# Patient Record
Sex: FEMALE | Race: BLACK OR AFRICAN AMERICAN | NOT HISPANIC OR LATINO | Employment: UNEMPLOYED | URBAN - METROPOLITAN AREA
[De-identification: names, ages, dates, MRNs, and addresses within clinical notes are randomized per-mention and may not be internally consistent; named-entity substitution may affect disease eponyms.]

---

## 2017-01-05 ENCOUNTER — TELEPHONE (OUTPATIENT)
Dept: INTERNAL MEDICINE | Facility: CLINIC | Age: 32
End: 2017-01-05

## 2017-01-05 NOTE — TELEPHONE ENCOUNTER
Called patient and discussed labs and or test results. Patient expressed understanding and had the opportunity to ask questions. Any questions were answered. See meds, orders, follow up and instructions sections of encounter.    Specific issues include:  Low FE - see Rx (take w/ vit C)    FU w/ PCP in 3 months

## 2017-01-12 ENCOUNTER — HOSPITAL ENCOUNTER (OUTPATIENT)
Dept: RADIOLOGY | Facility: OTHER | Age: 32
Discharge: HOME | End: 2017-01-12
Attending: OBSTETRICS & GYNECOLOGY
Payer: MEDICAID

## 2017-01-12 ENCOUNTER — OFFICE VISIT (OUTPATIENT)
Dept: OBSTETRICS AND GYNECOLOGY | Facility: CLINIC | Age: 32
End: 2017-01-12
Attending: OBSTETRICS & GYNECOLOGY
Payer: MEDICAID

## 2017-01-12 VITALS
WEIGHT: 123.25 LBS | DIASTOLIC BLOOD PRESSURE: 68 MMHG | BODY MASS INDEX: 20.54 KG/M2 | HEIGHT: 65 IN | SYSTOLIC BLOOD PRESSURE: 108 MMHG

## 2017-01-12 DIAGNOSIS — N83.209 CYST OF OVARY, UNSPECIFIED LATERALITY: ICD-10-CM

## 2017-01-12 DIAGNOSIS — Z30.2 STERILIZATION: Primary | ICD-10-CM

## 2017-01-12 PROCEDURE — 99213 OFFICE O/P EST LOW 20 MIN: CPT | Mod: S$PBB,,, | Performed by: OBSTETRICS & GYNECOLOGY

## 2017-01-12 PROCEDURE — 99999 PR PBB SHADOW E&M-EST. PATIENT-LVL III: CPT | Mod: PBBFAC,,, | Performed by: OBSTETRICS & GYNECOLOGY

## 2017-01-12 NOTE — MR AVS SNAPSHOT
[facility] - OB/GYN [address]  [address]  [facility] [address]  Phone: [phone]  Fax: [phone]                  Clayton Duarte   2017 9:30 AM   Office Visit    Description:  Female : 1985   Provider:  Ira Santos MD   Department:  [facility] - OB/GYN [address]           Reason for Visit     Follow-up                To Do List           Future Appointments        Provider Department Dept Phone    2017 9:30 AM Ira Santos MD [facility] OB/GYN [address] [phone]    2017 10:30 AM [facility] USOP2 [facility] [phone]    2017 9:00 AM Lisa Aguayo MD [facility] Cardiology [phone]      Goals (5 Years of Data)     None      [facility]     [facility] Nurse Care [facility] -  Assistance  Registered nurses in the [facility] provide clinical advisement, health education, appointment booking, and other advisory services.  Call for this free service at [phone].             Medications           Message regarding Medications     Verify the changes and/or additions to your medication regime listed below are the same as discussed with your clinician today.  If any of these changes or additions are incorrect, please notify your healthcare provider.             Verify that the below list of medications is an accurate representation of the medications you are currently taking.  If none reported, the list may be blank. If incorrect, please contact your healthcare provider. Carry this list with you in case of emergency.           Current Medications     aspirin 81 MG Chew Take 81 mg by mouth once daily.    ferrous gluconate (FERGON) 325 MG Tab Take 1 tablet (325 mg total) by mouth 2 (two) times daily with meals.    fluticasone (FLONASE) 50 mcg/actuation nasal spray 2 sprays by Each Nare route once daily.    ibuprofen (ADVIL,MOTRIN) 600 MG tablet Take 1 tablet (600 mg total) by mouth every 6 (six) hours  "as needed for Pain.    naproxen sodium (ANAPROX) 550 MG tablet Take 1 tablet (550 mg total) by mouth 2 (two) times daily with meals.           Clinical Reference Information           Vital Signs - Last Recorded  Most recent update: 1/12/2017  9:24 AM by Maki Mendez MA    BP Ht Wt LMP BMI    108/68 5' 5" (1.651 m) 55.9 kg (123 lb 3.8 oz) 01/01/2017 20.51 kg/m2      Blood Pressure          Most Recent Value    BP  108/68      Allergies as of 1/12/2017     Bananas [Banana]    Peanut Butter Flavor      Immunizations Administered on Date of Encounter - 1/12/2017     None      "

## 2017-01-13 ENCOUNTER — TELEPHONE (OUTPATIENT)
Dept: OBSTETRICS AND GYNECOLOGY | Facility: CLINIC | Age: 32
End: 2017-01-13

## 2017-01-13 ENCOUNTER — PATIENT MESSAGE (OUTPATIENT)
Dept: CARDIOLOGY | Facility: CLINIC | Age: 32
End: 2017-01-13

## 2017-01-13 DIAGNOSIS — Z01.818 PREOPERATIVE CLEARANCE: Primary | ICD-10-CM

## 2017-01-13 NOTE — PROGRESS NOTES
Subjective:       Patient ID: Clayton Duarte is a 31 y.o. female.    Chief Complaint:  Follow-up (ER)      History of Present Illness  HPI  Patient presents today for follow up from ED. She went to ED with irregular bleeding and her IUD was removed. An ultrasound was done which showed an ovarian cyst. Patient does not report any pain or any irregular bleeding now. Up until this visit to ED she had not had any problems with her IUD. Patient desires BTL.     GYN & OB History  Patient's last menstrual period was 2017.   Date of Last Pap: 2016    OB History    Para Term  AB SAB TAB Ectopic Multiple Living   3 3 3       3      # Outcome Date GA Lbr Jose Alberto/2nd Weight Sex Delivery Anes PTL Lv   3 Term 13 39w2d 103:30 / 00:23 3.025 kg (6 lb 10.7 oz) F Vag-Spont EPI N Y      Birth Comments: 28    2 Term 09 39w0d 10:00 3.629 kg (8 lb) M Vag-Spont EPI N Y   1 Term 06 39w0d 09:00 3.033 kg (6 lb 11 oz) F Vag-Spont EPI N Y          Review of Systems  Review of Systems    ROS:  Constitutional: no weight loss, weight gain, fever, fatigue  Eyes:  No vision changes, glasses/contacts  ENT/Mouth: No ulcers, sinus problems, ears ringing, headache  Cardiovascular: No inability to lie flat, chest pain, exercise intolerance, swelling, heart palpitations  Respiratory: No wheezing, coughing blood, shortness of breath, or cough  Gastrointestinal: No diarrhea, bloody stool, nausea/vomiting, constipation, gas, hemorrhoids  Genitourinary: No blood in urine, painful urination, urgency of urination, frequency of urination, incomplete emptying, incontinence, abnormal bleeding, painful periods, heavy periods, vaginal discharge, vaginal odor, painful intercourse, sexual problems, bleeding after intercourse.  Musculoskeletal: No muscle weakness  Skin/Breast: No painful breasts, nipple discharge, masses, rash, ulcers  Neurological: No passing out, seizures, numbness, headache  Endocrine: No diabetes,  hypothyroid, hyperthyroid, hot flashes, hair loss, abnormal hair growth, ance  Psychiatric: No depression, crying  Hematologic: No bruises, bleeding, swollen lymph nodes, anemia.       Objective:    Physical Exam     General- well developed, well nourished  Vulva- no masses, no lesions  Vagina-  no masses, no lesions  Cervix- no Cervical motion tenderness, no lesions  Uterus- normal size, nontender  Adnexa- nontender, no masses    Assessment:   1. Ovarian cyst  2. Contraceptive counseling     3. Sterilization           Plan:      Patient will need medical clearance  Lap BTL scheduled  Follow up ultrasound

## 2017-01-13 NOTE — TELEPHONE ENCOUNTER
----- Message from Ira Santos MD sent at 1/13/2017  2:33 PM CST -----  Looks like she will need to see someone else to get cleared. She will need to call and get an appointment. Please let her know.  ----- Message -----     From: Lisa Aguayo MD     Sent: 1/13/2017   8:39 AM       To: Ira Santos MD, #    Tish,  She hasnt been seen in over 1.5 years. She will need an appointment with whoever is available.  Thanks,  Lisa  ----- Message -----     From: Ira Santos MD     Sent: 1/12/2017   6:05 PM       To: Lisa Aguayo MD    This patient's IUD was removed in the ED. She desires permanent sterilization with Laparoscopic BTL. Can you clear her for surgery? She is scheduled for January 25 at [facility]. This will be outpatient surgery and the procedure should take less than 1 hour.   Best,  Colleen Santos MD  GYN

## 2017-01-13 NOTE — TELEPHONE ENCOUNTER
Advised pt needs to be seen by someone sooner to be cleared for surgery. Pt states she will call the office to schedule

## 2017-01-19 ENCOUNTER — HOSPITAL ENCOUNTER (OUTPATIENT)
Dept: CARDIOLOGY | Facility: CLINIC | Age: 32
Discharge: HOME | End: 2017-01-19
Payer: MEDICAID

## 2017-01-19 ENCOUNTER — HOSPITAL ENCOUNTER (OUTPATIENT)
Dept: RADIOLOGY | Facility: OTHER | Age: 32
Discharge: HOME | End: 2017-01-19
Attending: OBSTETRICS & GYNECOLOGY
Payer: MEDICAID

## 2017-01-19 ENCOUNTER — OFFICE VISIT (OUTPATIENT)
Dept: OBSTETRICS AND GYNECOLOGY | Facility: CLINIC | Age: 32
End: 2017-01-19
Attending: OBSTETRICS & GYNECOLOGY
Payer: MEDICAID

## 2017-01-19 ENCOUNTER — TELEPHONE (OUTPATIENT)
Dept: OBSTETRICS AND GYNECOLOGY | Facility: CLINIC | Age: 32
End: 2017-01-19

## 2017-01-19 ENCOUNTER — OFFICE VISIT (OUTPATIENT)
Dept: CARDIOLOGY | Facility: CLINIC | Age: 32
End: 2017-01-19
Payer: MEDICAID

## 2017-01-19 ENCOUNTER — HOSPITAL ENCOUNTER (OUTPATIENT)
Dept: PREADMISSION TESTING | Facility: OTHER | Age: 32
Discharge: HOME | End: 2017-01-19
Attending: OBSTETRICS & GYNECOLOGY
Payer: MEDICAID

## 2017-01-19 VITALS
WEIGHT: 123 LBS | SYSTOLIC BLOOD PRESSURE: 98 MMHG | HEIGHT: 65 IN | BODY MASS INDEX: 20.49 KG/M2 | DIASTOLIC BLOOD PRESSURE: 64 MMHG

## 2017-01-19 VITALS
DIASTOLIC BLOOD PRESSURE: 65 MMHG | OXYGEN SATURATION: 100 % | BODY MASS INDEX: 20.49 KG/M2 | HEIGHT: 65 IN | SYSTOLIC BLOOD PRESSURE: 110 MMHG | TEMPERATURE: 98 F | WEIGHT: 123 LBS | HEART RATE: 83 BPM

## 2017-01-19 VITALS
SYSTOLIC BLOOD PRESSURE: 112 MMHG | DIASTOLIC BLOOD PRESSURE: 63 MMHG | BODY MASS INDEX: 20.64 KG/M2 | HEART RATE: 92 BPM | HEIGHT: 65 IN | WEIGHT: 123.88 LBS

## 2017-01-19 DIAGNOSIS — Z95.1 S/P CABG (CORONARY ARTERY BYPASS GRAFT): ICD-10-CM

## 2017-01-19 DIAGNOSIS — G89.18 POST-OP PAIN: Primary | ICD-10-CM

## 2017-01-19 DIAGNOSIS — G89.18 POST-OP PAIN: ICD-10-CM

## 2017-01-19 DIAGNOSIS — I25.42 CORONARY ARTERY DISSECTION: ICD-10-CM

## 2017-01-19 DIAGNOSIS — Z01.818 PREOPERATIVE CLEARANCE: ICD-10-CM

## 2017-01-19 DIAGNOSIS — Z01.818 PREOPERATIVE CLEARANCE: Primary | ICD-10-CM

## 2017-01-19 DIAGNOSIS — Z30.2 STERILIZATION: ICD-10-CM

## 2017-01-19 PROCEDURE — 93010 ELECTROCARDIOGRAM REPORT: CPT | Mod: S$PBB,,, | Performed by: INTERNAL MEDICINE

## 2017-01-19 PROCEDURE — 76830 TRANSVAGINAL US NON-OB: CPT | Mod: 26,,, | Performed by: RADIOLOGY

## 2017-01-19 PROCEDURE — 99999 PR PBB SHADOW E&M-EST. PATIENT-LVL II: CPT | Mod: PBBFAC,,, | Performed by: OBSTETRICS & GYNECOLOGY

## 2017-01-19 PROCEDURE — 99213 OFFICE O/P EST LOW 20 MIN: CPT | Mod: PBBFAC | Performed by: INTERNAL MEDICINE

## 2017-01-19 PROCEDURE — 76856 US EXAM PELVIC COMPLETE: CPT | Mod: 26,,, | Performed by: RADIOLOGY

## 2017-01-19 PROCEDURE — 99999 PR PBB SHADOW E&M-EST. PATIENT-LVL III: CPT | Mod: PBBFAC,,, | Performed by: INTERNAL MEDICINE

## 2017-01-19 PROCEDURE — 93005 ELECTROCARDIOGRAM TRACING: CPT | Mod: PBBFAC | Performed by: INTERNAL MEDICINE

## 2017-01-19 PROCEDURE — 99214 OFFICE O/P EST MOD 30 MIN: CPT | Mod: S$PBB,,, | Performed by: INTERNAL MEDICINE

## 2017-01-19 PROCEDURE — 99213 OFFICE O/P EST LOW 20 MIN: CPT | Mod: S$PBB,,, | Performed by: OBSTETRICS & GYNECOLOGY

## 2017-01-19 RX ORDER — FAMOTIDINE 20 MG/1
20 TABLET, FILM COATED ORAL
Status: CANCELLED | OUTPATIENT
Start: 2017-01-19 | End: 2017-01-19

## 2017-01-19 RX ORDER — NAPROXEN SODIUM 550 MG/1
TABLET ORAL
Qty: 20 TABLET | Refills: 0
Start: 2017-01-19 | End: 2017-02-24

## 2017-01-19 RX ORDER — SODIUM CHLORIDE, SODIUM LACTATE, POTASSIUM CHLORIDE, CALCIUM CHLORIDE 600; 310; 30; 20 MG/100ML; MG/100ML; MG/100ML; MG/100ML
INJECTION, SOLUTION INTRAVENOUS CONTINUOUS
Status: CANCELLED | OUTPATIENT
Start: 2017-01-19

## 2017-01-19 RX ORDER — PREGABALIN 75 MG/1
150 CAPSULE ORAL ONCE
Status: CANCELLED | OUTPATIENT
Start: 2017-01-19 | End: 2017-01-19

## 2017-01-19 RX ORDER — NAPROXEN SODIUM 550 MG/1
TABLET ORAL
Qty: 20 TABLET | Refills: 0 | Status: SHIPPED | OUTPATIENT
Start: 2017-01-19 | End: 2017-01-19

## 2017-01-19 RX ORDER — MIDAZOLAM HYDROCHLORIDE 5 MG/ML
3 INJECTION INTRAMUSCULAR; INTRAVENOUS ONCE AS NEEDED
Status: CANCELLED | OUTPATIENT
Start: 2017-01-19 | End: 2017-01-19

## 2017-01-19 RX ORDER — ONDANSETRON 4 MG/1
8 TABLET, ORALLY DISINTEGRATING ORAL EVERY 8 HOURS PRN
Status: CANCELLED | OUTPATIENT
Start: 2017-01-19

## 2017-01-19 RX ORDER — LIDOCAINE HYDROCHLORIDE 10 MG/ML
1 INJECTION, SOLUTION EPIDURAL; INFILTRATION; INTRACAUDAL; PERINEURAL ONCE
Status: CANCELLED | OUTPATIENT
Start: 2017-01-19 | End: 2017-01-19

## 2017-01-19 RX ORDER — IBUPROFEN 200 MG
800 TABLET ORAL EVERY 8 HOURS
Status: CANCELLED | OUTPATIENT
Start: 2017-01-20

## 2017-01-19 RX ORDER — KETOROLAC TROMETHAMINE 30 MG/ML
30 INJECTION, SOLUTION INTRAMUSCULAR; INTRAVENOUS EVERY 6 HOURS
Status: CANCELLED | OUTPATIENT
Start: 2017-01-19 | End: 2017-01-20

## 2017-01-19 NOTE — TELEPHONE ENCOUNTER
----- Message from Sharon Negrete sent at 1/19/2017 10:26 AM CST -----  Contact: [facility]  _x  1st Request  _  2nd Request  _  3rd Request        Who: [facility]    Why:states they need clarification on the dosage of Rx naproxen sodium (ANAPROX DS) 550 MG tablet for the patient    What Number to Call Back: [phone]    When to Expect a call back: (Before the end of the day)   -- if call after 3:00 call back will be tomorrow.

## 2017-01-19 NOTE — DISCHARGE INSTRUCTIONS
PRE-ADMIT TESTING -  [phone]    [address]  [facility]        OUTPATIENT SURGERY UNIT - [phone]    Your surgery has been scheduled at [facility]. We are pleased to have the opportunity to serve you. For Further Information please call [phone].    On the day of surgery please report to the Information Desk on the 1st floor.    CONTACT YOUR PHYSICIAN'S OFFICE THE DAY PRIOR TO YOUR SURGERY TO OBTAIN YOUR ARRIVAL TIME.     The evening before surgery do not eat anything after 9 p.m. ( this includes hard candy, chewing gum and mints).  You may have GATORADE, POWERADE AND WATER FROM 9 p.m. until leaving home to come to the hospital.   DO NOT DRINK ANY LIQUIDS ON THE WAY TO THE HOSPITAL.     SPECIAL MEDICATION INSTRUCTIONS: TAKE medications checked off by the Anesthesiologist on your Medication List.    Angiogram Patients: Take medications as instructed by your physician, including aspirin.     Surgery Patients:    If you take ASPIRIN - Your PHYSICIAN/SURGEON will need to inform you IF/OR when you need to stop taking aspirin prior to your surgery.     Do Not take any medications containing IBUPROFEN.  Do Not Wear any make-up or dark nail polish   (especially eye make-up) to surgery. If you come to surgery with makeup on you will be required to remove the makeup or nail polish.    Do not shave your surgical area at least 5 days prior to your surgery. The surgical prep will be performed at the hospital according to Infection Control regulations.    Leave all valuables at home.   Do Not wear any jewelry or watches, including any metal in body piercings.  Contact Lens must be removed before surgery. Either do not wear the contact lens or bring a case and solution for storage.  Please bring a container for eyeglasses or dentures as required.  Bring any paperwork your physician has provided, such as consent forms,  history and physicals, doctor's orders, etc.   Bring comfortable  clothes that are loose fitting to wear upon discharge. Take into consideration the type of surgery being performed.  Maintain your diet as advised per your physician the day prior to surgery.      Adequate rest the night before surgery is advised.   Park in the Parking lot behind the hospital or in the [facility] across the street from the parking lot. Parking is complimentary.  If you will be discharged the same day as your procedure, please arrange for a responsible adult to drive you home or to accompany you if traveling by taxi.   YOU WILL NOT BE PERMITTED TO DRIVE OR TO LEAVE THE HOSPITAL ALONE AFTER SURGERY.   It is strongly recommended that you arrange for someone to remain with you for the first 24 hrs following your surgery.       Thank you for your cooperation.  The Staff of [facility].        Bathing Instructions                                                                 Please shower the evening before and morning of your procedure with    ANTIBACTERIAL SOAP. ( DIAL, etc )  Concentrate on the surgical area   for at least 3 minutes and rinse completely. Dry off as usual.                            No lotions or creams.

## 2017-01-19 NOTE — PROGRESS NOTES
"Subjective:       Patient ID: Clayton Duarte is a 31 y.o. female.    Chief Complaint: Pre-op Exam (tubaligation)    HPI   Ms. Duarte is a 31 year old female with a past medical history of spontaneous LAD/LCx dissection s/p 2xCABG (LIMA to LAD and SVG to OM1) who presents for preop clearance. The patient states that she is to undergo a tubal ligation on February 10th. She states that she has chronic SOB, with occasional chest pressure and palpitations since her surgery. She stated that she is unable to climb a flight of stairs or walk a block without being significantly out of breath. She denies any orthopnea, LE edema or PND. She had a treadmill stress echo in 2014 that did not identify any stress related wall motion abnormalities, but she also did not reach target heart rate. After further questioning, it does seem that the patient is not very active and does not have a regular exercise regimen. She was not referred to cardiac rehab after her surgery.    Review of Systems   Constitutional: Negative for activity change, chills, fatigue and fever.   HENT: Negative.    Respiratory: Positive for shortness of breath. Negative for cough.    Cardiovascular: Positive for chest pain and palpitations. Negative for leg swelling.   Gastrointestinal: Negative for abdominal distention, diarrhea, nausea and vomiting.   Genitourinary: Negative for difficulty urinating, dysuria and urgency.   Musculoskeletal: Negative for arthralgias and myalgias.   Skin: Negative for color change and rash.   Neurological: Negative for dizziness, weakness, light-headedness and numbness.       Objective:     Vitals:    01/19/17 1350   BP: 112/63   BP Location: Left arm   Patient Position: Sitting   BP Method: Automatic   Pulse: 92   Weight: 56.2 kg (123 lb 14.4 oz)   Height: 5' 5" (1.651 m)       Physical Exam   Constitutional: Vital signs are normal. She appears well-developed and well-nourished. She is active and cooperative.  Non-toxic " appearance. No distress.   HENT:   Head: Normocephalic and atraumatic.   Mouth/Throat: Uvula is midline, oropharynx is clear and moist and mucous membranes are normal.   Eyes: Conjunctivae and lids are normal. Pupils are equal, round, and reactive to light.   Neck: Normal carotid pulses and no JVD present. Carotid bruit is not present.   Cardiovascular: Normal rate, regular rhythm, S1 normal, S2 normal, normal heart sounds and normal pulses.  Exam reveals no gallop.    No murmur heard.  Pulmonary/Chest: Effort normal and breath sounds normal. No accessory muscle usage. No respiratory distress. She has no decreased breath sounds. She has no wheezes. She has no rhonchi. She has no rales.   Abdominal: Soft. Bowel sounds are normal. She exhibits no distension and no mass. There is no tenderness.   Neurological: She is alert.   Skin: Skin is warm, dry and intact.       Assessment:       1. Preoperative clearance    2. Coronary artery dissection    3. Post-op pain    4. S/P CABG (coronary artery bypass graft)        Plan:     1.) Preoperative clearance  --patient does have dyspnea on exertion and occasional anginal chest pains  --will order PET stress to determine cardiac perfusion and determine if there are any defects as her last stress test was negative, but she failed to reach target heart rate    2.) Coronary Artery Dissection  --s/p CABG  --patient is very deconditioned, so will refer to cardiac rehab    3.) Post-op pain  --during the intake, her medications were reconciled and the naproxen was discontinued accidentally.  --I replaced the prescription for naproxen within the computer (I did not send it electronically to any pharmacy) under this diagnosis and called the pharmacy to ensure that the prescription was still present for the patient to .    Discussed with Dr. Debbi Crum MD  Cardiology PGY4    ADDENDUM: PET Scan will not be covered by the patient's insurance. Will order SPECT stress  instead.

## 2017-01-19 NOTE — MR AVS SNAPSHOT
[facility] OB/GYN [address]  [address]  [place] [address]  Phone: [phone]  Fax: [phone]                  Clayton Duarte   2017 8:45 AM   Office Visit    Description:  Female : 1985   Provider:  Ira Santos MD   Department:  [facility] OB/GYN [address]           Reason for Visit     Pre-op Exam                To Do List           Future Appointments        Provider Department Dept Phone    2017 9:30 AM PRE-ADMIT, [facility] [phone]    2017 12:00 PM [facility] USOP2 [facility] [phone]    2017 2:00 PM EKG, APPT [facility] [phone]    2017 3:00 PM lAexandra Crum MD [facility] Cardiology [phone]    2017 9:00 AM Lisa Aguayo MD [facility] Cardiology [phone]      Your Future Surgeries/Procedures     2017   Surgery with Ira Santos MD   [facility] ([facility])    [address]  [facility] [place] [facility] [address] [phone]              Goals (5 Years of Data)     None      [facility]     [facility] Nurse Care Line -  Assistance  Registered nurses in the [facility] provide clinical advisement, health education, appointment booking, and other advisory services.  Call for this free service at [phone].             Medications           Message regarding Medications     Verify the changes and/or additions to your medication regime listed below are the same as discussed with your clinician today.  If any of these changes or additions are incorrect, please notify your healthcare provider.             Verify that the below list of medications is an accurate representation of the medications you are currently taking.  If none reported, the list may be blank. If incorrect, please contact your healthcare provider. Carry this list with you in case of emergency.           Current Medications      "aspirin 81 MG Chew Take 81 mg by mouth once daily.    ferrous gluconate (FERGON) 325 MG Tab Take 1 tablet (325 mg total) by mouth 2 (two) times daily with meals.    fluticasone (FLONASE) 50 mcg/actuation nasal spray 2 sprays by Each Nare route once daily.    ibuprofen (ADVIL,MOTRIN) 600 MG tablet Take 1 tablet (600 mg total) by mouth every 6 (six) hours as needed for Pain.    naproxen sodium (ANAPROX) 550 MG tablet Take 1 tablet (550 mg total) by mouth 2 (two) times daily with meals.           Clinical Reference Information           Vital Signs - Last Recorded  Most recent update: 1/19/2017  8:58 AM by Maki Mendez MA    BP Ht Wt LMP BMI    98/64 5' 5" (1.651 m) 55.8 kg (123 lb 0.3 oz) 01/01/2017 20.47 kg/m2      Blood Pressure          Most Recent Value    BP  98/64      Allergies as of 1/19/2017     Bananas [Banana]    Peanut Butter Flavor      Immunizations Administered on Date of Encounter - 1/19/2017     None      "

## 2017-01-19 NOTE — PROGRESS NOTES
"          History & Physical            OBGYN    C.C Pre-op Exam      HPI : Clayton Duarte is a 31 y.o. female  for evaluation and discussion of treatment options for Pre-op Exam  Patient desires permanent sterilization. She signed papers at last visit on 2017.  .     Past Medical History   Diagnosis Date    Abnormal Pap smear      LGSIL , ASCUS 2009    Abnormal Pap smear of cervix     Anemia     Coronary artery dissection 2013    History of Abnormal Pap smear     Postpartum depression     Spinal headache complicating labor and delivery, postpartum condition 2013     Past Surgical History   Procedure Laterality Date    [place] tooth extraction      Coronary artery bypass graft       tanner to lad, svg OM1    Cardiac surgery       Family History   Problem Relation Age of Onset    Cancer Neg Hx     Heart attack Neg Hx     Heart disease Neg Hx     Breast cancer Neg Hx     Ovarian cancer Neg Hx     Colon cancer Neg Hx      Social History   Substance Use Topics    Smoking status: Never Smoker    Smokeless tobacco: Never Used    Alcohol use No     OB History    Para Term  AB SAB TAB Ectopic Multiple Living   3 3 3       3      # Outcome Date GA Lbr Jose Alberto/2nd Weight Sex Delivery Anes PTL Lv   3 Term 13 39w2d 103:30 / 00:23 3.025 kg (6 lb 10.7 oz) F Vag-Spont EPI N Y      Birth Comments: 28    2 Term 09 39w0d 10:00 3.629 kg (8 lb) M Vag-Spont EPI N Y   1 Term 06 39w0d 09:00 3.033 kg (6 lb 11 oz) F Vag-Spont EPI N Y          Visit Vitals    BP 98/64    Ht 5' 5" (1.651 m)    Wt 55.8 kg (123 lb 0.3 oz)    LMP 2017    BMI 20.47 kg/m2       ROS:    GENERAL: Denies weight gain or weight loss. Feeling well overall.   SKIN: Denies rash or lesions.   HEAD: Denies head injury or headache.   NODES: Denies enlarged lymph nodes.   CHEST: Denies chest pain or shortness of breath.   CARDIOVASCULAR: Denies palpitations or left sided chest pain. "   ABDOMEN: No abdominal pain, constipation, diarrhea, nausea, vomiting or rectal bleeding.   URINARY: No frequency, dysuria, hematuria, or burning on urination.  REPRODUCTIVE: See HPI.   BREASTS: The patient performs breast self-examination and denies pain, lumps, or nipple discharge.   HEMATOLOGIC: No easy bruisability or excessive bleeding with the exception of menstrual cycles.  MUSCULOSKELETAL: Denies joint pain or swelling.   NEUROLOGIC: Denies syncope or weakness.   PSYCHIATRIC: Denies depression, anxiety or mood swings.    PHYSICAL EXAM:    APPEARANCE: Well nourished, well developed, in no acute distress.  AFFECT: WNL, alert and oriented x 3  SKIN: No acne or hirsutism  NECK: Neck symmetric without masses or thyromegaly  NODES: No inguinal, cervical, axillary, or femoral lymph node enlargement  CHEST: Good respiratory effect  ABDOMEN: Soft.  No tenderness or masses.  No hepatosplenomegaly.  No hernias.  PELVIC: Normal external genitalia without lesions.  Normal hair distribution.  Adequate perineal body, normal urethral meatus.  Vagina moist and well rugated without lesions or discharge.  Cervix pink, without lesions, discharge or tenderness.  No significant cystocele or rectocele.  Bimanual exam shows uterus to be normal size, regular, mobile and nontender.  Adnexa without masses or tenderness.    EXTREMITIES: No edema.    ASSESSMENT & PLAN:    1. Sterilization  2. Post op pain      I have discussed the risks, benefits, indications, and alternatives of the procedure in detail.  The patient verbalizes her understanding.  All questions answered.  Consents signed.  The patient agrees to proceed to proceed as planned.

## 2017-01-19 NOTE — IP AVS SNAPSHOT
[facility] [place] [facility] ([place])  [address] [place], [place] [address]  Phone: [phone]           Patient Discharge Instructions    Our goal is to set you up for success. This packet includes information on your condition, medications, and your home care. It will help you to care for yourself so you don't get sicker.     Please ask your nurse if you have any questions.        There are many details to remember when preparing for your surgery. Here is what you will need to do, please ask your nurse if there are more specific instructions and if you have any questions:    1. 24 hours before procedure Do not smoke or drink alcoholic beverages 24 hours prior to your procedure    2. Eating before procedure Do not eat or drink anything 8 hours before your procedure - this includes gum, mints, and candy.     3. Day of procedure Please remove all jewelry for the procedure. If you wear contact lenses, dentures, hearing aids or glasses, bring a container to put them in during your surgery and give to a family member for safekeeping.  If your doctor has scheduled you for an overnight stay, bring a small overnight bag with any personal items that you need.    4. After procedure Make arrangements in advance for transportation home by a responsible adult. It is not safe to drive a vehicle during the 24 hours following surgery.     PLEASE NOTE: You may be contacted the day before your surgery to confirm your surgery date and arrival time. The Surgery schedule has many variables which may affect the time of your surgery case. Family members should be available if your surgery time changes.                [facility]  Unless otherwise directed by your provider, please contact [facility], our nurse care line that is available for 24/7 assistance.     [phone] (toll-[phone])    Registered nurses in the [facility] provide clinical advisement, health education, appointment booking, and other  advisory services.                    ** Verify the list of medication(s) below is accurate and up to date. Carry this with you in case of emergency. If your medications have changed, please notify your healthcare provider.             Medication List      TAKE these medications        Additional Info                      ibuprofen 600 MG tablet   Commonly known as:  ADVIL,MOTRIN   Quantity:  60 tablet   Refills:  0   Dose:  600 mg    Instructions:  Take 1 tablet (600 mg total) by mouth every 6 (six) hours as needed for Pain.     Begin Date    AM    Noon    PM    Bedtime       * naproxen sodium 550 MG tablet   Commonly known as:  ANAPROX   Quantity:  14 tablet   Refills:  0   Dose:  550 mg    Instructions:  Take 1 tablet (550 mg total) by mouth 2 (two) times daily with meals.     Begin Date    AM    Noon    PM    Bedtime       * naproxen sodium 550 MG tablet   Commonly known as:  ANAPROX DS   Quantity:  20 tablet   Refills:  0    Instructions:  DO NOT TAKE OTHER ANTI-INFLAMMATORY AGENTS SUCH AS ASPIRIN, IBUPROPHEN, PAMPRIN, ETC. AT THE SAME TIME     Begin Date    AM    Noon    PM    Bedtime       * Notice:  This list has 2 medication(s) that are the same as other medications prescribed for you. Read the directions carefully, and ask your doctor or other care provider to review them with you.               Please bring to all follow up appointments:    1. A copy of your discharge instructions.  2. All medicines you are currently taking in their original bottles.  3. Identification and insurance card.    Please arrive 15 minutes ahead of scheduled appointment time.    Please call 24 hours in advance if you must reschedule your appointment and/or time.        Your Scheduled Appointments     Jan 19, 2017 12:00 PM CST   Us Non Ob with BAP USOP2   [facility]    [address]  [facility] [address]   [phone]            Jan 19, 2017  2:00 PM CST   EKG with EKG, APPT   [facility]    [address]  [facility] [address]   [phone]            Jan 19, 2017  3:00 PM CST   Established Patient Visit with MD Reginaldo Bess - Cardiology ([facility] )    [address]  [facility] [address]   [phone]            Feb 20, 2017  9:30 AM CST   Post OP with Ira Santos MD   [facility] - OB/GYN [address] ([facility])    [address]  [facility] [address]   [phone]            Feb 24, 2017  9:00 AM CST   Consult with MD Reginaldo Walsh - Cardiology ([facility] )    [address]  [place] [address] [phone]              Your Future Surgeries/Procedures     Jan 25, 2017   Surgery with Ira Santos MD   [facility] ([facility])    [address] [facility] [id number] [phone]                  Discharge Instructions       PRE-ADMIT TESTING -  [phone]    [address] [place] [address]        OUTPATIENT SURGERY UNIT - [phone]    Your surgery has been scheduled at [facility]. We are pleased to have the opportunity to serve you. For Further Information please call [phone].    On the day of surgery please report to the Information Desk on the 1st floor.    CONTACT YOUR PHYSICIAN'S OFFICE THE DAY PRIOR TO YOUR SURGERY TO OBTAIN YOUR ARRIVAL TIME.     The evening before surgery do not eat anything after 9 p.m. ( this includes hard candy, chewing gum and mints).  You may have GATORADE, POWERADE AND WATER FROM 9 p.m. until leaving home to come to the hospital.   DO NOT DRINK ANY LIQUIDS ON THE WAY TO THE HOSPITAL.     SPECIAL MEDICATION INSTRUCTIONS: TAKE medications checked off by the Anesthesiologist on your Medication List.    Angiogram Patients: Take medications as instructed by your physician, including aspirin.     Surgery Patients:    If you take ASPIRIN - Your PHYSICIAN/SURGEON will need to inform you IF/OR  when you need to stop taking aspirin prior to your surgery.     Do Not take any medications containing IBUPROFEN.  Do Not Wear any make-up or dark nail polish   (especially eye make-up) to surgery. If you come to surgery with makeup on you will be required to remove the makeup or nail polish.    Do not shave your surgical area at least 5 days prior to your surgery. The surgical prep will be performed at the hospital according to Infection Control regulations.    Leave all valuables at home.   Do Not wear any jewelry or watches, including any metal in body piercings.  Contact Lens must be removed before surgery. Either do not wear the contact lens or bring a case and solution for storage.  Please bring a container for eyeglasses or dentures as required.  Bring any paperwork your physician has provided, such as consent forms,  history and physicals, doctor's orders, etc.   Bring comfortable clothes that are loose fitting to wear upon discharge. Take into consideration the type of surgery being performed.  Maintain your diet as advised per your physician the day prior to surgery.      Adequate rest the night before surgery is advised.   Park in the Parking lot behind the hospital or in the [employer] [facility] across the street from the parking lot. Parking is complimentary.  If you will be discharged the same day as your procedure, please arrange for a responsible adult to drive you home or to accompany you if traveling by taxi.   YOU WILL NOT BE PERMITTED TO DRIVE OR TO LEAVE THE HOSPITAL ALONE AFTER SURGERY.   It is strongly recommended that you arrange for someone to remain with you for the first 24 hrs following your surgery.       Thank you for your cooperation.  The Staff of [facility].        Bathing Instructions                                                                 Please shower the evening before and morning of your procedure with    ANTIBACTERIAL SOAP. ( DIAL, etc )   "Concentrate on the surgical area   for at least 3 minutes and rinse completely. Dry off as usual.                            No lotions or creams.                        Admission Information     Date & Time Provider Department CSN    1/19/2017  9:30 AM Ira Santos MD [facility] [id number]      Care Providers     Provider Role Specialty Primary office phone    Ira Santos MD Attending Provider Obstetrics [phone]      Your Vitals Were     BP Pulse Temp Height Weight Last Period    110/65 83 98.3 °F (36.8 °C) (Oral) 5' 5" (1.651 m) 55.8 kg (123 lb) 01/01/2017    SpO2 BMI             100% 20.47 kg/m2         Recent Lab Values     No lab values to display.      Allergies as of 1/19/2017        Reactions    Bananas [Banana] Itching    Peanut Butter Flavor       Advance Directives     An advance directive is a document which, in the event you are no longer able to make decisions for yourself, tells your healthcare team what kind of treatment you do or do not want to receive, or who you would like to make those decisions for you.  If you do not currently have an advance directive, [facility] encourages you to create one.  For more information call:  [phone]), [phone] ([phone]),  or log on to [email or web address].        Language Assistance Services     ATTENTION: Language assistance services are available, free of charge. Please call [phone].      ATENCIÓN: Si habla español, tiene a santos disposición servicios gratuitos de asistencia lingüística. Llame al [phone].     CHÚ Ý: N?u b?n nói Ti?ng Vi?t, có các d?ch v? h? tr? ngôn ng? mi?n phí dành cho b?n. G?i s? [phone].         [facility] complies with applicable Federal civil rights laws and does not discriminate on the basis of race, color, national origin, age, disability, or sex.        "

## 2017-01-19 NOTE — MR AVS SNAPSHOT
[facility] - Cardiology  [address]  [facility] [address]  Phone: [phone]                  Clayton Duarte   2017 3:00 PM   Office Visit    Description:  Female : 1985   Provider:  Alexandra Crum MD   Department:  [facility] - Cardiology           Reason for Visit     Pre-op Exam           Diagnoses this Visit        Comments    Coronary artery dissection    -  Primary            To Do List           Future Appointments        Provider Department Dept Phone    2017 9:30 AM Ira Santos MD [facility] - OB/GYN [address] [phone]    2017 9:00 AM Lisa Aguayo MD [facility] Cardiology [phone]      Your Future Surgeries/Procedures     Feb 10, 2017   Surgery with Ira Santos MD   [facility] ([facility])    [address]  [facility] [address] [phone]              Goals (5 Years of Data)     None      [facility]     [facility] -  Assistance  Registered nurses in the [facility] provide clinical advisement, health education, appointment booking, and other advisory services.  Call for this free service at [phone].             Medications           Message regarding Medications     Verify the changes and/or additions to your medication regime listed below are the same as discussed with your clinician today.  If any of these changes or additions are incorrect, please notify your healthcare provider.        STOP taking these medications     ibuprofen (ADVIL,MOTRIN) 600 MG tablet Take 1 tablet (600 mg total) by mouth every 6 (six) hours as needed for Pain.    naproxen sodium (ANAPROX DS) 550 MG tablet DO NOT TAKE OTHER ANTI-INFLAMMATORY AGENTS SUCH AS ASPIRIN, IBUPROPHEN, PAMPRIN, ETC. AT THE SAME TIME    naproxen sodium (ANAPROX) 550 MG tablet Take 1 tablet (550 mg total) by mouth 2 (two) times daily with meals.           Verify that the below list of medications is an  "accurate representation of the medications you are currently taking.  If none reported, the list may be blank. If incorrect, please contact your healthcare provider. Carry this list with you in case of emergency.           Current Medications            Clinical Reference Information           Vital Signs - Last Recorded  Most recent update: 1/19/2017  1:53 PM by Bijal Barksdale MA    BP Pulse Ht Wt LMP BMI    112/63 (BP Location: Left arm, Patient Position: Sitting, BP Method: Automatic) 92 5' 5" (1.651 m) 56.2 kg (123 lb 14.4 oz) 01/01/2017 20.62 kg/m2      Blood Pressure          Most Recent Value    Right Arm BP - Sitting  112/59    Left Arm BP - Sitting  112/63    BP  112/63      Allergies as of 1/19/2017     Bananas [Banana]    Peanut Butter Flavor      Immunizations Administered on Date of Encounter - 1/19/2017     None      Orders Placed During Today's Visit     Future Labs/Procedures Expected by Expires    Cardiac PET Scan Stress  As directed 1/19/2018    Cardiac Rehab Phase II  As directed 1/19/2018      "

## 2017-01-20 ENCOUNTER — DOCUMENTATION ONLY (OUTPATIENT)
Dept: CARDIAC REHAB | Facility: CLINIC | Age: 32
End: 2017-01-20

## 2017-01-20 DIAGNOSIS — I25.42 CORONARY ARTERY DISSECTION: Primary | ICD-10-CM

## 2017-01-20 NOTE — PROGRESS NOTES
Patient was ordered to Phase II cardiac rehab 1-19-17.  Unfortunately the patient did not have an insurance coverable diagnosis and Medicaid does not cover the program.  EPIC order removed.

## 2017-01-24 ENCOUNTER — TELEPHONE (OUTPATIENT)
Dept: CARDIOLOGY | Facility: CLINIC | Age: 32
End: 2017-01-24

## 2017-01-24 NOTE — TELEPHONE ENCOUNTER
Left msg for pt to call our office back to reschedule SPECT appt to a different date due to department changes on schedule.

## 2017-02-08 DIAGNOSIS — R07.9 CHEST PAIN, UNSPECIFIED TYPE: Primary | ICD-10-CM

## 2017-02-08 DIAGNOSIS — Z01.810 PREOPERATIVE CARDIOVASCULAR EXAMINATION: ICD-10-CM

## 2017-02-08 NOTE — TELEPHONE ENCOUNTER
This pt is having surgery on Friday 2/10 and wanted to make sure she is cleared for surgery? Thanks

## 2017-02-08 NOTE — PROGRESS NOTES
"Received notice that patient's SPECT stress was denied. Called the insurance company who stated that since there were no "new" symptoms, they would not approve the MPI. Due to the fact that she has chest pains and has a surgery planned, we need to determine whether she has any evidence of ischemia. Will order an exercise stress echo and will attempt to plan it for tomorrow to prevent any delays.    Discussed with Dr. Debbi Crum MD  Cardiology PGY4  "

## 2017-02-09 ENCOUNTER — PATIENT MESSAGE (OUTPATIENT)
Dept: OBSTETRICS AND GYNECOLOGY | Facility: CLINIC | Age: 32
End: 2017-02-09

## 2017-02-09 RX ORDER — MEDROXYPROGESTERONE ACETATE 150 MG/ML
150 INJECTION, SUSPENSION INTRAMUSCULAR ONCE
Qty: 1 ML | Refills: 0 | Status: SHIPPED | OUTPATIENT
Start: 2017-02-09 | End: 2017-02-24

## 2017-02-10 ENCOUNTER — PATIENT MESSAGE (OUTPATIENT)
Dept: CARDIOLOGY | Facility: CLINIC | Age: 32
End: 2017-02-10

## 2017-02-10 ENCOUNTER — TELEPHONE (OUTPATIENT)
Dept: OBSTETRICS AND GYNECOLOGY | Facility: CLINIC | Age: 32
End: 2017-02-10

## 2017-02-10 DIAGNOSIS — N92.6 IRREGULAR MENSES: Primary | ICD-10-CM

## 2017-02-10 NOTE — TELEPHONE ENCOUNTER
Pt calling c/o 11 days late on cycle took UPT but it was negative. Pt would like to get blood work. Scheduled ot for beta

## 2017-02-14 ENCOUNTER — PATIENT MESSAGE (OUTPATIENT)
Dept: CARDIOLOGY | Facility: CLINIC | Age: 32
End: 2017-02-14

## 2017-02-15 ENCOUNTER — HOSPITAL ENCOUNTER (OUTPATIENT)
Dept: CARDIOLOGY | Facility: CLINIC | Age: 32
Discharge: HOME | End: 2017-02-15
Payer: MEDICAID

## 2017-02-15 ENCOUNTER — HOSPITAL ENCOUNTER (OUTPATIENT)
Facility: HOSPITAL | Age: 32
Discharge: HOME | End: 2017-02-16
Attending: EMERGENCY MEDICINE | Admitting: HOSPITALIST
Payer: MEDICAID

## 2017-02-15 DIAGNOSIS — R07.9 CHEST PAIN, UNSPECIFIED TYPE: ICD-10-CM

## 2017-02-15 DIAGNOSIS — R07.9 CHEST PAIN ON EXERTION: ICD-10-CM

## 2017-02-15 DIAGNOSIS — R07.9 CHEST PAIN, UNSPECIFIED TYPE: Primary | ICD-10-CM

## 2017-02-15 LAB
ALBUMIN SERPL BCP-MCNC: 4.3 G/DL
ALP SERPL-CCNC: 49 U/L
ALT SERPL W/O P-5'-P-CCNC: 14 U/L
ANION GAP SERPL CALC-SCNC: 8 MMOL/L
AST SERPL-CCNC: 22 U/L
B-HCG UR QL: NEGATIVE
BASOPHILS # BLD AUTO: 0.02 K/UL
BASOPHILS NFR BLD: 0.3 %
BILIRUB SERPL-MCNC: 0.1 MG/DL
BNP SERPL-MCNC: 34 PG/ML
BUN SERPL-MCNC: 11 MG/DL
CALCIUM SERPL-MCNC: 10.3 MG/DL
CHLORIDE SERPL-SCNC: 107 MMOL/L
CO2 SERPL-SCNC: 23 MMOL/L
CREAT SERPL-MCNC: 0.8 MG/DL
CTP QC/QA: YES
DIASTOLIC DYSFUNCTION: NO
DIFFERENTIAL METHOD: ABNORMAL
EOSINOPHIL # BLD AUTO: 0.1 K/UL
EOSINOPHIL NFR BLD: 1.8 %
ERYTHROCYTE [DISTWIDTH] IN BLOOD BY AUTOMATED COUNT: 18.1 %
EST. GFR  (AFRICAN AMERICAN): >60 ML/MIN/1.73 M^2
EST. GFR  (NON AFRICAN AMERICAN): >60 ML/MIN/1.73 M^2
ESTIMATED PA SYSTOLIC PRESSURE: 22.18
GLUCOSE SERPL-MCNC: 116 MG/DL
HCT VFR BLD AUTO: 32.2 %
HGB BLD-MCNC: 9.3 G/DL
INR PPP: 1
LYMPHOCYTES # BLD AUTO: 2.6 K/UL
LYMPHOCYTES NFR BLD: 37.2 %
MCH RBC QN AUTO: 21 PG
MCHC RBC AUTO-ENTMCNC: 28.9 %
MCV RBC AUTO: 73 FL
MITRAL VALVE MOBILITY: NORMAL
MITRAL VALVE REGURGITATION: NORMAL
MONOCYTES # BLD AUTO: 0.4 K/UL
MONOCYTES NFR BLD: 5.9 %
NEUTROPHILS # BLD AUTO: 3.9 K/UL
NEUTROPHILS NFR BLD: 54.7 %
PLATELET # BLD AUTO: 318 K/UL
PMV BLD AUTO: 9.8 FL
POTASSIUM SERPL-SCNC: 3.8 MMOL/L
PROT SERPL-MCNC: 8.1 G/DL
PROTHROMBIN TIME: 10.4 SEC
RBC # BLD AUTO: 4.42 M/UL
RETIRED EF AND QEF - SEE NOTES: 45 (ref 55–65)
SODIUM SERPL-SCNC: 138 MMOL/L
TRICUSPID VALVE REGURGITATION: NORMAL
TROPONIN I SERPL DL<=0.01 NG/ML-MCNC: <0.006 NG/ML
TROPONIN I SERPL DL<=0.01 NG/ML-MCNC: <0.006 NG/ML
WBC # BLD AUTO: 7.09 K/UL

## 2017-02-15 PROCEDURE — 93325 DOPPLER ECHO COLOR FLOW MAPG: CPT | Mod: 26,S$PBB,, | Performed by: INTERNAL MEDICINE

## 2017-02-15 PROCEDURE — 96361 HYDRATE IV INFUSION ADD-ON: CPT

## 2017-02-15 PROCEDURE — 99285 EMERGENCY DEPT VISIT HI MDM: CPT | Mod: ,,, | Performed by: EMERGENCY MEDICINE

## 2017-02-15 PROCEDURE — 96375 TX/PRO/DX INJ NEW DRUG ADDON: CPT

## 2017-02-15 PROCEDURE — 99285 EMERGENCY DEPT VISIT HI MDM: CPT | Mod: 25

## 2017-02-15 PROCEDURE — 85610 PROTHROMBIN TIME: CPT

## 2017-02-15 PROCEDURE — 81025 URINE PREGNANCY TEST: CPT | Performed by: EMERGENCY MEDICINE

## 2017-02-15 PROCEDURE — 84484 ASSAY OF TROPONIN QUANT: CPT | Mod: 91

## 2017-02-15 PROCEDURE — 25000003 PHARM REV CODE 250: Performed by: INTERNAL MEDICINE

## 2017-02-15 PROCEDURE — 96374 THER/PROPH/DIAG INJ IV PUSH: CPT

## 2017-02-15 PROCEDURE — 93351 STRESS TTE COMPLETE: CPT | Mod: 26,S$PBB,, | Performed by: INTERNAL MEDICINE

## 2017-02-15 PROCEDURE — 83880 ASSAY OF NATRIURETIC PEPTIDE: CPT

## 2017-02-15 PROCEDURE — 25500020 PHARM REV CODE 255: Performed by: EMERGENCY MEDICINE

## 2017-02-15 PROCEDURE — 85025 COMPLETE CBC W/AUTO DIFF WBC: CPT

## 2017-02-15 PROCEDURE — G0378 HOSPITAL OBSERVATION PER HR: HCPCS

## 2017-02-15 PROCEDURE — 80053 COMPREHEN METABOLIC PANEL: CPT

## 2017-02-15 PROCEDURE — 93320 DOPPLER ECHO COMPLETE: CPT | Mod: 26,S$PBB,, | Performed by: INTERNAL MEDICINE

## 2017-02-15 PROCEDURE — 25000003 PHARM REV CODE 250: Performed by: EMERGENCY MEDICINE

## 2017-02-15 RX ORDER — NITROGLYCERIN 0.4 MG/1
0.4 TABLET SUBLINGUAL
Status: DISCONTINUED | OUTPATIENT
Start: 2017-02-15 | End: 2017-02-16

## 2017-02-15 RX ORDER — ASPIRIN 325 MG
325 TABLET ORAL
Status: COMPLETED | OUTPATIENT
Start: 2017-02-15 | End: 2017-02-15

## 2017-02-15 RX ORDER — ACETAMINOPHEN 325 MG/1
650 TABLET ORAL
Status: DISCONTINUED | OUTPATIENT
Start: 2017-02-15 | End: 2017-02-16

## 2017-02-15 RX ORDER — NITROGLYCERIN 0.4 MG/1
0.4 TABLET SUBLINGUAL EVERY 5 MIN PRN
Status: DISCONTINUED | OUTPATIENT
Start: 2017-02-15 | End: 2017-02-16

## 2017-02-15 RX ORDER — METOPROLOL TARTRATE 25 MG/1
25 TABLET, FILM COATED ORAL ONCE
Status: COMPLETED | OUTPATIENT
Start: 2017-02-15 | End: 2017-02-15

## 2017-02-15 RX ORDER — METOPROLOL TARTRATE 1 MG/ML
5 INJECTION, SOLUTION INTRAVENOUS
Status: COMPLETED | OUTPATIENT
Start: 2017-02-15 | End: 2017-02-15

## 2017-02-15 RX ADMIN — METOPROLOL TARTRATE 25 MG: 25 TABLET ORAL at 10:02

## 2017-02-15 RX ADMIN — NITROGLYCERIN 0.4 MG: 0.4 TABLET SUBLINGUAL at 09:02

## 2017-02-15 RX ADMIN — METOPROLOL TARTRATE 5 MG: 5 INJECTION, SOLUTION INTRAVENOUS at 09:02

## 2017-02-15 RX ADMIN — ASPIRIN 325 MG ORAL TABLET 325 MG: 325 PILL ORAL at 07:02

## 2017-02-15 RX ADMIN — IOHEXOL 75 ML: 350 INJECTION, SOLUTION INTRAVENOUS at 09:02

## 2017-02-16 VITALS
HEART RATE: 79 BPM | BODY MASS INDEX: 21.62 KG/M2 | RESPIRATION RATE: 20 BRPM | OXYGEN SATURATION: 95 % | WEIGHT: 122 LBS | SYSTOLIC BLOOD PRESSURE: 121 MMHG | TEMPERATURE: 98 F | DIASTOLIC BLOOD PRESSURE: 68 MMHG | HEIGHT: 63 IN

## 2017-02-16 LAB
ANION GAP SERPL CALC-SCNC: 5 MMOL/L
BASOPHILS # BLD AUTO: 0.02 K/UL
BASOPHILS NFR BLD: 0.3 %
BUN SERPL-MCNC: 10 MG/DL
CALCIUM SERPL-MCNC: 9.9 MG/DL
CHLORIDE SERPL-SCNC: 111 MMOL/L
CHOLEST/HDLC SERPL: 2.6 {RATIO}
CO2 SERPL-SCNC: 23 MMOL/L
CREAT SERPL-MCNC: 0.7 MG/DL
DIFFERENTIAL METHOD: ABNORMAL
EOSINOPHIL # BLD AUTO: 0.1 K/UL
EOSINOPHIL NFR BLD: 2 %
ERYTHROCYTE [DISTWIDTH] IN BLOOD BY AUTOMATED COUNT: 17.9 %
EST. GFR  (AFRICAN AMERICAN): >60 ML/MIN/1.73 M^2
EST. GFR  (NON AFRICAN AMERICAN): >60 ML/MIN/1.73 M^2
GLUCOSE SERPL-MCNC: 82 MG/DL
HCT VFR BLD AUTO: 28.5 %
HDL/CHOLESTEROL RATIO: 38.6 %
HDLC SERPL-MCNC: 140 MG/DL
HDLC SERPL-MCNC: 54 MG/DL
HGB BLD-MCNC: 8.4 G/DL
IRON SERPL-MCNC: 18 UG/DL
LDLC SERPL CALC-MCNC: 66.4 MG/DL
LYMPHOCYTES # BLD AUTO: 2.7 K/UL
LYMPHOCYTES NFR BLD: 45.3 %
MCH RBC QN AUTO: 21 PG
MCHC RBC AUTO-ENTMCNC: 29.5 %
MCV RBC AUTO: 71 FL
MONOCYTES # BLD AUTO: 0.6 K/UL
MONOCYTES NFR BLD: 10.1 %
NEUTROPHILS # BLD AUTO: 2.5 K/UL
NEUTROPHILS NFR BLD: 42.1 %
NONHDLC SERPL-MCNC: 86 MG/DL
PLATELET # BLD AUTO: 312 K/UL
PMV BLD AUTO: 9.9 FL
POTASSIUM SERPL-SCNC: 3.9 MMOL/L
RBC # BLD AUTO: 4 M/UL
SATURATED IRON: 4 %
SODIUM SERPL-SCNC: 139 MMOL/L
TOTAL IRON BINDING CAPACITY: 457 UG/DL
TRANSFERRIN SERPL-MCNC: 309 MG/DL
TRIGL SERPL-MCNC: 98 MG/DL
TROPONIN I SERPL DL<=0.01 NG/ML-MCNC: 0.01 NG/ML
WBC # BLD AUTO: 6.01 K/UL

## 2017-02-16 PROCEDURE — 25000003 PHARM REV CODE 250: Performed by: PHYSICIAN ASSISTANT

## 2017-02-16 PROCEDURE — 93005 ELECTROCARDIOGRAM TRACING: CPT

## 2017-02-16 PROCEDURE — 80061 LIPID PANEL: CPT

## 2017-02-16 PROCEDURE — G0378 HOSPITAL OBSERVATION PER HR: HCPCS

## 2017-02-16 PROCEDURE — 63600175 PHARM REV CODE 636 W HCPCS: Performed by: INTERNAL MEDICINE

## 2017-02-16 PROCEDURE — 85025 COMPLETE CBC W/AUTO DIFF WBC: CPT

## 2017-02-16 PROCEDURE — 99225 PR SUBSEQUENT OBSERVATION CARE,LEVEL II: CPT | Mod: ,,, | Performed by: PHYSICIAN ASSISTANT

## 2017-02-16 PROCEDURE — 25000003 PHARM REV CODE 250: Performed by: EMERGENCY MEDICINE

## 2017-02-16 PROCEDURE — 83540 ASSAY OF IRON: CPT

## 2017-02-16 PROCEDURE — 93010 ELECTROCARDIOGRAM REPORT: CPT | Mod: ,,, | Performed by: INTERNAL MEDICINE

## 2017-02-16 PROCEDURE — 99219 PR INITIAL OBSERVATION CARE,LEVL II: CPT | Mod: ,,, | Performed by: INTERNAL MEDICINE

## 2017-02-16 PROCEDURE — 84484 ASSAY OF TROPONIN QUANT: CPT

## 2017-02-16 PROCEDURE — 80048 BASIC METABOLIC PNL TOTAL CA: CPT

## 2017-02-16 PROCEDURE — 63600175 PHARM REV CODE 636 W HCPCS: Performed by: EMERGENCY MEDICINE

## 2017-02-16 RX ORDER — KETOROLAC TROMETHAMINE 30 MG/ML
30 INJECTION, SOLUTION INTRAMUSCULAR; INTRAVENOUS EVERY 6 HOURS PRN
Status: DISCONTINUED | OUTPATIENT
Start: 2017-02-16 | End: 2017-02-16

## 2017-02-16 RX ORDER — FERROUS SULFATE 325(65) MG
325 TABLET, DELAYED RELEASE (ENTERIC COATED) ORAL DAILY
Status: DISCONTINUED | OUTPATIENT
Start: 2017-02-16 | End: 2017-02-16 | Stop reason: HOSPADM

## 2017-02-16 RX ORDER — ONDANSETRON 4 MG/1
4 TABLET, ORALLY DISINTEGRATING ORAL EVERY 8 HOURS PRN
Status: DISCONTINUED | OUTPATIENT
Start: 2017-02-16 | End: 2017-02-16 | Stop reason: HOSPADM

## 2017-02-16 RX ORDER — ACETAMINOPHEN 325 MG/1
650 TABLET ORAL EVERY 6 HOURS PRN
Status: DISCONTINUED | OUTPATIENT
Start: 2017-02-16 | End: 2017-02-16 | Stop reason: HOSPADM

## 2017-02-16 RX ORDER — FERROUS SULFATE 325(65) MG
325 TABLET, DELAYED RELEASE (ENTERIC COATED) ORAL DAILY
Refills: 0 | COMMUNITY
Start: 2017-02-16 | End: 2017-02-24 | Stop reason: SDUPTHER

## 2017-02-16 RX ORDER — MORPHINE SULFATE 2 MG/ML
2 INJECTION, SOLUTION INTRAMUSCULAR; INTRAVENOUS ONCE
Status: COMPLETED | OUTPATIENT
Start: 2017-02-16 | End: 2017-02-16

## 2017-02-16 RX ORDER — SODIUM CHLORIDE 9 MG/ML
INJECTION, SOLUTION INTRAVENOUS CONTINUOUS
Status: DISCONTINUED | OUTPATIENT
Start: 2017-02-16 | End: 2017-02-16

## 2017-02-16 RX ORDER — AMOXICILLIN 250 MG
1 CAPSULE ORAL 2 TIMES DAILY
Status: DISCONTINUED | OUTPATIENT
Start: 2017-02-16 | End: 2017-02-16

## 2017-02-16 RX ORDER — PANTOPRAZOLE SODIUM 40 MG/1
40 TABLET, DELAYED RELEASE ORAL DAILY
Status: DISCONTINUED | OUTPATIENT
Start: 2017-02-16 | End: 2017-02-16

## 2017-02-16 RX ADMIN — KETOROLAC TROMETHAMINE 30 MG: 30 INJECTION, SOLUTION INTRAMUSCULAR at 04:02

## 2017-02-16 RX ADMIN — SODIUM CHLORIDE: 0.9 INJECTION, SOLUTION INTRAVENOUS at 03:02

## 2017-02-16 RX ADMIN — MORPHINE SULFATE 2 MG: 2 INJECTION, SOLUTION INTRAMUSCULAR; INTRAVENOUS at 07:02

## 2017-02-16 RX ADMIN — FERROUS SULFATE TAB EC 325 MG (65 MG FE EQUIVALENT) 325 MG: 325 (65 FE) TABLET DELAYED RESPONSE at 01:02

## 2017-02-16 RX ADMIN — ONDANSETRON 4 MG: 4 TABLET, ORALLY DISINTEGRATING ORAL at 04:02

## 2017-02-16 NOTE — PROGRESS NOTES
Pt in recovery from stress test. Chest pain started when pt got off the treadmill. Dr zambrano in room received vorb for 1 spray sl ntg. B/p 119/81 ; nsr 83  1710 pt remains with 4/10 chest pains; vorb for 1 spray sl ntg; b/p 129/65; nsr 86  1719 4/10 chest pain remains; 1 spray sl ntg given b/p 124/76 nsr 90.  Per dr zambrano since chest pain is unresolved and stress was inconclusive and pt has hx of cabg . Pt should go to the er.  20 g sl started in left forearm. Tolerated well. Pt brought to er via wheelchair by myself and rajinder mills rn.  Pt vss, remains with 4/10 chest pain otherwise nad. Report given to zainab charge nurse.

## 2017-02-16 NOTE — CONSULTS
Emergency Room  Cardiology Consults      SUBJECTIVE:     Chief Complaint/Reason for Consult: Chest Pain    History of Present Illness:  Patient is a 31 y.o. female with hx of SCAD post partum with extension from LM to LAD/LCX requiring CABGx2(LIMA-LAD, SVG-OM1) sent from stress lab for chest pain during stress test. Pt with chronic chest pain since CABG. Describes pain as L sided can occur with exertion and associated with SOB, relieved with 15 min of rest. Can also occur at rest. Rates it very mild and continues with daily activities. Had ANUSHKA only reaching 73% predicted HR with baseline septal hypokinesis and no EKG changes in 5/2014. She has had same symptoms and underwent another ANUSHKA today as pre op prior to tubal ligation. Reached 93% predicted HR and developed 6/10 chest pain much worse in intensity but same character as before. Did not respond to 3 SL nitro. Still with baseline WMA that failed to augment. Had TWI in inf/lat leads. Sent to ED. BP equal in both arms. Trop neg. Still having pain      (Not in a hospital admission)    Review of patient's allergies indicates:   Allergen Reactions    Bananas [banana] Itching    Peanut butter flavor         Past Medical History   Diagnosis Date    Abnormal Pap smear      LGSIL 2008, ASCUS 2009    Abnormal Pap smear of cervix     Anemia     Coronary artery dissection 9/19/2013    History of Abnormal Pap smear     Postpartum depression     Spinal headache complicating labor and delivery, postpartum condition 9/6/2013     Past Surgical History   Procedure Laterality Date    [place] tooth extraction      Coronary artery bypass graft  9/13     tanner to lad, svg OM1    Cardiac surgery       Family History   Problem Relation Age of Onset    Cancer Neg Hx     Heart attack Neg Hx     Heart disease Neg Hx     Breast cancer Neg Hx     Ovarian cancer Neg Hx     Colon cancer Neg Hx      Social History   Substance Use Topics    Smoking status: Never Smoker     Smokeless tobacco: Never Used    Alcohol use No       Review of Systems:  Constitutional: no fever or chills  Eyes: no visual changes  ENT: no nasal congestion or sore throat  Respiratory: no cough or shortness of breath  Cardiovascular: +chest pain, no palpitations  Gastrointestinal: no nausea or vomiting, no abdominal pain or change in bowel habits  Genitourinary: no hematuria or dysuria  Integument/Breast: no rash or pruritis  Hematologic/Lymphatic: no easy bruising or lymphadenopathy  Musculoskeletal: no arthralgias or myalgias  Neurological: no seizures or tremors  Endocrine: no heat or cold intolerance    OBJECTIVE:     Vital Signs (Most Recent)  Temp: 98.8 °F (37.1 °C) (02/15/17 1807)  Pulse: 78 (02/15/17 2019)  Resp: 19 (02/15/17 2019)  BP: 113/66 (02/15/17 2019)  SpO2: 100 % (02/15/17 2019)    Physical Exam:  General appearance: WDWN in NAD  Neck: no JVD  Lungs:  CTAB  Heart: rrr, no m/r/g  Abdomen: SNTND, BS+  Extremities: no c/c/e  Pulses: 2+ and symmetric  Neurologic: AOx3.         EKG:  NSR, septal infarct, RAD    ASSESSMENT/PLAN:     Chest Pain  - Concerned for Angina vs MSK pain  - Given hx of SCAD and pain now worse after stress test will admit to Cardiology Comanagement for observation and to trend troponin  - Given recurrence rate in this pt population and need for pre op screening will get Cardiac CTA  - Pt given ASA. Will given metoprolol for Cardiac CTA to Keep HR<70  - Will not treat as ACS at this time unless CTA concerning or troponins trend positive.    Discussed with Dr. Aguayo

## 2017-02-16 NOTE — ED PROVIDER NOTES
"Encounter Date: 2/15/2017    SCRIBE #1 NOTE: I, Cheyanne Rodriguez, am scribing for, and in the presence of, Dr. Guerrero.       History     Chief Complaint   Patient presents with    Chest Pain     Review of patient's allergies indicates:   Allergen Reactions    Bananas [banana] Itching    Peanut butter flavor      HPI Comments: Time seen by provider: 7:12 PM    This is a 31 y.o. female with a PMHx of coronary artery dissection and CABG who presents with complaint of chest pain. She describes that during a stress test she began having adf 6/10 chest pain. She states that since her heart surgery she has baseline chest pain most notably on exertion. She says the pain she was having today was different than her "normal" had concerns including Chest Pain but she still feels like it was a manageable pain for her. The patient states she still has the pain now and has not changed since receiving nitroglycerin. She states she now has a headache from the nitro. She did have some nausea and shortness of breath earlier that has since resolved. She denies diaphoresis or radiating pain. She describes that her pain is a pressure like central chest pain.     The history is provided by the patient.     Past Medical History   Diagnosis Date    Abnormal Pap smear      LGSIL 2008, ASCUS 2009    Abnormal Pap smear of cervix     Anemia     Coronary artery dissection 9/19/2013    History of Abnormal Pap smear     Postpartum depression     Spinal headache complicating labor and delivery, postpartum condition 9/6/2013     Past Medical History Pertinent Negatives   Diagnosis Date Noted    AAA (abdominal aortic aneurysm) 4/29/2014    Anticoagulant long-term use 9/1/2013    Asthma 9/1/2013    Asthma 4/29/2014    Atrial fibrillation 4/29/2014    Atrial flutter 4/29/2014    Blood dyscrasia 9/1/2013    Breast disorder 9/1/2013    Carotid artery occlusion 4/29/2014    CHF (congestive heart failure) 4/29/2014    Chronic kidney " disease 9/1/2013    Clotting disorder 4/29/2014    Diabetes mellitus 2/14/2013    Diabetes mellitus 4/29/2014    Difficult intubation 9/1/2013    General anesthetics causing adverse effect in therapeutic use 9/1/2013    Heart block 4/29/2014    Heart murmur 4/29/2014    Herpes simplex without mention of complication 9/1/2013    HIV infection 9/1/2013    Hyperlipidemia 4/29/2014    Hypotension, iatrogenic 9/1/2013    Infertility 9/1/2013    Liver disease 9/1/2013    Malignant hyperthermia 9/1/2013    Mental disorder 9/1/2013    PONV (postoperative nausea and vomiting) 9/1/2013    Respiratory distress 9/1/2013    Rh incompatibility 9/1/2013    Seizures 9/1/2013    Sickle cell anemia 9/1/2013    Sleep apnea 4/29/2014    Stenosis and insufficiency of lacrimal passages 4/29/2014    Stroke 4/29/2014    Syncope and collapse 4/29/2014    Systemic lupus erythematosus 9/1/2013    Thyroid disease 9/1/2013    Trauma 9/1/2013    Valvular regurgitation 4/29/2014    Varicosities 9/1/2013     Past Surgical History   Procedure Laterality Date    [place] tooth extraction      Coronary artery bypass graft  9/13     tanner to lad, svg OM1    Cardiac surgery       Family History   Problem Relation Age of Onset    Cancer Neg Hx     Heart attack Neg Hx     Heart disease Neg Hx     Breast cancer Neg Hx     Ovarian cancer Neg Hx     Colon cancer Neg Hx      Social History   Substance Use Topics    Smoking status: Never Smoker    Smokeless tobacco: Never Used    Alcohol use No     Review of Systems   Constitutional: Negative for chills, diaphoresis and fever.   HENT: Negative for drooling and nosebleeds.    Eyes: Negative for visual disturbance.   Respiratory: Positive for shortness of breath. Negative for cough.    Cardiovascular: Positive for chest pain.   Gastrointestinal: Positive for nausea. Negative for abdominal distention, abdominal pain, diarrhea and vomiting.   Genitourinary: Negative for  difficulty urinating, dysuria and hematuria.   Musculoskeletal: Negative for neck pain and neck stiffness.   Skin: Negative for rash.   Neurological: Positive for headaches. Negative for seizures, syncope and speech difficulty.       Physical Exam   Initial Vitals   BP Pulse Resp Temp SpO2   02/15/17 1807 02/15/17 1807 02/15/17 1807 02/15/17 1807 02/15/17 1807   122/60 70 18 98.8 °F (37.1 °C) 95 %     Physical Exam    Nursing note and vitals reviewed.  Constitutional: She appears well-developed and well-nourished. She is not diaphoretic. No distress.   HENT:   Head: Normocephalic and atraumatic.   Mouth/Throat: Oropharynx is clear and moist.   Eyes: Conjunctivae and EOM are normal. Pupils are equal, round, and reactive to light.   Neck: Normal range of motion. Neck supple.   Cardiovascular: Normal rate, regular rhythm, normal heart sounds and intact distal pulses.   Pulmonary/Chest: Breath sounds normal. No respiratory distress. She has no wheezes. She has no rhonchi. She has no rales.   Abdominal: Soft. Bowel sounds are normal. She exhibits no distension. There is no tenderness. There is no rebound and no guarding.   Musculoskeletal: Normal range of motion.   Neurological: She is alert and oriented to person, place, and time. She has normal strength.   Skin: Skin is warm and dry. No erythema.   Psychiatric: She has a normal mood and affect. Her behavior is normal. Judgment and thought content normal.         ED Course   Procedures  Labs Reviewed   CBC W/ AUTO DIFFERENTIAL - Abnormal; Notable for the following:        Result Value    Hemoglobin 9.3 (*)     Hematocrit 32.2 (*)     MCV 73 (*)     MCH 21.0 (*)     MCHC 28.9 (*)     RDW 18.1 (*)     All other components within normal limits    Narrative:     PLEASE REVIEW ORDER START TIME BEFORE MARKING SPECIMEN  COLLECTED.   COMPREHENSIVE METABOLIC PANEL - Abnormal; Notable for the following:     Glucose 116 (*)     Alkaline Phosphatase 49 (*)     All other components  within normal limits    Narrative:     PLEASE REVIEW ORDER START TIME BEFORE MARKING SPECIMEN  COLLECTED.   PROTIME-INR    Narrative:     PLEASE REVIEW ORDER START TIME BEFORE MARKING SPECIMEN  COLLECTED.   TROPONIN I    Narrative:     PLEASE REVIEW ORDER START TIME BEFORE MARKING SPECIMEN  COLLECTED.   B-TYPE NATRIURETIC PEPTIDE    Narrative:     PLEASE REVIEW ORDER START TIME BEFORE MARKING SPECIMEN  COLLECTED.             Medical Decision Making:   History:   Old Medical Records: I decided to obtain old medical records.  Clinical Tests:   Lab Tests: Reviewed and Ordered  Radiological Study: Reviewed and Ordered  Medical Tests: Reviewed and Ordered  Other:   I have discussed this case with another health care provider.            Scribe Attestation:   Scribe #1: I performed the above scribed service and the documentation accurately describes the services I performed. I attest to the accuracy of the note.    Attending Attestation:           Physician Attestation for Scribe:  Physician Attestation Statement for Scribe #1: I, Dr. Guerrero, reviewed documentation, as scribed by Cheyanne Rodriguez in my presence, and it is both accurate and complete.         Attending ED Notes:   This is an emergent evaluation of a 31 y.o. female who presents with chest pain, h/o coronary artery dissection s/p repair.    Initial Differential Dx: recurrent dissection of coronaries, ACS, GERD, unlikely pleurisy, MSK     Plan: cardiac workup incl troponin, ekg, card monitoring, card consultation  __________________________________    I discussed my initial impression and plan with the patient, who agreed with the plan.    IV access established. Labs collected and sent for analysis.     EKG was independently interpreted by me as above.  __________________________________    Labs and imaging studies were reviewed and interpreted by me. No significant abnormalities noted.     Overall impression/plan: chest pain, unspecified    8:40 PM Case d/w  cardiologist again, who states that patient should be admitted to [facility] for further care, including but not limited to CTA coronaries.     patient informed of results, overall impression and further plan of care. They expressed understanding and agreed.    Dr. Estevez ([facility] medicine) contacted and case discussed. Pt to be admitted to Dr. Lopez.          ED Course   Value Comment By Time   Troponin I: <0.006 (Reviewed) Jose Francisco Guerrero MD 02/15 2011   WBC: 7.09 (Reviewed) Jose Francisco Guerrero MD 02/15 2011   Hemoglobin: (!) 9.3 (Reviewed) Jose Francisco Guerrero MD 02/15 2011   Hematocrit: (!) 32.2 (Reviewed) Jose Francisco Guerrero MD 02/15 2011   BNP: 34 (Reviewed) Jose Francisco Guerrero MD 02/15 2011     Clinical Impression:   The encounter diagnosis was Chest pain, unspecified type.    Disposition:   Disposition: Placed in Observation  Condition: Stable       Jose Francisco Guerrero MD  02/15/17 2053

## 2017-02-16 NOTE — H&P
[facility]  Cardiology  History and Physical     Patient Name: Clayton Duarte  MRN: [id number]  Admission Date: 2/15/2017  Code Status: Full Code   Attending Provider: Jose Francisco Guerrero MD   Primary Care Physician: Lesley Vanessa MD  Principal Problem:Chest pain on exertion    Patient information was obtained from patient and ER records.     Subjective:     Chief Complaint:  Chest pain     HPI:  30 yo F w chronic microcytic anemia and hx of post-partum coronary artery disection from left main to LAD and cirucmflex s/p CABG x2 (2013) w LIMA to LAD and SVG to OM1 presents w atypical chest pain while taking stress test.  Pt states that since her CABG, she has had permanent intermittent daily CP that is very different from her current pain.      She describes her permanent intermittent daily CP as usually in the L side w numbness, w and w/o exertion, off-and-on, at worst 6-7/10, comes in 15 min bouts, mostly sharp pains, nothing makes it worse or better.  She also sometimes has R chest pain when lying on L side producing R chest pain and tingling that goes down R arm.  This pain improves when the patient sits up.      Today during her stress-echo, she began to have CP, dizziness, and SOB and asked to stop the test.  She describes her current chest pain as being in her L side and L axilla,  w no radiation.  She states that it came on around 6-7 min while walking vigorously on incline.  She described the pain as both pressure-like and sharp.  4/10.  Nothing makes it better or worse.  She continues to have CP now in the ED but dizziness and SOB have resolved.    Denies any other medical problems except chronic anemia.  No hx of DM2.  No family hx of DM2, CAD, or cancer.   Never smoker.      During echo-stress test, she only reached 73% of her predicted HR w baseline septal hypokinesis.  No EKG changes compared to 5/2014.  She was given nitrox3 without relief.  She got 5 mg of metoprolol and 1xnitro for  CTA, still without relief.            Past Medical History   Diagnosis Date    Abnormal Pap smear      LGSIL 2008, ASCUS 2009    Abnormal Pap smear of cervix     Anemia     Coronary artery dissection 9/19/2013    History of Abnormal Pap smear     Postpartum depression     Spinal headache complicating labor and delivery, postpartum condition 9/6/2013       Past Surgical History   Procedure Laterality Date    [place] tooth extraction      Coronary artery bypass graft  9/13     tanner to lad, svg OM1    Cardiac surgery         Review of patient's allergies indicates:   Allergen Reactions    Bananas [banana] Itching    Peanut butter flavor        No current facility-administered medications on file prior to encounter.      Current Outpatient Prescriptions on File Prior to Encounter   Medication Sig    medroxyPROGESTERone (DEPO-PROVERA) 150 mg/mL Syrg Inject 1 mL (150 mg total) into the muscle once.    naproxen sodium (ANAPROX DS) 550 MG tablet DO NOT TAKE OTHER ANTI-INFLAMMATORY AGENTS SUCH AS ASPIRIN, IBUPROPHEN, PAMPRIN, ETC. AT THE SAME TIME     Family History     None        Social History Main Topics    Smoking status: Never Smoker    Smokeless tobacco: Never Used    Alcohol use No    Drug use: No    Sexual activity: Yes     Partners: Male     Birth control/ protection: None     Review of Systems   Constitution: Negative for weakness, malaise/fatigue and weight gain.   HENT: Negative for congestion.    Cardiovascular: Positive for chest pain and dyspnea on exertion. Negative for claudication, irregular heartbeat, leg swelling, near-syncope, orthopnea, palpitations, paroxysmal nocturnal dyspnea and syncope.   Respiratory: Negative for shortness of breath and sleep disturbances due to breathing.    Hematologic/Lymphatic: Negative for bleeding problem. Does not bruise/bleed easily.   Skin: Negative for flushing.   Musculoskeletal: Negative for back pain.   Gastrointestinal: Negative for bloating,  abdominal pain, heartburn, nausea and vomiting.   Neurological: Negative for dizziness and light-headedness.   Psychiatric/Behavioral: The patient is nervous/anxious.      Objective:     Vital Signs (Most Recent):  Temp: 98.8 °F (37.1 °C) (02/15/17 1807)  Pulse: 81 (02/15/17 2302)  Resp: 19 (02/15/17 2019)  BP: 114/72 (02/15/17 2302)  SpO2: 100 % (02/15/17 2302) Vital Signs (24h Range):  Temp:  [98.8 °F (37.1 °C)] 98.8 °F (37.1 °C)  Pulse:  [70-81] 81  Resp:  [18-21] 19  SpO2:  [95 %-100 %] 100 %  BP: (103-122)/(60-72) 114/72     Weight: 55.3 kg (122 lb)  Body mass index is 21.61 kg/(m^2).    SpO2: 100 %  O2 Device (Oxygen Therapy): room air    No intake or output data in the 24 hours ending 02/16/17 0119    Lines/Drains/Airways     Peripheral Intravenous Line                 Peripheral IV - Single Lumen 02/15/17 1909 Left Forearm less than 1 day         Peripheral IV - Single Lumen 02/15/17 1909 Right Antecubital less than 1 day                Physical Exam   Constitutional: She is oriented to person, place, and time. She appears well-developed.   HENT:   Head: Normocephalic and atraumatic.   Neck: Normal range of motion. Neck supple. No JVD present. No thyromegaly present.   Cardiovascular: Normal rate, regular rhythm, S1 normal, S2 normal, normal heart sounds, intact distal pulses and normal pulses.  Exam reveals no S3, no S4, no distant heart sounds, no friction rub, no midsystolic click and no opening snap.    No murmur heard.  Pulses:       Carotid pulses are 2+ on the right side, and 2+ on the left side.       Radial pulses are 2+ on the right side, and 2+ on the left side.        Posterior tibial pulses are 2+ on the right side, and 2+ on the left side.   Midline sternotomy scar is present, well-healed   Pulmonary/Chest: Effort normal and breath sounds normal. No stridor. No respiratory distress. She has no wheezes. She has no rales. She exhibits no tenderness.   Abdominal: Soft. Bowel sounds are normal. She  exhibits no distension and no mass. There is no tenderness. There is no rebound and no guarding.   Musculoskeletal: She exhibits no edema, tenderness or deformity.   Lymphadenopathy:     She has no cervical adenopathy.   Neurological: She is alert and oriented to person, place, and time.   Skin: Skin is warm and dry. No rash noted. No erythema. No pallor.   Psychiatric: She has a normal mood and affect. Her behavior is normal.       Significant Labs:   Troponin   Recent Labs  Lab 02/15/17  1924 02/15/17  2258   TROPONINI <0.006 <0.006       Significant Imaging:   Awaiting cardiology read of CTA.    Assessment and Plan:     * Chest pain on exertion  Pt presented with ongoing pain initiated by stress-echo s/p post-partum coronary artery stenosis and tx with CABGx2.  There is concern in such patients given the high reoccurrence rate of coronary artery dissection and MI.    Her current chronic chest pain is atypical and seems to be possibly related to neuropathic pain from her surgery.  Her current acute chest pain on exertion was concerning and an ischemic cause could not be ruled out.      -continue to trend troponin x3  -monitor VS closely and consider beta blocker if needed  -f/u CTA for concern of coronary artery dissection reoccurrence  -check lipid panel with AM labs  -b/l BP checks       Chronic anemia  Pt has stable H/H relative to past hospital visits.  Likely 2/2 to iron-deficiency anemia in a menstruating woman.  -iron panel in AM  -consider initiating PO iron supplement depending on iron panel and follow as outpatient  -continue to monitor CBC in morning labs      VTE Risk Mitigation         Ordered     Medium Risk of VTE  Once      02/16/17 0304     Place sequential compression device  Until discontinued      02/16/17 0304     Place KRIS hose  Until discontinued      02/16/17 0304          Alix Bennett MD  Cardiology   [facility]

## 2017-02-16 NOTE — SIGNIFICANT EVENT
Obtained prelimenary read of cardiac CTA from Dr. Arias. Pt's native coronaries appear to be well healed with no evidence of dissection or obstructive lesions. Here SVG and LIMA are occluded. Pt's troponin's are neg and her CP improved with toradol all suggesting non cardiac cause of her symptoms.

## 2017-02-16 NOTE — PLAN OF CARE
02/16/17 1001   Discharge Assessment   Assessment Type Discharge Planning Assessment   Confirmed/corrected address and phone number on facesheet? Yes   Assessment information obtained from? Patient;Medical Record   Expected Length of Stay (days) 2   Communicated expected length of stay with patient/caregiver yes   Prior to hospitilization cognitive status: Alert/Oriented   Prior to hospitalization functional status: Independent   Current cognitive status: Alert/Oriented   Current Functional Status: Independent   Arrived From home or self-care   Lives With spouse;child(meaghan), dependent   Able to Return to Prior Arrangements yes   Is patient able to care for self after discharge? Yes   How many people do you have in your home that can help with your care after discharge? 1   Who are your caregiver(s) and their phone number(s)? , Td  [phone]   Patient's perception of discharge disposition home or selfcare   Readmission Within The Last 30 Days no previous admission in last 30 days   Patient currently being followed by outpatient case management? No   Patient currently receives home health services? No   Does the patient currently use HME? No   Patient currently receives private duty nursing? No   Patient currently receives any other outside agency services? No   Equipment Currently Used at Home none   Do you have any problems affording any of your prescribed medications? No   Is the patient taking medications as prescribed? yes   Do you have any financial concerns preventing you from receiving the healthcare you need? No   Does the patient have transportation to healthcare appointments? Yes   Transportation Available car   On Dialysis? No   Does the patient receive services at the Coumadin Clinic? No   Are there any open cases? No   Discharge Plan A Home with family   Patient/Family In Agreement With Plan yes   Placed in Obs for CP on exertion. Lives with spouse and 3 children. Independent in her ADLs.  Plan is to DC home. No DC needs identified.

## 2017-02-16 NOTE — ED NOTES
Pt resting comfortably and independently repositioned in stretcher with bed locked in lowest position for safety. NAD noted at this time. Respirations even and unlabored and visible chest rise noted.  Patient offered bathroom assistance and denies need at this time. Pt instructed to call if assistance is needed. Pt on continuous cardiac, BP, and O2 monitoring. Call light within reach. Family at bedside. No needs at this time. Will continue to monitor.

## 2017-02-16 NOTE — ED TRIAGE NOTES
"Pt presents to ED c/o constant substernal chest pain. Pt stated that the pain is constant and "something I feel everyday so I am use to it." Pt stated that she went for a stress test today and the  Wanted her to come here for further evaulation. Pt had CABG in 2013.     LOC: Patient name and date of birth verified.  The patient is awake, alert and aware of environment with an appropriate affect, the patient is oriented x 3 and speaking appropriately.  Pt in NAD.    APPEARANCE: Patient resting comfortably and in no acute distress, patient is clean and well groomed, patient's clothing is properly fastened.  SKIN: The skin is warm and dry, color consistent with ethnicity, patient has normal skin turgor and moist mucus membranes, skin intact, no breakdown or brusing noted.  MUSCULOSKELETAL: Patient moving all extremities well, no obvious swelling or deformities noted.  RESPIRATORY: Airway is open and patent, respirations are spontaneous, patient has a normal effort and rate, no accessory muscle use noted.  ABDOMEN: Soft and non tender to palpation, no distention noted. Bowel sounds present in all four quadrants.  NEUROLOGIC: Eyes open spontaneously, behavior appropriate to situation, follows commands, facial expression symmetrical, bilateral hand grasp equal and even, purposeful motor response noted, normal sensation in all extremities when touched with a finger.    "

## 2017-02-16 NOTE — ASSESSMENT & PLAN NOTE
Pt presented with ongoing pain initiated by stress-echo s/p post-partum coronary artery stenosis and tx with CABGx2.  There is concern in such patients given the high reoccurrence rate of coronary artery dissection and MI.    Her current chronic chest pain is atypical and seems to be possibly related to neuropathic pain from her surgery.  Her current acute chest pain on exertion was concerning and an ischemic cause could not be ruled out.      -continue to trend troponin x3  -monitor VS closely and consider beta blocker if needed  -f/u CTA for concern of coronary artery dissection reoccurrence  -check lipid panel with AM labs

## 2017-02-16 NOTE — SUBJECTIVE & OBJECTIVE
Past Medical History   Diagnosis Date    Abnormal Pap smear      LGSIL 2008, ASCUS 2009    Abnormal Pap smear of cervix     Anemia     Coronary artery dissection 9/19/2013    History of Abnormal Pap smear     Postpartum depression     Spinal headache complicating labor and delivery, postpartum condition 9/6/2013       Past Surgical History   Procedure Laterality Date    Lerona tooth extraction      Coronary artery bypass graft  9/13     tanner to lad, svg OM1    Cardiac surgery         Review of patient's allergies indicates:   Allergen Reactions    Bananas [banana] Itching    Peanut butter flavor        No current facility-administered medications on file prior to encounter.      Current Outpatient Prescriptions on File Prior to Encounter   Medication Sig    medroxyPROGESTERone (DEPO-PROVERA) 150 mg/mL Syrg Inject 1 mL (150 mg total) into the muscle once.    naproxen sodium (ANAPROX DS) 550 MG tablet DO NOT TAKE OTHER ANTI-INFLAMMATORY AGENTS SUCH AS ASPIRIN, IBUPROPHEN, PAMPRIN, ETC. AT THE SAME TIME     Family History     None        Social History Main Topics    Smoking status: Never Smoker    Smokeless tobacco: Never Used    Alcohol use No    Drug use: No    Sexual activity: Yes     Partners: Male     Birth control/ protection: None     Review of Systems   Constitution: Negative for weakness, malaise/fatigue and weight gain.   HENT: Negative for congestion.    Cardiovascular: Positive for chest pain and dyspnea on exertion. Negative for claudication, irregular heartbeat, leg swelling, near-syncope, orthopnea, palpitations, paroxysmal nocturnal dyspnea and syncope.   Respiratory: Negative for shortness of breath and sleep disturbances due to breathing.    Hematologic/Lymphatic: Negative for bleeding problem. Does not bruise/bleed easily.   Skin: Negative for flushing.   Musculoskeletal: Negative for back pain.   Gastrointestinal: Negative for bloating, abdominal pain, heartburn, nausea and  vomiting.   Neurological: Negative for dizziness and light-headedness.   Psychiatric/Behavioral: The patient is nervous/anxious.      Objective:     Vital Signs (Most Recent):  Temp: 98.8 °F (37.1 °C) (02/15/17 1807)  Pulse: 81 (02/15/17 2302)  Resp: 19 (02/15/17 2019)  BP: 114/72 (02/15/17 2302)  SpO2: 100 % (02/15/17 2302) Vital Signs (24h Range):  Temp:  [98.8 °F (37.1 °C)] 98.8 °F (37.1 °C)  Pulse:  [70-81] 81  Resp:  [18-21] 19  SpO2:  [95 %-100 %] 100 %  BP: (103-122)/(60-72) 114/72     Weight: 55.3 kg (122 lb)  Body mass index is 21.61 kg/(m^2).    SpO2: 100 %  O2 Device (Oxygen Therapy): room air    No intake or output data in the 24 hours ending 02/16/17 0119    Lines/Drains/Airways     Peripheral Intravenous Line                 Peripheral IV - Single Lumen 02/15/17 1909 Left Forearm less than 1 day         Peripheral IV - Single Lumen 02/15/17 1909 Right Antecubital less than 1 day                Physical Exam   Constitutional: She is oriented to person, place, and time. She appears well-developed.   HENT:   Head: Normocephalic and atraumatic.   Neck: Normal range of motion. Neck supple. No JVD present. No thyromegaly present.   Cardiovascular: Normal rate, regular rhythm, S1 normal, S2 normal, normal heart sounds, intact distal pulses and normal pulses.  Exam reveals no S3, no S4, no distant heart sounds, no friction rub, no midsystolic click and no opening snap.    No murmur heard.  Pulses:       Carotid pulses are 2+ on the right side, and 2+ on the left side.       Radial pulses are 2+ on the right side, and 2+ on the left side.        Posterior tibial pulses are 2+ on the right side, and 2+ on the left side.   Midline sternotomy scar is present, well-healed   Pulmonary/Chest: Effort normal and breath sounds normal. No stridor. No respiratory distress. She has no wheezes. She has no rales. She exhibits no tenderness.   Abdominal: Soft. Bowel sounds are normal. She exhibits no distension and no mass.  There is no tenderness. There is no rebound and no guarding.   Musculoskeletal: She exhibits no edema, tenderness or deformity.   Lymphadenopathy:     She has no cervical adenopathy.   Neurological: She is alert and oriented to person, place, and time.   Skin: Skin is warm and dry. No rash noted. No erythema. No pallor.   Psychiatric: She has a normal mood and affect. Her behavior is normal.       Significant Labs:   Troponin   Recent Labs  Lab 02/15/17  1924 02/15/17  2258   TROPONINI <0.006 <0.006       Significant Imaging:   Awaiting cardiology read of CTA.

## 2017-02-16 NOTE — PLAN OF CARE
Date: 02/16/2017      Patient Name: Clayton Duarte  YOB: 1985  [address] [address]          Hospital Medicine Dept.  [facility] [address] [place] [address] [phone] after hours  [phone] fax Clayton Duarte has been hospitalized at the [facility] since 2/15/2017.  Please excuse the patient from duties.  Patient may return on 2/20/2017.  No restrictions.     Please contact me if you have any questions.                  __________________________  Kiara Bacon PA-C  02/16/2017

## 2017-02-16 NOTE — ASSESSMENT & PLAN NOTE
Pt has stable H/H relative to past hospital visits.  Likely 2/2 to iron-deficiency anemia in a menstruating woman.  -iron panel in AM  -consider initiating PO iron supplement and follow as outpatient  -continue to monitor CBC in morning labs

## 2017-02-17 NOTE — ASSESSMENT & PLAN NOTE
- Patient with both acute, ongoing chest pain inititated by stress echo and chronic chest pain related to CABG in 2013.  - Troponin negative x 3 and VSS  - CTA chest with no e/o coronary dissection and reviewed with cardiology  - Take ibuprofen TID with meals to see if chest pain improves

## 2017-02-17 NOTE — ASSESSMENT & PLAN NOTE
- chronic anemia and iron studies low  - Start on iron supplement  - Follow up PCP for ongoing management

## 2017-02-17 NOTE — DISCHARGE SUMMARY
[facility] Hospital Medicine  Discharge Summary      Patient Name: Clayton Duarte  MRN: [id number]  Admission Date: 2/15/2017  Hospital Length of Stay: 0 days  Discharge Date: 2/16/2017  Attending Physician: Dr. Lopez  Discharging Provider: Kiara Bacon PA-C  Primary Care Provider: Lesley Vanessa MD  [facility] Medicine Team: [facility] HOSP MED  Kiara Bacon PA-C    HPI:   30 yo F w chronic microcytic anemia and hx of post-partum coronary artery disection from left main to LAD and cirucmflex s/p CABG x2 (2013) w LIMA to LAD and SVG to OM1 presents w atypical chest pain while taking stress test. Pt states that since her CABG, she has had permanent intermittent daily CP that is very different from her current pain.      She describes her permanent intermittent daily CP as usually in the L side w numbness, w and w/o exertion, off-and-on, at worst 6-7/10, comes in 15 min bouts, mostly sharp pains, nothing makes it worse or better. She also sometimes has R chest pain when lying on L side producing R chest pain and tingling that goes down R arm. This pain improves when the patient sits up.      Today during her stress-echo, she began to have CP, dizziness, and SOB and asked to stop the test. She describes her current chest pain as being in her L side and L axilla, w no radiation. She states that it came on around 6-7 min while walking vigorously on incline. She described the pain as both pressure-like and sharp. 4/10. Nothing makes it better or worse. She continues to have CP now in the ED but dizziness and SOB have resolved.     Denies any other medical problems except chronic anemia. No hx of DM2.  No family hx of DM2, CAD, or cancer.   Never smoker.      During echo-stress test, she only reached 73% of her predicted HR w baseline septal hypokinesis. No EKG changes compared to 5/2014. She was given nitrox3 without relief. She got 5 mg of metoprolol and 1xnitro for CTA, still without  relief.       * No surgery found *      Indwelling Lines/Drains at time of discharge:   Lines/Drains/Airways          No matching active lines, drains, or airways        Hospital Course:   Placed in observation overnight for atypical chest pain that was initiated on stress echo prior to admission. Troponin negative x 3. Cardiology consulted in ED. CTA chest completed with no e/o coronary artery dissection. Vitals signs remained stable. Patient discharged home in stable condition.      Consults:     Significant Diagnostic Studies: Labs:   CMP   Recent Labs  Lab 02/15/17  1924 02/16/17  0345    139   K 3.8 3.9    111*   CO2 23 23   * 82   BUN 11 10   CREATININE 0.8 0.7   CALCIUM 10.3 9.9   PROT 8.1  --    ALBUMIN 4.3  --    BILITOT 0.1  --    ALKPHOS 49*  --    AST 22  --    ALT 14  --    ANIONGAP 8 5*   ESTGFRAFRICA >60.0 >60.0   EGFRNONAA >60.0 >60.0   , CBC   Recent Labs  Lab 02/15/17  1924 02/16/17  0345   WBC 7.09 6.01   HGB 9.3* 8.4*   HCT 32.2* 28.5*    312    and All labs within the past 24 hours have been reviewed  Cardiac Graphics:   CTA chest 2/15/17  The patient underwent cardiac CTA. Sublingual nitroglycerin and IV Lopressor administered per protocol. The patient tolerated procedure well.     Prior to administration of IV contrast dye. Cardiac calcium scoring was performed. Cardiac calcium score is zero.     The pulmonary artery is of normal caliber. The SVC is normal. Four pulmonary veins enter normally to left atrium. The interatrial septum is intact. Patient is status post median sternotomy. Ascending aorta is without evidence of aneurysm or dissection.     Left main arises normally from the left coronary sinus. The left main is normal. The LAD is a transapical artery. The LAD is normal.   The circumflex artery is normal. This is a left dominant circulation.  The right coronary artery arises normally from a right corner circulation. This is a nondominant RCA. The right  coronary is normal.   The FAVIO appears to have been harvested for bypass. It is occluded. There is a stump evident in the anterior portion of the aorta. This most likely was a vein graft to the circumflex territory.     1)Summary no evidence of proximal epicardial stenosis.   2) occluded FAVIO and saphenous vein graft.    Pending Diagnostic Studies:     None        Final Active Diagnoses:    Diagnosis Date Noted POA    PRINCIPAL PROBLEM:  Chest pain on exertion [R07.9] 02/15/2017 Yes    Chronic anemia [D64.9] 09/03/2013 Yes     Chronic      Problems Resolved During this Admission:    Diagnosis Date Noted Date Resolved POA      * Chest pain on exertion  - Patient with both acute, ongoing chest pain inititated by stress echo and chronic chest pain related to CABG in 2013.  - Troponin negative x 3 and VSS  - CTA chest with no e/o coronary dissection and reviewed with cardiology  - Take ibuprofen TID with meals to see if chest pain improves     Chronic anemia  - chronic anemia and iron studies low  - Start on iron supplement  - Follow up PCP for ongoing management      Discharged Condition: good    Disposition: Home or Self Care    Follow Up:  Follow-up Information     Follow up with Lisa Aguayo MD On 2/24/2017.    Specialty:  Cardiology    Why:  09:00 appointment    Contact information:    [address] [facility] [id number] [phone]          Patient Instructions:     Diet general     Activity as tolerated     Call MD for:  temperature >100.4     Call MD for:  persistent nausea and vomiting or diarrhea     Call MD for:  severe uncontrolled pain     Call MD for:  severe persistent headache     Call MD for:  persistent dizziness, light-headedness, or visual disturbances       Medications:  Reconciled Home Medications:   Discharge Medication List as of 2/16/2017  5:17 PM      START taking these medications    Details   ferrous sulfate 325 (65 FE) MG EC tablet Take 1 tablet (325 mg total) by mouth once  daily., Starting 2/16/2017, Until Discontinued, OTC         CONTINUE these medications which have NOT CHANGED    Details   medroxyPROGESTERone (DEPO-PROVERA) 150 mg/mL Syrg Inject 1 mL (150 mg total) into the muscle once., Starting 2/9/2017, Normal      naproxen sodium (ANAPROX DS) 550 MG tablet DO NOT TAKE OTHER ANTI-INFLAMMATORY AGENTS SUCH AS ASPIRIN, IBUPROPHEN, PAMPRIN, ETC. AT THE SAME TIME, No Print           Time spent on the discharge of patient: 33 minutes    Kiara Bacon PA-C  Department of Hospital Medicine  [facility]

## 2017-02-24 ENCOUNTER — OFFICE VISIT (OUTPATIENT)
Dept: CARDIOLOGY | Facility: CLINIC | Age: 32
End: 2017-02-24
Payer: MEDICAID

## 2017-02-24 VITALS
BODY MASS INDEX: 20.2 KG/M2 | DIASTOLIC BLOOD PRESSURE: 68 MMHG | SYSTOLIC BLOOD PRESSURE: 108 MMHG | WEIGHT: 121.25 LBS | HEART RATE: 92 BPM | HEIGHT: 65 IN

## 2017-02-24 DIAGNOSIS — Z01.810 PREOP CARDIOVASCULAR EXAM: ICD-10-CM

## 2017-02-24 DIAGNOSIS — D50.9 IRON DEFICIENCY ANEMIA, UNSPECIFIED IRON DEFICIENCY ANEMIA TYPE: ICD-10-CM

## 2017-02-24 DIAGNOSIS — I25.42 CORONARY ARTERY DISSECTION: ICD-10-CM

## 2017-02-24 DIAGNOSIS — R07.89 CHEST PAIN, NON-CARDIAC: Primary | ICD-10-CM

## 2017-02-24 PROCEDURE — 99213 OFFICE O/P EST LOW 20 MIN: CPT | Mod: PBBFAC | Performed by: INTERNAL MEDICINE

## 2017-02-24 PROCEDURE — 99999 PR PBB SHADOW E&M-EST. PATIENT-LVL III: CPT | Mod: PBBFAC,,, | Performed by: INTERNAL MEDICINE

## 2017-02-24 PROCEDURE — 99213 OFFICE O/P EST LOW 20 MIN: CPT | Mod: S$PBB,,, | Performed by: INTERNAL MEDICINE

## 2017-02-24 RX ORDER — FERROUS SULFATE 325(65) MG
325 TABLET, DELAYED RELEASE (ENTERIC COATED) ORAL 3 TIMES DAILY
Refills: 0 | COMMUNITY
Start: 2017-02-24 | End: 2017-02-24

## 2017-02-24 RX ORDER — FERROUS SULFATE 325(65) MG
325 TABLET, DELAYED RELEASE (ENTERIC COATED) ORAL
COMMUNITY
End: 2017-05-29 | Stop reason: SDUPTHER

## 2017-02-24 NOTE — MR AVS SNAPSHOT
[facility] - Cardiology  [address]  [facility] [address]  Phone: [phone]                  Clayton Duarte   2017 9:00 AM   Office Visit    Description:  Female : 1985   Provider:  Lisa Aguayo MD   Department:  [facility]           Reason for Visit     Coronary artery dissection           Diagnoses this Visit        Comments    Chest pain on exertion    -  Primary     Coronary artery dissection                To Do List           Goals (5 Years of Data)     None      Follow-Up and Disposition     Return in about 1 year (around 2018), or if symptoms worsen or fail to improve.      [facility]     [facility] -  Assistance  Registered nurses in the [facility] provide clinical advisement, health education, appointment booking, and other advisory services.  Call for this free service at [phone].             Medications           Message regarding Medications     Verify the changes and/or additions to your medication regime listed below are the same as discussed with your clinician today.  If any of these changes or additions are incorrect, please notify your healthcare provider.        STOP taking these medications     medroxyPROGESTERone (DEPO-PROVERA) 150 mg/mL Syrg Inject 1 mL (150 mg total) into the muscle once.    naproxen sodium (ANAPROX DS) 550 MG tablet DO NOT TAKE OTHER ANTI-INFLAMMATORY AGENTS SUCH AS ASPIRIN, IBUPROPHEN, PAMPRIN, ETC. AT THE SAME TIME           Verify that the below list of medications is an accurate representation of the medications you are currently taking.  If none reported, the list may be blank. If incorrect, please contact your healthcare provider. Carry this list with you in case of emergency.           Current Medications     ferrous sulfate 325 (65 FE) MG EC tablet Take 1 tablet (325 mg total) by mouth once daily.           Clinical Reference Information           Your Vitals  Were     BP                   108/68 (BP Location: Left arm, Patient Position: Sitting, BP Method: Automatic)           Blood Pressure          Most Recent Value    Right Arm BP - Sitting  115/64    Left Arm BP - Sitting  108/68    BP  108/68      Allergies as of 2/24/2017     Bananas [Banana]    Peanut Butter Flavor      Immunizations Administered on Date of Encounter - 2/24/2017     None      Instructions    Take iron with vitamin c rich foods and avoid taking it within an hour of dairy products or antacids such as tums.        Language Assistance Services     ATTENTION: Language assistance services are available, free of charge. Please call [phone].      ATENCIÓN: Si habla español, tiene a santos disposición servicios gratuitos de asistencia lingüística. Llame al [phone].     KELLY Ý: N?u b?n nói Ti?ng Vi?t, có các d?ch v? h? tr? ngôn ng? mi?n phí dành cho b?n. G?i s? [phone].         [facility] complies with applicable Federal civil rights laws and does not discriminate on the basis of race, color, national origin, age, disability, or sex.

## 2017-02-24 NOTE — PATIENT INSTRUCTIONS
Take iron with vitamin c rich foods and avoid taking it within an hour of dairy products or antacids such as tums.   Return to clinic in one year.  Pain management consult entered.

## 2017-02-24 NOTE — LETTER
February 24, 2017      Madhav Rascon MD  [address]  [place] [address]           [facility] - Cardiology  [address]  [place] [address]  Phone: [phone]          Patient: Clayton Duarte   MR Number: [id number]   YOB: 1985   Date of Visit: 2/24/2017       Dear Dr. Madhav Rascon:    Thank you for referring Clayton Duarte to me for evaluation. Attached you will find relevant portions of my assessment and plan of care.    If you have questions, please do not hesitate to call me. I look forward to following Clayton Duarte along with you.    Sincerely,    Brook Hanna NP    Enclosure  CC:  No Recipients    If you would like to receive this communication electronically, please contact [email or web address] or [phone] to request more information on [email or web address] access.    For providers and/or their staff who would like to refer a patient to [facility], please contact us through our one-stop-shop provider referral line, [facility], at [phone].    If you feel you have received this communication in error or would no longer like to receive these types of communications, please e-mail [email or web address]

## 2017-02-24 NOTE — PROGRESS NOTES
Ms. Duarte is a patient of Dr. Lisa Aguayo and was last seen in [facility] 1/19/2017.    Subjective:   Patient ID:  Clayton Duarte is a 31 y.o. female who presents for follow-up of Coronary artery dissection    Patient is a 31 y.o. female with hx of SCAD post partum with extension from LM to LAD/LCX requiring CABGx2 (LIMA-LAD, SVG-OM1) in 2013 sent from stress lab for chest pain during stress test. Pt with chronic chest pain since CABG. Describes pain as LSB middle of the sternum that is sharp shooting pressure pain and is constant in nature.  Reports PND at times. Chest pain is not relieved with rest. Rates pain at 4.5/10 and continues with daily activities. Exertion increases pain. States that pain has progressively increased over the last year. Had ANUSHKA only reaching 73% predicted HR with baseline septal hypokinesis and no EKG changes in 5/2014. She has had same symptoms and underwent another ANUSHKA 2/15/17 as pre op prior to tubal ligation. Reached 93% predicted HR and developed 6/10 chest pain much worse in intensity but same character as before. Did not respond to 3 SL nitro. Still with baseline wall motion abnormality that failed to augment. Had TWI in inf/lat leads. Sent to ED. BP equal in both arms. Trop neg x3 at that time. CTA negative.     Echo CONCLUSIONS 2/15/17:    1 - Mildly depressed left ventricular systolic function (EF 45-50%).     2 - Normal left ventricular diastolic function.     3 - Normal right ventricular systolic function .     4 - The estimated PA systolic pressure is 22 mmHg.     5 - Trivial mitral regurgitation.     6 - Trivial tricuspid regurgitation.     CTA 2/15/17  1)Summary no evidence of proximal epicardial stenosis.   2) occluded FAVIO and saphenous vein graft.  3) Calcium score 0    Past Medical History:   Diagnosis Date    Abnormal Pap smear     LGSIL 2008, ASCUS 2009    Abnormal Pap smear of cervix     Anemia     Coronary artery dissection 9/19/2013    History of  Abnormal Pap smear     Postpartum depression     Spinal headache complicating labor and delivery, postpartum condition 9/6/2013     Past Surgical History:   Procedure Laterality Date    CARDIAC SURGERY      CORONARY ARTERY BYPASS GRAFT  9/13    tanner to lad, svg OM1    WISDOM TOOTH EXTRACTION       Family History   Problem Relation Age of Onset    Cancer Neg Hx     Heart attack Neg Hx     Heart disease Neg Hx     Breast cancer Neg Hx     Ovarian cancer Neg Hx     Colon cancer Neg Hx      Social History     Social History    Marital status:      Spouse name: N/A    Number of children: 3    Years of education: College     Occupational History    MA at [employer]     Social History Main Topics    Smoking status: Never Smoker    Smokeless tobacco: Never Used    Alcohol use No    Drug use: No    Sexual activity: Yes     Partners: Male     Birth control/ protection: None     Other Topics Concern    Not on file     Social History Narrative       Review of Systems   Constitution: Negative for decreased appetite, diaphoresis, weakness, malaise/fatigue, weight gain and weight loss.   HENT: Negative for headaches.    Eyes: Negative for visual disturbance.   Cardiovascular: Positive for chest pain. Negative for claudication, dyspnea on exertion, irregular heartbeat, leg swelling, near-syncope, orthopnea, palpitations, paroxysmal nocturnal dyspnea and syncope.        Denies chest pressure   Respiratory: Positive for shortness of breath. Negative for cough, hemoptysis, sleep disturbances due to breathing and snoring.    Musculoskeletal: Negative for myalgias.   Gastrointestinal: Negative for bloating, abdominal pain, anorexia, change in bowel habit, constipation, diarrhea, nausea and vomiting.   Neurological: Negative for difficulty with concentration, disturbances in coordination, excessive daytime sleepiness, dizziness, light-headedness, loss of balance and numbness.  "  Psychiatric/Behavioral: The patient does not have insomnia.      Allergies and current medications updated and reviewed:  Review of patient's allergies indicates:   Allergen Reactions    Bananas [banana] Itching    Peanut butter flavor      Current Outpatient Prescriptions   Medication Sig    ferrous sulfate 325 (65 FE) MG EC tablet Take 325 mg by mouth 3 (three) times daily with meals.     No current facility-administered medications for this visit.        Objective:     Right Arm BP - Sittin/64 (17 08)  Left Arm BP - Sittin/68 (17)    /68 (BP Location: Left arm, Patient Position: Sitting, BP Method: Automatic)  Pulse 92  Ht 5' 5" (1.651 m)  Wt 55 kg (121 lb 4.1 oz)  BMI 20.18 kg/m2    Physical Exam   Constitutional: She is oriented to person, place, and time. She appears well-developed and well-nourished. No distress.   HENT:   Head: Normocephalic and atraumatic.   Eyes: Conjunctivae and EOM are normal.   Neck: Normal range of motion. Neck supple. Normal carotid pulses, no hepatojugular reflux and no JVD present. Carotid bruit is not present.   Cardiovascular: Normal rate, regular rhythm, S1 normal, S2 normal and intact distal pulses.  PMI is not displaced.  Exam reveals no gallop and no friction rub.    No murmur heard.  Pulses:       Carotid pulses are 2+ on the right side, and 2+ on the left side.       Radial pulses are 2+ on the right side, and 2+ on the left side.        Femoral pulses are 2+ on the right side, and 2+ on the left side.       Dorsalis pedis pulses are 2+ on the right side, and 2+ on the left side.        Posterior tibial pulses are 1+ on the right side, and 1+ on the left side.   Pulmonary/Chest: Effort normal and breath sounds normal. No respiratory distress. She has no decreased breath sounds. She has no wheezes. She has no rhonchi. She has no rales. She exhibits no tenderness.       Abdominal: Soft. Bowel sounds are normal. She exhibits no " distension, no fluid wave, no abdominal bruit, no ascites and no pulsatile midline mass. There is no hepatosplenomegaly. There is no tenderness. There is no guarding.   Musculoskeletal: She exhibits no edema.   Neurological: She is alert and oriented to person, place, and time. She has normal strength and normal reflexes. She displays a negative Romberg sign. Gait normal.   Skin: Skin is warm, dry and intact. No rash noted. She is not diaphoretic. Nails show no clubbing.   Psychiatric: She has a normal mood and affect. Her speech is normal and behavior is normal. Thought content normal.   Nursing note and vitals reviewed.      Chemistry        Component Value Date/Time     02/16/2017 0345    K 3.9 02/16/2017 0345     (H) 02/16/2017 0345    CO2 23 02/16/2017 0345    BUN 10 02/16/2017 0345    CREATININE 0.7 02/16/2017 0345    GLU 82 02/16/2017 0345        Component Value Date/Time    CALCIUM 9.9 02/16/2017 0345    ALKPHOS 49 (L) 02/15/2017 1924    AST 22 02/15/2017 1924    ALT 14 02/15/2017 1924    BILITOT 0.1 02/15/2017 1924          Recent Labs  Lab 12/09/15  1101  02/15/17  1924 02/16/17 0345   WHITE BLOOD CELL COUNT 6.33  < > 7.09 6.01   HEMOGLOBIN 8.7 L  < > 9.3 L 8.4 L   POC HEMATOCRIT  --   < >  --   --    HEMATOCRIT 30.0 L  < > 32.2 L 28.5 L   MCV 75 L  < > 73 L 71 L   PLATELETS 422 H  < > 318 312   BNP  --   --  34  --    TSH 0.174 L  --   --   --    CHOLESTEROL  --   --   --  140   HDL  --   --   --  54   LDL CHOLESTEROL  --   --   --  66.4   TRIGLYCERIDES  --   --   --  98   HDL/CHOLESTEROL RATIO  --   --   --  38.6   < > = values in this interval not displayed.      Recent Labs  Lab 02/15/17  1924   INR 1.0      Test(s) Reviewed  I have reviewed the following in detail:  [x] Stress test   [] Angiography   [x] Echocardiogram   [x] Labs   [x] Other:  CTA       Assessment/Plan:     Chest pain, non-cardiac  Comments:  Recent stress echo and CTA negative. Pain is not cardiac. Refer to PCP and pain  management   Orders:  -     Ambulatory consult to Pain Clinic    Coronary artery dissection  Comments:  CTA done 2/17/17 did not reveal any vessels of concern. Stress echo revealed good blood flow through coronary arteries and closure of bypass grafts. Follow PRN    Iron deficiency anemia, unspecified iron deficiency anemia type  Comments:  Continue TID iron supplement. Take iron 1 hour before or after dairy. Take iron with vitamin C rich foods. Follow with PCP    Preop cardiovascular exam  Comments:  No cardiac reason to prohibit tubal ligation surgery.     Other orders  -     Discontinue: ferrous sulfate 325 (65 FE) MG EC tablet; Take 1 tablet (325 mg total) by mouth 3 (three) times daily.; Refill: 0      Return in about 1 year (around 2/24/2018), or if symptoms worsen or fail to improve.    Brook Hanna NP-C

## 2017-05-23 ENCOUNTER — OFFICE VISIT (OUTPATIENT)
Dept: INTERNAL MEDICINE | Facility: CLINIC | Age: 32
End: 2017-05-23
Payer: MEDICAID

## 2017-05-23 VITALS
SYSTOLIC BLOOD PRESSURE: 110 MMHG | DIASTOLIC BLOOD PRESSURE: 58 MMHG | HEART RATE: 89 BPM | BODY MASS INDEX: 19.99 KG/M2 | HEIGHT: 65 IN | OXYGEN SATURATION: 99 % | WEIGHT: 120 LBS

## 2017-05-23 DIAGNOSIS — Z11.3 SCREENING FOR STD (SEXUALLY TRANSMITTED DISEASE): ICD-10-CM

## 2017-05-23 DIAGNOSIS — D50.9 IRON DEFICIENCY ANEMIA, UNSPECIFIED IRON DEFICIENCY ANEMIA TYPE: ICD-10-CM

## 2017-05-23 DIAGNOSIS — Z00.00 ANNUAL PHYSICAL EXAM: Primary | ICD-10-CM

## 2017-05-23 DIAGNOSIS — N39.41 URGENCY INCONTINENCE: ICD-10-CM

## 2017-05-23 DIAGNOSIS — K42.9 UMBILICAL HERNIA WITHOUT OBSTRUCTION AND WITHOUT GANGRENE: ICD-10-CM

## 2017-05-23 PROCEDURE — 99999 PR PBB SHADOW E&M-EST. PATIENT-LVL III: CPT | Mod: PBBFAC,,, | Performed by: FAMILY MEDICINE

## 2017-05-23 PROCEDURE — 99213 OFFICE O/P EST LOW 20 MIN: CPT | Mod: PBBFAC | Performed by: FAMILY MEDICINE

## 2017-05-23 PROCEDURE — 99395 PREV VISIT EST AGE 18-39: CPT | Mod: S$PBB,,, | Performed by: FAMILY MEDICINE

## 2017-05-23 NOTE — PROGRESS NOTES
"Subjective:       Patient ID: Clayton Duarte is a 31 y.o. female.    Chief Complaint: Annual Exam    HPI   30 y/o female with Iron def anemia, post partum SCAD s/p CABG is here for annual exam  Denies f/n/v/d/constipation/cp/sob, Endorses urinary urgency, no dysuria/hematuria  On iron bid  Eye exam utd  Dental exam utd  Not doing dedicated exercise    Review of Systems   Constitutional: Negative for activity change, appetite change, fatigue and fever.   Respiratory: Negative for cough and shortness of breath.    Cardiovascular: Negative for chest pain, palpitations and leg swelling.   Gastrointestinal: Negative for constipation, diarrhea, nausea and vomiting.   Genitourinary: Positive for difficulty urinating. Negative for dysuria.   Skin: Negative for rash.   Neurological: Negative for dizziness and light-headedness.   Psychiatric/Behavioral: Negative for sleep disturbance.       Objective:      BP (!) 110/58   Pulse 89   Ht 5' 5" (1.651 m)   Wt 54.4 kg (120 lb)   LMP 05/15/2017 (Approximate)   SpO2 99%   BMI 19.97 kg/m²   Physical Exam   Constitutional: She appears well-developed and well-nourished.   HENT:   Head: Normocephalic and atraumatic.   Mouth/Throat: No oropharyngeal exudate.   Neck: Normal range of motion. Neck supple. No thyromegaly present.   Cardiovascular: Normal rate, regular rhythm and normal heart sounds.    Pulmonary/Chest: Effort normal and breath sounds normal. No respiratory distress.   Abdominal: Soft. Bowel sounds are normal. She exhibits no distension. There is no tenderness.   Musculoskeletal: She exhibits no edema.   Lymphadenopathy:     She has no cervical adenopathy.   Neurological: She is alert.   Skin: Skin is warm and dry.   Psychiatric: She has a normal mood and affect.   Nursing note and vitals reviewed.      Assessment:       1. Annual physical exam    2. Iron deficiency anemia, unspecified iron deficiency anemia type    3. Screening for STD (sexually transmitted " disease)    4. Umbilical hernia without obstruction and without gangrene    5. Urgency incontinence        Plan:   Clayton was seen today for annual exam.    Diagnoses and all orders for this visit:    Annual physical exam    Iron deficiency anemia, unspecified iron deficiency anemia type  -     CBC auto differential; Future  -     Ferritin; Future  -     Iron and TIBC; Future  -     Reticulocytes; Future    Screening for STD (sexually transmitted disease)  -     C. trachomatis/N. gonorrhoeae by AMP DNA Urine; Future  -     Hepatitis panel, acute; Future  -     HIV-1 and HIV-2 antibodies; Future  -     RPR; Future    Umbilical hernia without obstruction and without gangrene    Urgency incontinence  -     Urinalysis  -     Urine culture       May due iv iron if no improvement

## 2017-05-23 NOTE — PATIENT INSTRUCTIONS
Kegel Exercises  Kegel exercises dont need special clothing or equipment. Theyre easy to learn and simple to do. And if you do them right, no one can tell youre doing them, so they can be done almost anywhere. Your healthcare provider, nurse, or physical therapist can answer any questions you have and help you get started.    A weak pelvic floor   The pelvic floor muscles may weaken due to aging, pregnancy and vaginal childbirth, injury, surgery, chronic cough, or lack of exercise. If the pelvic floor is weak, your bladder and other pelvic organs may sag out of place. The urethra may also open too easily and allow urine to leak out. Kegel exercises can help you strengthen your pelvic floor muscles. Then they can better support the pelvic organs and control urine flow.  How Kegel exercises are done  Try each of the Kegel exercises described below. When youre doing them, try not to move your leg, buttock, or stomach muscles.  · Contract as if you were stopping your urine stream. But do it when youre not urinating.  · Tighten your rectum as if trying not to pass gas. Contract your anus, but dont move your buttocks.  · You may place a finger or 2 in the vagina and squeeze your finger with your vagina to learn which muscles to tighten.  Try to hold each Kegel for a slow count to 5. You probably wont be able to hold them for that long at first. But keep practicing. It will get easier as your pelvic floor gets stronger. Eventually, special weights that you place in your vagina may be recommended to help make your Kegels even more effective. Visit your healthcare provider if you have difficulties doing Kegel exercises.  Helpful hints  Here are some tips to follow:  · Do your Kegels as often as you can. The more you do them, the faster youll feel the results.  · Pick an activity you do often as a reminder. For instance, do your Kegels every time you sit down.  · Tighten your pelvic floor before you sneeze, get up  from a chair, cough, laugh, or lift. This protects your pelvic floor from injury and can help prevent urine leakage.   Date Last Reviewed: 8/5/2015  © 0804-4510 [employer]. [address] [place] [address], [place] [address]. All rights reserved. This information is not intended as a substitute for professional medical care. Always follow your healthcare professional's instructions.

## 2017-05-25 ENCOUNTER — LAB VISIT (OUTPATIENT)
Dept: LAB | Facility: HOSPITAL | Age: 32
End: 2017-05-25
Attending: FAMILY MEDICINE
Payer: MEDICAID

## 2017-05-25 DIAGNOSIS — Z11.3 SCREENING FOR STD (SEXUALLY TRANSMITTED DISEASE): ICD-10-CM

## 2017-05-25 DIAGNOSIS — D50.9 IRON DEFICIENCY ANEMIA, UNSPECIFIED IRON DEFICIENCY ANEMIA TYPE: ICD-10-CM

## 2017-05-25 LAB
BASOPHILS # BLD AUTO: 0.01 K/UL
BASOPHILS NFR BLD: 0.2 %
DIFFERENTIAL METHOD: ABNORMAL
EOSINOPHIL # BLD AUTO: 0.1 K/UL
EOSINOPHIL NFR BLD: 2 %
ERYTHROCYTE [DISTWIDTH] IN BLOOD BY AUTOMATED COUNT: 17.5 %
FERRITIN SERPL-MCNC: 2 NG/ML
HCT VFR BLD AUTO: 33.2 %
HGB BLD-MCNC: 9.5 G/DL
IRON SERPL-MCNC: 17 UG/DL
LYMPHOCYTES # BLD AUTO: 1.6 K/UL
LYMPHOCYTES NFR BLD: 28.9 %
MCH RBC QN AUTO: 21.2 PG
MCHC RBC AUTO-ENTMCNC: 28.6 %
MCV RBC AUTO: 74 FL
MONOCYTES # BLD AUTO: 0.4 K/UL
MONOCYTES NFR BLD: 6.5 %
NEUTROPHILS # BLD AUTO: 3.5 K/UL
NEUTROPHILS NFR BLD: 62.2 %
PLATELET # BLD AUTO: 347 K/UL
PMV BLD AUTO: 10.5 FL
RBC # BLD AUTO: 4.49 M/UL
RETICS/RBC NFR AUTO: 1 %
SATURATED IRON: 3 %
TOTAL IRON BINDING CAPACITY: 549 UG/DL
TRANSFERRIN SERPL-MCNC: 371 MG/DL
WBC # BLD AUTO: 5.58 K/UL

## 2017-05-25 PROCEDURE — 36415 COLL VENOUS BLD VENIPUNCTURE: CPT

## 2017-05-25 PROCEDURE — 86703 HIV-1/HIV-2 1 RESULT ANTBDY: CPT

## 2017-05-25 PROCEDURE — 82728 ASSAY OF FERRITIN: CPT

## 2017-05-25 PROCEDURE — 80074 ACUTE HEPATITIS PANEL: CPT

## 2017-05-25 PROCEDURE — 85045 AUTOMATED RETICULOCYTE COUNT: CPT

## 2017-05-25 PROCEDURE — 83540 ASSAY OF IRON: CPT

## 2017-05-25 PROCEDURE — 85025 COMPLETE CBC W/AUTO DIFF WBC: CPT

## 2017-05-25 PROCEDURE — 86592 SYPHILIS TEST NON-TREP QUAL: CPT

## 2017-05-26 LAB
HAV IGM SERPL QL IA: NEGATIVE
HBV CORE IGM SERPL QL IA: NEGATIVE
HBV SURFACE AG SERPL QL IA: NEGATIVE
HCV AB SERPL QL IA: NEGATIVE
HIV 1+2 AB+HIV1 P24 AG SERPL QL IA: NEGATIVE
RPR SER QL: NORMAL

## 2017-05-29 ENCOUNTER — TELEPHONE (OUTPATIENT)
Dept: INTERNAL MEDICINE | Facility: CLINIC | Age: 32
End: 2017-05-29

## 2017-05-29 DIAGNOSIS — D50.9 IRON DEFICIENCY ANEMIA, UNSPECIFIED IRON DEFICIENCY ANEMIA TYPE: Primary | ICD-10-CM

## 2017-05-29 RX ORDER — FERROUS SULFATE 325(65) MG
325 TABLET, DELAYED RELEASE (ENTERIC COATED) ORAL
Qty: 270 TABLET | Refills: 3 | Status: SHIPPED | OUTPATIENT
Start: 2017-05-29 | End: 2017-09-21

## 2017-08-05 ENCOUNTER — HOSPITAL ENCOUNTER (EMERGENCY)
Facility: HOSPITAL | Age: 32
Discharge: HOME | End: 2017-08-05
Attending: EMERGENCY MEDICINE | Admitting: EMERGENCY MEDICINE
Payer: MEDICAID

## 2017-08-05 VITALS
OXYGEN SATURATION: 99 % | SYSTOLIC BLOOD PRESSURE: 118 MMHG | RESPIRATION RATE: 16 BRPM | TEMPERATURE: 99 F | HEIGHT: 65 IN | HEART RATE: 98 BPM | WEIGHT: 132 LBS | BODY MASS INDEX: 21.99 KG/M2 | DIASTOLIC BLOOD PRESSURE: 61 MMHG

## 2017-08-05 DIAGNOSIS — N39.0 URINARY TRACT INFECTION WITHOUT HEMATURIA, SITE UNSPECIFIED: Primary | ICD-10-CM

## 2017-08-05 LAB
B-HCG UR QL: NEGATIVE
BACTERIA #/AREA URNS AUTO: ABNORMAL /HPF
BILIRUB UR QL STRIP: NEGATIVE
CLARITY UR REFRACT.AUTO: ABNORMAL
COLOR UR AUTO: YELLOW
CTP QC/QA: YES
GLUCOSE UR QL STRIP: NEGATIVE
HGB UR QL STRIP: ABNORMAL
HYALINE CASTS UR QL AUTO: 0 /LPF
KETONES UR QL STRIP: NEGATIVE
LEUKOCYTE ESTERASE UR QL STRIP: ABNORMAL
MICROSCOPIC COMMENT: ABNORMAL
NITRITE UR QL STRIP: NEGATIVE
NON-SQ EPI CELLS #/AREA URNS AUTO: 2 /HPF
PH UR STRIP: 6 [PH] (ref 5–8)
PROT UR QL STRIP: ABNORMAL
RBC #/AREA URNS AUTO: 15 /HPF (ref 0–4)
SP GR UR STRIP: 1.01 (ref 1–1.03)
SQUAMOUS #/AREA URNS AUTO: 2 /HPF
URN SPEC COLLECT METH UR: ABNORMAL
UROBILINOGEN UR STRIP-ACNC: NEGATIVE EU/DL
WBC #/AREA URNS AUTO: >100 /HPF (ref 0–5)

## 2017-08-05 PROCEDURE — 99283 EMERGENCY DEPT VISIT LOW MDM: CPT

## 2017-08-05 PROCEDURE — 25000003 PHARM REV CODE 250: Performed by: PHYSICIAN ASSISTANT

## 2017-08-05 PROCEDURE — 99283 EMERGENCY DEPT VISIT LOW MDM: CPT | Mod: ,,, | Performed by: PHYSICIAN ASSISTANT

## 2017-08-05 PROCEDURE — 81025 URINE PREGNANCY TEST: CPT | Performed by: PHYSICIAN ASSISTANT

## 2017-08-05 PROCEDURE — 87077 CULTURE AEROBIC IDENTIFY: CPT

## 2017-08-05 PROCEDURE — 87186 SC STD MICRODIL/AGAR DIL: CPT

## 2017-08-05 PROCEDURE — 81001 URINALYSIS AUTO W/SCOPE: CPT

## 2017-08-05 PROCEDURE — 87088 URINE BACTERIA CULTURE: CPT

## 2017-08-05 PROCEDURE — 87086 URINE CULTURE/COLONY COUNT: CPT

## 2017-08-05 RX ORDER — NITROFURANTOIN 25; 75 MG/1; MG/1
100 CAPSULE ORAL
Status: COMPLETED | OUTPATIENT
Start: 2017-08-05 | End: 2017-08-05

## 2017-08-05 RX ORDER — NITROFURANTOIN 25; 75 MG/1; MG/1
100 CAPSULE ORAL 2 TIMES DAILY
Qty: 14 CAPSULE | Refills: 0 | Status: SHIPPED | OUTPATIENT
Start: 2017-08-05 | End: 2017-08-12

## 2017-08-05 RX ADMIN — NITROFURANTOIN (MONOHYDRATE/MACROCRYSTALS) 100 MG: 75; 25 CAPSULE ORAL at 05:08

## 2017-08-05 NOTE — ED NOTES
Pt identifiers Clayton Duarte were checked and are correct  LOC: The patient is awake, alert, aware of environment with an appropriate affect. Oriented x3, speaking appropriately  APPEARANCE: Pt resting comfortably, in no acute distress, pt is clean and well groomed, clothing properly fastened  SKIN: Skin warm, dry and intact, normal skin turgor, moist mucus membranes  RESPIRATORY: Airway is open and patent, respirations are spontaneous, even and unlabored, normal effort and rate  CARDIAC: Normal rate and rhythm, no peripheral edema noted,  bilateral radial pulses 2+  ABDOMEN: Soft, nontender, nondistended. Bowel sounds present to all four quad of abd on auscultation  NEUROLOGIC:  facial expression is symmetrical, patient moving all extremities spontaneously, normal sensation in all extremities when touched with a finger.  Follows all commands appropriately  MUSCULOSKELETAL: No obvious deformities.

## 2017-08-05 NOTE — ED PROVIDER NOTES
Encounter Date: 8/5/2017       History     Chief Complaint   Patient presents with    Abdominal Cramping     31-year-old -American female with past medical history of coronary artery dissection presents to the ED complaining of dysuria and suprapubic abdominal pain for the past 2 weeks.  She has the abdominal pain only after urinating.  She reports associated frequency, urgency, and nocturia. She was in [place] on vacation and just got back in town. She denies any abdominal trauma. She denies f/c, chest pain, SOB, nausea, VB, vaginal discharge, headache, flank pain. She denies tobacco, alcohol, or drug use.      The history is provided by the patient.     Review of patient's allergies indicates:   Allergen Reactions    Bananas [banana] Itching    Peanut butter flavor      Past Medical History:   Diagnosis Date    Abnormal Pap smear     LGSIL 2008, ASCUS 2009    Abnormal Pap smear of cervix     Anemia     Coronary artery dissection 9/19/2013    History of Abnormal Pap smear     Postpartum depression     Spinal headache complicating labor and delivery, postpartum condition 9/6/2013     Past Surgical History:   Procedure Laterality Date    CARDIAC SURGERY      CORONARY ARTERY BYPASS GRAFT  9/13    tanner to lad, svg OM1    WISDOM TOOTH EXTRACTION       Family History   Problem Relation Age of Onset    Hypertension Mother     Cancer Neg Hx     Heart attack Neg Hx     Heart disease Neg Hx     Breast cancer Neg Hx     Ovarian cancer Neg Hx     Colon cancer Neg Hx      Social History   Substance Use Topics    Smoking status: Never Smoker    Smokeless tobacco: Never Used    Alcohol use No     Review of Systems   Constitutional: Negative for chills and fever.   HENT: Negative for congestion, rhinorrhea and sore throat.    Eyes: Negative for photophobia and visual disturbance.   Respiratory: Negative for shortness of breath.    Cardiovascular: Negative for chest pain.   Gastrointestinal:  Positive for abdominal pain and nausea. Negative for constipation, diarrhea and vomiting.   Genitourinary: Positive for dysuria, frequency and urgency. Negative for hematuria.        +nocturia   Musculoskeletal: Negative for back pain, neck pain and neck stiffness.   Skin: Negative for rash and wound.   Neurological: Negative for dizziness, syncope, weakness, light-headedness, numbness and headaches.   Psychiatric/Behavioral: Negative for confusion.       Physical Exam     Initial Vitals [08/05/17 1522]   BP Pulse Resp Temp SpO2   118/61 98 16 98.7 °F (37.1 °C) 99 %      MAP       80         Physical Exam    Nursing note and vitals reviewed.  Constitutional: She appears well-developed and well-nourished. She is not diaphoretic. No distress.   HENT:   Head: Normocephalic and atraumatic.   Neck: Normal range of motion. Neck supple.   Cardiovascular: Normal rate, regular rhythm and normal heart sounds. Exam reveals no gallop and no friction rub.    No murmur heard.  Pulmonary/Chest: Breath sounds normal. She has no wheezes. She has no rhonchi. She has no rales.   Abdominal: Soft. Bowel sounds are normal. There is tenderness (minimal) in the right lower quadrant and suprapubic area. There is no rigidity, no rebound, no guarding, no CVA tenderness, no tenderness at McBurney's point and negative Duarte's sign.   Musculoskeletal: Normal range of motion.   Neurological: She is alert and oriented to person, place, and time.   Skin: Skin is warm and dry. No rash noted. No erythema.   Psychiatric: She has a normal mood and affect.         ED Course   Procedures  Labs Reviewed   URINALYSIS, REFLEX TO URINE CULTURE - Abnormal; Notable for the following:        Result Value    Appearance, UA Cloudy (*)     Protein, UA 1+ (*)     Occult Blood UA 1+ (*)     Leukocytes, UA 3+ (*)     All other components within normal limits    Narrative:     Preferred Collection Type->Urine, Clean Catch   URINALYSIS MICROSCOPIC - Abnormal; Notable  for the following:     RBC, UA 15 (*)     WBC, UA >100 (*)     Non-Squam Epith 2 (*)     All other components within normal limits    Narrative:     Preferred Collection Type->Urine, Clean Catch   CULTURE, URINE   POCT URINE PREGNANCY             Medical Decision Making:   History:   Old Medical Records: I decided to obtain old medical records.  Clinical Tests:   Lab Tests: Ordered and Reviewed       APC / Resident Notes:   31-year-old -American female with past medical history of coronary artery dissection presents to the ED complaining of dysuria and suprapubic abdominal pain for the past 2 weeks.  VSS. RRR. Lungs CTA. Abdomen soft and minimally tender in the epigastric region and RLQ. No CVA tenderness. DDx includes but is not limited to UTI, pregnancy, ovarian cyst, menstrual cramps. Will get UA.    UPT negative.    UA shows signs of infection. 3+ leukocytes, >100 WBC. Urine culture added.    I do not feel that she needs any further labs or imaging at this time. Stable for discharge.    She was discharged with a prescription for macrobid (first dose given in the ED).  She will follow up with her PCP.  All of the patient's questions were answered.  I reviewed the patient's chart and labs and discussed the case with my supervising physician.            Attending Attestation:     Physician Attestation Statement for NP/PA:   I discussed this assessment and plan of this patient with the NP/PA, but I did not personally examine the patient. The face to face encounter was performed by the NP/PA.    Other NP/PA Attestation Additions:      Medical Decision Making: Gerald- [id number]  30 yo female complaining of lower abodminal cramping. UA significant for UTI. Will treat with macrobid. Pt is stable for DC.                    ED Course     Clinical Impression:   The encounter diagnosis was Urinary tract infection without hematuria, site unspecified.    Disposition:   Disposition: Discharged  Condition:  Stable                        Selena Muro PA-C  08/05/17 1922       Karey Castellon MD  08/06/17 1104

## 2017-08-08 LAB — BACTERIA UR CULT: NORMAL

## 2017-09-12 ENCOUNTER — PATIENT MESSAGE (OUTPATIENT)
Dept: OBSTETRICS AND GYNECOLOGY | Facility: CLINIC | Age: 32
End: 2017-09-12

## 2017-09-21 ENCOUNTER — OFFICE VISIT (OUTPATIENT)
Dept: OBSTETRICS AND GYNECOLOGY | Facility: CLINIC | Age: 32
End: 2017-09-21
Payer: MEDICAID

## 2017-09-21 VITALS
SYSTOLIC BLOOD PRESSURE: 118 MMHG | HEIGHT: 65 IN | WEIGHT: 119.06 LBS | DIASTOLIC BLOOD PRESSURE: 72 MMHG | BODY MASS INDEX: 19.83 KG/M2

## 2017-09-21 DIAGNOSIS — Z01.419 WELL WOMAN EXAM: Primary | ICD-10-CM

## 2017-09-21 DIAGNOSIS — N89.8 VAGINAL DISCHARGE: ICD-10-CM

## 2017-09-21 DIAGNOSIS — Z30.09 GENERAL COUNSELING AND ADVICE FOR CONTRACEPTIVE MANAGEMENT: ICD-10-CM

## 2017-09-21 LAB
CANDIDA RRNA VAG QL PROBE: NEGATIVE
G VAGINALIS RRNA GENITAL QL PROBE: POSITIVE
T VAGINALIS RRNA GENITAL QL PROBE: NEGATIVE

## 2017-09-21 PROCEDURE — 99395 PREV VISIT EST AGE 18-39: CPT | Mod: S$PBB,,, | Performed by: NURSE PRACTITIONER

## 2017-09-21 PROCEDURE — 99999 PR PBB SHADOW E&M-EST. PATIENT-LVL II: CPT | Mod: PBBFAC,,, | Performed by: NURSE PRACTITIONER

## 2017-09-21 PROCEDURE — 87480 CANDIDA DNA DIR PROBE: CPT

## 2017-09-21 PROCEDURE — 99212 OFFICE O/P EST SF 10 MIN: CPT | Mod: PBBFAC | Performed by: NURSE PRACTITIONER

## 2017-09-21 PROCEDURE — 87660 TRICHOMONAS VAGIN DIR PROBE: CPT

## 2017-09-21 NOTE — PROGRESS NOTES
HISTORY OF PRESENT ILLNESS:    Clayton Daurte is a 32 y.o. female, , Patient's last menstrual period was 2017.,  presents for a routine exam and contraceptive counseling.  -Has had a Mirena IUD in the past with good results and is thinking about getting another one.  -Has noticed increased vaginal discharge w/o odor or itching.    Past Medical History:   Diagnosis Date    Abnormal Pap smear of cervix ,     colposcopy    Anemia     Coronary artery dissection 2013    Postpartum depression     Spinal headache complicating labor and delivery, postpartum condition 2013       Past Surgical History:   Procedure Laterality Date    CARDIAC SURGERY      CORONARY ARTERY BYPASS GRAFT      tanner to lad, svg OM1    WISDOM TOOTH EXTRACTION         MEDICATIONS AND ALLERGIES:  No current outpatient prescriptions on file.    Review of patient's allergies indicates:   Allergen Reactions    Bananas [banana] Itching    Peanut butter flavor        Family History   Problem Relation Age of Onset    Hypertension Mother     Breast cancer Neg Hx     Ovarian cancer Neg Hx     Colon cancer Neg Hx        Social History     Social History    Marital status: Single     Spouse name: N/A    Number of children: 3    Years of education: College     Occupational History    MA at [employer]     Social History Main Topics    Smoking status: Never Smoker    Smokeless tobacco: Never Used    Alcohol use No    Drug use: No    Sexual activity: Yes     Partners: Male     Birth control/ protection: None     Other Topics Concern    Not on file     Social History Narrative    No narrative on file       OB HISTORY: Number of vaginal deliveries:3    COMPREHENSIVE GYN HISTORY:  PAP History: Reports abnormal Paps: , . Treatment: Colposcopy. LAST PAP 16 NORMAL.  Infection History: Denies STDs. Denies PID.  Benign History: Denies uterine fibroids. Denies ovarian cysts.  "Denies endometriosis. Denies other conditions.  Cancer History: Denies cervical cancer. Denies uterine cancer or hyperplasia. Denies ovarian cancer. Denies vulvar cancer or pre-cancer. Denies vaginal cancer or pre-cancer. Denies breast cancer. Denies colon cancer.  Sexual Activity History: Reports currently being sexually active  Menstrual History: Monthly. Mod then light flow.   Dysmenorrhea History: Reports mild dysmenorrhea.   Contraception: Condoms.    ROS:  GENERAL: No weight changes. No swelling. No fatigue. No fever.  CARDIOVASCULAR: No chest pain. No shortness of breath. No leg cramps.   NEUROLOGICAL: No headaches. No vision changes.  BREASTS: No pain. No lumps. No discharge.  ABDOMEN: No pain. No nausea. No vomiting. No diarrhea. No constipation.  REPRODUCTIVE: No abnormal bleeding.   VULVA: No pain. No lesions. No itching.  VAGINA: No relaxation. No itching. No odor. DISCHARFE.. No lesions.  URINARY: No incontinence. No nocturia. No frequency. No dysuria.    /72   Ht 5' 5" (1.651 m)   Wt 54 kg (119 lb 0.8 oz)   LMP 09/05/2017   BMI 19.81 kg/m²     PE:  APPEARANCE: Well nourished, well developed, in no acute distress.  AFFECT: WNL, alert and oriented x 3.  SKIN: No acne or hirsutism.  NECK: Neck symmetric, without masses or thyromegaly.  NODES: No inguinal, cervical, axillary or femoral lymph node enlargement.  CHEST: Good respiratory effort.   ABDOMEN: Soft. No tenderness or masses.  BREASTS: Symmetrical, no skin changes, visible lesions, palpable masses or nipple discharge bilaterally.  PELVIC: External female genitalia without lesions.  Female hair distribution. Adequate perineal body, Normal urethral meatus. Vagina moist and well rugated without lesions. MUCOID D/C present  No significant cystocele or rectocele present. Cervix pink without lesions, discharge or tenderness. Uterus is 8 week size, regular, mobile and nontender. Adnexa without masses or tenderness.  EXTREMITIES: No " edema    DIAGNOSIS:  1. Well woman exam with routine exam    2. General counseling and advice for contraceptive management    3. Vaginal discharge        PLAN: Pre-authorization for Mirena IUD sent    LABS AND TESTS ORDERED:  None  Declined STD tests    MEDICATIONS PRESCRIBED:  Non    COUNSELING:  The patient was counseled today on:  -all contraceptive options and she chose Mirena IUD;  -Mirena IUD risk/benefits, insertion procedure;  -A.C.S. Pap and pelvic exam guidelines (pap every 3 years);  -to follow up with her PCP for other health maintenance.    FOLLOW-UP with me for an IUD insertion and with either me or Dr Santos annually.

## 2017-09-22 DIAGNOSIS — B96.89 BV (BACTERIAL VAGINOSIS): Primary | ICD-10-CM

## 2017-09-22 DIAGNOSIS — N76.0 BV (BACTERIAL VAGINOSIS): Primary | ICD-10-CM

## 2017-09-22 RX ORDER — METRONIDAZOLE 500 MG/1
500 TABLET ORAL 2 TIMES DAILY
Qty: 14 TABLET | Refills: 1 | Status: SHIPPED | OUTPATIENT
Start: 2017-09-22 | End: 2017-09-29

## 2017-10-08 ENCOUNTER — PATIENT MESSAGE (OUTPATIENT)
Dept: OBSTETRICS AND GYNECOLOGY | Facility: CLINIC | Age: 32
End: 2017-10-08

## 2017-10-09 ENCOUNTER — TELEPHONE (OUTPATIENT)
Dept: OBSTETRICS AND GYNECOLOGY | Facility: CLINIC | Age: 32
End: 2017-10-09

## 2017-10-12 ENCOUNTER — PROCEDURE VISIT (OUTPATIENT)
Dept: OBSTETRICS AND GYNECOLOGY | Facility: CLINIC | Age: 32
End: 2017-10-12
Payer: MEDICAID

## 2017-10-12 VITALS — DIASTOLIC BLOOD PRESSURE: 66 MMHG | HEIGHT: 65 IN | SYSTOLIC BLOOD PRESSURE: 102 MMHG

## 2017-10-12 DIAGNOSIS — Z30.430 ENCOUNTER FOR IUD INSERTION: Primary | ICD-10-CM

## 2017-10-12 LAB
B-HCG UR QL: NEGATIVE
CTP QC/QA: YES

## 2017-10-12 PROCEDURE — 58300 INSERT INTRAUTERINE DEVICE: CPT | Mod: S$PBB,,, | Performed by: NURSE PRACTITIONER

## 2017-10-12 PROCEDURE — 81025 URINE PREGNANCY TEST: CPT | Mod: PBBFAC | Performed by: NURSE PRACTITIONER

## 2017-10-12 PROCEDURE — 58300 INSERT INTRAUTERINE DEVICE: CPT | Mod: PBBFAC | Performed by: NURSE PRACTITIONER

## 2017-10-12 RX ADMIN — LEVONORGESTREL 1 EACH: 52 INTRAUTERINE DEVICE INTRAUTERINE at 05:10

## 2017-10-12 NOTE — PROCEDURES
INSERTION OF INTRAUTERINE DEVICE  Date/Time: 10/12/2017 4:00 PM  Performed by: TRACEY AZUL  Authorized by: TRACEY AZUL   Preparation: Patient was prepped and draped in the usual sterile fashion.  Local anesthesia used: no    Anesthesia:  Local anesthesia used: no    Sedation:  Patient sedated: no  Patient tolerance: Patient tolerated the procedure well with no immediate complications      IUD PLACEMENT:       Clayton Duarte is a 32 y.o. female  Patient's last menstrual period was 10/08/2017.  presents for an IUD placement.    PRE-IUD PLACEMENT COUNSELING:  All contraceptive options were reviewed and the patient chooses an IUD.  The patient's history was reviewed and there are no contraindications to an IUD. The procedure and minimal risks of pain, bleeding, perforation and infection at the insertion and spontaneous expulsion within the first two weeks was discussed. The benefits of amenorrhea and no systemic side effects were explained. All questions were answered and the patient agrees to proceed. Consent was signed (scanned into computer).    PROCEDURE:  UPT negative  A time out was performed.    PELVIC: External female genitalia without lesions.  Female hair distribution. Adequate perineal body, Normal urethral meatus. Vagina moist and well rugated without lesions or discharge. Cervix is pink without lesions, discharge or tenderness. Uterus is 6 week size,     position, regular, mobile and nontender. Adnexa without masses or tenderness.    PROCEDURE:  A single tooth tenaculum was placed on the anterior cervical lip.  The uterus was sounded to 6 cm using sterile technique.  A Mirena IUD was loaded and placed high in uterine fundus without difficulty using sterile technique.  The string was cut to 2cm length from exo cervix.  The tenaculum and speculum were removed.   The patient tolerated the procedure well.    ASSESSMENT:  1. Contraception management / IUD insertion.     POST IUD  PLACEMENT COUNSELING:  Manage post IUD placement pain with NSAIDs, Tylenol or Rx per [employer].  IUD danger signs and how to check the strings.  Removal in 5 years for Mirena IUD and in 10 years for Copper IUD.    FOLLOW-UP: With me in two weeks for a string check.

## 2017-10-19 ENCOUNTER — OFFICE VISIT (OUTPATIENT)
Dept: OBSTETRICS AND GYNECOLOGY | Facility: CLINIC | Age: 32
End: 2017-10-19
Payer: MEDICAID

## 2017-10-19 VITALS
BODY MASS INDEX: 19.83 KG/M2 | DIASTOLIC BLOOD PRESSURE: 72 MMHG | WEIGHT: 119 LBS | SYSTOLIC BLOOD PRESSURE: 118 MMHG | HEIGHT: 65 IN

## 2017-10-19 DIAGNOSIS — Z30.431 INTRAUTERINE DEVICE SURVEILLANCE: Primary | ICD-10-CM

## 2017-10-19 PROCEDURE — 99024 POSTOP FOLLOW-UP VISIT: CPT | Mod: ,,, | Performed by: NURSE PRACTITIONER

## 2017-10-19 PROCEDURE — 99212 OFFICE O/P EST SF 10 MIN: CPT | Mod: PBBFAC | Performed by: NURSE PRACTITIONER

## 2017-10-19 PROCEDURE — 99999 PR PBB SHADOW E&M-EST. PATIENT-LVL II: CPT | Mod: PBBFAC,,, | Performed by: NURSE PRACTITIONER

## 2017-10-19 NOTE — PROGRESS NOTES
IUD CHECK:    Clayton Duarte is a 32 y.o. female  presents for IUD check following insertion on 10-12-17 () . She is not having any problems with bleeding, cramping, fever or discharge. She is able to feel the strings.    EXAM:  External female genitalia is without lesions.  Vagina is without lesions or discharge.  Cervix is pink without lesions, discharge or tenderness; IUD strings are visible.  Uterus is nontender and strings are palpable.  Adnexa are nontender and without masses.    ASSESSMENT:  1. IUD check / IUD in situ.    COUNSELING:  IUD danger signs and how to check the strings reviewed.    FOLLOW-UP for annual gyn exam or prn.

## 2017-10-26 ENCOUNTER — PATIENT MESSAGE (OUTPATIENT)
Dept: OBSTETRICS AND GYNECOLOGY | Facility: CLINIC | Age: 32
End: 2017-10-26

## 2017-11-08 ENCOUNTER — PATIENT MESSAGE (OUTPATIENT)
Dept: OBSTETRICS AND GYNECOLOGY | Facility: CLINIC | Age: 32
End: 2017-11-08

## 2017-12-04 ENCOUNTER — HOSPITAL ENCOUNTER (EMERGENCY)
Facility: HOSPITAL | Age: 32
Discharge: HOME | End: 2017-12-04
Attending: EMERGENCY MEDICINE
Payer: MEDICAID

## 2017-12-04 VITALS
DIASTOLIC BLOOD PRESSURE: 62 MMHG | SYSTOLIC BLOOD PRESSURE: 100 MMHG | RESPIRATION RATE: 16 BRPM | WEIGHT: 129 LBS | TEMPERATURE: 99 F | BODY MASS INDEX: 21.49 KG/M2 | OXYGEN SATURATION: 100 % | HEART RATE: 95 BPM | HEIGHT: 65 IN

## 2017-12-04 DIAGNOSIS — R51.9 HEADACHE: ICD-10-CM

## 2017-12-04 DIAGNOSIS — E86.0 DEHYDRATION: ICD-10-CM

## 2017-12-04 DIAGNOSIS — R07.9 CHEST PAIN: ICD-10-CM

## 2017-12-04 DIAGNOSIS — R11.2 NAUSEA AND VOMITING, INTRACTABILITY OF VOMITING NOT SPECIFIED, UNSPECIFIED VOMITING TYPE: Primary | ICD-10-CM

## 2017-12-04 DIAGNOSIS — R06.00 DYSPNEA, UNSPECIFIED TYPE: ICD-10-CM

## 2017-12-04 LAB
ALBUMIN SERPL BCP-MCNC: 3.9 G/DL
ALP SERPL-CCNC: 53 U/L
ALT SERPL W/O P-5'-P-CCNC: 13 U/L
ANION GAP SERPL CALC-SCNC: 10 MMOL/L
AST SERPL-CCNC: 32 U/L
B-HCG UR QL: NEGATIVE
BACTERIA #/AREA URNS AUTO: ABNORMAL /HPF
BASOPHILS # BLD AUTO: 0.02 K/UL
BASOPHILS NFR BLD: 0.1 %
BILIRUB SERPL-MCNC: 0.6 MG/DL
BILIRUB UR QL STRIP: NEGATIVE
BNP SERPL-MCNC: 32 PG/ML
BUN SERPL-MCNC: 10 MG/DL
CALCIUM SERPL-MCNC: 10.4 MG/DL
CHLORIDE SERPL-SCNC: 107 MMOL/L
CLARITY UR REFRACT.AUTO: ABNORMAL
CO2 SERPL-SCNC: 20 MMOL/L
COLOR UR AUTO: YELLOW
CREAT SERPL-MCNC: 0.7 MG/DL
CTP QC/QA: YES
DIFFERENTIAL METHOD: ABNORMAL
EOSINOPHIL # BLD AUTO: 0 K/UL
EOSINOPHIL NFR BLD: 0.1 %
ERYTHROCYTE [DISTWIDTH] IN BLOOD BY AUTOMATED COUNT: 17 %
EST. GFR  (AFRICAN AMERICAN): >60 ML/MIN/1.73 M^2
EST. GFR  (NON AFRICAN AMERICAN): >60 ML/MIN/1.73 M^2
FLUAV AG SPEC QL IA: NEGATIVE
FLUBV AG SPEC QL IA: NEGATIVE
GLUCOSE SERPL-MCNC: 87 MG/DL
GLUCOSE UR QL STRIP: NEGATIVE
HCT VFR BLD AUTO: 32.6 %
HGB BLD-MCNC: 9.6 G/DL
HGB UR QL STRIP: ABNORMAL
IMM GRANULOCYTES # BLD AUTO: 0.07 K/UL
IMM GRANULOCYTES NFR BLD AUTO: 0.4 %
KETONES UR QL STRIP: ABNORMAL
LEUKOCYTE ESTERASE UR QL STRIP: ABNORMAL
LIPASE SERPL-CCNC: 20 U/L
LYMPHOCYTES # BLD AUTO: 1.1 K/UL
LYMPHOCYTES NFR BLD: 6.4 %
MCH RBC QN AUTO: 21.8 PG
MCHC RBC AUTO-ENTMCNC: 29.4 G/DL
MCV RBC AUTO: 74 FL
MICROSCOPIC COMMENT: ABNORMAL
MONOCYTES # BLD AUTO: 0.8 K/UL
MONOCYTES NFR BLD: 4.9 %
NEUTROPHILS # BLD AUTO: 14.7 K/UL
NEUTROPHILS NFR BLD: 88.1 %
NITRITE UR QL STRIP: NEGATIVE
NRBC BLD-RTO: 0 /100 WBC
PH UR STRIP: 6 [PH] (ref 5–8)
PLATELET # BLD AUTO: 236 K/UL
PMV BLD AUTO: 10.2 FL
POTASSIUM SERPL-SCNC: 5 MMOL/L
PROT SERPL-MCNC: 8.5 G/DL
PROT UR QL STRIP: NEGATIVE
RBC # BLD AUTO: 4.4 M/UL
RBC #/AREA URNS AUTO: 2 /HPF (ref 0–4)
SODIUM SERPL-SCNC: 137 MMOL/L
SP GR UR STRIP: 1.02 (ref 1–1.03)
SPECIMEN SOURCE: NORMAL
SQUAMOUS #/AREA URNS AUTO: 12 /HPF
TROPONIN I SERPL DL<=0.01 NG/ML-MCNC: <0.006 NG/ML
URN SPEC COLLECT METH UR: ABNORMAL
UROBILINOGEN UR STRIP-ACNC: NEGATIVE EU/DL
WBC # BLD AUTO: 16.67 K/UL
WBC #/AREA URNS AUTO: 8 /HPF (ref 0–5)

## 2017-12-04 PROCEDURE — 81025 URINE PREGNANCY TEST: CPT | Performed by: EMERGENCY MEDICINE

## 2017-12-04 PROCEDURE — 25000003 PHARM REV CODE 250: Performed by: EMERGENCY MEDICINE

## 2017-12-04 PROCEDURE — 83880 ASSAY OF NATRIURETIC PEPTIDE: CPT

## 2017-12-04 PROCEDURE — 84484 ASSAY OF TROPONIN QUANT: CPT

## 2017-12-04 PROCEDURE — 96375 TX/PRO/DX INJ NEW DRUG ADDON: CPT

## 2017-12-04 PROCEDURE — 63600175 PHARM REV CODE 636 W HCPCS: Performed by: EMERGENCY MEDICINE

## 2017-12-04 PROCEDURE — 25500020 PHARM REV CODE 255: Performed by: EMERGENCY MEDICINE

## 2017-12-04 PROCEDURE — 96374 THER/PROPH/DIAG INJ IV PUSH: CPT

## 2017-12-04 PROCEDURE — 99284 EMERGENCY DEPT VISIT MOD MDM: CPT | Mod: 25

## 2017-12-04 PROCEDURE — 99284 EMERGENCY DEPT VISIT MOD MDM: CPT | Mod: ,,, | Performed by: EMERGENCY MEDICINE

## 2017-12-04 PROCEDURE — 80053 COMPREHEN METABOLIC PANEL: CPT

## 2017-12-04 PROCEDURE — 83690 ASSAY OF LIPASE: CPT

## 2017-12-04 PROCEDURE — 87400 INFLUENZA A/B EACH AG IA: CPT | Mod: 59

## 2017-12-04 PROCEDURE — 81001 URINALYSIS AUTO W/SCOPE: CPT

## 2017-12-04 PROCEDURE — 96361 HYDRATE IV INFUSION ADD-ON: CPT

## 2017-12-04 PROCEDURE — 85025 COMPLETE CBC W/AUTO DIFF WBC: CPT

## 2017-12-04 RX ORDER — HYDROCODONE BITARTRATE AND ACETAMINOPHEN 5; 325 MG/1; MG/1
1 TABLET ORAL EVERY 4 HOURS PRN
Qty: 18 TABLET | Refills: 0 | Status: SHIPPED | OUTPATIENT
Start: 2017-12-04 | End: 2018-05-28

## 2017-12-04 RX ORDER — KETOROLAC TROMETHAMINE 30 MG/ML
30 INJECTION, SOLUTION INTRAMUSCULAR; INTRAVENOUS
Status: COMPLETED | OUTPATIENT
Start: 2017-12-04 | End: 2017-12-04

## 2017-12-04 RX ORDER — PROCHLORPERAZINE EDISYLATE 5 MG/ML
10 INJECTION INTRAMUSCULAR; INTRAVENOUS
Status: COMPLETED | OUTPATIENT
Start: 2017-12-04 | End: 2017-12-04

## 2017-12-04 RX ORDER — HYDROMORPHONE HYDROCHLORIDE 2 MG/ML
1 INJECTION, SOLUTION INTRAMUSCULAR; INTRAVENOUS; SUBCUTANEOUS
Status: COMPLETED | OUTPATIENT
Start: 2017-12-04 | End: 2017-12-04

## 2017-12-04 RX ORDER — ONDANSETRON 4 MG/1
4 TABLET, FILM COATED ORAL EVERY 8 HOURS PRN
Qty: 12 TABLET | Refills: 0 | Status: SHIPPED | OUTPATIENT
Start: 2017-12-04 | End: 2017-12-11

## 2017-12-04 RX ADMIN — PROCHLORPERAZINE EDISYLATE 10 MG: 5 INJECTION INTRAMUSCULAR; INTRAVENOUS at 12:12

## 2017-12-04 RX ADMIN — IOHEXOL 75 ML: 350 INJECTION, SOLUTION INTRAVENOUS at 01:12

## 2017-12-04 RX ADMIN — SODIUM CHLORIDE 1000 ML: 0.9 INJECTION, SOLUTION INTRAVENOUS at 12:12

## 2017-12-04 RX ADMIN — KETOROLAC TROMETHAMINE 30 MG: 30 INJECTION, SOLUTION INTRAMUSCULAR at 12:12

## 2017-12-04 RX ADMIN — HYDROMORPHONE HYDROCHLORIDE 1 MG: 2 INJECTION INTRAMUSCULAR; INTRAVENOUS; SUBCUTANEOUS at 03:12

## 2017-12-04 NOTE — ED PROVIDER NOTES
Chief complaint:  Headache (onset two weeks ago w/ n/v and facial pain)      HPI:  Clayton Duarte is a 32 y.o. female presenting with acute onset of multiple symptoms that have been going on for last 2 weeks.  First she is complaining of right-sided chest pain and associated shortness of breath that she describes as moderate and aching but sometimes sharp.  No cough or congestion.  No leg swelling or calf tenderness.  Additionally, she is complaining of a headache which has also been present for 2 weeks.  She states it is mostly frontal in nature that radiates into her face.  No sinus pressure or congestion.  No drainage.  No fevers or chills.  She is also describing some right-sided numbness to her arm and upper leg but not her lower leg, but it does include her right foot.  No weakness.      ROS: As per HPI and below:  Constitutional:  No fevers, no chills  Eyes: no visual changes  Cardiac: right sided chest pain  Respiratory: shortness of breath  Abdominal: no abdominal pain,  nausea,  vomiting  Genitourinary: No dysuria, no frequency  Skin: no rash  Heme: no bleeding  Musculoskeletal: no joint pain  Neuro:  Right-sided focal numbness, no focal weakness, HA  Pyschological: no depression      Review of patient's allergies indicates:   Allergen Reactions    Bananas [banana] Itching    Peanut butter flavor        No current facility-administered medications on file prior to encounter.      No current outpatient prescriptions on file prior to encounter.       PMH:  As per HPI and below:  Past Medical History:   Diagnosis Date    Abnormal Pap smear of cervix 2008, 2009    colposcopy    Anemia     Coronary artery dissection 9/19/2013    Coronary artery dissection     Postpartum depression     Spinal headache complicating labor and delivery, postpartum condition 9/6/2013     Past Surgical History:   Procedure Laterality Date    CARDIAC SURGERY      CORONARY ARTERY BYPASS GRAFT  9/13    tanner to lad, svg  OM1    WISDOM TOOTH EXTRACTION         Social History     Social History    Marital status: Single     Spouse name: N/A    Number of children: 3    Years of education: College     Occupational History    MA at [employer]     Social History Main Topics    Smoking status: Never Smoker    Smokeless tobacco: Never Used    Alcohol use No    Drug use: No    Sexual activity: Yes     Partners: Male     Birth control/ protection: None     Other Topics Concern    None     Social History Narrative    None       Family History   Problem Relation Age of Onset    Hypertension Mother     Breast cancer Neg Hx     Ovarian cancer Neg Hx     Colon cancer Neg Hx        Physical Exam:    Vitals:    12/04/17 1520   BP: 100/62   Pulse: 95   Resp:    Temp: 98.5 °F (36.9 °C)     Constitutional: Well-nourished, well-developed, in no acute distress, not cachectic  Eyes: PERRLA, EOMI, normal conjunctiva, normal sclera  ENT: Moist Mucous membranes  Respiratory: Clear to auscultation bilaterally, no wheezes, no crackles, no rhonchi  Cardiovascular: Regular rate and rhythm, no murmurs, no rubs, no gallops  Abdominal: Soft, nontender, nondistended, no guarding, no rebound  Musculoskeletal: Normal range of motion, no obvious deformity, normal capillary refill, head atraumatic, neck supple, no meningismus  Skin: no rash, no ecchymosis, no errythema, no discharge  Neurologic: Cranial nerves II through XII intact, no motor deficits,  subjective right-sided numbness no cerebellar deficits  Psychological: Alert, oriented x3, normal affect, normal mood    Orders Placed This Encounter   Procedures    X-Ray Chest PA And Lateral    CT Head Without Contrast    CTA Chest Non-Coronary - PE Study    CBC auto differential    Comprehensive metabolic panel    Lipase    Troponin I    Urinalysis, Reflex to Urine Culture Urine, Clean Catch    Influenza antigen Nasopharyngeal Swab    Brain natriuretic peptide     Urinalysis Microscopic    POCT urine pregnancy    Insert Saline lock IV       Medications   hydromorphone (PF) injection 1 mg (not administered)   sodium chloride 0.9% bolus 1,000 mL (0 mLs Intravenous Stopped 12/4/17 1417)   prochlorperazine injection Soln 10 mg (10 mg Intravenous Given 12/4/17 1257)   ketorolac injection 30 mg (30 mg Intravenous Given 12/4/17 1257)   omnipaque 350 iohexol 75 mL (75 mLs Intravenous Given 12/4/17 1340)         Labs Reviewed   CBC W/ AUTO DIFFERENTIAL - Abnormal; Notable for the following:        Result Value    WBC 16.67 (*)     Hemoglobin 9.6 (*)     Hematocrit 32.6 (*)     MCV 74 (*)     MCH 21.8 (*)     MCHC 29.4 (*)     RDW 17.0 (*)     Gran # 14.7 (*)     Immature Grans (Abs) 0.07 (*)     Gran% 88.1 (*)     Lymph% 6.4 (*)     All other components within normal limits   COMPREHENSIVE METABOLIC PANEL - Abnormal; Notable for the following:     CO2 20 (*)     Total Protein 8.5 (*)     Alkaline Phosphatase 53 (*)     All other components within normal limits   URINALYSIS, REFLEX TO URINE CULTURE - Abnormal; Notable for the following:     Appearance, UA Hazy (*)     Ketones, UA 3+ (*)     Occult Blood UA 1+ (*)     Leukocytes, UA Trace (*)     All other components within normal limits    Narrative:     Preferred Collection Type->Urine, Clean Catch   URINALYSIS MICROSCOPIC - Abnormal; Notable for the following:     WBC, UA 8 (*)     All other components within normal limits    Narrative:     Preferred Collection Type->Urine, Clean Catch   LIPASE   TROPONIN I   INFLUENZA A AND B ANTIGEN   B-TYPE NATRIURETIC PEPTIDE   POCT URINE PREGNANCY                   ASSESSMENT  1. Nausea and vomiting, intractability of vomiting not specified, unspecified vomiting type    2. Chest pain    3. Headache    4. Dehydration    5. Dyspnea, unspecified type          Disposition:  Discharge    New Prescriptions    HYDROCODONE-ACETAMINOPHEN 5-325MG (NORCO) 5-325 MG PER TABLET    Take 1 tablet by mouth  every 4 (four) hours as needed for Pain.    ONDANSETRON (ZOFRAN) 4 MG TABLET    Take 1 tablet (4 mg total) by mouth every 8 (eight) hours as needed.     Modified Medications    No medications on file     Discontinued Medications    No medications on file     MDM  Number of Diagnoses or Management Options  Chest pain:   Dehydration:   Dyspnea, unspecified type:   Headache:   Nausea and vomiting, intractability of vomiting not specified, unspecified vomiting type:   Diagnosis management comments: Differential diagnosis includes migraine, sinusitis, Intracranial hemorrhage, ACS, pneumonia, PE    Patient presents with a constellation of symptoms that have been going on for 2 weeks including headache, nausea vomiting, chest pain, shortness of breath.  She is accompanied history with a previous history of coronary artery dissection.  We'll perform cardiac and infectious workup and reevaluate.  We'll also rule out PE.    Patient's laboratories are unremarkable except for some chronic anemia as well as an elevated white count of unclear etiology.  Urinalysis has slight contamination but no evidence of sea infection.  Chest x-ray as well as CT scan of chest and head are unremarkable.  Patient feels better with pain medication and fluids.  We'll give slightly more pain medication for headache and discharge home with primary care follow-up.  At this moment, I have no clear etiology for the extent of her symptoms, but I do not feel there is an acute emergent etiology.  I will not start antibiotics at this time since I am unclear what I would be treating.  His may be a viral syndrome of some sort.  We'll discharge with nausea medication and pain medication for her headache.       Amount and/or Complexity of Data Reviewed  Clinical lab tests: ordered and reviewed  Tests in the radiology section of CPT®: ordered  Tests in the medicine section of CPT®: ordered  Decide to obtain previous medical records or to obtain history from  someone other than the patient: yes  Independent visualization of images, tracings, or specimens: yes (EKG: nsr at 97, t wave inversion anteriorly, q waves anteriorly, no st elevation or depression    CXR: nad)           Charles Pulido III, MD  12/04/17 1142

## 2017-12-04 NOTE — ED NOTES
"Patient identifiers verified and correct for Ms Duarte  C/C: Headache, CP, SOB, n/v/d  APPEARANCE: awake and alert in NAD.  SKIN: warm, dry and intact. No breakdown or bruising.  MUSCULOSKELETAL: Patient moving all extremities spontaneously, no obvious swelling or deformities noted. Ambulates independently.  RESPIRATORY: Positive  shortness of breath.Respirations unlabored. No cough, no fever.   CARDIAC: Positive  CP but states PTA  2+ distal pulses; no peripheral edema  ABDOMEN: S/ND/NT, Positive nausea, vomiting, diarrhea.  : voids spontaneously, denies difficulty  Neurologic: AAO x 4; follows commands equal strength in all extremities; Positive  Numbness/tingling on "entire" right side. Positive dizziness and weakness when standing    "

## 2017-12-04 NOTE — ED TRIAGE NOTES
"Patient states luigi temporal  headache x 2 weeks, states right facial "numbness" Also concerned nausea/vomiting onset today, states achy with chills. Upon triage , states positive CP and SOB.   "

## 2018-05-28 ENCOUNTER — LAB VISIT (OUTPATIENT)
Dept: LAB | Facility: HOSPITAL | Age: 33
End: 2018-05-28
Attending: FAMILY MEDICINE
Payer: MEDICAID

## 2018-05-28 ENCOUNTER — OFFICE VISIT (OUTPATIENT)
Dept: INTERNAL MEDICINE | Facility: CLINIC | Age: 33
End: 2018-05-28
Payer: MEDICAID

## 2018-05-28 VITALS
DIASTOLIC BLOOD PRESSURE: 70 MMHG | BODY MASS INDEX: 22.77 KG/M2 | HEIGHT: 65 IN | WEIGHT: 136.69 LBS | HEART RATE: 81 BPM | SYSTOLIC BLOOD PRESSURE: 112 MMHG | OXYGEN SATURATION: 99 %

## 2018-05-28 DIAGNOSIS — K42.9 UMBILICAL HERNIA WITHOUT OBSTRUCTION AND WITHOUT GANGRENE: ICD-10-CM

## 2018-05-28 DIAGNOSIS — Z00.00 ANNUAL PHYSICAL EXAM: ICD-10-CM

## 2018-05-28 DIAGNOSIS — Z13.220 ENCOUNTER FOR LIPID SCREENING FOR CARDIOVASCULAR DISEASE: ICD-10-CM

## 2018-05-28 DIAGNOSIS — R07.9 CHEST PAIN, UNSPECIFIED TYPE: ICD-10-CM

## 2018-05-28 DIAGNOSIS — R10.84 GENERALIZED ABDOMINAL PAIN: ICD-10-CM

## 2018-05-28 DIAGNOSIS — Z11.3 SCREENING EXAMINATION FOR STD (SEXUALLY TRANSMITTED DISEASE): ICD-10-CM

## 2018-05-28 DIAGNOSIS — Z00.00 ANNUAL PHYSICAL EXAM: Primary | ICD-10-CM

## 2018-05-28 DIAGNOSIS — D64.9 CHRONIC ANEMIA: Chronic | ICD-10-CM

## 2018-05-28 DIAGNOSIS — Z13.6 ENCOUNTER FOR LIPID SCREENING FOR CARDIOVASCULAR DISEASE: ICD-10-CM

## 2018-05-28 DIAGNOSIS — R10.30 LOWER ABDOMINAL PAIN: ICD-10-CM

## 2018-05-28 DIAGNOSIS — D50.9 IRON DEFICIENCY ANEMIA, UNSPECIFIED IRON DEFICIENCY ANEMIA TYPE: ICD-10-CM

## 2018-05-28 LAB
BILIRUB UR QL STRIP: NEGATIVE
CLARITY UR REFRACT.AUTO: ABNORMAL
COLOR UR AUTO: YELLOW
GLUCOSE UR QL STRIP: NEGATIVE
HGB UR QL STRIP: ABNORMAL
KETONES UR QL STRIP: NEGATIVE
LEUKOCYTE ESTERASE UR QL STRIP: NEGATIVE
MICROSCOPIC COMMENT: ABNORMAL
NITRITE UR QL STRIP: NEGATIVE
PH UR STRIP: 5 [PH] (ref 5–8)
PROT UR QL STRIP: NEGATIVE
RBC #/AREA URNS AUTO: 10 /HPF (ref 0–4)
SP GR UR STRIP: 1.03 (ref 1–1.03)
SQUAMOUS #/AREA URNS AUTO: 4 /HPF
URN SPEC COLLECT METH UR: ABNORMAL
UROBILINOGEN UR STRIP-ACNC: 4 EU/DL

## 2018-05-28 PROCEDURE — 99395 PREV VISIT EST AGE 18-39: CPT | Mod: S$PBB,,, | Performed by: FAMILY MEDICINE

## 2018-05-28 PROCEDURE — 81001 URINALYSIS AUTO W/SCOPE: CPT

## 2018-05-28 PROCEDURE — 99999 PR PBB SHADOW E&M-EST. PATIENT-LVL III: CPT | Mod: PBBFAC,,, | Performed by: FAMILY MEDICINE

## 2018-05-28 PROCEDURE — 87491 CHLMYD TRACH DNA AMP PROBE: CPT

## 2018-05-28 PROCEDURE — 99213 OFFICE O/P EST LOW 20 MIN: CPT | Mod: PBBFAC,PN | Performed by: FAMILY MEDICINE

## 2018-05-28 NOTE — PROGRESS NOTES
"Subjective:       Patient ID: Clayton Duarte is a 32 y.o. female.    Chief Complaint: Annual Exam    HPI 31 y/o female with Iron def anemia, post partum SCAD s/p CABG 9/2013 is here for annual exam.  She has been having lower abdominal crampy sharp pain, occurs about 1-2 times a week, lasts 5 min and resolves, not associated with cycle, not associated with meal time, denies f/n/v/d/constipation, gets sternal chest pain almost daily since her surgery, 1-2 times lasts a few min and resolves, occur more with activity, when this happens she feels a little sob, not taking any nsaids, does feel heartburn or indigestion, denies urinary sx.  She is active and does ene and cardio 4 days a week.  Sleeping ok.  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   Respiratory: Negative for cough and shortness of breath.    Cardiovascular: Positive for chest pain. Negative for palpitations and leg swelling.   Gastrointestinal: Positive for abdominal pain. Negative for constipation, diarrhea, nausea and vomiting.   Genitourinary: Negative for difficulty urinating and dysuria.   Skin: Negative for rash.   Neurological: Negative for dizziness and light-headedness.   Psychiatric/Behavioral: Negative for sleep disturbance.       Objective:      /70 (BP Location: Left arm, Patient Position: Sitting, BP Method: Medium (Manual))   Pulse 81   Ht 5' 5" (1.651 m)   Wt 62 kg (136 lb 11 oz)   LMP 05/26/2018 (Exact Date)   SpO2 99%   BMI 22.75 kg/m²   Physical Exam   Constitutional: She appears well-developed and well-nourished.   HENT:   Head: Normocephalic and atraumatic.   Mouth/Throat: No oropharyngeal exudate.   Neck: Normal range of motion. Neck supple. No thyromegaly present. "   Cardiovascular: Normal rate, regular rhythm and normal heart sounds.    Pulmonary/Chest: Effort normal and breath sounds normal. No respiratory distress.   Abdominal: Soft. Bowel sounds are normal. She exhibits no distension and no mass. There is no tenderness. There is no rebound. No hernia.   Musculoskeletal: She exhibits no edema.   Cannot reproduce chest pain   Lymphadenopathy:     She has no cervical adenopathy.   Neurological: She is alert.   Skin: Skin is warm and dry.   Psychiatric: She has a normal mood and affect.   Nursing note and vitals reviewed.      Assessment:       1. Annual physical exam    2. Chronic anemia    3. Iron deficiency anemia, unspecified iron deficiency anemia type    4. Screening examination for STD (sexually transmitted disease)    5. Encounter for lipid screening for cardiovascular disease    6. Umbilical hernia without obstruction and without gangrene    7. Generalized abdominal pain    8. Lower abdominal pain    9. Chest pain, unspecified type        Plan:   Clayton was seen today for annual exam.    Diagnoses and all orders for this visit:    Annual physical exam  -     Iron and TIBC; Future  -     Ferritin; Future  -     Folate; Future  -     Vitamin B12; Future  -     Reticulocytes; Future  -     Comprehensive metabolic panel; Future  -     Lipid panel; Future  -     TSH; Future  -     Hemoglobin A1c; Future  -     C. trachomatis/N. gonorrhoeae by AMP DNA Urine; Future  -     Hepatitis panel, acute; Future  -     HIV-1 and HIV-2 antibodies; Future  -     RPR; Future  -     Urinalysis; Future    Chronic anemia    Iron deficiency anemia, unspecified iron deficiency anemia type  -     Iron and TIBC; Future  -     Ferritin; Future  -     Folate; Future  -     Vitamin B12; Future  -     Reticulocytes; Future    Screening examination for STD (sexually transmitted disease)  -     C. trachomatis/N. gonorrhoeae by AMP DNA Urine; Future  -     Hepatitis panel, acute; Future  -      HIV-1 and HIV-2 antibodies; Future  -     RPR; Future    Encounter for lipid screening for cardiovascular disease  -     Lipid panel; Future    Umbilical hernia without obstruction and without gangrene    Generalized abdominal pain  -     CT Abdomen Pelvis  Without Contrast; Future    Lower abdominal pain  -     CT Abdomen Pelvis  Without Contrast; Future    Chest pain, unspecified type

## 2018-05-29 ENCOUNTER — TELEPHONE (OUTPATIENT)
Dept: INTERNAL MEDICINE | Facility: CLINIC | Age: 33
End: 2018-05-29

## 2018-05-29 LAB
C TRACH DNA SPEC QL NAA+PROBE: NOT DETECTED
N GONORRHOEA DNA SPEC QL NAA+PROBE: NOT DETECTED

## 2018-05-29 NOTE — TELEPHONE ENCOUNTER
Please let her know that I heard back from Dr. Aguayo and that she would like to see her in clinic prior to doing any testing please assist with appointment

## 2018-05-30 ENCOUNTER — TELEPHONE (OUTPATIENT)
Dept: INTERNAL MEDICINE | Facility: CLINIC | Age: 33
End: 2018-05-30

## 2018-05-30 ENCOUNTER — PATIENT MESSAGE (OUTPATIENT)
Dept: INTERNAL MEDICINE | Facility: CLINIC | Age: 33
End: 2018-05-30

## 2018-05-30 NOTE — TELEPHONE ENCOUNTER
----- Message from Alix Wagner sent at 5/30/2018  2:52 PM CDT -----  Contact: Pt    Name of Who is Calling: HALIMA JOE [[id number]]      What is the request in detail: Patient states she is returning a call patient prefers to be contacted on the patient portal......Please contact to further discuss and advise       Can the clinic reply by [facility]: Yes      [facility]: [phone]

## 2018-08-07 ENCOUNTER — PATIENT MESSAGE (OUTPATIENT)
Dept: OBSTETRICS AND GYNECOLOGY | Facility: CLINIC | Age: 33
End: 2018-08-07

## 2018-08-08 ENCOUNTER — PATIENT MESSAGE (OUTPATIENT)
Dept: OBSTETRICS AND GYNECOLOGY | Facility: CLINIC | Age: 33
End: 2018-08-08

## 2018-08-13 ENCOUNTER — OFFICE VISIT (OUTPATIENT)
Dept: OBSTETRICS AND GYNECOLOGY | Facility: CLINIC | Age: 33
End: 2018-08-13
Payer: MEDICAID

## 2018-08-13 VITALS
HEIGHT: 65 IN | SYSTOLIC BLOOD PRESSURE: 100 MMHG | DIASTOLIC BLOOD PRESSURE: 76 MMHG | WEIGHT: 144.75 LBS | BODY MASS INDEX: 24.12 KG/M2

## 2018-08-13 DIAGNOSIS — Z30.9 ENCOUNTER FOR CONTRACEPTIVE MANAGEMENT, UNSPECIFIED TYPE: ICD-10-CM

## 2018-08-13 DIAGNOSIS — T83.32XA MALPOSITIONED INTRAUTERINE DEVICE (IUD), INITIAL ENCOUNTER: Primary | ICD-10-CM

## 2018-08-13 PROCEDURE — 99999 PR PBB SHADOW E&M-EST. PATIENT-LVL III: CPT | Mod: PBBFAC,,, | Performed by: NURSE PRACTITIONER

## 2018-08-13 PROCEDURE — 99213 OFFICE O/P EST LOW 20 MIN: CPT | Mod: PBBFAC | Performed by: NURSE PRACTITIONER

## 2018-08-13 PROCEDURE — 99213 OFFICE O/P EST LOW 20 MIN: CPT | Mod: S$PBB,,, | Performed by: NURSE PRACTITIONER

## 2018-08-13 RX ORDER — MEDROXYPROGESTERONE ACETATE 150 MG/ML
150 INJECTION, SUSPENSION INTRAMUSCULAR
Qty: 1 SYRINGE | Refills: 3 | Status: SHIPPED | OUTPATIENT
Start: 2018-08-13 | End: 2019-07-30 | Stop reason: SDUPTHER

## 2018-08-13 NOTE — PROGRESS NOTES
CC: IUD fell out    HPI: Pt is a 32 y.o.  female who presents c/o that her IUD spontaneously came out onto the floor while she was on her cycle.  [facility]  Reports since IUD was inserted she has had heavy monthly cycles.  Reports her previous IUD she had no cycle. Pt desires a replacement IUD.  UPT is negative.       ROS:  GENERAL: Feeling well overall. Denies fever or chills.   SKIN: Denies rash or lesions.   HEAD: Denies head injury or headache.   NODES: Denies enlarged lymph nodes.   CHEST: Denies chest pain or shortness of breath.   CARDIOVASCULAR: Denies palpitations or left sided chest pain.   ABDOMEN: No abdominal pain, constipation, diarrhea, nausea, vomiting or rectal bleeding.   URINARY: No dysuria, hematuria, or burning on urination.  REPRODUCTIVE: See HPI.   BREASTS: Denies pain, lumps, or nipple discharge.   HEMATOLOGIC: No easy bruisability or excessive bleeding.   MUSCULOSKELETAL: Denies joint pain or swelling.   NEUROLOGIC: Denies syncope or weakness.   PSYCHIATRIC: Denies depression, anxiety or mood swings.    PE:   APPEARANCE: Well nourished, well developed, Black or  female in no acute distress.  VULVA: No lesions. Normal external female genitalia.  URETHRAL MEATUS: Normal size and location, no lesions, no prolapse.  URETHRA: No masses, tenderness, or prolapse.  VAGINA: Moist. No lesions or lacerations noted. No abnormal discharge present. No odor present.   CERVIX: No lesions or discharge. No cervical motion tenderness.   UTERUS: Normal size, regular shape, mobile, non-tender.  ADNEXA: No tenderness. No fullness or masses palpated in the adnexal regions.   ANUS PERINEUM: Normal.      Diagnosis:  1. Malpositioned intrauterine device (IUD), initial encounter    2. Encounter for contraceptive management, unspecified type        Plan:   UPT is negative  Depo Provera for contraception for now until IUD is replaced  Mirena IUD benefits investigation -  to be inserted by Dr. Madison    Orders Placed This Encounter    POCT Urine Pregnancy    medroxyPROGESTERone (DEPO-PROVERA) 150 mg/mL Syrg         Follow-up PRN no resolution of symptoms.    Marita Rock, KASSIE-C

## 2018-08-14 ENCOUNTER — PATIENT MESSAGE (OUTPATIENT)
Dept: OBSTETRICS AND GYNECOLOGY | Facility: CLINIC | Age: 33
End: 2018-08-14

## 2018-08-29 ENCOUNTER — TELEPHONE (OUTPATIENT)
Dept: OBSTETRICS AND GYNECOLOGY | Facility: CLINIC | Age: 33
End: 2018-08-29

## 2018-08-29 ENCOUNTER — PATIENT MESSAGE (OUTPATIENT)
Dept: OBSTETRICS AND GYNECOLOGY | Facility: CLINIC | Age: 33
End: 2018-08-29

## 2018-09-06 ENCOUNTER — TELEPHONE (OUTPATIENT)
Dept: OBSTETRICS AND GYNECOLOGY | Facility: CLINIC | Age: 33
End: 2018-09-06

## 2018-09-06 NOTE — TELEPHONE ENCOUNTER
lvm to [facility] pt for IUD, sherman covered 100% through buy and bill. $0.00 co pay

## 2018-09-10 ENCOUNTER — PATIENT MESSAGE (OUTPATIENT)
Dept: OBSTETRICS AND GYNECOLOGY | Facility: CLINIC | Age: 33
End: 2018-09-10

## 2018-09-24 ENCOUNTER — OFFICE VISIT (OUTPATIENT)
Dept: URGENT CARE | Facility: CLINIC | Age: 33
End: 2018-09-24
Payer: MEDICAID

## 2018-09-24 ENCOUNTER — PATIENT MESSAGE (OUTPATIENT)
Dept: OBSTETRICS AND GYNECOLOGY | Facility: CLINIC | Age: 33
End: 2018-09-24

## 2018-09-24 VITALS
WEIGHT: 144 LBS | BODY MASS INDEX: 23.99 KG/M2 | HEIGHT: 65 IN | SYSTOLIC BLOOD PRESSURE: 113 MMHG | OXYGEN SATURATION: 99 % | DIASTOLIC BLOOD PRESSURE: 71 MMHG | TEMPERATURE: 99 F | RESPIRATION RATE: 16 BRPM | HEART RATE: 88 BPM

## 2018-09-24 DIAGNOSIS — H66.002 ACUTE SUPPURATIVE OTITIS MEDIA OF LEFT EAR WITHOUT SPONTANEOUS RUPTURE OF TYMPANIC MEMBRANE, RECURRENCE NOT SPECIFIED: Primary | ICD-10-CM

## 2018-09-24 PROCEDURE — 99214 OFFICE O/P EST MOD 30 MIN: CPT | Mod: S$GLB,,, | Performed by: NURSE PRACTITIONER

## 2018-09-24 RX ORDER — AMOXICILLIN 875 MG/1
875 TABLET, FILM COATED ORAL 2 TIMES DAILY
Qty: 20 TABLET | Refills: 0 | Status: SHIPPED | OUTPATIENT
Start: 2018-09-24 | End: 2018-10-04

## 2018-09-24 RX ORDER — IBUPROFEN 600 MG/1
600 TABLET ORAL EVERY 6 HOURS PRN
Qty: 60 TABLET | Refills: 2 | Status: SHIPPED | OUTPATIENT
Start: 2018-09-24 | End: 2018-10-28

## 2018-09-24 NOTE — LETTER
September 24, 2018      [facility] [address], [address] [address]  Phone: [phone]  Fax: [phone]       Patient: Clayton Duarte   YOB: 1985  Date of Visit: 09/24/2018    To Whom It May Concern:    Damian Duarte  was at [facility] on 09/24/2018. She may return to work/school on 09/26/2018 with no restrictions. If you have any questions or concerns, or if I can be of further assistance, please do not hesitate to contact me.    Sincerely,        Krupa Quintero, NP

## 2018-09-24 NOTE — PROGRESS NOTES
"Subjective:       Patient ID: Clayton Duarte is a 33 y.o. female.    Vitals:  height is 5' 5" (1.651 m) and weight is 65.3 kg (144 lb). Her temperature is 98.8 °F (37.1 °C). Her blood pressure is 113/71 and her pulse is 88. Her respiration is 16 and oxygen saturation is 99%.     Chief Complaint: Sinus Problem    Pt c/o symptoms that have been accumulating over the past 3 weeks, starting with sore throat, followed by, ear pain, fatigue.     Reports a negative strep swab 1 wk ago      Sinus Problem   This is a new problem. The current episode started 1 to 4 weeks ago. The problem has been waxing and waning since onset. There has been no fever. Her pain is at a severity of 8/10. Associated symptoms include ear pain (left/severe) and a sore throat. Pertinent negatives include no chills, congestion, coughing, headaches, hoarse voice or shortness of breath.     Review of Systems   Constitution: Positive for malaise/fatigue. Negative for chills.   HENT: Positive for ear pain (left/severe) and sore throat. Negative for congestion and hoarse voice.    Eyes: Negative for discharge and redness.   Cardiovascular: Negative for chest pain, dyspnea on exertion and leg swelling.   Respiratory: Negative for cough, shortness of breath, sputum production and wheezing.    Musculoskeletal: Negative for myalgias.   Gastrointestinal: Positive for nausea. Negative for abdominal pain and vomiting.   Neurological: Negative for headaches.       Objective:      Physical Exam   Constitutional: She is oriented to person, place, and time. She appears well-developed and well-nourished. She is cooperative.  Non-toxic appearance. She does not appear ill. No distress.   HENT:   Head: Normocephalic and atraumatic.   Right Ear: Hearing, tympanic membrane, external ear and ear canal normal.   Left Ear: Hearing, external ear and ear canal normal. Tympanic membrane is erythematous and bulging.   Nose: Nose normal. No mucosal edema, rhinorrhea or " nasal deformity. No epistaxis. Right sinus exhibits no maxillary sinus tenderness and no frontal sinus tenderness. Left sinus exhibits no maxillary sinus tenderness and no frontal sinus tenderness.   Mouth/Throat: Uvula is midline, oropharynx is clear and moist and mucous membranes are normal. No trismus in the jaw. Normal dentition. No uvula swelling. No posterior oropharyngeal erythema.   Eyes: Conjunctivae and lids are normal. No scleral icterus.   Sclera clear bilat   Neck: Trachea normal, full passive range of motion without pain and phonation normal. Neck supple.   Cardiovascular: Normal rate, regular rhythm, normal heart sounds, intact distal pulses and normal pulses.   Pulmonary/Chest: Effort normal and breath sounds normal. No respiratory distress.   Abdominal: Soft. Normal appearance and bowel sounds are normal. She exhibits no distension. There is no tenderness.   Musculoskeletal: Normal range of motion. She exhibits no edema or deformity.   Neurological: She is alert and oriented to person, place, and time. She exhibits normal muscle tone. Coordination normal.   Skin: Skin is warm, dry and intact. She is not diaphoretic. No pallor.   Psychiatric: She has a normal mood and affect. Her speech is normal and behavior is normal. Judgment and thought content normal. Cognition and memory are normal.   Nursing note and vitals reviewed.      Assessment:       1. Acute suppurative otitis media of left ear without spontaneous rupture of tympanic membrane, recurrence not specified        Plan:         Acute suppurative otitis media of left ear without spontaneous rupture of tympanic membrane, recurrence not specified  -     amoxicillin (AMOXIL) 875 MG tablet; Take 1 tablet (875 mg total) by mouth 2 (two) times daily. for 10 days  Dispense: 20 tablet; Refill: 0  -     ibuprofen (ADVIL,MOTRIN) 600 MG tablet; Take 1 tablet (600 mg total) by mouth every 6 (six) hours as needed for Pain.  Dispense: 60 tablet; Refill:  2            Patient Instructions   TAKE FULL COURSE OF ANTIBIOTICS.   EAT WHEN TAKING MOTRIN  You must understand that you've received an Urgent Care treatment only and that you may be released before all your medical problems are known or treated. You, the patient, will arrange for follow up care as instructed.  If your condition worsens we recommend that you receive another evaluation at the emergency room immediately or contact your primary medical clinics after hours call service to discuss your concerns.  Please return here or go to the Emergency Department for any concerns or worsening of condition.      Middle Ear Infection (Adult)  You have an infection of the middle ear, the space behind the eardrum. This is also called acute otitis media (AOM). Sometimes it is caused by the common cold. This is because congestion can block the internal passage (eustachian tube) that drains fluid from the middle ear. When the middle ear fills with fluid, bacteria can grow there and cause an infection. Oral antibiotics are used to treat this illness, not ear drops. Symptoms usually start to improve within 1 to 2 days of treatment.    Home care  The following are general care guidelines:  · Finish all of the antibiotic medicine given, even though you may feel better after the first few days.  · You may use over-the-counter medicine, such as acetaminophen or ibuprofen, to control pain and fever, unless something else was prescribed. If you have chronic liver or kidney disease or have ever had a stomach ulcer or gastrointestinal bleeding, talk with your healthcare provider before using these medicines. Do not give aspirin to anyone under 18 years of age who has a fever. It may cause severe illness or death.  Follow-up care  Follow up with your healthcare provider, or as advised, in 2 weeks if all symptoms have not gotten better, or if hearing doesn't go back to normal within 1 month.  When to seek medical advice  Call your  healthcare provider right away if any of these occur:  · Ear pain gets worse or does not improve after 3 days of treatment  · Unusual drowsiness or confusion  · Neck pain, stiff neck, or headache  · Fluid or blood draining from the ear canal  · Fever of 100.4°F (38°C) or as advised   · Seizure  Date Last Reviewed: 6/1/2016  © 2436-1605 [employer]. [address] [place] [address], [place] [address]. All rights reserved. This information is not intended as a substitute for professional medical care. Always follow your healthcare professional's instructions.

## 2018-09-24 NOTE — PATIENT INSTRUCTIONS
TAKE FULL COURSE OF ANTIBIOTICS.   EAT WHEN TAKING MOTRIN  You must understand that you've received an Urgent Care treatment only and that you may be released before all your medical problems are known or treated. You, the patient, will arrange for follow up care as instructed.  If your condition worsens we recommend that you receive another evaluation at the emergency room immediately or contact your primary medical clinics after hours call service to discuss your concerns.  Please return here or go to the Emergency Department for any concerns or worsening of condition.      Middle Ear Infection (Adult)  You have an infection of the middle ear, the space behind the eardrum. This is also called acute otitis media (AOM). Sometimes it is caused by the common cold. This is because congestion can block the internal passage (eustachian tube) that drains fluid from the middle ear. When the middle ear fills with fluid, bacteria can grow there and cause an infection. Oral antibiotics are used to treat this illness, not ear drops. Symptoms usually start to improve within 1 to 2 days of treatment.    Home care  The following are general care guidelines:  · Finish all of the antibiotic medicine given, even though you may feel better after the first few days.  · You may use over-the-counter medicine, such as acetaminophen or ibuprofen, to control pain and fever, unless something else was prescribed. If you have chronic liver or kidney disease or have ever had a stomach ulcer or gastrointestinal bleeding, talk with your healthcare provider before using these medicines. Do not give aspirin to anyone under 18 years of age who has a fever. It may cause severe illness or death.  Follow-up care  Follow up with your healthcare provider, or as advised, in 2 weeks if all symptoms have not gotten better, or if hearing doesn't go back to normal within 1 month.  When to seek medical advice  Call your healthcare provider right away if any  of these occur:  · Ear pain gets worse or does not improve after 3 days of treatment  · Unusual drowsiness or confusion  · Neck pain, stiff neck, or headache  · Fluid or blood draining from the ear canal  · Fever of 100.4°F (38°C) or as advised   · Seizure  Date Last Reviewed: 6/1/2016  © 1875-8516 [employer]. [address] [place] [address]. All rights reserved. This information is not intended as a substitute for professional medical care. Always follow your healthcare professional's instructions.

## 2018-10-15 ENCOUNTER — PATIENT MESSAGE (OUTPATIENT)
Dept: INTERNAL MEDICINE | Facility: CLINIC | Age: 33
End: 2018-10-15

## 2018-10-22 ENCOUNTER — PATIENT MESSAGE (OUTPATIENT)
Dept: OBSTETRICS AND GYNECOLOGY | Facility: CLINIC | Age: 33
End: 2018-10-22

## 2018-10-28 ENCOUNTER — OFFICE VISIT (OUTPATIENT)
Dept: URGENT CARE | Facility: CLINIC | Age: 33
End: 2018-10-28
Payer: MEDICAID

## 2018-10-28 VITALS
BODY MASS INDEX: 23.99 KG/M2 | DIASTOLIC BLOOD PRESSURE: 73 MMHG | SYSTOLIC BLOOD PRESSURE: 118 MMHG | TEMPERATURE: 99 F | HEART RATE: 89 BPM | HEIGHT: 65 IN | RESPIRATION RATE: 16 BRPM | OXYGEN SATURATION: 96 % | WEIGHT: 144 LBS

## 2018-10-28 DIAGNOSIS — S99.922A INJURY OF TOE ON LEFT FOOT, INITIAL ENCOUNTER: Primary | ICD-10-CM

## 2018-10-28 PROCEDURE — 73660 X-RAY EXAM OF TOE(S): CPT | Mod: TA,S$GLB,, | Performed by: RADIOLOGY

## 2018-10-28 PROCEDURE — 99214 OFFICE O/P EST MOD 30 MIN: CPT | Mod: S$GLB,,, | Performed by: NURSE PRACTITIONER

## 2018-10-28 RX ORDER — NAPROXEN 500 MG/1
500 TABLET ORAL 2 TIMES DAILY WITH MEALS
Qty: 20 TABLET | Refills: 0 | Status: SHIPPED | OUTPATIENT
Start: 2018-10-28 | End: 2018-11-07

## 2018-10-28 NOTE — PATIENT INSTRUCTIONS
BUDDY TAPE AS NEEDED. TAKE ANTIINFLAMMATORIES AS NEEDED FOR PAIN.     You must understand that you've received an Urgent Care treatment only and that you may be released before all your medical problems are known or treated. You, the patient, will arrange for follow up care as instructed.  If your condition worsens we recommend that you receive another evaluation at the emergency room immediately or contact your primary medical clinics after hours call service to discuss your concerns.  Please return here or go to the Emergency Department for any concerns or worsening of condition.      Self-Care for Strains and Sprains  Most minor strains and sprains can be treated with self-care. Recovering from a strain or sprain may take 6 to 8 weeks. Your self-care goal is to reduce pain and immobilize the injury to speed healing.     A sprain injures ligaments (tissue that connects bones to bones).        A strain injures muscles or tendons (tissue that connects muscles to bones).   Support the injured area  Wrapping the injured area provides support for short, necessary activities. Be careful not to wrap the area too tightly. This could cut off the blood supply.  · Support a wrist, elbow, or shoulder with a sling.  · Wrap an ankle or knee with an elastic bandage.  · Tape a finger or toe to the one next to it.  Use cold and heat  Cold reduces swelling. Both cold and heat reduce pain. Heat should not be used in the initial treatment of the injury. When using cold or heat, always place a towel between the pack and your skin.  · Apply ice or a cold pack 10 to 15 minutes every hour youre awake for the first 2 days.  · After the swelling goes down, use cold or heat to control pain. Dont use heat late in the day, since it can cause swelling when youre not active.  Rest and elevate  Rest and elevation help your injury heal faster.  · Raise the injured area above your heart level.  · Keep the injured area from moving.  · Limit the  use of the joint or limb.  Use medicine  · Aspirin reduces pain and swelling. (Note: Dont give aspirin to a child 18 or younger unless prescribed by the doctor.)  · Aspirin substitutes, such as ibuprofen, can reduce pain. Some substitutes reduce swelling, too. Ask your pharmacist which substitutes you can use.  Call your doctor if:  · The injured joint wont move, or bones make a grating sound when they move.  · You cant put weight on the injured area, even after 24 hours.  · The injured body part is cold, blue, or numb.  · The joint or limb appears bent or crooked.  · Pain increases or doesnt improve in 4 days.  · When pressing along the injured area, you notice a spot that is especially painful.   Date Last Reviewed: 9/29/2015  © 8416-0346 [employer]. [address] [place] [address], [place] [address]. All rights reserved. This information is not intended as a substitute for professional medical care. Always follow your healthcare professional's instructions.

## 2018-10-28 NOTE — PROGRESS NOTES
"Subjective:       Patient ID: Clayton Duarte is a 33 y.o. female.    Vitals:  height is 5' 5" (1.651 m) and weight is 65.3 kg (144 lb). Her temperature is 98.6 °F (37 °C). Her blood pressure is 118/73 and her pulse is 89. Her respiration is 16 and oxygen saturation is 96%.     Chief Complaint: Toe Pain    Pt c/o first digit toe pain due to bumping her tow on her bed   Onset five days ago.        Toe Pain    The incident occurred 5 to 7 days ago. The incident occurred at home. The injury mechanism was a direct blow. The pain is present in the left toes. The pain is at a severity of 10/10. The pain is severe. The pain has been worsening since onset. She reports no foreign bodies present. The symptoms are aggravated by movement. She has tried nothing for the symptoms.     Review of Systems   Constitution: Negative for chills and fever.   HENT: Negative for sore throat.    Eyes: Negative for blurred vision.   Cardiovascular: Negative for chest pain.   Respiratory: Negative for shortness of breath.    Skin: Negative for rash.   Musculoskeletal: Negative for back pain and joint pain.   Gastrointestinal: Negative for abdominal pain, diarrhea, nausea and vomiting.   Neurological: Negative for headaches.   Psychiatric/Behavioral: The patient is not nervous/anxious.    All other systems reviewed and are negative.      Objective:      Physical Exam   Constitutional: She is oriented to person, place, and time. She appears well-developed and well-nourished. She is cooperative.  Non-toxic appearance. She does not appear ill. No distress.   HENT:   Head: Normocephalic and atraumatic. Head is without abrasion, without contusion and without laceration.   Right Ear: Hearing, tympanic membrane, external ear and ear canal normal. No hemotympanum.   Left Ear: Hearing, tympanic membrane, external ear and ear canal normal. No hemotympanum.   Nose: Nose normal. No mucosal edema, rhinorrhea or nasal deformity. No epistaxis. Right " sinus exhibits no maxillary sinus tenderness and no frontal sinus tenderness. Left sinus exhibits no maxillary sinus tenderness and no frontal sinus tenderness.   Mouth/Throat: Uvula is midline, oropharynx is clear and moist and mucous membranes are normal. No trismus in the jaw. Normal dentition. No uvula swelling. No posterior oropharyngeal erythema.   Eyes: Conjunctivae, EOM and lids are normal. Pupils are equal, round, and reactive to light. Right eye exhibits no discharge. Left eye exhibits no discharge. No scleral icterus.   Sclera clear bilat   Neck: Trachea normal, normal range of motion, full passive range of motion without pain and phonation normal. Neck supple. No spinous process tenderness and no muscular tenderness present. No neck rigidity. No tracheal deviation present.   Cardiovascular: Normal rate, regular rhythm, normal heart sounds, intact distal pulses and normal pulses.   Pulmonary/Chest: Effort normal and breath sounds normal. No respiratory distress.   Abdominal: Soft. Normal appearance and bowel sounds are normal. She exhibits no distension, no pulsatile midline mass and no mass. There is no tenderness.   Musculoskeletal: She exhibits no edema or deformity.        Left foot: There is decreased range of motion, tenderness and swelling. There is no bony tenderness, normal capillary refill, no crepitus, no deformity and no laceration.        Feet:    Neurological: She is alert and oriented to person, place, and time. She has normal strength. No cranial nerve deficit or sensory deficit. She exhibits normal muscle tone. She displays no seizure activity. Coordination normal. GCS eye subscore is 4. GCS verbal subscore is 5. GCS motor subscore is 6.   Skin: Skin is warm, dry and intact. Capillary refill takes less than 2 seconds. No abrasion, no bruising, no burn, no ecchymosis and no laceration noted. She is not diaphoretic. No pallor.   Psychiatric: She has a normal mood and affect. Her speech is  normal and behavior is normal. Judgment and thought content normal. Cognition and memory are normal.   Nursing note and vitals reviewed.   X-ray:No fracture or dislocation.  Assessment:       1. Injury of toe on left foot, initial encounter        Plan:         Injury of toe on left foot, initial encounter  -     X-Ray Toe 2 View; Future; Expected date: 10/28/2018  -     naproxen (NAPROSYN) 500 MG tablet; Take 1 tablet (500 mg total) by mouth 2 (two) times daily with meals. for 10 days  Dispense: 20 tablet; Refill: 0            Patient Instructions     BUDDY TAPE AS NEEDED. TAKE ANTIINFLAMMATORIES AS NEEDED FOR PAIN.     You must understand that you've received an Urgent Care treatment only and that you may be released before all your medical problems are known or treated. You, the patient, will arrange for follow up care as instructed.  If your condition worsens we recommend that you receive another evaluation at the emergency room immediately or contact your primary medical clinics after hours call service to discuss your concerns.  Please return here or go to the Emergency Department for any concerns or worsening of condition.      Self-Care for Strains and Sprains  Most minor strains and sprains can be treated with self-care. Recovering from a strain or sprain may take 6 to 8 weeks. Your self-care goal is to reduce pain and immobilize the injury to speed healing.     A sprain injures ligaments (tissue that connects bones to bones).        A strain injures muscles or tendons (tissue that connects muscles to bones).   Support the injured area  Wrapping the injured area provides support for short, necessary activities. Be careful not to wrap the area too tightly. This could cut off the blood supply.  · Support a wrist, elbow, or shoulder with a sling.  · Wrap an ankle or knee with an elastic bandage.  · Tape a finger or toe to the one next to it.  Use cold and heat  Cold reduces swelling. Both cold and heat reduce  pain. Heat should not be used in the initial treatment of the injury. When using cold or heat, always place a towel between the pack and your skin.  · Apply ice or a cold pack 10 to 15 minutes every hour youre awake for the first 2 days.  · After the swelling goes down, use cold or heat to control pain. Dont use heat late in the day, since it can cause swelling when youre not active.  Rest and elevate  Rest and elevation help your injury heal faster.  · Raise the injured area above your heart level.  · Keep the injured area from moving.  · Limit the use of the joint or limb.  Use medicine  · Aspirin reduces pain and swelling. (Note: Dont give aspirin to a child 18 or younger unless prescribed by the doctor.)  · Aspirin substitutes, such as ibuprofen, can reduce pain. Some substitutes reduce swelling, too. Ask your pharmacist which substitutes you can use.  Call your doctor if:  · The injured joint wont move, or bones make a grating sound when they move.  · You cant put weight on the injured area, even after 24 hours.  · The injured body part is cold, blue, or numb.  · The joint or limb appears bent or crooked.  · Pain increases or doesnt improve in 4 days.  · When pressing along the injured area, you notice a spot that is especially painful.   Date Last Reviewed: 9/29/2015  © 2938-1572 [employer]. [address] [place] [address], [place] [address]. All rights reserved. This information is not intended as a substitute for professional medical care. Always follow your healthcare professional's instructions.

## 2018-11-06 ENCOUNTER — PATIENT MESSAGE (OUTPATIENT)
Dept: OBSTETRICS AND GYNECOLOGY | Facility: CLINIC | Age: 33
End: 2018-11-06

## 2018-12-03 ENCOUNTER — PATIENT MESSAGE (OUTPATIENT)
Dept: CARDIOLOGY | Facility: CLINIC | Age: 33
End: 2018-12-03

## 2018-12-17 ENCOUNTER — PATIENT MESSAGE (OUTPATIENT)
Dept: CARDIOLOGY | Facility: CLINIC | Age: 33
End: 2018-12-17

## 2019-01-14 ENCOUNTER — PATIENT MESSAGE (OUTPATIENT)
Dept: OBSTETRICS AND GYNECOLOGY | Facility: CLINIC | Age: 34
End: 2019-01-14

## 2019-02-25 ENCOUNTER — PATIENT MESSAGE (OUTPATIENT)
Dept: INTERNAL MEDICINE | Facility: CLINIC | Age: 34
End: 2019-02-25

## 2019-02-26 RX ORDER — MEDROXYPROGESTERONE ACETATE 150 MG/ML
INJECTION, SUSPENSION INTRAMUSCULAR
Refills: 1 | COMMUNITY
Start: 2019-02-12 | End: 2019-12-24

## 2019-04-26 ENCOUNTER — PATIENT MESSAGE (OUTPATIENT)
Dept: OBSTETRICS AND GYNECOLOGY | Facility: CLINIC | Age: 34
End: 2019-04-26

## 2019-04-29 ENCOUNTER — TELEPHONE (OUTPATIENT)
Dept: INTERNAL MEDICINE | Facility: CLINIC | Age: 34
End: 2019-04-29

## 2019-05-04 ENCOUNTER — LAB VISIT (OUTPATIENT)
Dept: LAB | Facility: HOSPITAL | Age: 34
End: 2019-05-04
Attending: FAMILY MEDICINE
Payer: MEDICAID

## 2019-05-04 DIAGNOSIS — D50.9 IRON DEFICIENCY ANEMIA, UNSPECIFIED IRON DEFICIENCY ANEMIA TYPE: ICD-10-CM

## 2019-05-04 DIAGNOSIS — Z11.3 SCREENING EXAMINATION FOR STD (SEXUALLY TRANSMITTED DISEASE): ICD-10-CM

## 2019-05-04 DIAGNOSIS — Z13.6 ENCOUNTER FOR LIPID SCREENING FOR CARDIOVASCULAR DISEASE: ICD-10-CM

## 2019-05-04 DIAGNOSIS — Z00.00 ANNUAL PHYSICAL EXAM: ICD-10-CM

## 2019-05-04 DIAGNOSIS — Z13.220 ENCOUNTER FOR LIPID SCREENING FOR CARDIOVASCULAR DISEASE: ICD-10-CM

## 2019-05-04 LAB
ALBUMIN SERPL BCP-MCNC: 3.9 G/DL (ref 3.5–5.2)
ALP SERPL-CCNC: 53 U/L (ref 55–135)
ALT SERPL W/O P-5'-P-CCNC: 25 U/L (ref 10–44)
ANION GAP SERPL CALC-SCNC: 6 MMOL/L (ref 8–16)
AST SERPL-CCNC: 23 U/L (ref 10–40)
BILIRUB SERPL-MCNC: 0.5 MG/DL (ref 0.1–1)
BUN SERPL-MCNC: 8 MG/DL (ref 6–20)
CALCIUM SERPL-MCNC: 10.7 MG/DL (ref 8.7–10.5)
CHLORIDE SERPL-SCNC: 112 MMOL/L (ref 95–110)
CHOLEST SERPL-MCNC: 161 MG/DL (ref 120–199)
CHOLEST/HDLC SERPL: 3.3 {RATIO} (ref 2–5)
CO2 SERPL-SCNC: 23 MMOL/L (ref 23–29)
CREAT SERPL-MCNC: 0.9 MG/DL (ref 0.5–1.4)
EST. GFR  (AFRICAN AMERICAN): >60 ML/MIN/1.73 M^2
EST. GFR  (NON AFRICAN AMERICAN): >60 ML/MIN/1.73 M^2
ESTIMATED AVG GLUCOSE: 108 MG/DL (ref 68–131)
FERRITIN SERPL-MCNC: 13 NG/ML (ref 20–300)
FOLATE SERPL-MCNC: 11.8 NG/ML (ref 4–24)
GLUCOSE SERPL-MCNC: 86 MG/DL (ref 70–110)
HBA1C MFR BLD HPLC: 5.4 % (ref 4–5.6)
HDLC SERPL-MCNC: 49 MG/DL (ref 40–75)
HDLC SERPL: 30.4 % (ref 20–50)
IRON SERPL-MCNC: 73 UG/DL (ref 30–160)
LDLC SERPL CALC-MCNC: 96.8 MG/DL (ref 63–159)
NONHDLC SERPL-MCNC: 112 MG/DL
POTASSIUM SERPL-SCNC: 4.1 MMOL/L (ref 3.5–5.1)
PROT SERPL-MCNC: 7.1 G/DL (ref 6–8.4)
RETICS/RBC NFR AUTO: 1.9 % (ref 0.5–2.5)
SATURATED IRON: 16 % (ref 20–50)
SODIUM SERPL-SCNC: 141 MMOL/L (ref 136–145)
T4 FREE SERPL-MCNC: 1.11 NG/DL (ref 0.71–1.51)
TOTAL IRON BINDING CAPACITY: 443 UG/DL (ref 250–450)
TRANSFERRIN SERPL-MCNC: 299 MG/DL (ref 200–375)
TRIGL SERPL-MCNC: 76 MG/DL (ref 30–150)
TSH SERPL DL<=0.005 MIU/L-ACNC: 0.27 UIU/ML (ref 0.4–4)
VIT B12 SERPL-MCNC: 558 PG/ML (ref 210–950)

## 2019-05-04 PROCEDURE — 83036 HEMOGLOBIN GLYCOSYLATED A1C: CPT

## 2019-05-04 PROCEDURE — 86703 HIV-1/HIV-2 1 RESULT ANTBDY: CPT

## 2019-05-04 PROCEDURE — 84443 ASSAY THYROID STIM HORMONE: CPT

## 2019-05-04 PROCEDURE — 80053 COMPREHEN METABOLIC PANEL: CPT

## 2019-05-04 PROCEDURE — 85045 AUTOMATED RETICULOCYTE COUNT: CPT

## 2019-05-04 PROCEDURE — 82728 ASSAY OF FERRITIN: CPT

## 2019-05-04 PROCEDURE — 36415 COLL VENOUS BLD VENIPUNCTURE: CPT

## 2019-05-04 PROCEDURE — 83540 ASSAY OF IRON: CPT

## 2019-05-04 PROCEDURE — 80061 LIPID PANEL: CPT

## 2019-05-04 PROCEDURE — 80074 ACUTE HEPATITIS PANEL: CPT

## 2019-05-04 PROCEDURE — 84439 ASSAY OF FREE THYROXINE: CPT

## 2019-05-04 PROCEDURE — 82746 ASSAY OF FOLIC ACID SERUM: CPT

## 2019-05-04 PROCEDURE — 82607 VITAMIN B-12: CPT

## 2019-05-04 PROCEDURE — 86592 SYPHILIS TEST NON-TREP QUAL: CPT

## 2019-05-06 ENCOUNTER — PATIENT MESSAGE (OUTPATIENT)
Dept: INTERNAL MEDICINE | Facility: CLINIC | Age: 34
End: 2019-05-06

## 2019-05-06 ENCOUNTER — OFFICE VISIT (OUTPATIENT)
Dept: INTERNAL MEDICINE | Facility: CLINIC | Age: 34
End: 2019-05-06
Payer: MEDICAID

## 2019-05-06 VITALS
WEIGHT: 159.31 LBS | DIASTOLIC BLOOD PRESSURE: 66 MMHG | OXYGEN SATURATION: 98 % | HEIGHT: 65 IN | SYSTOLIC BLOOD PRESSURE: 118 MMHG | HEART RATE: 111 BPM | BODY MASS INDEX: 26.54 KG/M2

## 2019-05-06 DIAGNOSIS — I25.42 CORONARY ARTERY DISSECTION: ICD-10-CM

## 2019-05-06 DIAGNOSIS — Z30.9 ENCOUNTER FOR CONTRACEPTIVE MANAGEMENT, UNSPECIFIED TYPE: ICD-10-CM

## 2019-05-06 DIAGNOSIS — D50.9 IRON DEFICIENCY ANEMIA, UNSPECIFIED IRON DEFICIENCY ANEMIA TYPE: ICD-10-CM

## 2019-05-06 DIAGNOSIS — Z00.00 ANNUAL PHYSICAL EXAM: Primary | ICD-10-CM

## 2019-05-06 DIAGNOSIS — R51.9 NEW ONSET HEADACHE: ICD-10-CM

## 2019-05-06 DIAGNOSIS — R79.89 LOW SERUM THYROID STIMULATING HORMONE (TSH): ICD-10-CM

## 2019-05-06 DIAGNOSIS — N83.201 CYST OF RIGHT OVARY: ICD-10-CM

## 2019-05-06 DIAGNOSIS — R10.819 SUPRAPUBIC TENDERNESS: ICD-10-CM

## 2019-05-06 PROCEDURE — 99395 PREV VISIT EST AGE 18-39: CPT | Mod: S$PBB,,, | Performed by: FAMILY MEDICINE

## 2019-05-06 PROCEDURE — 99999 PR PBB SHADOW E&M-EST. PATIENT-LVL V: CPT | Mod: PBBFAC,,, | Performed by: FAMILY MEDICINE

## 2019-05-06 PROCEDURE — 99999 PR PBB SHADOW E&M-EST. PATIENT-LVL V: ICD-10-PCS | Mod: PBBFAC,,, | Performed by: FAMILY MEDICINE

## 2019-05-06 PROCEDURE — 99215 OFFICE O/P EST HI 40 MIN: CPT | Mod: PBBFAC,PN | Performed by: FAMILY MEDICINE

## 2019-05-06 PROCEDURE — 99395 PR PREVENTIVE VISIT,EST,18-39: ICD-10-PCS | Mod: S$PBB,,, | Performed by: FAMILY MEDICINE

## 2019-05-06 RX ORDER — FERROUS GLUCONATE 324(38)MG
324 TABLET ORAL 2 TIMES DAILY
Qty: 180 TABLET | Refills: 1 | Status: SHIPPED | OUTPATIENT
Start: 2019-05-06 | End: 2019-07-26

## 2019-05-06 RX ORDER — MEDROXYPROGESTERONE ACETATE 150 MG/ML
150 INJECTION, SUSPENSION INTRAMUSCULAR ONCE
Status: COMPLETED | OUTPATIENT
Start: 2019-05-06 | End: 2019-05-06

## 2019-05-06 RX ADMIN — MEDROXYPROGESTERONE ACETATE 150 MG: 150 INJECTION, SUSPENSION INTRAMUSCULAR at 02:05

## 2019-05-06 NOTE — PROGRESS NOTES
After obtaining consent, and per orders of Dr. Madison, injection of depo Lot [id number] 2/28/23 given in the [facility] by DESIREE DRAKE. Patient tolerated well and band aid applied. Patient instructed to remain in clinic for 15 minutes afterwards, and to report any adverse reaction to me immediately.

## 2019-05-06 NOTE — PROGRESS NOTES
"Subjective:       Patient ID: Clayton Duarte is a 33 y.o. female.    Chief Complaint: Annual Exam    HPI  34 y/o female with Iron def anemia, post partum SCAD s/p CABG 9/2013 is here for annual exam.      Headaches on and off for the past 6 months. Pain is temporal, its sharp and throbbing, she has blurry vision when it happens, she has associated light and noise sensitivity, last 3 minutes and resolves.  This occurs 3-4 times a day on most day.  She is eating healthy and regularly, she is staying hydrated, she is not having caffeine regularly at most 2 days a week, she is sleeping well but wakes up tired. She denies snoring or stopping breathing. Recent eye exam normal. She denies f/n/v, she had an episode of heartburn 3 months ago, none since, her stools are a little looser over the past 2 months, she denies constipation/abdominal pain/cp/sob/urianry sx.  S Weight up 25 pounds. She is not doing any dedicated exercise.    Iron def anemia: Gummie iron  Eye exam utd  Dental exam utd  GYN: Depo now, wants to get IUD has to set an appt with Dr. Madison    Review of Systems   Constitutional: Positive for fatigue. Negative for activity change, appetite change and fever.   Eyes: Positive for visual disturbance.   Respiratory: Negative for cough and shortness of breath.    Cardiovascular: Negative for chest pain, palpitations and leg swelling.   Gastrointestinal: Positive for diarrhea. Negative for abdominal distention, abdominal pain, blood in stool, constipation, nausea and vomiting.   Genitourinary: Negative for difficulty urinating and dysuria.   Skin: Negative for rash.   Neurological: Positive for headaches. Negative for dizziness and light-headedness.   Psychiatric/Behavioral: Negative for sleep disturbance.       Objective:      /66 (BP Location: Left arm, Patient Position: Sitting, BP Method: X-Large (Manual))   Pulse (!) 111   Ht 5' 5" (1.651 m)   Wt 72.3 kg (159 lb 4.5 oz)   LMP 01/07/2019   SpO2 " 98%   BMI 26.51 kg/m²   Physical Exam   Constitutional: She appears well-developed and well-nourished.   HENT:   Head: Normocephalic and atraumatic.   Mouth/Throat: No oropharyngeal exudate.   Neck: Normal range of motion. Neck supple. No thyromegaly present.   Cardiovascular: Normal rate, regular rhythm and normal heart sounds.   Pulmonary/Chest: Effort normal and breath sounds normal. No respiratory distress.   Abdominal: Soft. Bowel sounds are normal. She exhibits no distension. There is tenderness (R lower quadrant tenderness).   Musculoskeletal: She exhibits no edema.   Lymphadenopathy:     She has no cervical adenopathy.   Neurological: She is alert.   Skin: Skin is warm and dry.   Psychiatric: She has a normal mood and affect.   Nursing note and vitals reviewed.      Assessment:       1. Annual physical exam    2. Encounter for contraceptive management, unspecified type    3. Low serum thyroid stimulating hormone (TSH)    4. Iron deficiency anemia, unspecified iron deficiency anemia type    5. Coronary artery dissection    6. Suprapubic tenderness    7. Cyst of right ovary    8. New onset headache        Plan:   Clayton was seen today for annual exam.    Diagnoses and all orders for this visit:    Annual physical exam    Encounter for contraceptive management, unspecified type  -     medroxyPROGESTERone (DEPO-PROVERA) injection 150 mg    Low serum thyroid stimulating hormone (TSH)  -     US Soft Tissue Head Neck Thyroid; Future  -     Ambulatory referral to Endocrinology  -     Ambulatory referral to Cardiology    Iron deficiency anemia, unspecified iron deficiency anemia type  -     ferrous gluconate (FERGON) 324 MG tablet; Take 1 tablet (324 mg total) by mouth 2 (two) times daily.  -     CBC auto differential; Future  -     Ferritin; Future  -     Iron and TIBC; Future  -     Ambulatory referral to Cardiology    Coronary artery dissection  -     Ambulatory referral to Cardiology    Suprapubic  tenderness  -     US Transvaginal Non OB; Future    Cyst of right ovary  -     US Transvaginal Non OB; Future    New onset headache  -     Ambulatory referral to Neurology

## 2019-06-18 ENCOUNTER — PATIENT MESSAGE (OUTPATIENT)
Dept: OBSTETRICS AND GYNECOLOGY | Facility: CLINIC | Age: 34
End: 2019-06-18

## 2019-06-28 ENCOUNTER — OFFICE VISIT (OUTPATIENT)
Dept: OBSTETRICS AND GYNECOLOGY | Facility: CLINIC | Age: 34
End: 2019-06-28
Payer: MEDICAID

## 2019-06-28 VITALS
WEIGHT: 160.5 LBS | BODY MASS INDEX: 26.74 KG/M2 | HEIGHT: 65 IN | DIASTOLIC BLOOD PRESSURE: 84 MMHG | SYSTOLIC BLOOD PRESSURE: 126 MMHG

## 2019-06-28 DIAGNOSIS — Z01.411 ENCOUNTER FOR WELL WOMAN EXAM WITH ABNORMAL FINDINGS: ICD-10-CM

## 2019-06-28 DIAGNOSIS — Z30.9 ENCOUNTER FOR CONTRACEPTIVE MANAGEMENT, UNSPECIFIED TYPE: ICD-10-CM

## 2019-06-28 DIAGNOSIS — N89.8 VAGINAL DISCHARGE: ICD-10-CM

## 2019-06-28 DIAGNOSIS — Z11.3 SCREEN FOR STD (SEXUALLY TRANSMITTED DISEASE): ICD-10-CM

## 2019-06-28 DIAGNOSIS — Z01.419 WELL WOMAN EXAM WITH ROUTINE GYNECOLOGICAL EXAM: Primary | ICD-10-CM

## 2019-06-28 PROCEDURE — 87624 HPV HI-RISK TYP POOLED RSLT: CPT

## 2019-06-28 PROCEDURE — 87510 GARDNER VAG DNA DIR PROBE: CPT

## 2019-06-28 PROCEDURE — 88141 CYTOPATH C/V INTERPRET: CPT | Mod: ,,, | Performed by: PATHOLOGY

## 2019-06-28 PROCEDURE — 87491 CHLMYD TRACH DNA AMP PROBE: CPT

## 2019-06-28 PROCEDURE — 87480 CANDIDA DNA DIR PROBE: CPT

## 2019-06-28 PROCEDURE — 99212 OFFICE O/P EST SF 10 MIN: CPT | Mod: PBBFAC | Performed by: OBSTETRICS & GYNECOLOGY

## 2019-06-28 PROCEDURE — 99999 PR PBB SHADOW E&M-EST. PATIENT-LVL II: ICD-10-PCS | Mod: PBBFAC,,, | Performed by: OBSTETRICS & GYNECOLOGY

## 2019-06-28 PROCEDURE — 99395 PR PREVENTIVE VISIT,EST,18-39: ICD-10-PCS | Mod: S$PBB,,, | Performed by: OBSTETRICS & GYNECOLOGY

## 2019-06-28 PROCEDURE — 99395 PREV VISIT EST AGE 18-39: CPT | Mod: S$PBB,,, | Performed by: OBSTETRICS & GYNECOLOGY

## 2019-06-28 PROCEDURE — 88142 CYTOPATH C/V THIN LAYER: CPT | Performed by: PATHOLOGY

## 2019-06-28 PROCEDURE — 99999 PR PBB SHADOW E&M-EST. PATIENT-LVL II: CPT | Mod: PBBFAC,,, | Performed by: OBSTETRICS & GYNECOLOGY

## 2019-06-28 PROCEDURE — 88141 LIQUID-BASED PAP SMEAR, SCREENING: ICD-10-PCS | Mod: ,,, | Performed by: PATHOLOGY

## 2019-06-28 NOTE — PROGRESS NOTES
Subjective:       Patient ID: Clayton Duarte is a 33 y.o. female.    Chief Complaint:  Annual Exam    History of Present Illness:  HPI  SUBJECTIVE:   33 y.o. female  here for annual.     She describes her periods as irregular on Depo Provera.   denies break through bleeding.   denies vaginal itching or irritation.  complains of vaginal discharge. Recently changed     She is sexually active.  She uses Depo-Provera (19) for contraception. She desires Mirena IUD. She had Mirena last year, but had expulsion during her cycle.    History of abnormal pap: No  Last Pap: 2016 - NILM  Last MMG: N/A  Last Colonoscopy: N/A    FH: Denies family history of breast, GYN or colon cancer.    GYN & OB History  No LMP recorded. (Menstrual status: Birth Control).   Date of Last Pap: 2016    OB History    Para Term  AB Living   3 3 3     3   SAB TAB Ectopic Multiple Live Births           3      # Outcome Date GA Lbr Jose Alberto/2nd Weight Sex Delivery Anes PTL Lv   3 Term 13 39w2d 103:30 / 00:23 3.025 kg (6 lb 10.7 oz) F Vag-Spont EPI N RENEE      Birth Comments: 28    2 Term 09 39w0d 10:00 3.629 kg (8 lb) M Vag-Spont EPI N RENEE   1 Term 06 39w0d 09:00 3.033 kg (6 lb 11 oz) F Vag-Spont EPI N RENEE       Review of Systems  Review of Systems   Constitutional: Negative for chills, diaphoresis, fatigue and fever.   Respiratory: Negative for cough and shortness of breath.    Cardiovascular: Negative for chest pain and palpitations.   Gastrointestinal: Negative for abdominal pain, constipation, diarrhea, nausea and vomiting.   Genitourinary: Negative for dysmenorrhea, dysuria, frequency, menorrhagia, menstrual problem, pelvic pain, vaginal bleeding, vaginal discharge and vaginal pain.   Musculoskeletal: Negative for back pain and myalgias.   Integumentary:  Negative for rash, acne, breast mass, nipple discharge and breast skin changes.   Neurological: Negative for headaches.    Psychiatric/Behavioral: Negative for depression. The patient is not nervous/anxious.    Breast: Negative for mass, mastodynia, nipple discharge and skin changes          Objective:    Physical Exam:   Constitutional: She is oriented to person, place, and time. She appears well-developed and well-nourished. No distress.    HENT:   Head: Normocephalic and atraumatic.    Eyes: EOM are normal.    Neck: Normal range of motion. No thyromegaly present.     Pulmonary/Chest: Effort normal. She exhibits no mass and no tenderness. Right breast exhibits no inverted nipple, no mass, no nipple discharge, no skin change, no tenderness and no swelling. Left breast exhibits no inverted nipple, no mass, no nipple discharge, no skin change, no tenderness and no swelling. Breasts are symmetrical.        Abdominal: Soft. She exhibits no distension and no mass. There is no tenderness. There is no rebound and no guarding. No hernia.     Genitourinary:   Genitourinary Comments: Normal external female genitalia; vagina rugated, normal; cervix normal, no masses; uterus small mobile nontender; no adnexal masses palpated; rectal deferred             Musculoskeletal: Normal range of motion and moves all extremeties.       Neurological: She is alert and oriented to person, place, and time.    Skin: Skin is warm. No rash noted.    Psychiatric: She has a normal mood and affect. Her behavior is normal. Judgment and thought content normal.          Assessment:        1. Well woman exam with routine gynecological exam    2. Encounter for contraceptive management, unspecified type    3. Vaginal discharge    4. Encounter for well woman exam with abnormal findings    5. Screen for STD (sexually transmitted disease)                Plan:      Clayton was seen today for annual exam.    Diagnoses and all orders for this visit:    Well woman exam with routine gynecological exam  -     Liquid-based pap smear, screening  -     HPV High Risk Genotypes,  PCR  - Pap guidelines discussed. Pap/HPV collected.  - MMG age 40.    - Contraception - Contraception options discussed including OCPs, Depo Provera, vaginal ring, hormone patch, Nexplanon, IUD. Desires Mirena IUD.  - STD screening - collected    Encounter for contraceptive management, unspecified type  -     Device Authorization Order  -     levonorgestrel 20 mcg/24 hours (5 yrs) 52 mg IUD 1 Intra Uterine Device    Vaginal discharge  -     Vaginosis Screen by DNA Probe    Orders Placed This Encounter   Procedures    Vaginosis Screen by DNA Probe    HPV High Risk Genotypes, PCR    C. trachomatis/N. gonorrhoeae by AMP DNA    Device Authorization Order       Follow up in about 2 weeks (around 7/12/2019) for IUD placement.

## 2019-06-30 LAB
BACTERIAL VAGINOSIS DNA: POSITIVE
C TRACH DNA SPEC QL NAA+PROBE: NOT DETECTED
CANDIDA GLABRATA DNA: NEGATIVE
CANDIDA KRUSEI DNA: NEGATIVE
CANDIDA RRNA VAG QL PROBE: NEGATIVE
N GONORRHOEA DNA SPEC QL NAA+PROBE: NOT DETECTED
T VAGINALIS RRNA GENITAL QL PROBE: NEGATIVE

## 2019-07-01 ENCOUNTER — PATIENT MESSAGE (OUTPATIENT)
Dept: OBSTETRICS AND GYNECOLOGY | Facility: CLINIC | Age: 34
End: 2019-07-01

## 2019-07-01 DIAGNOSIS — N76.0 BV (BACTERIAL VAGINOSIS): Primary | ICD-10-CM

## 2019-07-01 DIAGNOSIS — B96.89 BV (BACTERIAL VAGINOSIS): Primary | ICD-10-CM

## 2019-07-01 RX ORDER — METRONIDAZOLE 500 MG/1
500 TABLET ORAL EVERY 12 HOURS
Qty: 14 TABLET | Refills: 0 | Status: SHIPPED | OUTPATIENT
Start: 2019-07-01 | End: 2019-07-08

## 2019-07-03 LAB
HPV HR 12 DNA CVX QL NAA+PROBE: NEGATIVE
HPV16 AG SPEC QL: NEGATIVE
HPV18 DNA SPEC QL NAA+PROBE: POSITIVE

## 2019-07-08 ENCOUNTER — PATIENT MESSAGE (OUTPATIENT)
Dept: OBSTETRICS AND GYNECOLOGY | Facility: CLINIC | Age: 34
End: 2019-07-08

## 2019-07-11 ENCOUNTER — PATIENT MESSAGE (OUTPATIENT)
Dept: OBSTETRICS AND GYNECOLOGY | Facility: CLINIC | Age: 34
End: 2019-07-11

## 2019-07-26 ENCOUNTER — TELEPHONE (OUTPATIENT)
Dept: PHARMACY | Facility: CLINIC | Age: 34
End: 2019-07-26

## 2019-07-26 ENCOUNTER — PROCEDURE VISIT (OUTPATIENT)
Dept: OBSTETRICS AND GYNECOLOGY | Facility: CLINIC | Age: 34
End: 2019-07-26
Payer: MEDICAID

## 2019-07-26 VITALS
BODY MASS INDEX: 27.66 KG/M2 | WEIGHT: 166 LBS | SYSTOLIC BLOOD PRESSURE: 122 MMHG | DIASTOLIC BLOOD PRESSURE: 80 MMHG | HEIGHT: 65 IN

## 2019-07-26 DIAGNOSIS — Z30.09 ENCOUNTER FOR OTHER GENERAL COUNSELING OR ADVICE ON CONTRACEPTION: ICD-10-CM

## 2019-07-26 DIAGNOSIS — Z87.42 HX OF ABNORMAL CERVICAL PAP SMEAR: Primary | ICD-10-CM

## 2019-07-26 DIAGNOSIS — Z30.9 ENCOUNTER FOR CONTRACEPTIVE MANAGEMENT, UNSPECIFIED TYPE: ICD-10-CM

## 2019-07-26 LAB
B-HCG UR QL: NEGATIVE
CTP QC/QA: YES

## 2019-07-26 PROCEDURE — 57454 COLPOSCOPY: ICD-10-PCS | Mod: S$PBB,,, | Performed by: OBSTETRICS & GYNECOLOGY

## 2019-07-26 PROCEDURE — 81025 URINE PREGNANCY TEST: CPT | Mod: PBBFAC | Performed by: OBSTETRICS & GYNECOLOGY

## 2019-07-26 PROCEDURE — 99212 PR OFFICE/OUTPT VISIT, EST, LEVL II, 10-19 MIN: ICD-10-PCS | Mod: 25,S$PBB,, | Performed by: OBSTETRICS & GYNECOLOGY

## 2019-07-26 PROCEDURE — 99212 OFFICE O/P EST SF 10 MIN: CPT | Mod: 25,S$PBB,, | Performed by: OBSTETRICS & GYNECOLOGY

## 2019-07-26 PROCEDURE — 57454 BX/CURETT OF CERVIX W/SCOPE: CPT | Mod: PBBFAC | Performed by: OBSTETRICS & GYNECOLOGY

## 2019-07-26 PROCEDURE — 88305 TISSUE SPECIMEN TO PATHOLOGY, OBSTETRICS/GYNECOLOGY: ICD-10-PCS | Mod: 26,,, | Performed by: PATHOLOGY

## 2019-07-26 PROCEDURE — 88305 TISSUE EXAM BY PATHOLOGIST: CPT | Mod: 26,,, | Performed by: PATHOLOGY

## 2019-07-26 PROCEDURE — 88305 TISSUE EXAM BY PATHOLOGIST: CPT | Performed by: PATHOLOGY

## 2019-07-26 NOTE — PROCEDURES
Colposcopy  Date/Time: 7/26/2019 11:08 AM  Performed by: Mitzy Madison MD  Authorized by: Mitzy Madison MD     Consent Done?:  Yes (Written)  Assistants?: Yes    List of assistants:  Perla SCHUSTER was present for the entire procedure.    Colposcopy Site:  Cervix  Position:  Supine  Acrowhite Lesion? Yes (12:00)    Atypical Vessels? Yes (6:00)    Transformation Zone Adequate?: Yes    Biopsy?: Yes         Location:  Cervix ((6 00 and 12 00))  ECC Performed?: Yes    LEEP Performed?: No    Estimated blood loss (cc):  2   Patient tolerated the procedure well with no immediate complications.   Post-operative instructions were provided for the patient.   Patient was discharged and will follow up if any complications occur

## 2019-07-26 NOTE — PROGRESS NOTES
Subjective:       Patient ID: Clayton Duarte is a 33 y.o. female.    Chief Complaint:  Colposcopy    History of Present Illness:  HPI  32 y/o  presents for colposcopy for ASCUS pap/HPV 18. She has not had gardasil vaccine. She is also interested in BTL in the future. She knows she will need to discuss with her Cardiologist whether or not she could be cleared for general anesthesia. Planning for Mirena IUD in the meantime.    GYN & OB History  No LMP recorded. (Menstrual status: Birth Control).   Date of Last Pap: 7/10/2019    OB History    Para Term  AB Living   3 3 3     3   SAB TAB Ectopic Multiple Live Births           3      # Outcome Date GA Lbr Jose Alberto/2nd Weight Sex Delivery Anes PTL Lv   3 Term 13 39w2d 103:30 / 00:23 3.025 kg (6 lb 10.7 oz) F Vag-Spont EPI N RENEE      Birth Comments: 28    2 Term 09 39w0d 10:00 3.629 kg (8 lb) M Vag-Spont EPI N RENEE   1 Term 06 39w0d 09:00 3.033 kg (6 lb 11 oz) F Vag-Spont EPI N RENEE       Review of Systems  Review of Systems   Constitutional: Negative for chills, diaphoresis, fatigue and fever.   Respiratory: Negative for cough and shortness of breath.    Cardiovascular: Negative for chest pain and palpitations.   Gastrointestinal: Negative for abdominal pain, constipation, diarrhea, nausea and vomiting.   Genitourinary: Negative for dysmenorrhea, dyspareunia, menorrhagia, menstrual problem, pelvic pain, vaginal bleeding, vaginal discharge and vaginal pain.   Neurological: Negative for headaches.   Psychiatric/Behavioral: Negative for depression. The patient is not nervous/anxious.            Objective:    Physical Exam:   Constitutional: She is oriented to person, place, and time. She appears well-developed and well-nourished.    HENT:   Head: Normocephalic and atraumatic.    Eyes: EOM are normal.    Neck: Normal range of motion.     Pulmonary/Chest: Effort normal.        Abdominal: Soft.     Genitourinary:   Genitourinary  Comments: See procedure note           Musculoskeletal: Normal range of motion and moves all extremeties.       Neurological: She is alert and oriented to person, place, and time.    Skin: Skin is warm. No rash noted.    Psychiatric: She has a normal mood and affect. Her behavior is normal. Judgment and thought content normal.          Assessment:        1. Hx of abnormal cervical Pap smear    2. Encounter for contraceptive management, unspecified type    3. Encounter for other general counseling or advice on contraception             Plan:      Clayton was seen today for colposcopy.    Diagnoses and all orders for this visit:    Hx of abnormal cervical Pap smear  -     POCT Urine Pregnancy  -     Tissue Specimen To Pathology, Obstetrics/Gynecology  - See colposcopy procedure note.   - Discussed HPV vaccination - will request approval and schedule for initial vaccine.    Encounter for contraceptive management, unspecified type  -     levonorgestrel (MIRENA) 20 mcg/24 hours (5 yrs) 52 mg IUD; 1 Intra Uterine Device by Intrauterine route once. for 1 dose  - Referral placed. F/U once approved.    Encounter for other general counseling or advice on contraception  - Patient desires permanent sterilization. In office sterilization (essure) no longer recommended. She will discuss with her Cardiologist regarding general anesthesia. Would be short 20-30 min laparoscopic procedure, bilateral salpingectomy. Discussed ovarian cancer risk reducing benefit of salpingectomy and patient agrees.     Other orders  -     (In Office Administered) HPV Vaccine (9-Valent) (3 Dose) (IM)        Orders Placed This Encounter   Procedures    (In Office Administered) HPV Vaccine (9-Valent) (3 Dose) (IM)    POCT Urine Pregnancy       Follow up in about 2 weeks (around 8/9/2019) for IUD placement.

## 2019-07-29 ENCOUNTER — PATIENT MESSAGE (OUTPATIENT)
Dept: OBSTETRICS AND GYNECOLOGY | Facility: CLINIC | Age: 34
End: 2019-07-29

## 2019-07-30 DIAGNOSIS — Z30.9 ENCOUNTER FOR CONTRACEPTIVE MANAGEMENT, UNSPECIFIED TYPE: ICD-10-CM

## 2019-07-31 RX ORDER — MEDROXYPROGESTERONE ACETATE 150 MG/ML
INJECTION, SUSPENSION INTRAMUSCULAR
Qty: 1 ML | Refills: 0 | Status: SHIPPED | OUTPATIENT
Start: 2019-07-31 | End: 2019-12-24

## 2019-08-05 ENCOUNTER — PROCEDURE VISIT (OUTPATIENT)
Dept: OBSTETRICS AND GYNECOLOGY | Facility: CLINIC | Age: 34
End: 2019-08-05
Payer: MEDICAID

## 2019-08-05 VITALS
BODY MASS INDEX: 27.82 KG/M2 | WEIGHT: 167 LBS | SYSTOLIC BLOOD PRESSURE: 129 MMHG | HEIGHT: 65 IN | DIASTOLIC BLOOD PRESSURE: 85 MMHG

## 2019-08-05 DIAGNOSIS — Z30.9 ENCOUNTER FOR CONTRACEPTIVE MANAGEMENT, UNSPECIFIED TYPE: Primary | ICD-10-CM

## 2019-08-05 LAB
B-HCG UR QL: NEGATIVE
CTP QC/QA: YES

## 2019-08-05 PROCEDURE — 81025 URINE PREGNANCY TEST: CPT | Mod: S$GLB,,, | Performed by: OBSTETRICS & GYNECOLOGY

## 2019-08-05 PROCEDURE — 58300 INSERT INTRAUTERINE DEVICE: CPT | Mod: S$GLB,,, | Performed by: OBSTETRICS & GYNECOLOGY

## 2019-08-05 PROCEDURE — 58300 INSERTION OF IUD: ICD-10-PCS | Mod: S$GLB,,, | Performed by: OBSTETRICS & GYNECOLOGY

## 2019-08-05 PROCEDURE — 81025 POCT URINE PREGNANCY: ICD-10-PCS | Mod: S$GLB,,, | Performed by: OBSTETRICS & GYNECOLOGY

## 2019-08-05 NOTE — PROCEDURES
Insertion of IUD  Date/Time: 8/5/2019 11:55 AM  Performed by: Mitzy Madison MD  Authorized by: Mitzy Madison MD     Consent:     Consent obtained:  Written    Consent given by:  Patient    Procedure risks and benefits discussed: yes      Patient questions answered: yes      Patient agrees, verbalizes understanding, and wants to proceed: yes      Instructions and paperwork completed: yes    Procedure:     Pelvic exam performed: yes      Negative GC/chlamydia test: no      Negative urine pregnancy test: yes      Negative serum pregnancy test: no      Cervix cleaned and prepped: yes      Speculum placed in vagina: yes      Tenaculum applied to cervix: no      Uterus sounded: yes      Uterus sound depth (cm):  8.5    IUD inserted with no complications: yes      Strings trimmed: yes    Post-procedure:     Patient tolerated procedure well: yes      Patient will follow up after next period: yes

## 2019-08-06 ENCOUNTER — PATIENT MESSAGE (OUTPATIENT)
Dept: OBSTETRICS AND GYNECOLOGY | Facility: CLINIC | Age: 34
End: 2019-08-06

## 2019-12-19 ENCOUNTER — TELEPHONE (OUTPATIENT)
Dept: INTERNAL MEDICINE | Facility: CLINIC | Age: 34
End: 2019-12-19

## 2019-12-23 ENCOUNTER — PATIENT MESSAGE (OUTPATIENT)
Dept: INTERNAL MEDICINE | Facility: CLINIC | Age: 34
End: 2019-12-23

## 2019-12-23 NOTE — TELEPHONE ENCOUNTER
Pt states that she used bath and body work scented Jel and starting feeling lots of pressure during urination after use. Pt thinks she might have a UTI. Pt denies any other symptoms of UTI. Schedule pt for 12/24/19 with Dr. Vanessa to discuss.

## 2019-12-24 ENCOUNTER — PATIENT MESSAGE (OUTPATIENT)
Dept: INTERNAL MEDICINE | Facility: CLINIC | Age: 34
End: 2019-12-24

## 2019-12-24 PROBLEM — R79.89 LOW TSH LEVEL: Status: ACTIVE | Noted: 2019-12-24

## 2019-12-26 ENCOUNTER — TELEPHONE (OUTPATIENT)
Dept: INTERNAL MEDICINE | Facility: CLINIC | Age: 34
End: 2019-12-26

## 2019-12-26 DIAGNOSIS — N30.00 ACUTE CYSTITIS WITHOUT HEMATURIA: Primary | ICD-10-CM

## 2019-12-27 RX ORDER — SULFAMETHOXAZOLE AND TRIMETHOPRIM 800; 160 MG/1; MG/1
1 TABLET ORAL 2 TIMES DAILY
Qty: 10 TABLET | Refills: 0 | Status: SHIPPED | OUTPATIENT
Start: 2019-12-27 | End: 2020-01-01

## 2019-12-27 NOTE — TELEPHONE ENCOUNTER
Spoke with pt and informed her of her urine sample results. Pt stated she was still having some burning when urinating still. Pt uses [facility] on [address] and [address].

## 2019-12-27 NOTE — TELEPHONE ENCOUNTER
Spoke with pt and informed her that Dr Ash sent in a new antibiotic to her pharmacy and to call the office next week if she is still having symptoms and follow-up. Pt verbalized understanding.

## 2019-12-27 NOTE — TELEPHONE ENCOUNTER
Rx for new antibiotic has been sent to her pharmacy.  If she still has symptoms next week, please recommend to call the office to be re-evaluated.  Thanks.

## 2019-12-27 NOTE — TELEPHONE ENCOUNTER
Please let the patient know her urine sample is growing out an unusual bacterium which may not be sensitive to the antibiotic that was chosen for her urine infection.  If she has any additional urinary symptoms at this time, we should plan on changing this antibiotic.  Thank you.

## 2020-02-05 ENCOUNTER — PATIENT MESSAGE (OUTPATIENT)
Dept: OBSTETRICS AND GYNECOLOGY | Facility: CLINIC | Age: 35
End: 2020-02-05

## 2020-03-04 ENCOUNTER — OFFICE VISIT (OUTPATIENT)
Dept: URGENT CARE | Facility: CLINIC | Age: 35
End: 2020-03-04
Payer: MEDICAID

## 2020-03-04 VITALS
BODY MASS INDEX: 28.66 KG/M2 | HEIGHT: 65 IN | WEIGHT: 172 LBS | DIASTOLIC BLOOD PRESSURE: 82 MMHG | HEART RATE: 119 BPM | TEMPERATURE: 102 F | RESPIRATION RATE: 16 BRPM | SYSTOLIC BLOOD PRESSURE: 121 MMHG | OXYGEN SATURATION: 95 %

## 2020-03-04 DIAGNOSIS — J03.90 EXUDATIVE TONSILLITIS: ICD-10-CM

## 2020-03-04 DIAGNOSIS — R68.89 FLU-LIKE SYMPTOMS: ICD-10-CM

## 2020-03-04 DIAGNOSIS — R50.9 FEVER, UNSPECIFIED FEVER CAUSE: Primary | ICD-10-CM

## 2020-03-04 LAB
CTP QC/QA: YES
FLUAV AG NPH QL: NEGATIVE
FLUBV AG NPH QL: NEGATIVE
HETEROPH AB SER QL: NEGATIVE
MOLECULAR STREP A: NEGATIVE

## 2020-03-04 PROCEDURE — 99214 OFFICE O/P EST MOD 30 MIN: CPT | Mod: 25,S$GLB,, | Performed by: PHYSICIAN ASSISTANT

## 2020-03-04 PROCEDURE — 87804 INFLUENZA ASSAY W/OPTIC: CPT | Mod: QW,S$GLB,, | Performed by: PHYSICIAN ASSISTANT

## 2020-03-04 PROCEDURE — 99214 PR OFFICE/OUTPT VISIT, EST, LEVL IV, 30-39 MIN: ICD-10-PCS | Mod: 25,S$GLB,, | Performed by: PHYSICIAN ASSISTANT

## 2020-03-04 PROCEDURE — 86308 POCT INFECTIOUS MONONUCLEOSIS: ICD-10-PCS | Mod: QW,S$GLB,, | Performed by: PHYSICIAN ASSISTANT

## 2020-03-04 PROCEDURE — 87804 POCT INFLUENZA A/B: ICD-10-PCS | Mod: 59,QW,S$GLB, | Performed by: PHYSICIAN ASSISTANT

## 2020-03-04 PROCEDURE — 86308 HETEROPHILE ANTIBODY SCREEN: CPT | Mod: QW,S$GLB,, | Performed by: PHYSICIAN ASSISTANT

## 2020-03-04 PROCEDURE — 87651 STREP A DNA AMP PROBE: CPT | Mod: QW,S$GLB,, | Performed by: PHYSICIAN ASSISTANT

## 2020-03-04 PROCEDURE — 87651 POCT STREP A MOLECULAR: ICD-10-PCS | Mod: QW,S$GLB,, | Performed by: PHYSICIAN ASSISTANT

## 2020-03-04 RX ORDER — OSELTAMIVIR PHOSPHATE 75 MG/1
75 CAPSULE ORAL 2 TIMES DAILY
Qty: 10 CAPSULE | Refills: 0 | Status: SHIPPED | OUTPATIENT
Start: 2020-03-04 | End: 2020-03-09

## 2020-03-04 RX ORDER — ACETAMINOPHEN 500 MG
1000 TABLET ORAL
Status: COMPLETED | OUTPATIENT
Start: 2020-03-04 | End: 2020-03-04

## 2020-03-04 RX ORDER — AMOXICILLIN 500 MG/1
500 CAPSULE ORAL EVERY 12 HOURS
Qty: 20 CAPSULE | Refills: 0 | Status: SHIPPED | OUTPATIENT
Start: 2020-03-04 | End: 2020-03-14

## 2020-03-04 RX ADMIN — Medication 1000 MG: at 11:03

## 2020-03-04 NOTE — PATIENT INSTRUCTIONS
General Discharge Instructions   If you were prescribed a narcotic or controlled medication, do not drive or operate heavy equipment or machinery while taking these medications.  If you were prescribed antibiotics, please take them to completion.  You must understand that you've received an Urgent Care treatment only and that you may be released before all your medical problems are known or treated. You, the patient, will arrange for follow up care as instructed.  Follow up with your PCP or specialty clinic as directed in the next 1-2 weeks if not improved or as needed.  You can call [phone] to schedule an appointment with the appropriate provider.  If your condition worsens we recommend that you receive another evaluation at the emergency room immediately or contact your primary medical clinics after hours call service to discuss your concerns.  Please return here or go to the Emergency Department for any concerns or worsening of condition.      When You Have a Sore Throat    A sore throat can be painful. There are many reasons why you may have a sore throat. Your healthcare provider will work with you to find the cause of your sore throat. He or she will also find the best treatment for you.  What causes a sore throat?  Sore throats can be caused or worsened by:  · Cold or flu viruses  · Bacteria  · Irritants such as tobacco smoke or air pollution  · Acid reflux  A healthy throat  The tonsils are on the sides of the throat near the base of the tongue. They collect viruses and bacteria and help fight infection. The throat (pharynx) is the passage for air. Mucus from the nasal cavity also moves down the passage.  An inflamed throat  The tonsils and pharynx can become inflamed due to a cold or flu virus. Postnasal drip (excess mucus draining from the nasal cavity) can irritate the throat. It can also make the throat or tonsils more likely to be infected by bacteria. Severe, untreated tonsillitis in children or  adults can cause a pocket of pus (abscess) to form near the tonsil.  Your evaluation  A medical evaluation can help find the cause of your sore throat. It can also help your healthcare provider choose the best treatment for you. The evaluation may include a health history, physical exam, and diagnostic tests.  Health history  Your healthcare provider may ask you the following:  · How long has the sore throat lasted and how have you been treating it?  · Do you have any other symptoms, such as body aches, fever, or cough?  · Does your sore throat recur? If so, how often? How many days of school or work have you missed because of a sore throat?  · Do you have trouble eating or swallowing?  · Have you been told that you snore or have other sleep problems?  · Do you have bad breath?  · Do you cough up bad-tasting mucus?  Physical exam  During the exam, your healthcare provider checks your ears, nose, and throat for problems. He or she also checks for swelling in the neck, and may listen to your chest.  Possible tests  Other tests your healthcare provider may perform include:  · A throat swab to check for bacteria such as streptococcus (the bacteria that causes strep throat)  · A blood test to check for mononucleosis (a viral infection)  · A chest X-ray to rule out pneumonia, especially if you have a cough  Treating a sore throat  Treatment depends on many factors. What is the likely cause? Is the problem recent? Does it keep coming back? In many cases, the best thing to do is to treat the symptoms, rest, and let the problem heal itself. Antibiotics may help clear up some bacterial infections. For cases of severe or recurring tonsillitis, the tonsils may need to be removed.  Relieving your symptoms  · Dont smoke, and avoid secondhand smoke.  · For children, try throat sprays or Popsicles. Adults and older children may try lozenges.  · Drink warm liquids to soothe the throat and help thin mucus. Avoid alcohol, spicy  "foods, and acidic drinks such as orange juice. These can irritate the throat.  · Gargle with warm saltwater (1 teaspoon of salt to 8 ounces of warm water).  · Use a humidifier to keep air moist and relieve throat dryness.  · Try over-the-counter pain relievers such as acetaminophen or ibuprofen. Use as directed, and dont exceed the recommended dose. Dont give aspirin to children.   Are antibiotics needed?  If your sore throat is due to a bacterial infection, antibiotics may speed healing and prevent complications. Although group A streptococcus ("strep throat" or GAS) is the major treatable infection for a sore throat, GAS causes only 5% to 15% of sore throats in adults who seek medical care. Most sore throats are caused by cold or flu viruses. And antibiotics dont treat viral illness. In fact, using antibiotics when theyre not needed may produce bacteria that are harder to kill. Your healthcare provider will prescribe antibiotics only if he or she thinks they are likely to help.  If antibiotics are prescribed  Take the medicine exactly as directed. Be sure to finish your prescription even if youre feeling better. And be sure to ask your healthcare provider or pharmacist what side effects are common and what to do about them.  Is surgery needed?  In some cases, tonsils need to be removed. This is often done as outpatient (same-day) surgery. Your healthcare provider may advise removing the tonsils in cases of:  · Several severe bouts of tonsillitis in a year. Severe episodes include those that lead to missed days of school or work, or that need to be treated with antibiotics.  · Tonsillitis that causes breathing problems during sleep  · Tonsillitis caused by food particles collecting in pouches in the tonsils (cryptic tonsillitis)  Call your healthcare provider if any of the following occur:  · Symptoms worsen, or new symptoms develop.  · Swollen tonsils make breathing difficult.  · The pain is severe enough " to keep you from drinking liquids.  · A skin rash, hives, or wheezing develops. Any of these could signal an allergic reaction to antibiotics.  · Symptoms dont improve within a week.  · Symptoms dont improve within 2 to 3 days of starting antibiotics.   Date Last Reviewed: 10/1/2016  © 6583-0192 [employer]. [address] [place] [address], [place] [address]. All rights reserved. This information is not intended as a substitute for professional medical care. Always follow your healthcare professional's instructions.

## 2020-03-04 NOTE — LETTER
March 4, 2020      [facility] [address], [address]  [place] [address]  Phone: [phone]  Fax: [phone]       Patient: Clayton Duarte   YOB: 1985  Date of Visit: 03/04/2020    To Whom It May Concern:    Damian Duarte  was at [facility] on 03/04/2020. She may return to work/school on 09 March 2020 with no restrictions. If you have any questions or concerns, or if I can be of further assistance, please do not hesitate to contact me.    Sincerely,    Guru Ackerman, RT

## 2020-03-04 NOTE — PROGRESS NOTES
"Subjective:       Patient ID: Clayton Duarte is a 34 y.o. female.    Vitals:  height is 5' 5" (1.651 m) and weight is 78 kg (172 lb). Her temperature is 101.5 °F (38.6 °C) (abnormal). Her blood pressure is 121/82 and her pulse is 119 (abnormal). Her respiration is 16 and oxygen saturation is 95%.     Chief Complaint: URI    Pt c/o waking up this morning with sweats, coughing, feverish, chest hurts to take deep breaths or cough. She had open heart surgery in 2013 and says the right side is hurting a little bit when coughing. She was at work this morning so she has not taken any medication for relief. 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    URI    This is a new problem. The current episode started today. The problem has been unchanged. Associated symptoms include coughing. Pertinent negatives include no chest pain, congestion, diarrhea, dysuria, headaches, nausea, rash, sore throat or vomiting. She has tried nothing for the symptoms.       Constitution: Positive for fever. Negative for chills and fatigue.   HENT: Negative for congestion and sore throat.    Neck: Negative for painful lymph nodes.   Cardiovascular: Negative for chest pain and leg swelling.   Eyes: Negative for double vision and blurred vision.   Respiratory: Positive for cough. Negative for shortness of breath.    Gastrointestinal: Negative for nausea, vomiting and diarrhea.   Genitourinary: Negative for dysuria, frequency, urgency and history of kidney stones.   Musculoskeletal: Negative for joint pain, joint swelling, muscle cramps and muscle ache.   Skin: Negative for color change, pale, rash and bruising.   Allergic/Immunologic: Negative for seasonal allergies.   Neurological: Negative for dizziness, history of vertigo, light-headedness, passing out and headaches.   Hematologic/Lymphatic: Negative for swollen lymph nodes.   Psychiatric/Behavioral: Negative for nervous/anxious, sleep disturbance and " depression. The patient is not nervous/anxious.        Objective:      Physical Exam   Constitutional: She is oriented to person, place, and time. She appears well-developed and well-nourished. She is cooperative.  Non-toxic appearance. She has a sickly appearance. She does not appear ill. No distress.   HENT:   Head: Normocephalic and atraumatic.   Right Ear: Hearing, tympanic membrane, external ear and ear canal normal.   Left Ear: Hearing, tympanic membrane, external ear and ear canal normal.   Nose: Mucosal edema present. No rhinorrhea or nasal deformity. No epistaxis. Right sinus exhibits maxillary sinus tenderness and frontal sinus tenderness. Left sinus exhibits maxillary sinus tenderness and frontal sinus tenderness.   Mouth/Throat: Uvula is midline and mucous membranes are normal. No trismus in the jaw. Normal dentition. No uvula swelling. Posterior oropharyngeal erythema present. No oropharyngeal exudate or posterior oropharyngeal edema. Tonsils are 3+ on the right. Tonsils are 2+ on the left. Tonsillar exudate.   Eyes: Conjunctivae and lids are normal. No scleral icterus.   Neck: Trachea normal, full passive range of motion without pain and phonation normal. Neck supple. No neck rigidity. No edema and no erythema present.   Cardiovascular: Normal rate, regular rhythm, normal heart sounds, intact distal pulses and normal pulses.   Pulmonary/Chest: Effort normal and breath sounds normal. No respiratory distress. She has no decreased breath sounds. She has no wheezes. She has no rhonchi.   Abdominal: Normal appearance.   Musculoskeletal: Normal range of motion. She exhibits no edema or deformity.   Neurological: She is alert and oriented to person, place, and time. She exhibits normal muscle tone. Coordination normal.   Skin: Skin is warm, dry, intact, not diaphoretic and not pale.   Psychiatric: She has a normal mood and affect. Her speech is normal and behavior is normal. Judgment and thought content  normal. Cognition and memory are normal.   Nursing note and vitals reviewed.        Recent Results (from the past 48 hour(s))   POCT Influenza A/B    Collection Time: 03/04/20 11:26 AM   Result Value Ref Range    Rapid Influenza A Ag Negative Negative    Rapid Influenza B Ag Negative Negative     Acceptable Yes    POCT Strep A, Molecular    Collection Time: 03/04/20 11:47 AM   Result Value Ref Range    Molecular Strep A, POC Negative Negative     Acceptable Yes    POCT Infectious mononucleosis antibody    Collection Time: 03/04/20 12:02 PM   Result Value Ref Range    Monospot Negative Negative     Acceptable Yes      Assessment:       1. Fever, unspecified fever cause    2. Exudative tonsillitis    3. Flu-like symptoms        Plan:         Fever, unspecified fever cause  -     POCT Influenza A/B  -     acetaminophen tablet 1,000 mg  -     POCT Strep A, Molecular    Exudative tonsillitis  -     POCT Infectious mononucleosis antibody  -     amoxicillin (AMOXIL) 500 MG capsule; Take 1 capsule (500 mg total) by mouth every 12 (twelve) hours. for 10 days  Dispense: 20 capsule; Refill: 0    Flu-like symptoms  -     oseltamivir (TAMIFLU) 75 MG capsule; Take 1 capsule (75 mg total) by mouth 2 (two) times daily. for 5 days  Dispense: 10 capsule; Refill: 0          Patient Instructions     General Discharge Instructions   If you were prescribed a narcotic or controlled medication, do not drive or operate heavy equipment or machinery while taking these medications.  If you were prescribed antibiotics, please take them to completion.  You must understand that you've received an Urgent Care treatment only and that you may be released before all your medical problems are known or treated. You, the patient, will arrange for follow up care as instructed.  Follow up with your PCP or specialty clinic as directed in the next 1-2 weeks if not improved or as needed.  You can call [phone] to schedule an appointment with the appropriate provider.  If your condition worsens we recommend that you receive another evaluation at the emergency room immediately or contact your primary medical clinics after hours call service to discuss your concerns.  Please return here or go to the Emergency Department for any concerns or worsening of condition.      When You Have a Sore Throat    A sore throat can be painful. There are many reasons why you may have a sore throat. Your healthcare provider will work with you to find the cause of your sore throat. He or she will also find the best treatment for you.  What causes a sore throat?  Sore throats can be caused or worsened by:  · Cold or flu viruses  · Bacteria  · Irritants such as tobacco smoke or air pollution  · Acid reflux  A healthy throat  The tonsils are on the sides of the throat near the base of the tongue. They collect viruses and bacteria and help fight infection. The throat (pharynx) is the passage for air. Mucus from the nasal cavity also moves down the passage.  An inflamed throat  The tonsils and pharynx can become inflamed due to a cold or flu virus. Postnasal drip (excess mucus draining from the nasal cavity) can irritate the throat. It can also make the throat or tonsils more likely to be infected by bacteria. Severe, untreated tonsillitis in children or adults can cause a pocket of pus (abscess) to form near the tonsil.  Your evaluation  A medical evaluation can help find the cause of your sore throat. It can also help your healthcare provider choose the best treatment for you. The evaluation may include a health history, physical exam, and diagnostic tests.  Health history  Your healthcare provider may ask you the following:  · How long has the sore throat lasted and how have you been treating it?  · Do you have any other symptoms, such as body aches, fever, or cough?  · Does your sore throat recur? If so, how often? How many days of  "school or work have you missed because of a sore throat?  · Do you have trouble eating or swallowing?  · Have you been told that you snore or have other sleep problems?  · Do you have bad breath?  · Do you cough up bad-tasting mucus?  Physical exam  During the exam, your healthcare provider checks your ears, nose, and throat for problems. He or she also checks for swelling in the neck, and may listen to your chest.  Possible tests  Other tests your healthcare provider may perform include:  · A throat swab to check for bacteria such as streptococcus (the bacteria that causes strep throat)  · A blood test to check for mononucleosis (a viral infection)  · A chest X-ray to rule out pneumonia, especially if you have a cough  Treating a sore throat  Treatment depends on many factors. What is the likely cause? Is the problem recent? Does it keep coming back? In many cases, the best thing to do is to treat the symptoms, rest, and let the problem heal itself. Antibiotics may help clear up some bacterial infections. For cases of severe or recurring tonsillitis, the tonsils may need to be removed.  Relieving your symptoms  · Dont smoke, and avoid secondhand smoke.  · For children, try throat sprays or Popsicles. Adults and older children may try lozenges.  · Drink warm liquids to soothe the throat and help thin mucus. Avoid alcohol, spicy foods, and acidic drinks such as orange juice. These can irritate the throat.  · Gargle with warm saltwater (1 teaspoon of salt to 8 ounces of warm water).  · Use a humidifier to keep air moist and relieve throat dryness.  · Try over-the-counter pain relievers such as acetaminophen or ibuprofen. Use as directed, and dont exceed the recommended dose. Dont give aspirin to children.   Are antibiotics needed?  If your sore throat is due to a bacterial infection, antibiotics may speed healing and prevent complications. Although group A streptococcus ("strep throat" or GAS) is the major " treatable infection for a sore throat, GAS causes only 5% to 15% of sore throats in adults who seek medical care. Most sore throats are caused by cold or flu viruses. And antibiotics dont treat viral illness. In fact, using antibiotics when theyre not needed may produce bacteria that are harder to kill. Your healthcare provider will prescribe antibiotics only if he or she thinks they are likely to help.  If antibiotics are prescribed  Take the medicine exactly as directed. Be sure to finish your prescription even if youre feeling better. And be sure to ask your healthcare provider or pharmacist what side effects are common and what to do about them.  Is surgery needed?  In some cases, tonsils need to be removed. This is often done as outpatient (same-day) surgery. Your healthcare provider may advise removing the tonsils in cases of:  · Several severe bouts of tonsillitis in a year. Severe episodes include those that lead to missed days of school or work, or that need to be treated with antibiotics.  · Tonsillitis that causes breathing problems during sleep  · Tonsillitis caused by food particles collecting in pouches in the tonsils (cryptic tonsillitis)  Call your healthcare provider if any of the following occur:  · Symptoms worsen, or new symptoms develop.  · Swollen tonsils make breathing difficult.  · The pain is severe enough to keep you from drinking liquids.  · A skin rash, hives, or wheezing develops. Any of these could signal an allergic reaction to antibiotics.  · Symptoms dont improve within a week.  · Symptoms dont improve within 2 to 3 days of starting antibiotics.   Date Last Reviewed: 10/1/2016  © 0865-9924 [employer]. [address] [place] [address], [place] [address]. All rights reserved. This information is not intended as a substitute for professional medical care. Always follow your healthcare professional's instructions.

## 2020-09-22 ENCOUNTER — PATIENT MESSAGE (OUTPATIENT)
Dept: OBSTETRICS AND GYNECOLOGY | Facility: CLINIC | Age: 35
End: 2020-09-22

## 2020-10-01 ENCOUNTER — PATIENT MESSAGE (OUTPATIENT)
Dept: OBSTETRICS AND GYNECOLOGY | Facility: CLINIC | Age: 35
End: 2020-10-01

## 2020-11-02 ENCOUNTER — PATIENT MESSAGE (OUTPATIENT)
Dept: OBSTETRICS AND GYNECOLOGY | Facility: CLINIC | Age: 35
End: 2020-11-02

## 2020-11-30 ENCOUNTER — OFFICE VISIT (OUTPATIENT)
Dept: OBSTETRICS AND GYNECOLOGY | Facility: CLINIC | Age: 35
End: 2020-11-30
Payer: MEDICAID

## 2020-11-30 VITALS
DIASTOLIC BLOOD PRESSURE: 70 MMHG | WEIGHT: 158.5 LBS | HEIGHT: 65 IN | BODY MASS INDEX: 26.41 KG/M2 | SYSTOLIC BLOOD PRESSURE: 122 MMHG

## 2020-11-30 DIAGNOSIS — Z01.411 ENCOUNTER FOR WELL WOMAN EXAM WITH ABNORMAL FINDINGS: ICD-10-CM

## 2020-11-30 DIAGNOSIS — Z01.419 WELL WOMAN EXAM WITH ROUTINE GYNECOLOGICAL EXAM: Primary | ICD-10-CM

## 2020-11-30 DIAGNOSIS — N89.8 VAGINAL DISCHARGE: ICD-10-CM

## 2020-11-30 PROCEDURE — 99999 PR PBB SHADOW E&M-EST. PATIENT-LVL II: CPT | Mod: PBBFAC,,, | Performed by: OBSTETRICS & GYNECOLOGY

## 2020-11-30 PROCEDURE — 88175 CYTOPATH C/V AUTO FLUID REDO: CPT

## 2020-11-30 PROCEDURE — 99395 PREV VISIT EST AGE 18-39: CPT | Mod: S$PBB,,, | Performed by: OBSTETRICS & GYNECOLOGY

## 2020-11-30 PROCEDURE — 99395 PR PREVENTIVE VISIT,EST,18-39: ICD-10-PCS | Mod: S$PBB,,, | Performed by: OBSTETRICS & GYNECOLOGY

## 2020-11-30 PROCEDURE — 87624 HPV HI-RISK TYP POOLED RSLT: CPT

## 2020-11-30 PROCEDURE — 99212 OFFICE O/P EST SF 10 MIN: CPT | Mod: PBBFAC | Performed by: OBSTETRICS & GYNECOLOGY

## 2020-11-30 PROCEDURE — 99999 PR PBB SHADOW E&M-EST. PATIENT-LVL II: ICD-10-PCS | Mod: PBBFAC,,, | Performed by: OBSTETRICS & GYNECOLOGY

## 2020-11-30 NOTE — PROGRESS NOTES
Subjective:       Patient ID: Clayton Duarte is a 35 y.o. female.    Chief Complaint:  Well Woman    History of Present Illness:  HPI  SUBJECTIVE:   35 y.o. female  here for annual.     She describes her periods as regular now with Mirena IUD, cycles vary from 2-7 days.  denies break through bleeding.   denies vaginal itching or irritation.  complains of vaginal discharge.     She is sexually active. She has Mirena IUD. She expelled a Mirena IUD in the past with a menstrual cycle so is worried about contraception. She is open to permanent sterilization, but would need clearance from Cardiology. She will consider.  History of abnormal pap: No  Last Pap: 2016 - NILM  Last MMG: N/A  Last Colonoscopy: N/A    FH: Denies family history of breast, GYN or colon cancer.    GYN & OB History  Patient's last menstrual period was 2020 (exact date).   Date of Last Pap: 2020    OB History    Para Term  AB Living   3 3 3     3   SAB TAB Ectopic Multiple Live Births           3      # Outcome Date GA Lbr Jose Alberto/2nd Weight Sex Delivery Anes PTL Lv   3 Term 13 39w2d 103:30 / 00:23 3.025 kg (6 lb 10.7 oz) F Vag-Spont EPI N RENEE      Birth Comments: 28    2 Term 09 39w0d 10:00 3.629 kg (8 lb) M Vag-Spont EPI N RENEE   1 Term 06 39w0d 09:00 3.033 kg (6 lb 11 oz) F Vag-Spont EPI N RENEE       Review of Systems  Review of Systems   Constitutional: Negative for chills, diaphoresis, fatigue and fever.   Respiratory: Negative for cough and shortness of breath.    Cardiovascular: Negative for chest pain and palpitations.   Gastrointestinal: Negative for abdominal pain, constipation, diarrhea, nausea and vomiting.   Genitourinary: Negative for dysmenorrhea, dysuria, frequency, menorrhagia, menstrual problem, pelvic pain, vaginal bleeding, vaginal discharge and vaginal pain.   Musculoskeletal: Negative for back pain and myalgias.   Integumentary:  Negative for rash, acne, breast mass,  nipple discharge and breast skin changes.   Neurological: Negative for headaches.   Psychiatric/Behavioral: Negative for depression. The patient is not nervous/anxious.    Breast: Negative for mass, mastodynia, nipple discharge and skin changes          Objective:    Physical Exam:   Constitutional: She is oriented to person, place, and time. She appears well-developed and well-nourished. No distress.    HENT:   Head: Normocephalic and atraumatic.    Eyes: EOM are normal.    Neck: Normal range of motion. No thyromegaly present.     Pulmonary/Chest: Effort normal. She exhibits no mass and no tenderness. Right breast exhibits no inverted nipple, no mass, no nipple discharge, no skin change, no tenderness and no swelling. Left breast exhibits no inverted nipple, no mass, no nipple discharge, no skin change, no tenderness and no swelling. Breasts are symmetrical.        Abdominal: Soft. She exhibits no distension and no mass. There is no abdominal tenderness. There is no rebound and no guarding. No hernia.     Genitourinary:    Genitourinary Comments: Normal external female genitalia; vagina rugated, normal; cervix normal, no masses; uterus small mobile nontender; no adnexal masses palpated; rectal deferred               Musculoskeletal: Normal range of motion and moves all extremeties.       Neurological: She is alert and oriented to person, place, and time.    Skin: Skin is warm. No rash noted.    Psychiatric: She has a normal mood and affect. Her behavior is normal. Judgment and thought content normal.          Assessment:        1. Well woman exam with routine gynecological exam    2. Encounter for well woman exam with abnormal findings    3. Vaginal discharge                Plan:      Clayton was seen today for annual exam.    Diagnoses and all orders for this visit:    Well woman exam with routine gynecological exam  -     Liquid-based pap smear, screening  -     HPV High Risk Genotypes, PCR  - Pap guidelines  discussed. Pap/HPV collected.  - MMG age 39.    - Contraception - Continue Mirena IUD. Patient will consider permanent contraception procedure.  - STD screening - collected.    Orders Placed This Encounter   Procedures    HPV High Risk Genotypes, PCR    NuSwab Vaginitis (VG)       No follow-ups on file.

## 2020-12-03 LAB
A VAGINAE DNA VAG QL NAA+PROBE: NORMAL SCORE
BVAB2 DNA VAG QL NAA+PROBE: NORMAL SCORE
C ALBICANS DNA VAG QL NAA+PROBE: NEGATIVE
C GLABRATA DNA VAG QL NAA+PROBE: NEGATIVE
MEGA1 DNA VAG QL NAA+PROBE: NORMAL SCORE
T VAGINALIS DNA VAG QL NAA+PROBE: NEGATIVE

## 2020-12-09 LAB
HPV HR 12 DNA SPEC QL NAA+PROBE: POSITIVE
HPV16 AG SPEC QL: NEGATIVE
HPV18 DNA SPEC QL NAA+PROBE: POSITIVE

## 2020-12-30 ENCOUNTER — TELEPHONE (OUTPATIENT)
Dept: CARDIOLOGY | Facility: CLINIC | Age: 35
End: 2020-12-30

## 2020-12-30 DIAGNOSIS — R00.2 PALPITATION: Primary | ICD-10-CM

## 2020-12-31 ENCOUNTER — HOSPITAL ENCOUNTER (OUTPATIENT)
Dept: CARDIOLOGY | Facility: CLINIC | Age: 35
Discharge: HOME | End: 2020-12-31
Payer: MEDICAID

## 2020-12-31 ENCOUNTER — OFFICE VISIT (OUTPATIENT)
Dept: CARDIOLOGY | Facility: CLINIC | Age: 35
End: 2020-12-31
Payer: MEDICAID

## 2020-12-31 VITALS
DIASTOLIC BLOOD PRESSURE: 74 MMHG | WEIGHT: 157.63 LBS | OXYGEN SATURATION: 99 % | HEIGHT: 65 IN | BODY MASS INDEX: 26.26 KG/M2 | SYSTOLIC BLOOD PRESSURE: 116 MMHG | HEART RATE: 82 BPM

## 2020-12-31 DIAGNOSIS — Z01.810 PREOPERATIVE CARDIOVASCULAR EXAMINATION: ICD-10-CM

## 2020-12-31 DIAGNOSIS — R07.9 CHEST PAIN AT REST: ICD-10-CM

## 2020-12-31 DIAGNOSIS — R79.89 LOW TSH LEVEL: ICD-10-CM

## 2020-12-31 DIAGNOSIS — R00.2 PALPITATION: ICD-10-CM

## 2020-12-31 DIAGNOSIS — D64.9 CHRONIC ANEMIA: Chronic | ICD-10-CM

## 2020-12-31 DIAGNOSIS — I25.42 CORONARY ARTERY DISSECTION: Primary | ICD-10-CM

## 2020-12-31 DIAGNOSIS — D50.9 MICROCYTIC ANEMIA: ICD-10-CM

## 2020-12-31 PROCEDURE — 99203 PR OFFICE/OUTPT VISIT, NEW, LEVL III, 30-44 MIN: ICD-10-PCS | Mod: S$PBB,,, | Performed by: NURSE PRACTITIONER

## 2020-12-31 PROCEDURE — 99999 PR PBB SHADOW E&M-EST. PATIENT-LVL III: CPT | Mod: PBBFAC,,, | Performed by: NURSE PRACTITIONER

## 2020-12-31 PROCEDURE — 93010 EKG 12-LEAD: ICD-10-PCS | Mod: S$PBB,,, | Performed by: INTERNAL MEDICINE

## 2020-12-31 PROCEDURE — 93010 ELECTROCARDIOGRAM REPORT: CPT | Mod: S$PBB,,, | Performed by: INTERNAL MEDICINE

## 2020-12-31 PROCEDURE — 93005 ELECTROCARDIOGRAM TRACING: CPT | Mod: PBBFAC | Performed by: INTERNAL MEDICINE

## 2020-12-31 PROCEDURE — 99203 OFFICE O/P NEW LOW 30 MIN: CPT | Mod: S$PBB,,, | Performed by: NURSE PRACTITIONER

## 2020-12-31 PROCEDURE — 99999 PR PBB SHADOW E&M-EST. PATIENT-LVL III: ICD-10-PCS | Mod: PBBFAC,,, | Performed by: NURSE PRACTITIONER

## 2020-12-31 PROCEDURE — 99213 OFFICE O/P EST LOW 20 MIN: CPT | Mod: PBBFAC,25 | Performed by: NURSE PRACTITIONER

## 2021-01-04 ENCOUNTER — TELEPHONE (OUTPATIENT)
Dept: INTERNAL MEDICINE | Facility: CLINIC | Age: 36
End: 2021-01-04

## 2021-01-04 DIAGNOSIS — E83.52 SERUM CALCIUM ELEVATED: ICD-10-CM

## 2021-01-04 DIAGNOSIS — E55.9 VITAMIN D DEFICIENCY: ICD-10-CM

## 2021-01-04 DIAGNOSIS — R79.89 LOW TSH LEVEL: Primary | ICD-10-CM

## 2021-01-04 RX ORDER — ERGOCALCIFEROL 1.25 MG/1
50000 CAPSULE ORAL
Qty: 12 CAPSULE | Refills: 1 | Status: SHIPPED | OUTPATIENT
Start: 2021-01-04 | End: 2021-11-17

## 2021-01-05 ENCOUNTER — PATIENT MESSAGE (OUTPATIENT)
Dept: ADMINISTRATIVE | Facility: HOSPITAL | Age: 36
End: 2021-01-05

## 2021-01-12 LAB
FINAL PATHOLOGIC DIAGNOSIS: NORMAL
Lab: NORMAL

## 2021-04-05 ENCOUNTER — PATIENT MESSAGE (OUTPATIENT)
Dept: ADMINISTRATIVE | Facility: HOSPITAL | Age: 36
End: 2021-04-05

## 2021-06-03 ENCOUNTER — PATIENT MESSAGE (OUTPATIENT)
Dept: INTERNAL MEDICINE | Facility: CLINIC | Age: 36
End: 2021-06-03

## 2021-08-14 ENCOUNTER — IMMUNIZATION (OUTPATIENT)
Dept: PRIMARY CARE CLINIC | Facility: CLINIC | Age: 36
End: 2021-08-14

## 2021-08-14 DIAGNOSIS — Z23 NEED FOR VACCINATION: Primary | ICD-10-CM

## 2021-08-14 PROCEDURE — 91300 COVID-19, MRNA, LNP-S, PF, 30 MCG/0.3 ML DOSE VACCINE: ICD-10-PCS | Mod: S$GLB,,, | Performed by: INTERNAL MEDICINE

## 2021-08-14 PROCEDURE — 0001A COVID-19, MRNA, LNP-S, PF, 30 MCG/0.3 ML DOSE VACCINE: CPT | Mod: CV19,S$GLB,, | Performed by: INTERNAL MEDICINE

## 2021-08-14 PROCEDURE — 0001A COVID-19, MRNA, LNP-S, PF, 30 MCG/0.3 ML DOSE VACCINE: ICD-10-PCS | Mod: CV19,S$GLB,, | Performed by: INTERNAL MEDICINE

## 2021-08-14 PROCEDURE — 91300 COVID-19, MRNA, LNP-S, PF, 30 MCG/0.3 ML DOSE VACCINE: CPT | Mod: S$GLB,,, | Performed by: INTERNAL MEDICINE

## 2021-09-08 ENCOUNTER — IMMUNIZATION (OUTPATIENT)
Dept: INTERNAL MEDICINE | Facility: CLINIC | Age: 36
End: 2021-09-08
Payer: MEDICAID

## 2021-09-08 DIAGNOSIS — Z23 NEED FOR VACCINATION: Primary | ICD-10-CM

## 2021-09-08 PROCEDURE — 0002A COVID-19, MRNA, LNP-S, PF, 30 MCG/0.3 ML DOSE VACCINE: ICD-10-PCS | Mod: CV19,,, | Performed by: INTERNAL MEDICINE

## 2021-09-08 PROCEDURE — 0002A COVID-19, MRNA, LNP-S, PF, 30 MCG/0.3 ML DOSE VACCINE: CPT | Mod: CV19,,, | Performed by: INTERNAL MEDICINE

## 2021-09-08 PROCEDURE — 91300 COVID-19, MRNA, LNP-S, PF, 30 MCG/0.3 ML DOSE VACCINE: CPT | Mod: ,,, | Performed by: INTERNAL MEDICINE

## 2021-09-08 PROCEDURE — 91300 COVID-19, MRNA, LNP-S, PF, 30 MCG/0.3 ML DOSE VACCINE: ICD-10-PCS | Mod: ,,, | Performed by: INTERNAL MEDICINE

## 2021-10-04 ENCOUNTER — PATIENT MESSAGE (OUTPATIENT)
Dept: INTERNAL MEDICINE | Facility: CLINIC | Age: 36
End: 2021-10-04

## 2021-10-04 ENCOUNTER — PATIENT MESSAGE (OUTPATIENT)
Dept: OBSTETRICS AND GYNECOLOGY | Facility: CLINIC | Age: 36
End: 2021-10-04

## 2021-10-20 ENCOUNTER — PATIENT MESSAGE (OUTPATIENT)
Dept: OBSTETRICS AND GYNECOLOGY | Facility: CLINIC | Age: 36
End: 2021-10-20
Payer: MEDICAID

## 2021-11-03 ENCOUNTER — PATIENT MESSAGE (OUTPATIENT)
Dept: OBSTETRICS AND GYNECOLOGY | Facility: CLINIC | Age: 36
End: 2021-11-03
Payer: MEDICAID

## 2021-11-08 ENCOUNTER — PATIENT MESSAGE (OUTPATIENT)
Dept: INTERNAL MEDICINE | Facility: CLINIC | Age: 36
End: 2021-11-08
Payer: MEDICAID

## 2021-11-10 ENCOUNTER — PATIENT MESSAGE (OUTPATIENT)
Dept: INTERNAL MEDICINE | Facility: CLINIC | Age: 36
End: 2021-11-10
Payer: MEDICAID

## 2021-11-16 ENCOUNTER — LAB VISIT (OUTPATIENT)
Dept: LAB | Facility: HOSPITAL | Age: 36
End: 2021-11-16
Attending: FAMILY MEDICINE
Payer: MEDICAID

## 2021-11-16 DIAGNOSIS — E55.9 VITAMIN D DEFICIENCY: ICD-10-CM

## 2021-11-16 DIAGNOSIS — E83.52 SERUM CALCIUM ELEVATED: ICD-10-CM

## 2021-11-16 DIAGNOSIS — R79.89 LOW TSH LEVEL: ICD-10-CM

## 2021-11-16 LAB
25(OH)D3+25(OH)D2 SERPL-MCNC: 11 NG/ML (ref 30–96)
CA-I BLDV-SCNC: 1.5 MMOL/L (ref 1.06–1.42)
PTH-INTACT SERPL-MCNC: 132.8 PG/ML (ref 9–77)
T4 FREE SERPL-MCNC: 1 NG/DL (ref 0.71–1.51)
TSH SERPL DL<=0.005 MIU/L-ACNC: 0.28 UIU/ML (ref 0.4–4)

## 2021-11-16 PROCEDURE — 82330 ASSAY OF CALCIUM: CPT | Performed by: FAMILY MEDICINE

## 2021-11-16 PROCEDURE — 83970 ASSAY OF PARATHORMONE: CPT | Performed by: FAMILY MEDICINE

## 2021-11-16 PROCEDURE — 84439 ASSAY OF FREE THYROXINE: CPT | Performed by: FAMILY MEDICINE

## 2021-11-16 PROCEDURE — 84443 ASSAY THYROID STIM HORMONE: CPT | Performed by: FAMILY MEDICINE

## 2021-11-16 PROCEDURE — 82306 VITAMIN D 25 HYDROXY: CPT | Performed by: FAMILY MEDICINE

## 2021-11-16 PROCEDURE — 36415 COLL VENOUS BLD VENIPUNCTURE: CPT | Mod: PN | Performed by: FAMILY MEDICINE

## 2021-11-17 ENCOUNTER — PATIENT MESSAGE (OUTPATIENT)
Dept: INTERNAL MEDICINE | Facility: CLINIC | Age: 36
End: 2021-11-17
Payer: MEDICAID

## 2021-11-17 ENCOUNTER — TELEPHONE (OUTPATIENT)
Dept: INTERNAL MEDICINE | Facility: CLINIC | Age: 36
End: 2021-11-17
Payer: MEDICAID

## 2021-11-17 DIAGNOSIS — E21.3 HYPERPARATHYROIDISM: ICD-10-CM

## 2021-11-17 DIAGNOSIS — R79.89 LOW TSH LEVEL: Primary | ICD-10-CM

## 2021-11-18 ENCOUNTER — LAB VISIT (OUTPATIENT)
Dept: LAB | Facility: HOSPITAL | Age: 36
End: 2021-11-18
Attending: OBSTETRICS & GYNECOLOGY
Payer: MEDICAID

## 2021-11-18 ENCOUNTER — PATIENT MESSAGE (OUTPATIENT)
Dept: PHARMACY | Facility: CLINIC | Age: 36
End: 2021-11-18
Payer: MEDICAID

## 2021-11-18 ENCOUNTER — OFFICE VISIT (OUTPATIENT)
Dept: OBSTETRICS AND GYNECOLOGY | Facility: CLINIC | Age: 36
End: 2021-11-18
Payer: MEDICAID

## 2021-11-18 VITALS
WEIGHT: 156.44 LBS | SYSTOLIC BLOOD PRESSURE: 110 MMHG | BODY MASS INDEX: 26.06 KG/M2 | HEIGHT: 65 IN | DIASTOLIC BLOOD PRESSURE: 70 MMHG

## 2021-11-18 DIAGNOSIS — Z01.411 ENCOUNTER FOR WELL WOMAN EXAM WITH ABNORMAL FINDINGS: Primary | ICD-10-CM

## 2021-11-18 DIAGNOSIS — Z01.411 ENCOUNTER FOR WELL WOMAN EXAM WITH ABNORMAL FINDINGS: ICD-10-CM

## 2021-11-18 DIAGNOSIS — Z30.9 ENCOUNTER FOR CONTRACEPTIVE MANAGEMENT, UNSPECIFIED TYPE: ICD-10-CM

## 2021-11-18 PROCEDURE — 99395 PREV VISIT EST AGE 18-39: CPT | Mod: S$PBB,,, | Performed by: OBSTETRICS & GYNECOLOGY

## 2021-11-18 PROCEDURE — 99999 PR PBB SHADOW E&M-EST. PATIENT-LVL III: CPT | Mod: PBBFAC,,, | Performed by: OBSTETRICS & GYNECOLOGY

## 2021-11-18 PROCEDURE — 99395 PR PREVENTIVE VISIT,EST,18-39: ICD-10-PCS | Mod: S$PBB,,, | Performed by: OBSTETRICS & GYNECOLOGY

## 2021-11-18 PROCEDURE — 99213 OFFICE O/P EST LOW 20 MIN: CPT | Mod: PBBFAC,PN | Performed by: OBSTETRICS & GYNECOLOGY

## 2021-11-18 PROCEDURE — 80074 ACUTE HEPATITIS PANEL: CPT | Performed by: OBSTETRICS & GYNECOLOGY

## 2021-11-18 PROCEDURE — 87625 HPV TYPES 16 & 18 ONLY: CPT | Mod: 59 | Performed by: OBSTETRICS & GYNECOLOGY

## 2021-11-18 PROCEDURE — 87481 CANDIDA DNA AMP PROBE: CPT | Mod: 59 | Performed by: OBSTETRICS & GYNECOLOGY

## 2021-11-18 PROCEDURE — 88175 CYTOPATH C/V AUTO FLUID REDO: CPT | Performed by: OBSTETRICS & GYNECOLOGY

## 2021-11-18 PROCEDURE — 87389 HIV-1 AG W/HIV-1&-2 AB AG IA: CPT | Performed by: OBSTETRICS & GYNECOLOGY

## 2021-11-18 PROCEDURE — 86592 SYPHILIS TEST NON-TREP QUAL: CPT | Performed by: OBSTETRICS & GYNECOLOGY

## 2021-11-18 PROCEDURE — 36415 COLL VENOUS BLD VENIPUNCTURE: CPT | Mod: PN | Performed by: OBSTETRICS & GYNECOLOGY

## 2021-11-18 PROCEDURE — 87624 HPV HI-RISK TYP POOLED RSLT: CPT | Performed by: OBSTETRICS & GYNECOLOGY

## 2021-11-18 PROCEDURE — 99999 PR PBB SHADOW E&M-EST. PATIENT-LVL III: ICD-10-PCS | Mod: PBBFAC,,, | Performed by: OBSTETRICS & GYNECOLOGY

## 2021-11-18 PROCEDURE — 87591 N.GONORRHOEAE DNA AMP PROB: CPT | Performed by: OBSTETRICS & GYNECOLOGY

## 2021-11-18 PROCEDURE — 87491 CHLMYD TRACH DNA AMP PROBE: CPT | Performed by: OBSTETRICS & GYNECOLOGY

## 2021-11-19 LAB — RPR SER QL: NORMAL

## 2021-11-22 ENCOUNTER — OFFICE VISIT (OUTPATIENT)
Dept: INTERNAL MEDICINE | Facility: CLINIC | Age: 36
End: 2021-11-22
Payer: MEDICAID

## 2021-11-22 VITALS
HEIGHT: 65 IN | BODY MASS INDEX: 25.66 KG/M2 | WEIGHT: 154 LBS | DIASTOLIC BLOOD PRESSURE: 78 MMHG | SYSTOLIC BLOOD PRESSURE: 100 MMHG

## 2021-11-22 DIAGNOSIS — E83.52 SERUM CALCIUM ELEVATED: ICD-10-CM

## 2021-11-22 DIAGNOSIS — Z00.00 ANNUAL PHYSICAL EXAM: Primary | ICD-10-CM

## 2021-11-22 DIAGNOSIS — D50.9 IRON DEFICIENCY ANEMIA, UNSPECIFIED IRON DEFICIENCY ANEMIA TYPE: ICD-10-CM

## 2021-11-22 DIAGNOSIS — Z95.1 S/P CABG (CORONARY ARTERY BYPASS GRAFT): ICD-10-CM

## 2021-11-22 DIAGNOSIS — E21.3 HYPERPARATHYROIDISM: ICD-10-CM

## 2021-11-22 DIAGNOSIS — E55.9 VITAMIN D DEFICIENCY: ICD-10-CM

## 2021-11-22 DIAGNOSIS — Z13.6 ENCOUNTER FOR LIPID SCREENING FOR CARDIOVASCULAR DISEASE: ICD-10-CM

## 2021-11-22 DIAGNOSIS — R79.89 LOW TSH LEVEL: ICD-10-CM

## 2021-11-22 DIAGNOSIS — Z13.220 ENCOUNTER FOR LIPID SCREENING FOR CARDIOVASCULAR DISEASE: ICD-10-CM

## 2021-11-22 PROCEDURE — 99999 PR PBB SHADOW E&M-EST. PATIENT-LVL III: CPT | Mod: PBBFAC,,, | Performed by: FAMILY MEDICINE

## 2021-11-22 PROCEDURE — 99213 OFFICE O/P EST LOW 20 MIN: CPT | Mod: PBBFAC,PN | Performed by: FAMILY MEDICINE

## 2021-11-22 PROCEDURE — 99999 PR PBB SHADOW E&M-EST. PATIENT-LVL III: ICD-10-PCS | Mod: PBBFAC,,, | Performed by: FAMILY MEDICINE

## 2021-11-22 PROCEDURE — 99395 PR PREVENTIVE VISIT,EST,18-39: ICD-10-PCS | Mod: S$PBB,,, | Performed by: FAMILY MEDICINE

## 2021-11-22 PROCEDURE — 99395 PREV VISIT EST AGE 18-39: CPT | Mod: S$PBB,,, | Performed by: FAMILY MEDICINE

## 2021-11-23 LAB
BACTERIAL VAGINOSIS DNA: NEGATIVE
CANDIDA GLABRATA DNA: NEGATIVE
CANDIDA KRUSEI DNA: NEGATIVE
CANDIDA RRNA VAG QL PROBE: NEGATIVE
T VAGINALIS RRNA GENITAL QL PROBE: NEGATIVE

## 2021-11-26 LAB
HAV IGM SERPL QL IA: NEGATIVE
HBV CORE IGM SERPL QL IA: NEGATIVE
HBV SURFACE AG SERPL QL IA: NEGATIVE
HCV AB SERPL QL IA: NEGATIVE
HIV 1+2 AB+HIV1 P24 AG SERPL QL IA: NEGATIVE

## 2021-11-29 LAB
CLINICAL INFO: ABNORMAL
CYTO CVX: ABNORMAL
CYTOLOGIST CVX/VAG CYTO: ABNORMAL
CYTOLOGIST CVX/VAG CYTO: ABNORMAL
CYTOLOGY CMNT CVX/VAG CYTO-IMP: ABNORMAL
CYTOLOGY PAP THIN PREP EXPLANATION: ABNORMAL
DATE OF PREVIOUS PAP: ABNORMAL
DATE PREVIOUS BX: NO
GEN CATEG CVX/VAG CYTO-IMP: ABNORMAL
HPV I/H RISK 4 DNA CVX QL NAA+PROBE: DETECTED
HPV16 DNA CVX QL PROBE+SIG AMP: NOT DETECTED
HPV18 DNA CVX QL PROBE+SIG AMP: DETECTED
LMP START DATE: ABNORMAL
MICROORGANISM CVX/VAG CYTO: ABNORMAL
PATHOLOGIST CVX/VAG CYTO: ABNORMAL
SERVICE CMNT-IMP: ABNORMAL
SPECIMEN SOURCE CVX/VAG CYTO: ABNORMAL
STAT OF ADQ CVX/VAG CYTO-IMP: ABNORMAL

## 2021-11-30 LAB
C TRACH DNA SPEC QL NAA+PROBE: NOT DETECTED
N GONORRHOEA DNA SPEC QL NAA+PROBE: NOT DETECTED

## 2021-12-30 ENCOUNTER — HOSPITAL ENCOUNTER (EMERGENCY)
Facility: HOSPITAL | Age: 36
Discharge: HOME | End: 2021-12-30
Attending: EMERGENCY MEDICINE
Payer: MEDICAID

## 2021-12-30 VITALS
HEART RATE: 98 BPM | DIASTOLIC BLOOD PRESSURE: 72 MMHG | OXYGEN SATURATION: 99 % | TEMPERATURE: 99 F | RESPIRATION RATE: 16 BRPM | SYSTOLIC BLOOD PRESSURE: 118 MMHG

## 2021-12-30 DIAGNOSIS — Z20.822 EXPOSURE TO COVID-19 VIRUS: Primary | ICD-10-CM

## 2021-12-30 DIAGNOSIS — R07.9 CHEST PAIN: ICD-10-CM

## 2021-12-30 LAB
BUN SERPL-MCNC: 11 MG/DL (ref 6–30)
CHLORIDE SERPL-SCNC: 105 MMOL/L (ref 95–110)
CREAT SERPL-MCNC: 0.7 MG/DL (ref 0.5–1.4)
CTP QC/QA: YES
GLUCOSE SERPL-MCNC: 127 MG/DL (ref 70–110)
HCT VFR BLD CALC: 45 %PCV (ref 36–54)
POC IONIZED CALCIUM: 1.46 MMOL/L (ref 1.06–1.42)
POC TCO2 (MEASURED): 27 MMOL/L (ref 23–29)
POTASSIUM BLD-SCNC: 4 MMOL/L (ref 3.5–5.1)
SAMPLE: ABNORMAL
SARS-COV-2 RDRP RESP QL NAA+PROBE: NEGATIVE
SODIUM BLD-SCNC: 141 MMOL/L (ref 136–145)
TROPONIN I SERPL DL<=0.01 NG/ML-MCNC: 0.01 NG/ML (ref 0–0.03)

## 2021-12-30 PROCEDURE — 84484 ASSAY OF TROPONIN QUANT: CPT | Performed by: EMERGENCY MEDICINE

## 2021-12-30 PROCEDURE — 99284 EMERGENCY DEPT VISIT MOD MDM: CPT | Mod: CR,CS,, | Performed by: EMERGENCY MEDICINE

## 2021-12-30 PROCEDURE — U0002 COVID-19 LAB TEST NON-CDC: HCPCS | Performed by: EMERGENCY MEDICINE

## 2021-12-30 PROCEDURE — 93005 ELECTROCARDIOGRAM TRACING: CPT

## 2021-12-30 PROCEDURE — 99283 EMERGENCY DEPT VISIT LOW MDM: CPT | Mod: 25

## 2021-12-30 PROCEDURE — 93010 EKG 12-LEAD: ICD-10-PCS | Mod: ,,, | Performed by: INTERNAL MEDICINE

## 2021-12-30 PROCEDURE — 99284 PR EMERGENCY DEPT VISIT,LEVEL IV: ICD-10-PCS | Mod: CR,CS,, | Performed by: EMERGENCY MEDICINE

## 2021-12-30 PROCEDURE — 93010 ELECTROCARDIOGRAM REPORT: CPT | Mod: ,,, | Performed by: INTERNAL MEDICINE

## 2021-12-30 PROCEDURE — 80047 BASIC METABLC PNL IONIZED CA: CPT

## 2021-12-30 NOTE — ED PROVIDER NOTES
Encounter Date: 12/30/2021       History     Chief Complaint   Patient presents with    COVID-19 Concerns     Headache and body aches and back pain     HPI   36-year-old female with past medical history of coronary artery dissection, coronary artery bypass graft, anemia, coming in with some chest pain and back pain in body aches that started this morning.  She also has a headache.  She does endorse some shortness of breath although she states she is chronically short of breath insertion this is not new for her.  She does usually get on and off chest pain since her coronary artery dissection and related procedure, however is slightly worse today when she had upper back pain accompanying her symptoms.  It lasted for several minutes and then resolved and currently she is chest pain-free.  Started around 8 or 9 in the morning.    Of note several of her children are currently COVID positive.  She is vaccinated x2, but is still care taking for them.     Review of patient's allergies indicates:   Allergen Reactions    Bananas [banana] Itching    Peanut butter flavor      Past Medical History:   Diagnosis Date    Abnormal Pap smear of cervix 2008, 2009    colposcopy    Anemia     Coronary artery dissection 9/19/2013    Postpartum depression     Spinal headache complicating labor and delivery, postpartum condition 9/6/2013     Past Surgical History:   Procedure Laterality Date    CARDIAC SURGERY      CORONARY ARTERY BYPASS GRAFT  9/13    tanner to lad, svg OM1    WISDOM TOOTH EXTRACTION       Family History   Problem Relation Age of Onset    Hypertension Mother     Diabetes Mother     Stroke Mother     Breast cancer Neg Hx     Ovarian cancer Neg Hx     Colon cancer Neg Hx     Cancer Neg Hx     Heart disease Neg Hx      Social History     Tobacco Use    Smoking status: Never Smoker    Smokeless tobacco: Never Used   Substance Use Topics    Alcohol use: Yes    Drug use: No     Review of  Systems    Constitutional:  No Fever, No Chills,   Eyes: No Vision Changes  ENT/Mouth: No sore throat, No rhinorrhea  Cardiovascular:  Intermittent Chest Pain, No Palpitations  Respiratory:  No Cough, at baseline SOB  Gastrointestinal:  No Nausea, No Vomiting, No Diarrhea, No abdo pain.  Genitourinary:  No  pain, No dysuria   Musculoskeletal:  Positive myalgias, positive upper Back Pain, No Neck Pain, No recent trauma.  Skin:  No skin Lesions  Neuro:  No Weakness, No Numbness, No Paresthesias, No Dizziness, No Headache        Physical Exam     Initial Vitals [12/30/21 1148]   BP Pulse Resp Temp SpO2   118/72 98 16 98.8 °F (37.1 °C) 99 %      MAP       --         Physical Exam    Physical Exam:  CONSTITUTIONAL: Well developed, well nourished, in no acute distress.  HENT: Normocephalic, atraumatic    EYES: Sclerae anicteric   NECK: Supple, no thyroid enlargement  CARDIOVASCULAR: Regular rate and rhythm without any murmurs, gallops, rubs.  No lower extremity swelling or tenderness to palpation.  RESPIRATORY: Speaking in full sentences. Breathing comfortably. Auscultation of the lungs revealed normal breath sounds b/l, no wheezing, no rales, no rhonchi.  ABDOMEN: Soft and nontender, no masses, no rebound or guarding   NEUROLOGIC: Alert, interacting normally. No facial droop.   MSK:  There is no C, T or L-spine tenderness, there is no lateralizes back tenderness or deformities.  Moving all four extremities.  Skin: Warm and dry. No visible rash on exposed areas of skin.    Psych: Mood and affect normal.       ED Course   Procedures  Labs Reviewed   ISTAT PROCEDURE - Abnormal; Notable for the following components:       Result Value    POC Glucose 127 (*)     POC Ionized Calcium 1.46 (*)     All other components within normal limits   TROPONIN I   SARS-COV-2 RDRP GENE    Narrative:     This test utilizes isothermal nucleic acid amplification   technology to detect the SARS-CoV-2 RdRp nucleic acid segment.   The  analytical sensitivity (limit of detection) is 125 genome   equivalents/mL.   A POSITIVE result implies infection with the SARS-CoV-2 virus;   the patient is presumed to be contagious.     A NEGATIVE result means that SARS-CoV-2 nucleic acids are not   present above the limit of detection. A NEGATIVE result should be   treated as presumptive. It does not rule out the possibility of   COVID-19 and should not be the sole basis for treatment decisions.   If COVID-19 is strongly suspected based on clinical and exposure   history, re-testing using an alternate molecular assay should be   considered.   This test is only for use under the Food and Drug   Administration s Emergency Use Authorization (EUA).   Commercial kits are provided by [employer].   Performance characteristics of the EUA have been independently   verified by [facility] Department of   Pathology and Laboratory Medicine.   _________________________________________________________________   The authorized Fact Sheet for Healthcare Providers and the authorized Fact   Sheet for Patients of the ID NOW COVID-19 are available on the FDA   website:     https://www.fda.gov/media/811538/download  https://www.fda.gov/media/494613/download              ECG Results          EKG 12-lead (Final result)  Result time 12/30/21 16:47:43    Final result by Interface, Lab In [facility] (12/30/21 16:47:43)                 Narrative:    Test Reason : R07.9,    Vent. Rate : 094 BPM     Atrial Rate : 094 BPM     P-R Int : 142 ms          QRS Dur : 076 ms      QT Int : 322 ms       P-R-T Axes : 073 091 062 degrees     QTc Int : 402 ms    Normal sinus rhythm  Rightward axis  Nonspecific ST and/or T wave abnormalities  Otherwise normal ECG  When compared with ECG of 31-DEC-2020 10:26,  No significant change was found  Confirmed by Bernard Garcia MD ([id number]) on 12/30/2021 4:47:34 PM    Referred By: [email or web address]   SELF           Confirmed By:Bernard Garcia MD                              EKG, indep interp, NSR at rate of 94 with non specific TW abnormalities, normal axis, intervals are not remarkable. Similar to prior in morphology.     Imaging Results    None          Medications - No data to display  Medical Decision Making:   History:   Old Medical Records: I decided to obtain old medical records.  Old Records Summarized: records from clinic visits.       <> Summary of Records: See HPI  Independently Interpreted Test(s):   I have ordered and independently interpreted EKG Reading(s) - see prior notes  Clinical Tests:   Lab Tests: Ordered and Reviewed  Medical Tests: Ordered and Reviewed    36-year-old female with past medical history as noted coming in with episode of chest pain, back pain early this morning in the context of being in contact with multiple other children her COVID positive.  She also is complaining of a bit of a headache.  She has not taken any medications for because she states she does not take medications at all.  Currently she is chest pain-free, she is shortness of breath that is at her baseline.  She is vaccinated x2.  Her exam is entirely benign.    I suspect that she may have early COVID signs given her multiple children with positive COVID.  Her COVID test today is negative however he could still be a false negative/early infection, this information was related to her.     For given her cardiac history and chest pain that was abnormal for her will undertake single troponin, EKG, i-STAT to look for any cardiac etiology of her symptoms.    Offer medications but she declined.    If ED workup is unremarkable anticipate discharge home with outpatient follow-up and return precautions.             ED Course as of 01/01/22 1235   Thu Dec 30, 2021   1435 Troponin is negative.  I-STAT is unremarkable.  Will discharge home with supportive care and outpatient follow-up. [BA]   1437 Normal sinus rhythm at a rate of 94 with nonspecific ST abnormalities, T-wave  flattening, similar to prior in morphology. Rightward axis, intervals are unremarkable. [BA]      ED Course User Index  [BA] Luis Alfredo Macdonald MD             She has likely been expose and my be developing covid, advised quarantine for at least 5 days. If she develops minor symptoms, she can treat herself at home with OTC cough/cold remedies, and assume it's covid given her recent multiple exposures. If she has any new, worsening or worrisome symptoms, she should return to the ED, get tested and seek further care.       Findings of ED work up explained to patient. Patient agrees with discharge plan and verbalizes understanding of return precautions.       Clinical Impression:   Final diagnoses:  [R07.9] Chest pain  [Z20.822] Exposure to COVID-19 virus (Primary)          ED Disposition Condition    Discharge Stable        ED Prescriptions     None        Follow-up Information     Follow up With Specialties Details Why Contact Info    Lesley Vanessa MD Family Medicine In 1 week  [address]  [place] [address]  [phone]             Luis Alfredo Macdonald MD  01/01/22 8772

## 2021-12-30 NOTE — ED NOTES
I-STAT Chem-8+ Results:   Value Reference Range   Sodium 141 136-145 mmol/L   Potassium  4.0 3.5-5.1 mmol/L   Chloride 105  mmol/L   Ionized Calcium 1.46 1.06-1.42 mmol/L   CO2 (measured) 27 23-29 mmol/L   Glucose 127  mg/dL   BUN 11 6-30 mg/dL   Creatinine 0.7 0.5-1.4 mg/dL   Hematocrit 45 36-54%

## 2021-12-30 NOTE — ED NOTES
Patient identifiers verified and correct for Padmini Duarte  LOC: The patient is awake, alert and aware of environment with an appropriate affect, the patient is oriented x 3 and speaking appropriately.   APPEARANCE: Patient appears comfortable and in no acute distress, patient is clean and well groomed.  SKIN: The skin is warm and dry, color consistent with ethnicity, patient has normal skin turgor and moist mucus membranes, skin intact, no breakdown or bruising noted.   MUSCULOSKELETAL: Patient moving all extremities spontaneously, no swelling noted. Pt reports body aches  RESPIRATORY: Airway is open and patent, respirations are spontaneous, patient has a normal effort and rate, no accessory muscle use noted, pt placed on continuous pulse ox with O2 sats noted at 97% on room air.  CARDIAC: Pt placed on cardiac monitor. Patient has a normal rate and regular rhythm, no edema noted, capillary refill < 3 seconds.   GASTRO: Soft and non tender to palpation, no distention noted, normoactive bowel sounds present in all four quadrants. Pt states bowel movements have been regular.  : Pt denies any pain or frequency with urination.  NEURO: Pt opens eyes spontaneously, behavior appropriate to situation, follows commands, facial expression symmetrical, bilateral hand grasp equal and even, purposeful motor response noted, normal sensation in all extremities when touched with a finger.

## 2021-12-30 NOTE — DISCHARGE INSTRUCTIONS
Your COVID test was negative.    Your EKG, lab tests, troponin looking for signs of new heart problems were not remarkable.    It is possible that you have a early mild COVID infection that is not showing up on testing at this time given the fact that you have been exposed to COVID.  Please monitor your symptoms.      If you develop any concerning trouble breathing, fevers, significant weakness, new or different chest pain, please return to the emergency department for repeat evaluation.      If you develop minor upper respiratory / cold symptoms it is very likely that you have COVID and should take home COVID test to confirm, and treated at home with supportive care and over-the-counter medications.    Please follow-up with your primary care doctor within 1 week if your symptoms are not entirely resolved.

## 2022-01-21 ENCOUNTER — OFFICE VISIT (OUTPATIENT)
Dept: CARDIOLOGY | Facility: CLINIC | Age: 37
End: 2022-01-21
Payer: MEDICAID

## 2022-01-21 VITALS
DIASTOLIC BLOOD PRESSURE: 72 MMHG | HEIGHT: 65 IN | SYSTOLIC BLOOD PRESSURE: 109 MMHG | HEART RATE: 74 BPM | BODY MASS INDEX: 26.02 KG/M2 | WEIGHT: 156.19 LBS

## 2022-01-21 DIAGNOSIS — I25.42 SPONTANEOUS DISSECTION OF CORONARY ARTERY: Primary | ICD-10-CM

## 2022-01-21 DIAGNOSIS — R06.02 SOB (SHORTNESS OF BREATH): ICD-10-CM

## 2022-01-21 DIAGNOSIS — Z95.1 S/P CABG X 2: ICD-10-CM

## 2022-01-21 DIAGNOSIS — I25.10 CORONARY ARTERY DISEASE INVOLVING NATIVE CORONARY ARTERY OF NATIVE HEART WITHOUT ANGINA PECTORIS: ICD-10-CM

## 2022-01-21 PROCEDURE — 3078F PR MOST RECENT DIASTOLIC BLOOD PRESSURE < 80 MM HG: ICD-10-PCS | Mod: CPTII,S$GLB,, | Performed by: INTERNAL MEDICINE

## 2022-01-21 PROCEDURE — 1160F PR REVIEW ALL MEDS BY PRESCRIBER/CLIN PHARMACIST DOCUMENTED: ICD-10-PCS | Mod: CPTII,S$GLB,, | Performed by: INTERNAL MEDICINE

## 2022-01-21 PROCEDURE — 99214 OFFICE O/P EST MOD 30 MIN: CPT | Mod: S$GLB,,, | Performed by: INTERNAL MEDICINE

## 2022-01-21 PROCEDURE — 3008F PR BODY MASS INDEX (BMI) DOCUMENTED: ICD-10-PCS | Mod: CPTII,S$GLB,, | Performed by: INTERNAL MEDICINE

## 2022-01-21 PROCEDURE — 3074F PR MOST RECENT SYSTOLIC BLOOD PRESSURE < 130 MM HG: ICD-10-PCS | Mod: CPTII,S$GLB,, | Performed by: INTERNAL MEDICINE

## 2022-01-21 PROCEDURE — 3008F BODY MASS INDEX DOCD: CPT | Mod: CPTII,S$GLB,, | Performed by: INTERNAL MEDICINE

## 2022-01-21 PROCEDURE — 1159F PR MEDICATION LIST DOCUMENTED IN MEDICAL RECORD: ICD-10-PCS | Mod: CPTII,S$GLB,, | Performed by: INTERNAL MEDICINE

## 2022-01-21 PROCEDURE — 3074F SYST BP LT 130 MM HG: CPT | Mod: CPTII,S$GLB,, | Performed by: INTERNAL MEDICINE

## 2022-01-21 PROCEDURE — 99214 PR OFFICE/OUTPT VISIT, EST, LEVL IV, 30-39 MIN: ICD-10-PCS | Mod: S$GLB,,, | Performed by: INTERNAL MEDICINE

## 2022-01-21 PROCEDURE — 1160F RVW MEDS BY RX/DR IN RCRD: CPT | Mod: CPTII,S$GLB,, | Performed by: INTERNAL MEDICINE

## 2022-01-21 PROCEDURE — 1159F MED LIST DOCD IN RCRD: CPT | Mod: CPTII,S$GLB,, | Performed by: INTERNAL MEDICINE

## 2022-01-21 PROCEDURE — 3078F DIAST BP <80 MM HG: CPT | Mod: CPTII,S$GLB,, | Performed by: INTERNAL MEDICINE

## 2022-01-21 RX ORDER — LANOLIN ALCOHOL/MO/W.PET/CERES
1 CREAM (GRAM) TOPICAL
COMMUNITY
End: 2022-04-20

## 2022-01-21 NOTE — PROGRESS NOTES
Subjective:   Patient ID:  Clayton Duarte is a 36 y.o. female is a new patient who presents for evaluation of Chest pain, non-cardiac    PREOPERATIVE DIAGNOSES:  1.  Postpartum left main coronary artery dissection.  2.  Proximal LAD dissection.  3.  Proximal circumflex and ramus dissection  4.  Acute cardiogenic shock.  5.  Status post intraaortic balloon pump.     POSTOPERATIVE DIAGNOSES:  1.  Postpartum left main coronary artery dissection.  2.  Proximal LAD dissection.  3.  Proximal circumflex and ramus dissection  4.  Acute cardiogenic shock.  5.  Status post intraaortic balloon pump.     PROCEDURES:  Emergency salvage CABG x2 with LIMA to LAD and saphenous vein graft to OM1   to OM1.     HPI:   Preop clearance for tubal ligation  She has 3 kids, no issues with the pregnancies of 2 now teenage kids, with the last pregnancy after delivery few hours late she developed severe chest pain ST changes. Her diagnostic catheterization revealed what appeared to be a likely spontaneous postpartum coronary dissection and she underwent a 2 vessel CABG  Occasional chest pain when she gets cold.   Walks 3,4 times a week  Non smoker     Cath 2013  Left Main Coronary Artery:              The distal LM has a 70% stenosis. There is MARY ELLEN 2 flow.      - Left Anterior Descending Artery:             The LAD has a 90% stenosis. There is MARY ELLEN 2 flow.      - Ramus:              The ramus has a 90% stenosis. There is MARY ELLEN 2 flow.      - Left Circumflex Artery:              The LCX has a 75% stenosis. There is MARY ELLEN 3 flow. The remaining portion of the vessel has luminal irregularities.      - OM1:              The OM1 is normal. There is MARY ELLEN 3 flow.      - OM2:              The OM2 is normal. There is MARY ELLEN 3 flow. The remaining portion of the vessel is of small caliber.      - OM3:              The OM3 is normal. There is MARY ELLEN 3 flow. The remaining portion of the vessel is of small caliber.      - Left Posterior Descending Artery:  "             The left PDA is normal. There is MARY ELLEN 3 flow. The remaining portion of the vessel is of small caliber.      - Right Coronary Artery:              The RCA is normal. There is MARY ELLEN 3 flow.      - Posterior Lateral Branch:              The PLB is normal. There is MARY ELLEN 3 flow.      - Posterior Descending Artery:              The PDA is normal. There is MARY ELLEN 3 flow.     Echo 2017  CONCLUSIONS     1 - Mildly depressed left ventricular systolic function (EF 45-50%).     2 - Normal left ventricular diastolic function.     3 - Normal right ventricular systolic function .     4 - The estimated PA systolic pressure is 22 mmHg.     5 - Trivial mitral regurgitation.     6 - Trivial tricuspid regurgitation.     Non-specific finding demonstrating failure to augment involving the mid anteroseptum, apical septum, mid inferoseptum, apical inferior wall which may be associated with ischemia; clinical correlation required.   Patient Active Problem List   Diagnosis    Chronic anemia    Coronary artery dissection    Preoperative cardiovascular examination    Iron deficiency anemia    Chest pain, non-cardiac    Umbilical hernia without obstruction and without gangrene    Low TSH level    Chest pain at rest    Palpitations    Microcytic anemia    Vitamin D deficiency    Serum calcium elevated     /72   Pulse 74   Ht 5' 5" (1.651 m)   Wt 70.9 kg (156 lb 3.2 oz)   BMI 25.99 kg/m²   Body mass index is 25.99 kg/m².  CrCl cannot be calculated (Patient's most recent lab result is older than the maximum 7 days allowed.).    Lab Results   Component Value Date     12/31/2020    K 4.1 12/31/2020     12/31/2020    CO2 28 12/31/2020    BUN 8 12/31/2020    CREATININE 0.9 12/31/2020    GLU 92 12/31/2020    HGBA1C 5.1 12/31/2020    MG 2.0 09/24/2013    AST 16 12/31/2020    ALT 18 12/31/2020    ALBUMIN 4.0 12/31/2020    PROT 7.4 12/31/2020    BILITOT 0.7 12/31/2020    WBC 6.57 12/31/2020    HGB 14.6 " 12/31/2020    HCT 45 12/30/2021    MCV 97 12/31/2020     12/31/2020    INR 1.0 02/15/2017    TSH 0.280 (L) 11/16/2021    CHOL 199 12/31/2020    HDL 53 12/31/2020    LDLCALC 131.4 12/31/2020    TRIG 73 12/31/2020       Current Outpatient Medications   Medication Sig    ferrous sulfate (FEOSOL) Tab tablet Take 1 tablet by mouth daily with breakfast.    levonorgestrel (MIRENA) 20 mcg/24 hours (5 yrs) 52 mg IUD 1 Intra Uterine Device by Intrauterine route once. for 1 dose    multivitamin capsule Take 1 capsule by mouth once daily.     Current Facility-Administered Medications   Medication    levonorgestrel 20 mcg/24 hours (5 yrs) 52 mg IUD 1 Intra Uterine Device    levonorgestrel 20 mcg/24 hours (5 yrs) 52 mg IUD 1 Intra Uterine Device       ROS    Objective:   Physical Exam  Constitutional:       General: She is not in acute distress.     Appearance: She is well-developed and well-nourished. She is not diaphoretic.   HENT:      Head: Normocephalic and atraumatic.      Nose: Nose normal.      Mouth/Throat:      Mouth: Oropharynx is clear and moist.      Pharynx: No oropharyngeal exudate.   Eyes:      General: No scleral icterus.        Right eye: No discharge.         Left eye: No discharge.      Extraocular Movements: EOM normal.      Conjunctiva/sclera: Conjunctivae normal.      Pupils: Pupils are equal, round, and reactive to light.   Neck:      Thyroid: No thyromegaly.      Vascular: No JVD.      Trachea: No tracheal deviation.   Cardiovascular:      Rate and Rhythm: Normal rate and regular rhythm.      Pulses: Intact distal pulses.      Heart sounds: Normal heart sounds. No murmur heard.  No friction rub. No gallop.    Pulmonary:      Effort: Pulmonary effort is normal. No respiratory distress.      Breath sounds: Normal breath sounds. No stridor. No wheezing or rales.   Chest:      Chest wall: No tenderness.   Abdominal:      General: Bowel sounds are normal. There is no distension.      Palpations:  Abdomen is soft. There is no mass.      Tenderness: There is no abdominal tenderness. There is no guarding or rebound.   Musculoskeletal:         General: No tenderness or edema. Normal range of motion.      Cervical back: Normal range of motion and neck supple.   Lymphadenopathy:      Cervical: No cervical adenopathy.   Skin:     General: Skin is warm.      Coloration: Skin is not pale.      Findings: No erythema or rash.   Neurological:      Mental Status: She is alert and oriented to person, place, and time.      Cranial Nerves: No cranial nerve deficit.      Motor: No abnormal muscle tone.      Coordination: Coordination normal.      Deep Tendon Reflexes: Reflexes are normal and symmetric.   Psychiatric:         Mood and Affect: Mood and affect normal.         Behavior: Behavior normal.         Thought Content: Thought content normal.         Judgment: Judgment normal.         Assessment:     1. Spontaneous dissection of coronary artery    2. SOB (shortness of breath)    3. Coronary artery disease involving native coronary artery of native heart without angina pectoris    4. S/P angioplasty with stent        Plan:   Clayton was seen today for chest pain, non-cardiac.    Diagnoses and all orders for this visit:    Spontaneous dissection of coronary artery  -     Lipid Panel; Future  -     Comprehensive Metabolic Panel; Future  -     CBC Auto Differential; Future  -     CBC Auto Differential; Future  -     Comprehensive Metabolic Panel; Future  -     Stress Echo Which stress agent will be used? Exercise; Future    SOB (shortness of breath)    Coronary artery disease involving native coronary artery of native heart without angina pectoris    S/P CABG x 2  -     Stress Echo Which stress agent will be used? Exercise; Future      Recommend initiation of aspirin and will likely start statin based on the results. Will review cath with Mathew Garcia. Needs evaluation of LV function and ischemia.   Counseled on importance of  heart healthy diet low in saturated and trans fat and salt as well gradually starting a regular aerobic exercise regimen with goal of 30min 5x/week. Recommend BP diary. Call if systolic BP > 130 mmHg on checking repeatedly

## 2022-01-24 ENCOUNTER — LAB VISIT (OUTPATIENT)
Dept: LAB | Facility: HOSPITAL | Age: 37
End: 2022-01-24
Attending: INTERNAL MEDICINE
Payer: MEDICAID

## 2022-01-24 DIAGNOSIS — I25.42 SPONTANEOUS DISSECTION OF CORONARY ARTERY: ICD-10-CM

## 2022-01-24 LAB
ALBUMIN SERPL BCP-MCNC: 3.9 G/DL (ref 3.5–5.2)
ALP SERPL-CCNC: 45 U/L (ref 55–135)
ALT SERPL W/O P-5'-P-CCNC: 19 U/L (ref 10–44)
ANION GAP SERPL CALC-SCNC: 7 MMOL/L (ref 8–16)
AST SERPL-CCNC: 18 U/L (ref 10–40)
BASOPHILS # BLD AUTO: 0.03 K/UL (ref 0–0.2)
BASOPHILS NFR BLD: 0.5 % (ref 0–1.9)
BILIRUB SERPL-MCNC: 0.6 MG/DL (ref 0.1–1)
BUN SERPL-MCNC: 8 MG/DL (ref 6–20)
CALCIUM SERPL-MCNC: 10.1 MG/DL (ref 8.7–10.5)
CHLORIDE SERPL-SCNC: 110 MMOL/L (ref 95–110)
CHOLEST SERPL-MCNC: 168 MG/DL (ref 120–199)
CHOLEST/HDLC SERPL: 3.4 {RATIO} (ref 2–5)
CO2 SERPL-SCNC: 23 MMOL/L (ref 23–29)
CREAT SERPL-MCNC: 0.7 MG/DL (ref 0.5–1.4)
DIFFERENTIAL METHOD: ABNORMAL
EOSINOPHIL # BLD AUTO: 0.2 K/UL (ref 0–0.5)
EOSINOPHIL NFR BLD: 2.5 % (ref 0–8)
ERYTHROCYTE [DISTWIDTH] IN BLOOD BY AUTOMATED COUNT: 11.5 % (ref 11.5–14.5)
EST. GFR  (AFRICAN AMERICAN): >60 ML/MIN/1.73 M^2
EST. GFR  (NON AFRICAN AMERICAN): >60 ML/MIN/1.73 M^2
GLUCOSE SERPL-MCNC: 85 MG/DL (ref 70–110)
HCT VFR BLD AUTO: 45.4 % (ref 37–48.5)
HDLC SERPL-MCNC: 50 MG/DL (ref 40–75)
HDLC SERPL: 29.8 % (ref 20–50)
HGB BLD-MCNC: 14.4 G/DL (ref 12–16)
IMM GRANULOCYTES # BLD AUTO: 0.01 K/UL (ref 0–0.04)
IMM GRANULOCYTES NFR BLD AUTO: 0.2 % (ref 0–0.5)
LDLC SERPL CALC-MCNC: 101.8 MG/DL (ref 63–159)
LYMPHOCYTES # BLD AUTO: 1.9 K/UL (ref 1–4.8)
LYMPHOCYTES NFR BLD: 30.8 % (ref 18–48)
MCH RBC QN AUTO: 30.6 PG (ref 27–31)
MCHC RBC AUTO-ENTMCNC: 31.7 G/DL (ref 32–36)
MCV RBC AUTO: 96 FL (ref 82–98)
MONOCYTES # BLD AUTO: 0.4 K/UL (ref 0.3–1)
MONOCYTES NFR BLD: 7.2 % (ref 4–15)
NEUTROPHILS # BLD AUTO: 3.6 K/UL (ref 1.8–7.7)
NEUTROPHILS NFR BLD: 58.8 % (ref 38–73)
NONHDLC SERPL-MCNC: 118 MG/DL
NRBC BLD-RTO: 0 /100 WBC
PLATELET # BLD AUTO: 247 K/UL (ref 150–450)
PMV BLD AUTO: 10.9 FL (ref 9.2–12.9)
POTASSIUM SERPL-SCNC: 4.3 MMOL/L (ref 3.5–5.1)
PROT SERPL-MCNC: 7.1 G/DL (ref 6–8.4)
RBC # BLD AUTO: 4.71 M/UL (ref 4–5.4)
SODIUM SERPL-SCNC: 140 MMOL/L (ref 136–145)
TRIGL SERPL-MCNC: 81 MG/DL (ref 30–150)
WBC # BLD AUTO: 6.11 K/UL (ref 3.9–12.7)

## 2022-01-24 PROCEDURE — 80061 LIPID PANEL: CPT | Performed by: INTERNAL MEDICINE

## 2022-01-24 PROCEDURE — 85025 COMPLETE CBC W/AUTO DIFF WBC: CPT | Performed by: INTERNAL MEDICINE

## 2022-01-24 PROCEDURE — 36415 COLL VENOUS BLD VENIPUNCTURE: CPT | Mod: PO | Performed by: INTERNAL MEDICINE

## 2022-01-24 PROCEDURE — 80053 COMPREHEN METABOLIC PANEL: CPT | Performed by: INTERNAL MEDICINE

## 2022-01-27 DIAGNOSIS — E78.00 HYPERCHOLESTEREMIA: Primary | ICD-10-CM

## 2022-01-27 RX ORDER — ATORVASTATIN CALCIUM 10 MG/1
80 TABLET, FILM COATED ORAL DAILY
Qty: 90 TABLET | Refills: 3 | Status: SHIPPED | OUTPATIENT
Start: 2022-01-27 | End: 2022-01-27

## 2022-01-27 NOTE — TELEPHONE ENCOUNTER
Your cholesterol can be better, I reviewed your angiogram with the doctors and suggest starting you on a cholesterol lowering medication.     Repeat lipid level and liver test in 6 wks after starting the medication, you may experience mild muscle aches that usually improve with time,. If there are persistent sx please call.

## 2022-01-28 ENCOUNTER — HOSPITAL ENCOUNTER (OUTPATIENT)
Dept: CARDIOLOGY | Facility: HOSPITAL | Age: 37
Discharge: HOME | End: 2022-01-28
Attending: INTERNAL MEDICINE
Payer: MEDICAID

## 2022-01-28 VITALS — BODY MASS INDEX: 25.99 KG/M2 | WEIGHT: 156 LBS | HEIGHT: 65 IN

## 2022-01-28 DIAGNOSIS — I25.42 SPONTANEOUS DISSECTION OF CORONARY ARTERY: ICD-10-CM

## 2022-01-28 DIAGNOSIS — Z95.1 S/P CABG X 2: ICD-10-CM

## 2022-01-28 LAB
ASCENDING AORTA: 2.54 CM
AV INDEX (PROSTH): 0.68
AV MEAN GRADIENT: 3 MMHG
AV PEAK GRADIENT: 5 MMHG
AV VALVE AREA: 2.04 CM2
AV VELOCITY RATIO: 0.65
BSA FOR ECHO PROCEDURE: 1.8 M2
CV ECHO LV RWT: 0.28 CM
CV STRESS BASE HR: 65 BPM
DIASTOLIC BLOOD PRESSURE: 75 MMHG
DOP CALC AO PEAK VEL: 1.17 M/S
DOP CALC AO VTI: 22.36 CM
DOP CALC LVOT AREA: 3 CM2
DOP CALC LVOT DIAMETER: 1.95 CM
DOP CALC LVOT PEAK VEL: 0.76 M/S
DOP CALC LVOT STROKE VOLUME: 45.7 CM3
DOP CALCLVOT PEAK VEL VTI: 15.31 CM
E WAVE DECELERATION TIME: 140.66 MSEC
E/A RATIO: 1.73
E/E' RATIO: 11.18 M/S
ECHO LV POSTERIOR WALL: 0.67 CM (ref 0.6–1.1)
EJECTION FRACTION: 50 %
FRACTIONAL SHORTENING: 28 % (ref 28–44)
INTERVENTRICULAR SEPTUM: 0.69 CM (ref 0.6–1.1)
IVRT: 119.89 MSEC
LA MAJOR: 5.1 CM
LA MINOR: 4.59 CM
LA WIDTH: 3.76 CM
LEFT ATRIUM SIZE: 3.14 CM
LEFT ATRIUM VOLUME INDEX MOD: 19.5 ML/M2
LEFT ATRIUM VOLUME INDEX: 27.2 ML/M2
LEFT ATRIUM VOLUME MOD: 34.73 CM3
LEFT ATRIUM VOLUME: 48.49 CM3
LEFT INTERNAL DIMENSION IN SYSTOLE: 3.4 CM (ref 2.1–4)
LEFT VENTRICLE DIASTOLIC VOLUME INDEX: 58.92 ML/M2
LEFT VENTRICLE DIASTOLIC VOLUME: 104.88 ML
LEFT VENTRICLE MASS INDEX: 57 G/M2
LEFT VENTRICLE SYSTOLIC VOLUME INDEX: 26.6 ML/M2
LEFT VENTRICLE SYSTOLIC VOLUME: 47.39 ML
LEFT VENTRICULAR INTERNAL DIMENSION IN DIASTOLE: 4.75 CM (ref 3.5–6)
LEFT VENTRICULAR MASS: 101.21 G
LV LATERAL E/E' RATIO: 11.88 M/S
LV SEPTAL E/E' RATIO: 10.56 M/S
MV PEAK A VEL: 0.55 M/S
MV PEAK E VEL: 0.95 M/S
MV STENOSIS PRESSURE HALF TIME: 40.79 MS
MV VALVE AREA P 1/2 METHOD: 5.39 CM2
OHS CV CPX 1 MINUTE RECOVERY HEART RATE: 133 BPM
OHS CV CPX 85 PERCENT MAX PREDICTED HEART RATE MALE: 147
OHS CV CPX ESTIMATED METS: 11
OHS CV CPX MAX PREDICTED HEART RATE: 173
OHS CV CPX PATIENT IS FEMALE: 1
OHS CV CPX PATIENT IS MALE: 0
OHS CV CPX PEAK DIASTOLIC BLOOD PRESSURE: 84 MMHG
OHS CV CPX PEAK HEAR RATE: 169 BPM
OHS CV CPX PEAK RATE PRESSURE PRODUCT: NORMAL
OHS CV CPX PEAK SYSTOLIC BLOOD PRESSURE: 198 MMHG
OHS CV CPX PERCENT MAX PREDICTED HEART RATE ACHIEVED: 97
OHS CV CPX RATE PRESSURE PRODUCT PRESENTING: 8255
PISA TR MAX VEL: 1.93 M/S
RA MAJOR: 3.91 CM
RA PRESSURE: 3 MMHG
RA WIDTH: 3.34 CM
RV TISSUE DOPPLER FREE WALL SYSTOLIC VELOCITY 1 (APICAL 4 CHAMBER VIEW): 7.97 CM/S
SINUS: 2.54 CM
STJ: 2.24 CM
STRESS ECHO POST EXERCISE DUR MIN: 6 MINUTES
STRESS ECHO POST EXERCISE DUR SEC: 42 SECONDS
SYSTOLIC BLOOD PRESSURE: 127 MMHG
TDI LATERAL: 0.08 M/S
TDI SEPTAL: 0.09 M/S
TDI: 0.09 M/S
TR MAX PG: 15 MMHG
TRICUSPID ANNULAR PLANE SYSTOLIC EXCURSION: 1.62 CM
TV REST PULMONARY ARTERY PRESSURE: 18 MMHG

## 2022-01-28 PROCEDURE — 93351 STRESS TTE COMPLETE: CPT | Mod: 26,,, | Performed by: INTERNAL MEDICINE

## 2022-01-28 PROCEDURE — 93325 STRESS ECHO (CUPID ONLY): ICD-10-PCS | Mod: 26,,, | Performed by: INTERNAL MEDICINE

## 2022-01-28 PROCEDURE — 93325 DOPPLER ECHO COLOR FLOW MAPG: CPT

## 2022-01-28 PROCEDURE — 93320 STRESS ECHO (CUPID ONLY): ICD-10-PCS | Mod: 26,,, | Performed by: INTERNAL MEDICINE

## 2022-01-28 PROCEDURE — 93320 DOPPLER ECHO COMPLETE: CPT | Mod: 26,,, | Performed by: INTERNAL MEDICINE

## 2022-01-28 PROCEDURE — 25500020 PHARM REV CODE 255: Performed by: INTERNAL MEDICINE

## 2022-01-28 PROCEDURE — 93351 STRESS ECHO (CUPID ONLY): ICD-10-PCS | Mod: 26,,, | Performed by: INTERNAL MEDICINE

## 2022-01-28 PROCEDURE — 93325 DOPPLER ECHO COLOR FLOW MAPG: CPT | Mod: 26,,, | Performed by: INTERNAL MEDICINE

## 2022-01-28 RX ORDER — ATORVASTATIN CALCIUM 80 MG/1
80 TABLET, FILM COATED ORAL DAILY
Qty: 90 TABLET | Refills: 3 | Status: SHIPPED | OUTPATIENT
Start: 2022-01-28 | End: 2023-02-07 | Stop reason: SDUPTHER

## 2022-01-28 RX ADMIN — HUMAN ALBUMIN MICROSPHERES AND PERFLUTREN 0.66 MG: 10; .22 INJECTION, SOLUTION INTRAVENOUS at 11:01

## 2022-01-28 NOTE — PROGRESS NOTES
Procedure explained. 22 g sl started in left forearm for optison use. optison given ivp via sl pre and post ex2d. Tolerated well sl d/irina after. Pressure applied.

## 2022-01-31 ENCOUNTER — TELEPHONE (OUTPATIENT)
Dept: CARDIOLOGY | Facility: CLINIC | Age: 37
End: 2022-01-31
Payer: MEDICAID

## 2022-01-31 NOTE — PROGRESS NOTES
Please let pt. Know that the stress test was normal. There is prior scar in the heart but her heart function is low normal nothing to worry about

## 2022-04-20 ENCOUNTER — TELEPHONE (OUTPATIENT)
Dept: OBSTETRICS AND GYNECOLOGY | Facility: CLINIC | Age: 37
End: 2022-04-20

## 2022-04-20 ENCOUNTER — PATIENT MESSAGE (OUTPATIENT)
Dept: OBSTETRICS AND GYNECOLOGY | Facility: CLINIC | Age: 37
End: 2022-04-20
Payer: MEDICAID

## 2022-04-20 ENCOUNTER — HOSPITAL ENCOUNTER (OUTPATIENT)
Dept: RADIOLOGY | Facility: HOSPITAL | Age: 37
Discharge: HOME | End: 2022-04-20
Attending: OBSTETRICS & GYNECOLOGY
Payer: MEDICAID

## 2022-04-20 ENCOUNTER — OFFICE VISIT (OUTPATIENT)
Dept: OBSTETRICS AND GYNECOLOGY | Facility: CLINIC | Age: 37
End: 2022-04-20
Payer: MEDICAID

## 2022-04-20 ENCOUNTER — TELEPHONE (OUTPATIENT)
Dept: OBSTETRICS AND GYNECOLOGY | Facility: CLINIC | Age: 37
End: 2022-04-20
Payer: MEDICAID

## 2022-04-20 VITALS
DIASTOLIC BLOOD PRESSURE: 82 MMHG | HEIGHT: 65 IN | WEIGHT: 157.44 LBS | BODY MASS INDEX: 26.23 KG/M2 | SYSTOLIC BLOOD PRESSURE: 124 MMHG

## 2022-04-20 DIAGNOSIS — N93.9 ABNORMAL UTERINE BLEEDING (AUB): ICD-10-CM

## 2022-04-20 DIAGNOSIS — N93.9 ABNORMAL UTERINE BLEEDING (AUB): Primary | ICD-10-CM

## 2022-04-20 DIAGNOSIS — T83.32XA INTRAUTERINE CONTRACEPTIVE DEVICE THREADS LOST, INITIAL ENCOUNTER: Primary | ICD-10-CM

## 2022-04-20 LAB
B-HCG UR QL: NEGATIVE
CTP QC/QA: YES

## 2022-04-20 PROCEDURE — 3074F PR MOST RECENT SYSTOLIC BLOOD PRESSURE < 130 MM HG: ICD-10-PCS | Mod: CPTII,,, | Performed by: OBSTETRICS & GYNECOLOGY

## 2022-04-20 PROCEDURE — 3008F PR BODY MASS INDEX (BMI) DOCUMENTED: ICD-10-PCS | Mod: CPTII,,, | Performed by: OBSTETRICS & GYNECOLOGY

## 2022-04-20 PROCEDURE — 1159F MED LIST DOCD IN RCRD: CPT | Mod: CPTII,,, | Performed by: OBSTETRICS & GYNECOLOGY

## 2022-04-20 PROCEDURE — 99213 PR OFFICE/OUTPT VISIT, EST, LEVL III, 20-29 MIN: ICD-10-PCS | Mod: S$PBB,,, | Performed by: OBSTETRICS & GYNECOLOGY

## 2022-04-20 PROCEDURE — 1160F RVW MEDS BY RX/DR IN RCRD: CPT | Mod: CPTII,,, | Performed by: OBSTETRICS & GYNECOLOGY

## 2022-04-20 PROCEDURE — 76830 US PELVIS COMP WITH TRANSVAG NON-OB (XPD): ICD-10-PCS | Mod: 26,,, | Performed by: RADIOLOGY

## 2022-04-20 PROCEDURE — 99213 OFFICE O/P EST LOW 20 MIN: CPT | Mod: PBBFAC,25 | Performed by: OBSTETRICS & GYNECOLOGY

## 2022-04-20 PROCEDURE — 3079F PR MOST RECENT DIASTOLIC BLOOD PRESSURE 80-89 MM HG: ICD-10-PCS | Mod: CPTII,,, | Performed by: OBSTETRICS & GYNECOLOGY

## 2022-04-20 PROCEDURE — 3074F SYST BP LT 130 MM HG: CPT | Mod: CPTII,,, | Performed by: OBSTETRICS & GYNECOLOGY

## 2022-04-20 PROCEDURE — 81025 URINE PREGNANCY TEST: CPT | Mod: PBBFAC | Performed by: OBSTETRICS & GYNECOLOGY

## 2022-04-20 PROCEDURE — 76856 US EXAM PELVIC COMPLETE: CPT | Mod: 26,,, | Performed by: RADIOLOGY

## 2022-04-20 PROCEDURE — 76830 TRANSVAGINAL US NON-OB: CPT | Mod: 26,,, | Performed by: RADIOLOGY

## 2022-04-20 PROCEDURE — 1160F PR REVIEW ALL MEDS BY PRESCRIBER/CLIN PHARMACIST DOCUMENTED: ICD-10-PCS | Mod: CPTII,,, | Performed by: OBSTETRICS & GYNECOLOGY

## 2022-04-20 PROCEDURE — 76830 TRANSVAGINAL US NON-OB: CPT | Mod: TC

## 2022-04-20 PROCEDURE — 3008F BODY MASS INDEX DOCD: CPT | Mod: CPTII,,, | Performed by: OBSTETRICS & GYNECOLOGY

## 2022-04-20 PROCEDURE — 99999 PR PBB SHADOW E&M-EST. PATIENT-LVL III: ICD-10-PCS | Mod: PBBFAC,,, | Performed by: OBSTETRICS & GYNECOLOGY

## 2022-04-20 PROCEDURE — 1159F PR MEDICATION LIST DOCUMENTED IN MEDICAL RECORD: ICD-10-PCS | Mod: CPTII,,, | Performed by: OBSTETRICS & GYNECOLOGY

## 2022-04-20 PROCEDURE — 99999 PR PBB SHADOW E&M-EST. PATIENT-LVL III: CPT | Mod: PBBFAC,,, | Performed by: OBSTETRICS & GYNECOLOGY

## 2022-04-20 PROCEDURE — 3079F DIAST BP 80-89 MM HG: CPT | Mod: CPTII,,, | Performed by: OBSTETRICS & GYNECOLOGY

## 2022-04-20 PROCEDURE — 99213 OFFICE O/P EST LOW 20 MIN: CPT | Mod: S$PBB,,, | Performed by: OBSTETRICS & GYNECOLOGY

## 2022-04-20 PROCEDURE — 76856 US PELVIS COMP WITH TRANSVAG NON-OB (XPD): ICD-10-PCS | Mod: 26,,, | Performed by: RADIOLOGY

## 2022-04-20 RX ORDER — ACETAMINOPHEN AND CODEINE PHOSPHATE 120; 12 MG/5ML; MG/5ML
1 SOLUTION ORAL DAILY
Qty: 30 TABLET | Refills: 12 | Status: SHIPPED | OUTPATIENT
Start: 2022-04-20 | End: 2023-05-12 | Stop reason: ALTCHOICE

## 2022-04-20 RX ORDER — MEDROXYPROGESTERONE ACETATE 10 MG/1
20 TABLET ORAL 3 TIMES DAILY
Qty: 180 TABLET | Refills: 3 | Status: SHIPPED | OUTPATIENT
Start: 2022-04-20 | End: 2022-08-22

## 2022-04-20 NOTE — TELEPHONE ENCOUNTER
----- Message from Tu Hi sent at 4/20/2022  8:29 AM CDT -----  Regarding: Same Day Appt Request  Name of Who is Calling: HALIMA JOE [[id number]]           What is the request in detail: Patient is requesting a call back to schedule a same day appointment.  Patient is experiencing heavy, irregular periods. Please assist.           Can the clinic reply by [facility]: NO           [facility] [phone]

## 2022-04-20 NOTE — TELEPHONE ENCOUNTER
Spoke with patient. US noted IUD not in uterus. She has previously had an IUD expulsion, so would assume this occurred again. Recommended abdominal xray. Will schedule.     Patient needing contraception - Rx for micronor sent. Recommended provera taper as well. Strict bleeding precautions reviewed. Briefly discussed replacement IUD vs surgical mangement. She will consider.     Reviewed CBC- stable.     All questions answered.

## 2022-04-20 NOTE — PROGRESS NOTES
Subjective:       Patient ID: Clayton Duarte is a 36 y.o. female.    Chief Complaint:  Metrorrhagia    History of Present Illness  HPI  35 y/o  presents for evaluation of AUB. She has had Mirena IUD since 2019. Previously had Mirena IUD expulsion with prior IUD. Reports saturating 6 super pads in an hour, severe pelvic cramping and back pain. Bleeding improved over the morning but still persistent. Last H/h in 2022 was 14.4/45.4. Reports fatigue.  Previously described cycles as regular with Mirena IUD, cycles vary from 2-7 days.    GYN & OB History  Patient's last menstrual period was 2022.   Date of Last Pap: 2021    OB History    Para Term  AB Living   3 3 3     3   SAB IAB Ectopic Multiple Live Births           3      # Outcome Date GA Lbr Jose Alberto/2nd Weight Sex Delivery Anes PTL Lv   3 Term 13 39w2d 103:30 / 00:23 3.025 kg (6 lb 10.7 oz) F Vag-Spont EPI N RENEE      Birth Comments: 29    2 Term 09 39w0d 10:00 3.629 kg (8 lb) M Vag-Spont EPI N RENEE   1 Term 06 39w0d 09:00 3.033 kg (6 lb 11 oz) F Vag-Spont EPI N RENEE       Review of Systems  Review of Systems   Constitutional: Negative for chills, diaphoresis, fatigue and fever.   Respiratory: Negative for cough and shortness of breath.    Cardiovascular: Negative for chest pain and palpitations.   Gastrointestinal: Negative for abdominal pain, constipation, diarrhea, nausea and vomiting.   Genitourinary: Positive for dysmenorrhea, menorrhagia, menstrual problem and pelvic pain. Negative for dyspareunia, dysuria, frequency, vaginal bleeding, vaginal discharge and vaginal pain.   Musculoskeletal: Positive for back pain.   Neurological: Negative for headaches.   Psychiatric/Behavioral: Negative for depression. The patient is not nervous/anxious.            Objective:    Physical Exam:   Constitutional: She is oriented to person, place, and time. She appears well-developed and well-nourished. No distress.     HENT:   Head: Normocephalic and atraumatic.    Eyes: EOM are normal.      Pulmonary/Chest: Effort normal.          Genitourinary:    Genitourinary Comments: Normal external female genitalia; vagina rugated, normal, moderate blood in vault; cervix normal, no masses, IUD strings NOT VISIBLE; uterus small mobile nontender; no adnexal masses palpated; rectal deferred               Musculoskeletal: Normal range of motion and moves all extremeties.       Neurological: She is alert and oriented to person, place, and time.    Skin: No rash noted.    Psychiatric: She has a normal mood and affect. Her behavior is normal. Judgment and thought content normal.          Assessment:        1. Abnormal uterine bleeding (AUB)             Plan:      Clayton was seen today for metrorrhagia.    Diagnoses and all orders for this visit:    Abnormal uterine bleeding (AUB)  -     CBC Auto Differential; Future  -     TSH; Future  -     Cancel: US Pelvis Comp with Transvag NON-OB (xpd; Future  -     US Pelvis Comp with Transvag NON-OB (xpd; Future  -     POCT urine pregnancy    Other orders  -     medroxyPROGESTERone (PROVERA) 10 MG tablet; Take 2 tablets (20 mg total) by mouth 3 (three) times daily.    IUD strings not visible  Pelvic US ordered  CBC and TSH  Provera taper for acute bleeding episode  Precautions    Orders Placed This Encounter   Procedures    US Pelvis Comp with Transvag NON-OB (xpd    CBC Auto Differential    TSH    POCT urine pregnancy       Follow up if symptoms worsen or fail to improve.        Addendum  UPT NEG

## 2022-04-22 ENCOUNTER — PATIENT MESSAGE (OUTPATIENT)
Dept: OBSTETRICS AND GYNECOLOGY | Facility: CLINIC | Age: 37
End: 2022-04-22
Payer: MEDICAID

## 2022-04-22 ENCOUNTER — HOSPITAL ENCOUNTER (OUTPATIENT)
Dept: RADIOLOGY | Facility: HOSPITAL | Age: 37
Discharge: HOME | End: 2022-04-22
Attending: OBSTETRICS & GYNECOLOGY
Payer: MEDICAID

## 2022-04-22 DIAGNOSIS — T83.32XA INTRAUTERINE CONTRACEPTIVE DEVICE THREADS LOST, INITIAL ENCOUNTER: ICD-10-CM

## 2022-04-22 PROCEDURE — 74018 RADEX ABDOMEN 1 VIEW: CPT | Mod: TC,FY,PO

## 2022-04-22 PROCEDURE — 74018 RADEX ABDOMEN 1 VIEW: CPT | Mod: 26,,, | Performed by: RADIOLOGY

## 2022-04-22 PROCEDURE — 74018 XR ABDOMEN AP 1 VIEW: ICD-10-PCS | Mod: 26,,, | Performed by: RADIOLOGY

## 2022-05-10 ENCOUNTER — PATIENT MESSAGE (OUTPATIENT)
Dept: OBSTETRICS AND GYNECOLOGY | Facility: CLINIC | Age: 37
End: 2022-05-10
Payer: MEDICAID

## 2022-05-13 ENCOUNTER — LAB VISIT (OUTPATIENT)
Dept: LAB | Facility: HOSPITAL | Age: 37
End: 2022-05-13
Attending: INTERNAL MEDICINE
Payer: MEDICAID

## 2022-05-13 DIAGNOSIS — I25.42 SPONTANEOUS DISSECTION OF CORONARY ARTERY: ICD-10-CM

## 2022-05-13 LAB
ALBUMIN SERPL BCP-MCNC: 3.9 G/DL (ref 3.5–5.2)
ALP SERPL-CCNC: 40 U/L (ref 55–135)
ALT SERPL W/O P-5'-P-CCNC: 14 U/L (ref 10–44)
ANION GAP SERPL CALC-SCNC: 5 MMOL/L (ref 8–16)
AST SERPL-CCNC: 15 U/L (ref 10–40)
BASOPHILS # BLD AUTO: 0.02 K/UL (ref 0–0.2)
BASOPHILS NFR BLD: 0.3 % (ref 0–1.9)
BILIRUB SERPL-MCNC: 0.4 MG/DL (ref 0.1–1)
BUN SERPL-MCNC: 9 MG/DL (ref 6–20)
CALCIUM SERPL-MCNC: 10.3 MG/DL (ref 8.7–10.5)
CHLORIDE SERPL-SCNC: 106 MMOL/L (ref 95–110)
CO2 SERPL-SCNC: 25 MMOL/L (ref 23–29)
CREAT SERPL-MCNC: 0.8 MG/DL (ref 0.5–1.4)
DIFFERENTIAL METHOD: ABNORMAL
EOSINOPHIL # BLD AUTO: 0.1 K/UL (ref 0–0.5)
EOSINOPHIL NFR BLD: 1.7 % (ref 0–8)
ERYTHROCYTE [DISTWIDTH] IN BLOOD BY AUTOMATED COUNT: 11.4 % (ref 11.5–14.5)
EST. GFR  (AFRICAN AMERICAN): >60 ML/MIN/1.73 M^2
EST. GFR  (NON AFRICAN AMERICAN): >60 ML/MIN/1.73 M^2
GLUCOSE SERPL-MCNC: 87 MG/DL (ref 70–110)
HCT VFR BLD AUTO: 41.6 % (ref 37–48.5)
HGB BLD-MCNC: 13.4 G/DL (ref 12–16)
IMM GRANULOCYTES # BLD AUTO: 0.02 K/UL (ref 0–0.04)
IMM GRANULOCYTES NFR BLD AUTO: 0.3 % (ref 0–0.5)
LYMPHOCYTES # BLD AUTO: 1.7 K/UL (ref 1–4.8)
LYMPHOCYTES NFR BLD: 28.4 % (ref 18–48)
MCH RBC QN AUTO: 30.7 PG (ref 27–31)
MCHC RBC AUTO-ENTMCNC: 32.2 G/DL (ref 32–36)
MCV RBC AUTO: 95 FL (ref 82–98)
MONOCYTES # BLD AUTO: 0.4 K/UL (ref 0.3–1)
MONOCYTES NFR BLD: 7.4 % (ref 4–15)
NEUTROPHILS # BLD AUTO: 3.7 K/UL (ref 1.8–7.7)
NEUTROPHILS NFR BLD: 61.9 % (ref 38–73)
NRBC BLD-RTO: 0 /100 WBC
PLATELET # BLD AUTO: 248 K/UL (ref 150–450)
PMV BLD AUTO: 11 FL (ref 9.2–12.9)
POTASSIUM SERPL-SCNC: 3.9 MMOL/L (ref 3.5–5.1)
PROT SERPL-MCNC: 6.9 G/DL (ref 6–8.4)
RBC # BLD AUTO: 4.37 M/UL (ref 4–5.4)
SODIUM SERPL-SCNC: 136 MMOL/L (ref 136–145)
WBC # BLD AUTO: 5.96 K/UL (ref 3.9–12.7)

## 2022-05-13 PROCEDURE — 36415 COLL VENOUS BLD VENIPUNCTURE: CPT | Mod: PO | Performed by: INTERNAL MEDICINE

## 2022-05-13 PROCEDURE — 85025 COMPLETE CBC W/AUTO DIFF WBC: CPT | Performed by: INTERNAL MEDICINE

## 2022-05-13 PROCEDURE — 80053 COMPREHEN METABOLIC PANEL: CPT | Performed by: INTERNAL MEDICINE

## 2022-05-18 ENCOUNTER — TELEPHONE (OUTPATIENT)
Dept: CARDIOLOGY | Facility: CLINIC | Age: 37
End: 2022-05-18
Payer: MEDICAID

## 2022-05-20 ENCOUNTER — OFFICE VISIT (OUTPATIENT)
Dept: CARDIOLOGY | Facility: CLINIC | Age: 37
End: 2022-05-20
Payer: MEDICAID

## 2022-05-20 VITALS
WEIGHT: 158.38 LBS | HEIGHT: 65 IN | DIASTOLIC BLOOD PRESSURE: 77 MMHG | BODY MASS INDEX: 26.39 KG/M2 | HEART RATE: 92 BPM | SYSTOLIC BLOOD PRESSURE: 112 MMHG

## 2022-05-20 DIAGNOSIS — E66.3 OVERWEIGHT (BMI 25.0-29.9): ICD-10-CM

## 2022-05-20 DIAGNOSIS — Z95.1 S/P CABG X 2: ICD-10-CM

## 2022-05-20 DIAGNOSIS — I25.42 SPONTANEOUS DISSECTION OF CORONARY ARTERY: ICD-10-CM

## 2022-05-20 DIAGNOSIS — I25.10 CORONARY ARTERY DISEASE INVOLVING NATIVE CORONARY ARTERY OF NATIVE HEART WITHOUT ANGINA PECTORIS: ICD-10-CM

## 2022-05-20 DIAGNOSIS — E78.00 HYPERCHOLESTEREMIA: Primary | ICD-10-CM

## 2022-05-20 PROCEDURE — 3074F PR MOST RECENT SYSTOLIC BLOOD PRESSURE < 130 MM HG: ICD-10-PCS | Mod: CPTII,S$GLB,, | Performed by: INTERNAL MEDICINE

## 2022-05-20 PROCEDURE — 3078F DIAST BP <80 MM HG: CPT | Mod: CPTII,S$GLB,, | Performed by: INTERNAL MEDICINE

## 2022-05-20 PROCEDURE — 99214 OFFICE O/P EST MOD 30 MIN: CPT | Mod: S$GLB,,, | Performed by: INTERNAL MEDICINE

## 2022-05-20 PROCEDURE — 99214 PR OFFICE/OUTPT VISIT, EST, LEVL IV, 30-39 MIN: ICD-10-PCS | Mod: S$GLB,,, | Performed by: INTERNAL MEDICINE

## 2022-05-20 PROCEDURE — 3078F PR MOST RECENT DIASTOLIC BLOOD PRESSURE < 80 MM HG: ICD-10-PCS | Mod: CPTII,S$GLB,, | Performed by: INTERNAL MEDICINE

## 2022-05-20 PROCEDURE — 1159F PR MEDICATION LIST DOCUMENTED IN MEDICAL RECORD: ICD-10-PCS | Mod: CPTII,S$GLB,, | Performed by: INTERNAL MEDICINE

## 2022-05-20 PROCEDURE — 3008F PR BODY MASS INDEX (BMI) DOCUMENTED: ICD-10-PCS | Mod: CPTII,S$GLB,, | Performed by: INTERNAL MEDICINE

## 2022-05-20 PROCEDURE — 3008F BODY MASS INDEX DOCD: CPT | Mod: CPTII,S$GLB,, | Performed by: INTERNAL MEDICINE

## 2022-05-20 PROCEDURE — 1159F MED LIST DOCD IN RCRD: CPT | Mod: CPTII,S$GLB,, | Performed by: INTERNAL MEDICINE

## 2022-05-20 PROCEDURE — 3074F SYST BP LT 130 MM HG: CPT | Mod: CPTII,S$GLB,, | Performed by: INTERNAL MEDICINE

## 2022-05-20 RX ORDER — FERROUS SULFATE 325(65) MG
325 TABLET ORAL
COMMUNITY
End: 2023-06-26

## 2022-05-20 NOTE — PROGRESS NOTES
Subjective:   Patient ID:  Clayton Duarte is a 36 y.o. female who presents for follow-up of Spontaneous dissection of coronary artery    Clayton Duarte is a 36 y.o. female is a new patient who presents for evaluation of Chest pain, non-cardiac     PREOPERATIVE DIAGNOSES:  1.  Postpartum left main coronary artery dissection.  2.  Proximal LAD dissection.  3.  Proximal circumflex and ramus dissection  4.  Acute cardiogenic shock.  5.  Status post intraaortic balloon pump.     POSTOPERATIVE DIAGNOSES:  1.  Postpartum left main coronary artery dissection.  2.  Proximal LAD dissection.  3.  Proximal circumflex and ramus dissection  4.  Acute cardiogenic shock.  5.  Status post intraaortic balloon pump.     PROCEDURES:  Emergency salvage CABG x2 with LIMA to LAD and saphenous vein graft to OM1   to OM1.     HPI:   Preop clearance for tubal ligation  She has 3 kids, no issues with the pregnancies of 2 now teenage kids, with the last pregnancy after delivery few hours late she developed severe chest pain ST changes. Her diagnostic catheterization revealed what appeared to be a likely spontaneous postpartum coronary dissection and she underwent a 2 vessel CABG  Occasional chest pain when she gets cold.   Walks 3,4 times a week  Non smoker     Cath 2013  Left Main Coronary Artery:              The distal LM has a 70% stenosis. There is MARY ELLEN 2 flow.      - Left Anterior Descending Artery:             The LAD has a 90% stenosis. There is MARY ELLEN 2 flow.      - Ramus:              The ramus has a 90% stenosis. There is MARY ELLEN 2 flow.      - Left Circumflex Artery:              The LCX has a 75% stenosis. There is MARY ELLEN 3 flow. The remaining portion of the vessel has luminal irregularities.      - OM1:              The OM1 is normal. There is MARY ELLEN 3 flow.      - OM2:              The OM2 is normal. There is MARY ELLEN 3 flow. The remaining portion of the vessel is of small caliber.      - OM3:              The OM3 is normal.  "There is MARY ELLEN 3 flow. The remaining portion of the vessel is of small caliber.      - Left Posterior Descending Artery:              The left PDA is normal. There is MARY ELLEN 3 flow. The remaining portion of the vessel is of small caliber.      - Right Coronary Artery:              The RCA is normal. There is MARY ELLEN 3 flow.      - Posterior Lateral Branch:              The PLB is normal. There is MARY ELLEN 3 flow.      - Posterior Descending Artery:              The PDA is normal. There is MARY ELLEN 3 flow.      Echo 2017  CONCLUSIONS     1 - Mildly depressed left ventricular systolic function (EF 45-50%).     2 - Normal left ventricular diastolic function.     3 - Normal right ventricular systolic function .     4 - The estimated PA systolic pressure is 22 mmHg.     5 - Trivial mitral regurgitation.     6 - Trivial tricuspid regurgitation.     Non-specific finding demonstrating failure to augment involving the mid anteroseptum, apical septum, mid inferoseptum, apical inferior wall which may be associated with ischemia; clinical correlation required.     HPI:   No chest pain, Orthopnea, PND of heart failure symptoms.   Denies palpitations or fluttering in the chest  Very active with kids  works    Patient Active Problem List   Diagnosis    Chronic anemia    Coronary artery dissection    Preoperative cardiovascular examination    Iron deficiency anemia    Chest pain, non-cardiac    Umbilical hernia without obstruction and without gangrene    Low TSH level    Chest pain at rest    Palpitations    Microcytic anemia    Vitamin D deficiency    Serum calcium elevated     /77   Pulse 92   Ht 5' 5" (1.651 m)   Wt 71.8 kg (158 lb 6.4 oz)   BMI 26.36 kg/m²   Body mass index is 26.36 kg/m².  CrCl cannot be calculated (Patient's most recent lab result is older than the maximum 7 days allowed.).    Lab Results   Component Value Date     05/13/2022    K 3.9 05/13/2022     05/13/2022    CO2 25 05/13/2022    BUN 9 " 05/13/2022    CREATININE 0.8 05/13/2022    GLU 87 05/13/2022    HGBA1C 5.1 12/31/2020    MG 2.0 09/24/2013    AST 15 05/13/2022    ALT 14 05/13/2022    ALBUMIN 3.9 05/13/2022    PROT 6.9 05/13/2022    BILITOT 0.4 05/13/2022    WBC 5.96 05/13/2022    HGB 13.4 05/13/2022    HCT 41.6 05/13/2022    HCT 45 12/30/2021    MCV 95 05/13/2022     05/13/2022    INR 1.0 02/15/2017    TSH 0.338 (L) 04/20/2022    CHOL 168 01/24/2022    HDL 50 01/24/2022    LDLCALC 101.8 01/24/2022    TRIG 81 01/24/2022       Current Outpatient Medications   Medication Sig    atorvastatin (LIPITOR) 80 MG tablet Take 1 tablet (80 mg total) by mouth once daily.    ferrous sulfate (FEOSOL) 325 mg (65 mg iron) Tab tablet Take 325 mg by mouth daily with breakfast.    medroxyPROGESTERone (PROVERA) 10 MG tablet Take 2 tablets (20 mg total) by mouth 3 (three) times daily. (Patient taking differently: Take 20 mg by mouth 3 (three) times daily. Pt taking 2 pill bid.)    norethindrone (MICRONOR) 0.35 mg tablet Take 1 tablet (0.35 mg total) by mouth once daily.     No current facility-administered medications for this visit.       Review of Systems   Constitutional: Negative for chills, decreased appetite, malaise/fatigue, night sweats, weight gain and weight loss.   Eyes: Negative for blurred vision, double vision, visual disturbance and visual halos.   Cardiovascular: Negative for chest pain, claudication, cyanosis, dyspnea on exertion, irregular heartbeat, leg swelling, near-syncope, orthopnea, palpitations, paroxysmal nocturnal dyspnea and syncope.   Respiratory: Negative for cough, hemoptysis, snoring, sputum production and wheezing.    Endocrine: Negative for cold intolerance, heat intolerance, polydipsia and polyphagia.   Hematologic/Lymphatic: Negative for adenopathy and bleeding problem. Does not bruise/bleed easily.   Skin: Negative for flushing, itching, poor wound healing and rash.   Musculoskeletal: Negative for arthritis, back pain,  falls, gout, joint pain, joint swelling, muscle cramps, muscle weakness, myalgias, neck pain and stiffness.   Gastrointestinal: Negative for bloating, abdominal pain, anorexia, diarrhea, dysphagia, excessive appetite, flatus, hematemesis, jaundice, melena and nausea.   Genitourinary: Negative for hesitancy and incomplete emptying.   Neurological: Negative for aphonia, brief paralysis, difficulty with concentration, disturbances in coordination, excessive daytime sleepiness, dizziness, focal weakness, light-headedness, loss of balance and weakness.   Psychiatric/Behavioral: Negative for altered mental status, depression, hallucinations, hypervigilance, memory loss, substance abuse and suicidal ideas. The patient does not have insomnia and is not nervous/anxious.        Objective:   Physical Exam  Constitutional:       General: She is not in acute distress.     Appearance: She is well-developed. She is not diaphoretic.   HENT:      Head: Normocephalic and atraumatic.      Nose: Nose normal.      Mouth/Throat:      Pharynx: No oropharyngeal exudate.   Eyes:      General: No scleral icterus.        Right eye: No discharge.         Left eye: No discharge.      Conjunctiva/sclera: Conjunctivae normal.      Pupils: Pupils are equal, round, and reactive to light.   Neck:      Thyroid: No thyromegaly.      Vascular: No JVD.      Trachea: No tracheal deviation.   Cardiovascular:      Rate and Rhythm: Normal rate and regular rhythm.      Pulses: Intact distal pulses.      Heart sounds: Normal heart sounds. No murmur heard.    No friction rub. No gallop.   Pulmonary:      Effort: Pulmonary effort is normal. No respiratory distress.      Breath sounds: Normal breath sounds. No stridor. No wheezing or rales.   Chest:      Chest wall: No tenderness.   Abdominal:      General: Bowel sounds are normal. There is no distension.      Palpations: Abdomen is soft. There is no mass.      Tenderness: There is no abdominal tenderness. There  is no guarding or rebound.   Musculoskeletal:         General: No tenderness. Normal range of motion.      Cervical back: Normal range of motion and neck supple.   Lymphadenopathy:      Cervical: No cervical adenopathy.   Skin:     General: Skin is warm.      Coloration: Skin is not pale.      Findings: No erythema or rash.   Neurological:      Mental Status: She is alert and oriented to person, place, and time.      Cranial Nerves: No cranial nerve deficit.      Motor: No abnormal muscle tone.      Coordination: Coordination normal.      Deep Tendon Reflexes: Reflexes are normal and symmetric.   Psychiatric:         Behavior: Behavior normal.         Thought Content: Thought content normal.         Judgment: Judgment normal.         Assessment:     1. Hypercholesteremia    2. S/P CABG x 2    3. Spontaneous dissection of coronary artery    4. Coronary artery disease involving native coronary artery of native heart without angina pectoris    5. Overweight (BMI 25.0-29.9)        Plan:     Clayton was seen today for spontaneous dissection of coronary artery.    Diagnoses and all orders for this visit:    Hypercholesteremia  -     Lipid Panel; Future    S/P CABG x 2    Spontaneous dissection of coronary artery    Coronary artery disease involving native coronary artery of native heart without angina pectoris    Overweight (BMI 25.0-29.9)      Plan is to increase statin. And add red yeast rice.

## 2022-06-14 ENCOUNTER — PATIENT MESSAGE (OUTPATIENT)
Dept: CARDIOLOGY | Facility: CLINIC | Age: 37
End: 2022-06-14
Payer: MEDICAID

## 2022-06-17 ENCOUNTER — LAB VISIT (OUTPATIENT)
Dept: LAB | Facility: HOSPITAL | Age: 37
End: 2022-06-17
Attending: INTERNAL MEDICINE
Payer: MEDICAID

## 2022-06-17 DIAGNOSIS — E78.00 HYPERCHOLESTEREMIA: ICD-10-CM

## 2022-06-17 LAB
CHOLEST SERPL-MCNC: 147 MG/DL (ref 120–199)
CHOLEST/HDLC SERPL: 2.9 {RATIO} (ref 2–5)
HDLC SERPL-MCNC: 51 MG/DL (ref 40–75)
HDLC SERPL: 34.7 % (ref 20–50)
LDLC SERPL CALC-MCNC: 82.8 MG/DL (ref 63–159)
NONHDLC SERPL-MCNC: 96 MG/DL
TRIGL SERPL-MCNC: 66 MG/DL (ref 30–150)

## 2022-06-17 PROCEDURE — 36415 COLL VENOUS BLD VENIPUNCTURE: CPT | Mod: PO | Performed by: INTERNAL MEDICINE

## 2022-06-17 PROCEDURE — 80061 LIPID PANEL: CPT | Performed by: INTERNAL MEDICINE

## 2022-07-12 ENCOUNTER — TELEPHONE (OUTPATIENT)
Dept: CARDIOLOGY | Facility: CLINIC | Age: 37
End: 2022-07-12
Payer: MEDICAID

## 2022-07-15 ENCOUNTER — PATIENT MESSAGE (OUTPATIENT)
Dept: PRIMARY CARE CLINIC | Facility: CLINIC | Age: 37
End: 2022-07-15
Payer: MEDICAID

## 2022-07-15 ENCOUNTER — PATIENT MESSAGE (OUTPATIENT)
Dept: CARDIOLOGY | Facility: CLINIC | Age: 37
End: 2022-07-15
Payer: MEDICAID

## 2022-08-09 ENCOUNTER — OFFICE VISIT (OUTPATIENT)
Dept: URGENT CARE | Facility: CLINIC | Age: 37
End: 2022-08-09
Payer: MEDICAID

## 2022-08-09 VITALS
HEART RATE: 104 BPM | OXYGEN SATURATION: 96 % | TEMPERATURE: 99 F | BODY MASS INDEX: 26.33 KG/M2 | HEIGHT: 65 IN | SYSTOLIC BLOOD PRESSURE: 119 MMHG | DIASTOLIC BLOOD PRESSURE: 76 MMHG | WEIGHT: 158 LBS | RESPIRATION RATE: 18 BRPM

## 2022-08-09 DIAGNOSIS — Z11.52 ENCOUNTER FOR SCREENING LABORATORY TESTING FOR COVID-19 VIRUS: ICD-10-CM

## 2022-08-09 DIAGNOSIS — J02.9 PHARYNGITIS, UNSPECIFIED ETIOLOGY: ICD-10-CM

## 2022-08-09 DIAGNOSIS — H65.03 NON-RECURRENT ACUTE SEROUS OTITIS MEDIA OF BOTH EARS: Primary | ICD-10-CM

## 2022-08-09 DIAGNOSIS — J02.9 SORE THROAT: ICD-10-CM

## 2022-08-09 LAB
CTP QC/QA: YES
CTP QC/QA: YES
MOLECULAR STREP A: NEGATIVE
SARS-COV-2 RDRP RESP QL NAA+PROBE: NEGATIVE

## 2022-08-09 PROCEDURE — U0002: ICD-10-PCS | Mod: QW,S$GLB,,

## 2022-08-09 PROCEDURE — 3008F PR BODY MASS INDEX (BMI) DOCUMENTED: ICD-10-PCS | Mod: CPTII,S$GLB,,

## 2022-08-09 PROCEDURE — 3008F BODY MASS INDEX DOCD: CPT | Mod: CPTII,S$GLB,,

## 2022-08-09 PROCEDURE — 3078F PR MOST RECENT DIASTOLIC BLOOD PRESSURE < 80 MM HG: ICD-10-PCS | Mod: CPTII,S$GLB,,

## 2022-08-09 PROCEDURE — 3078F DIAST BP <80 MM HG: CPT | Mod: CPTII,S$GLB,,

## 2022-08-09 PROCEDURE — 99213 PR OFFICE/OUTPT VISIT, EST, LEVL III, 20-29 MIN: ICD-10-PCS | Mod: S$GLB,,,

## 2022-08-09 PROCEDURE — 1160F RVW MEDS BY RX/DR IN RCRD: CPT | Mod: CPTII,S$GLB,,

## 2022-08-09 PROCEDURE — 87651 STREP A DNA AMP PROBE: CPT | Mod: QW,S$GLB,,

## 2022-08-09 PROCEDURE — 99213 OFFICE O/P EST LOW 20 MIN: CPT | Mod: S$GLB,,,

## 2022-08-09 PROCEDURE — 1159F PR MEDICATION LIST DOCUMENTED IN MEDICAL RECORD: ICD-10-PCS | Mod: CPTII,S$GLB,,

## 2022-08-09 PROCEDURE — 1159F MED LIST DOCD IN RCRD: CPT | Mod: CPTII,S$GLB,,

## 2022-08-09 PROCEDURE — U0002 COVID-19 LAB TEST NON-CDC: HCPCS | Mod: QW,S$GLB,,

## 2022-08-09 PROCEDURE — 3074F SYST BP LT 130 MM HG: CPT | Mod: CPTII,S$GLB,,

## 2022-08-09 PROCEDURE — 3074F PR MOST RECENT SYSTOLIC BLOOD PRESSURE < 130 MM HG: ICD-10-PCS | Mod: CPTII,S$GLB,,

## 2022-08-09 PROCEDURE — 1160F PR REVIEW ALL MEDS BY PRESCRIBER/CLIN PHARMACIST DOCUMENTED: ICD-10-PCS | Mod: CPTII,S$GLB,,

## 2022-08-09 PROCEDURE — 87651 POCT STREP A MOLECULAR: ICD-10-PCS | Mod: QW,S$GLB,,

## 2022-08-09 RX ORDER — AMOXICILLIN AND CLAVULANATE POTASSIUM 875; 125 MG/1; MG/1
1 TABLET, FILM COATED ORAL 2 TIMES DAILY
Qty: 20 TABLET | Refills: 0 | Status: SHIPPED | OUTPATIENT
Start: 2022-08-09 | End: 2022-08-19

## 2022-08-09 RX ORDER — FLUTICASONE PROPIONATE 50 MCG
1 SPRAY, SUSPENSION (ML) NASAL DAILY
Qty: 9.9 ML | Refills: 0 | Status: SHIPPED | OUTPATIENT
Start: 2022-08-09 | End: 2023-02-07 | Stop reason: ALTCHOICE

## 2022-08-09 NOTE — PROGRESS NOTES
"Subjective:       Patient ID: lCayton Durate is a 36 y.o. female.    Vitals:  height is 5' 5" (1.651 m) and weight is 71.7 kg (158 lb). Her oral temperature is 99.2 °F (37.3 °C). Her blood pressure is 119/76 and her pulse is 104. Her respiration is 18 and oxygen saturation is 96%.     Chief Complaint: URI    37 yo female complains of sore throat, ear fullness/ear pain with popping sensation and headache for 2 days. Patient has not taken anything for her symptoms. She states the sore throat is constant and worse in the morning. She denies any known sick contacts. No fever, cough, congestion, runny nose, chest pain, shortness of breath.     URI   This is a new problem. The current episode started yesterday. The problem has been unchanged. There has been no fever. Associated symptoms include ear pain, headaches, a plugged ear sensation and a sore throat. Pertinent negatives include no abdominal pain, chest pain, congestion, coughing, diarrhea, dysuria, joint pain, joint swelling, nausea, neck pain, rash, rhinorrhea, sinus pain, sneezing, swollen glands, vomiting or wheezing. She has tried nothing for the symptoms. The treatment provided no relief.       HENT: Positive for ear pain and sore throat. Negative for congestion and sinus pain.    Neck: Negative for neck pain.   Cardiovascular: Negative for chest pain.   Respiratory: Negative for cough and wheezing.    Gastrointestinal: Negative for abdominal pain, nausea, vomiting and diarrhea.   Genitourinary: Negative for dysuria.   Skin: Negative for rash.   Allergic/Immunologic: Negative for sneezing.   Neurological: Positive for headaches.       Objective:      Physical Exam   Constitutional: She is oriented to person, place, and time. She appears well-developed. She is cooperative.  Non-toxic appearance. She does not appear ill. No distress.   HENT:   Head: Normocephalic and atraumatic.   Ears:   Right Ear: Hearing, external ear and ear canal normal. There is " tenderness. Tympanic membrane is erythematous and bulging. A middle ear effusion is present.   Left Ear: Hearing, external ear and ear canal normal. Tympanic membrane is erythematous and bulging. A middle ear effusion is present.   Nose: Nose normal. No mucosal edema, rhinorrhea or nasal deformity. No epistaxis. Right sinus exhibits no maxillary sinus tenderness and no frontal sinus tenderness. Left sinus exhibits no maxillary sinus tenderness and no frontal sinus tenderness.   Mouth/Throat: Uvula is midline and mucous membranes are normal. Mucous membranes are moist. No trismus in the jaw. Normal dentition. No uvula swelling. Posterior oropharyngeal erythema present. Tonsils are 2+ on the right. Tonsils are 2+ on the left. No tonsillar exudate.   Eyes: Conjunctivae and lids are normal. Right eye exhibits no discharge. Left eye exhibits no discharge. No scleral icterus.   Neck: Trachea normal and phonation normal. Neck supple.   Cardiovascular: Normal rate, regular rhythm, normal heart sounds and normal pulses.   Pulmonary/Chest: Effort normal and breath sounds normal. No respiratory distress.   Abdominal: Normal appearance and bowel sounds are normal. She exhibits no distension and no mass. Soft. flat abdomen There is no abdominal tenderness.   Musculoskeletal: Normal range of motion.         General: No deformity. Normal range of motion.   Neurological: She is alert and oriented to person, place, and time. She exhibits normal muscle tone. Coordination normal.   Skin: Skin is warm, dry, intact, not diaphoretic and not pale.   Psychiatric: Her speech is normal and behavior is normal. Judgment and thought content normal.   Nursing note and vitals reviewed.        Results for orders placed or performed in visit on 08/09/22   POCT COVID-19 Rapid Screening   Result Value Ref Range    POC Rapid COVID Negative Negative     Acceptable Yes    POCT Strep A, Molecular   Result Value Ref Range    Molecular Strep  A, POC Negative Negative     Acceptable Yes        Assessment:       1. Non-recurrent acute serous otitis media of both ears    2. Encounter for screening laboratory testing for COVID-19 virus    3. Sore throat    4. Pharyngitis, unspecified etiology          Plan:       Reviewed diagnosis of an ear infection with patient who verbalized understanding.  We discussed the treatment plan which also included antibiotics and over-the-counter medications to help with allergy symptoms as well.  Patient verbalized understanding agrees with plan of care.  She denied any further questions or concerns at this time.  Patient exits exam room in no acute distress.     Non-recurrent acute serous otitis media of both ears    Encounter for screening laboratory testing for COVID-19 virus  -     POCT COVID-19 Rapid Screening    Sore throat  -     POCT Strep A, Molecular    Pharyngitis, unspecified etiology

## 2022-10-05 ENCOUNTER — TELEPHONE (OUTPATIENT)
Dept: PRIMARY CARE CLINIC | Facility: CLINIC | Age: 37
End: 2022-10-05
Payer: MEDICAID

## 2022-10-05 DIAGNOSIS — D64.9 CHRONIC ANEMIA: Chronic | ICD-10-CM

## 2022-10-05 DIAGNOSIS — D50.9 MICROCYTIC ANEMIA: ICD-10-CM

## 2022-10-05 DIAGNOSIS — R79.89 LOW TSH LEVEL: ICD-10-CM

## 2022-10-05 DIAGNOSIS — Z00.00 ANNUAL PHYSICAL EXAM: Primary | ICD-10-CM

## 2022-10-05 DIAGNOSIS — D50.9 IRON DEFICIENCY ANEMIA, UNSPECIFIED IRON DEFICIENCY ANEMIA TYPE: ICD-10-CM

## 2022-10-05 NOTE — TELEPHONE ENCOUNTER
----- Message from Jamar Green sent at 10/5/2022  9:37 AM CDT -----  Contact: Patient  The pt called and would like to schedule an annual appt    She can be reached at [phone]

## 2022-10-05 NOTE — TELEPHONE ENCOUNTER
----- Message from Jamar Green sent at 10/5/2022  9:37 AM CDT -----  Contact: Patient  The pt called and would like to schedule an annual appt    She can be reached at [phone]

## 2022-11-10 ENCOUNTER — PATIENT MESSAGE (OUTPATIENT)
Dept: OBSTETRICS AND GYNECOLOGY | Facility: CLINIC | Age: 37
End: 2022-11-10
Payer: MEDICAID

## 2022-11-10 ENCOUNTER — PATIENT MESSAGE (OUTPATIENT)
Dept: PRIMARY CARE CLINIC | Facility: CLINIC | Age: 37
End: 2022-11-10
Payer: MEDICAID

## 2022-12-12 ENCOUNTER — LAB VISIT (OUTPATIENT)
Dept: LAB | Facility: HOSPITAL | Age: 37
End: 2022-12-12
Attending: OBSTETRICS & GYNECOLOGY
Payer: MEDICAID

## 2022-12-12 DIAGNOSIS — D50.9 IRON DEFICIENCY ANEMIA, UNSPECIFIED IRON DEFICIENCY ANEMIA TYPE: ICD-10-CM

## 2022-12-12 DIAGNOSIS — Z13.220 ENCOUNTER FOR LIPID SCREENING FOR CARDIOVASCULAR DISEASE: ICD-10-CM

## 2022-12-12 DIAGNOSIS — Z00.00 ANNUAL PHYSICAL EXAM: ICD-10-CM

## 2022-12-12 DIAGNOSIS — D64.9 CHRONIC ANEMIA: Chronic | ICD-10-CM

## 2022-12-12 DIAGNOSIS — R79.89 LOW TSH LEVEL: ICD-10-CM

## 2022-12-12 DIAGNOSIS — D50.9 MICROCYTIC ANEMIA: ICD-10-CM

## 2022-12-12 DIAGNOSIS — Z13.6 ENCOUNTER FOR LIPID SCREENING FOR CARDIOVASCULAR DISEASE: ICD-10-CM

## 2022-12-12 DIAGNOSIS — E55.9 VITAMIN D DEFICIENCY: ICD-10-CM

## 2022-12-12 LAB
25(OH)D3+25(OH)D2 SERPL-MCNC: 21 NG/ML (ref 30–96)
ALBUMIN SERPL BCP-MCNC: 3.6 G/DL (ref 3.5–5.2)
ALP SERPL-CCNC: 44 U/L (ref 55–135)
ALT SERPL W/O P-5'-P-CCNC: 17 U/L (ref 10–44)
ANION GAP SERPL CALC-SCNC: 7 MMOL/L (ref 8–16)
AST SERPL-CCNC: 20 U/L (ref 10–40)
BASOPHILS # BLD AUTO: 0.03 K/UL (ref 0–0.2)
BASOPHILS NFR BLD: 0.6 % (ref 0–1.9)
BILIRUB SERPL-MCNC: 0.3 MG/DL (ref 0.1–1)
BUN SERPL-MCNC: 7 MG/DL (ref 6–20)
CALCIUM SERPL-MCNC: 10 MG/DL (ref 8.7–10.5)
CHLORIDE SERPL-SCNC: 109 MMOL/L (ref 95–110)
CHOLEST SERPL-MCNC: 153 MG/DL (ref 120–199)
CHOLEST/HDLC SERPL: 3.2 {RATIO} (ref 2–5)
CO2 SERPL-SCNC: 25 MMOL/L (ref 23–29)
CREAT SERPL-MCNC: 0.8 MG/DL (ref 0.5–1.4)
DIFFERENTIAL METHOD: ABNORMAL
EOSINOPHIL # BLD AUTO: 0.3 K/UL (ref 0–0.5)
EOSINOPHIL NFR BLD: 5.7 % (ref 0–8)
ERYTHROCYTE [DISTWIDTH] IN BLOOD BY AUTOMATED COUNT: 11.9 % (ref 11.5–14.5)
EST. GFR  (NO RACE VARIABLE): >60 ML/MIN/1.73 M^2
ESTIMATED AVG GLUCOSE: 103 MG/DL (ref 68–131)
FERRITIN SERPL-MCNC: 5 NG/ML (ref 20–300)
GLUCOSE SERPL-MCNC: 89 MG/DL (ref 70–110)
HBA1C MFR BLD: 5.2 % (ref 4–5.6)
HCT VFR BLD AUTO: 36.3 % (ref 37–48.5)
HDLC SERPL-MCNC: 48 MG/DL (ref 40–75)
HDLC SERPL: 31.4 % (ref 20–50)
HGB BLD-MCNC: 11.1 G/DL (ref 12–16)
IMM GRANULOCYTES # BLD AUTO: 0.01 K/UL (ref 0–0.04)
IMM GRANULOCYTES NFR BLD AUTO: 0.2 % (ref 0–0.5)
IRON SERPL-MCNC: 30 UG/DL (ref 30–160)
LDLC SERPL CALC-MCNC: 93.2 MG/DL (ref 63–159)
LYMPHOCYTES # BLD AUTO: 1.6 K/UL (ref 1–4.8)
LYMPHOCYTES NFR BLD: 29.7 % (ref 18–48)
MCH RBC QN AUTO: 28.3 PG (ref 27–31)
MCHC RBC AUTO-ENTMCNC: 30.6 G/DL (ref 32–36)
MCV RBC AUTO: 93 FL (ref 82–98)
MONOCYTES # BLD AUTO: 0.4 K/UL (ref 0.3–1)
MONOCYTES NFR BLD: 7 % (ref 4–15)
NEUTROPHILS # BLD AUTO: 3.1 K/UL (ref 1.8–7.7)
NEUTROPHILS NFR BLD: 56.8 % (ref 38–73)
NONHDLC SERPL-MCNC: 105 MG/DL
NRBC BLD-RTO: 0 /100 WBC
PLATELET # BLD AUTO: 267 K/UL (ref 150–450)
PMV BLD AUTO: 11.3 FL (ref 9.2–12.9)
POTASSIUM SERPL-SCNC: 4.2 MMOL/L (ref 3.5–5.1)
PROT SERPL-MCNC: 6.5 G/DL (ref 6–8.4)
RBC # BLD AUTO: 3.92 M/UL (ref 4–5.4)
SATURATED IRON: 6 % (ref 20–50)
SODIUM SERPL-SCNC: 141 MMOL/L (ref 136–145)
T4 FREE SERPL-MCNC: 1 NG/DL (ref 0.71–1.51)
TOTAL IRON BINDING CAPACITY: 465 UG/DL (ref 250–450)
TRANSFERRIN SERPL-MCNC: 314 MG/DL (ref 200–375)
TRIGL SERPL-MCNC: 59 MG/DL (ref 30–150)
TSH SERPL DL<=0.005 MIU/L-ACNC: 0.33 UIU/ML (ref 0.4–4)
WBC # BLD AUTO: 5.43 K/UL (ref 3.9–12.7)

## 2022-12-12 PROCEDURE — 36415 COLL VENOUS BLD VENIPUNCTURE: CPT | Mod: PO | Performed by: FAMILY MEDICINE

## 2022-12-12 PROCEDURE — 84439 ASSAY OF FREE THYROXINE: CPT | Performed by: FAMILY MEDICINE

## 2022-12-12 PROCEDURE — 82306 VITAMIN D 25 HYDROXY: CPT | Performed by: FAMILY MEDICINE

## 2022-12-12 PROCEDURE — 80061 LIPID PANEL: CPT | Performed by: FAMILY MEDICINE

## 2022-12-12 PROCEDURE — 80053 COMPREHEN METABOLIC PANEL: CPT | Performed by: FAMILY MEDICINE

## 2022-12-12 PROCEDURE — 85025 COMPLETE CBC W/AUTO DIFF WBC: CPT | Performed by: FAMILY MEDICINE

## 2022-12-12 PROCEDURE — 84466 ASSAY OF TRANSFERRIN: CPT | Performed by: FAMILY MEDICINE

## 2022-12-12 PROCEDURE — 83036 HEMOGLOBIN GLYCOSYLATED A1C: CPT | Performed by: FAMILY MEDICINE

## 2022-12-12 PROCEDURE — 84443 ASSAY THYROID STIM HORMONE: CPT | Performed by: FAMILY MEDICINE

## 2022-12-12 PROCEDURE — 82728 ASSAY OF FERRITIN: CPT | Performed by: FAMILY MEDICINE

## 2022-12-20 ENCOUNTER — PATIENT MESSAGE (OUTPATIENT)
Dept: PRIMARY CARE CLINIC | Facility: CLINIC | Age: 37
End: 2022-12-20
Payer: MEDICAID

## 2022-12-20 ENCOUNTER — TELEPHONE (OUTPATIENT)
Dept: PRIMARY CARE CLINIC | Facility: CLINIC | Age: 37
End: 2022-12-20
Payer: MEDICAID

## 2022-12-20 NOTE — TELEPHONE ENCOUNTER
----- Message from Lesley Vanessa MD sent at 12/20/2022  8:31 AM CST -----  Overall your blood work looks good, your vitamin-D level is too low please be sure you are taking D3 2000 IU daily, if you are already on this dose then I recommend increasing to 4000 IU daily.  Your iron levels are also low please be sure to take your iron supplement 3 times a day.  Your thyroid blood test, the TSH is still low however your free T4 is normal.  I do not see that we have an appointment scheduled I will have my staff reach out to help you set up your annual exam.  And we can discuss these in more detail.

## 2022-12-20 NOTE — TELEPHONE ENCOUNTER
Spoke with pt, Pt aware an expresses understanding.    Pt aware to increase Vitamin D, she was taking iron twice a day. She will increase to 3 times daily. Annual appt rescheduled.

## 2023-02-07 ENCOUNTER — PATIENT MESSAGE (OUTPATIENT)
Dept: OBSTETRICS AND GYNECOLOGY | Facility: CLINIC | Age: 38
End: 2023-02-07
Payer: MEDICAID

## 2023-02-07 ENCOUNTER — OFFICE VISIT (OUTPATIENT)
Dept: PRIMARY CARE CLINIC | Facility: CLINIC | Age: 38
End: 2023-02-07
Payer: MEDICAID

## 2023-02-07 VITALS
OXYGEN SATURATION: 99 % | HEIGHT: 65 IN | SYSTOLIC BLOOD PRESSURE: 124 MMHG | TEMPERATURE: 98 F | BODY MASS INDEX: 27.36 KG/M2 | WEIGHT: 164.25 LBS | DIASTOLIC BLOOD PRESSURE: 70 MMHG | RESPIRATION RATE: 18 BRPM | HEART RATE: 97 BPM

## 2023-02-07 DIAGNOSIS — D50.9 IRON DEFICIENCY ANEMIA, UNSPECIFIED IRON DEFICIENCY ANEMIA TYPE: ICD-10-CM

## 2023-02-07 DIAGNOSIS — E55.9 VITAMIN D DEFICIENCY: ICD-10-CM

## 2023-02-07 DIAGNOSIS — I25.42 CORONARY ARTERY DISSECTION: ICD-10-CM

## 2023-02-07 DIAGNOSIS — Z00.00 ANNUAL PHYSICAL EXAM: Primary | ICD-10-CM

## 2023-02-07 DIAGNOSIS — N83.202 LEFT OVARIAN CYST: ICD-10-CM

## 2023-02-07 PROCEDURE — 99395 PR PREVENTIVE VISIT,EST,18-39: ICD-10-PCS | Mod: S$PBB,,, | Performed by: FAMILY MEDICINE

## 2023-02-07 PROCEDURE — 3074F PR MOST RECENT SYSTOLIC BLOOD PRESSURE < 130 MM HG: ICD-10-PCS | Mod: CPTII,,, | Performed by: FAMILY MEDICINE

## 2023-02-07 PROCEDURE — 3008F BODY MASS INDEX DOCD: CPT | Mod: CPTII,,, | Performed by: FAMILY MEDICINE

## 2023-02-07 PROCEDURE — 1160F PR REVIEW ALL MEDS BY PRESCRIBER/CLIN PHARMACIST DOCUMENTED: ICD-10-PCS | Mod: CPTII,,, | Performed by: FAMILY MEDICINE

## 2023-02-07 PROCEDURE — 3078F PR MOST RECENT DIASTOLIC BLOOD PRESSURE < 80 MM HG: ICD-10-PCS | Mod: CPTII,,, | Performed by: FAMILY MEDICINE

## 2023-02-07 PROCEDURE — 3074F SYST BP LT 130 MM HG: CPT | Mod: CPTII,,, | Performed by: FAMILY MEDICINE

## 2023-02-07 PROCEDURE — 99999 PR PBB SHADOW E&M-EST. PATIENT-LVL IV: ICD-10-PCS | Mod: PBBFAC,,, | Performed by: FAMILY MEDICINE

## 2023-02-07 PROCEDURE — 3008F PR BODY MASS INDEX (BMI) DOCUMENTED: ICD-10-PCS | Mod: CPTII,,, | Performed by: FAMILY MEDICINE

## 2023-02-07 PROCEDURE — 99999 PR PBB SHADOW E&M-EST. PATIENT-LVL IV: CPT | Mod: PBBFAC,,, | Performed by: FAMILY MEDICINE

## 2023-02-07 PROCEDURE — 1160F RVW MEDS BY RX/DR IN RCRD: CPT | Mod: CPTII,,, | Performed by: FAMILY MEDICINE

## 2023-02-07 PROCEDURE — 99395 PREV VISIT EST AGE 18-39: CPT | Mod: S$PBB,,, | Performed by: FAMILY MEDICINE

## 2023-02-07 PROCEDURE — 1159F MED LIST DOCD IN RCRD: CPT | Mod: CPTII,,, | Performed by: FAMILY MEDICINE

## 2023-02-07 PROCEDURE — 1159F PR MEDICATION LIST DOCUMENTED IN MEDICAL RECORD: ICD-10-PCS | Mod: CPTII,,, | Performed by: FAMILY MEDICINE

## 2023-02-07 PROCEDURE — 3078F DIAST BP <80 MM HG: CPT | Mod: CPTII,,, | Performed by: FAMILY MEDICINE

## 2023-02-07 PROCEDURE — 99214 OFFICE O/P EST MOD 30 MIN: CPT | Mod: PBBFAC,PN | Performed by: FAMILY MEDICINE

## 2023-02-07 RX ORDER — ASPIRIN 81 MG/1
81 TABLET ORAL
Status: ON HOLD | COMMUNITY
End: 2023-12-01 | Stop reason: HOSPADM

## 2023-02-07 RX ORDER — ATORVASTATIN CALCIUM 80 MG/1
80 TABLET, FILM COATED ORAL DAILY
Qty: 90 TABLET | Refills: 3 | Status: SHIPPED | OUTPATIENT
Start: 2023-02-07 | End: 2023-05-17

## 2023-02-07 NOTE — PROGRESS NOTES
"Subjective:       Patient ID: Clayton Duarte is a 37 y.o. female.    Chief Complaint: Annual Exam    HPI   36 y/o female with Iron def anemia, Vitamin D deficiency, post partum SCAD s/p CABG 9/2013 is here for annual exam.    She has R lower abdominal pain 2 days a week, pain is sharp and fleeting, 7/10, happens randomly, can occur at rest or with activity, not associated with meals or stooling, has woken her up from sleep. Her cycles are regular.  She denies f/n/v/d/constipation/cp/sob/urinary sx. She is walking 3 hours a day.  She is trying to eat healthy. Sleeping ok. Mood good.      SCAD: following with cardiology, lipitor 80 mg daily, asa 81 mg twice weekly  Low TSH/free T4 normal  Vitamin D deficiency: on gummie vitamin 5,000 IU daily  Iron def anemia: on supplement three times daily  Eye exam utd  Dental exam utd  GYN: following with Dr. Madison, ocp,     Labs 12/2022 reviewed     Review of Systems  see HPI  Objective:      /70 (BP Location: Left arm, Patient Position: Sitting, BP Method: Small (Manual))   Pulse 97   Temp 98.2 °F (36.8 °C) (Oral)   Resp 18   Ht 5' 5" (1.651 m)   Wt 74.5 kg (164 lb 3.9 oz)   LMP 02/04/2023   SpO2 99%   BMI 27.33 kg/m²   Physical Exam  Vitals and nursing note reviewed.   Constitutional:       Appearance: She is well-developed.   HENT:      Head: Normocephalic and atraumatic.   Neck:      Thyroid: No thyromegaly.   Cardiovascular:      Rate and Rhythm: Normal rate and regular rhythm.      Heart sounds: Normal heart sounds.   Pulmonary:      Effort: Pulmonary effort is normal. No respiratory distress.      Breath sounds: Normal breath sounds.   Abdominal:      General: Abdomen is flat. Bowel sounds are normal. There is no distension.      Palpations: Abdomen is soft. There is no mass.      Tenderness: There is abdominal tenderness (R sided suprapubic).   Musculoskeletal:      Cervical back: Normal range of motion and neck supple.   Lymphadenopathy:      " Cervical: No cervical adenopathy.   Skin:     General: Skin is warm and dry.   Neurological:      Mental Status: She is alert.       Assessment:       1. Annual physical exam    2. Left ovarian cyst    3. Iron deficiency anemia, unspecified iron deficiency anemia type    4. Coronary artery dissection    5. Vitamin D deficiency        Plan:   Clayton was seen today for annual exam.    Diagnoses and all orders for this visit:    Annual physical exam    Left ovarian cyst  -     US Pelvis Comp with Transvag NON-OB (xpd); Future    Iron deficiency anemia, unspecified iron deficiency anemia type  -     Iron and TIBC; Future  -     Ferritin; Future    Coronary artery dissection  -     atorvastatin (LIPITOR) 80 MG tablet; Take 1 tablet (80 mg total) by mouth once daily.    Vitamin D deficiency  -     Vitamin D; Future

## 2023-02-08 ENCOUNTER — PATIENT MESSAGE (OUTPATIENT)
Dept: PRIMARY CARE CLINIC | Facility: CLINIC | Age: 38
End: 2023-02-08
Payer: MEDICAID

## 2023-03-02 ENCOUNTER — HOSPITAL ENCOUNTER (OUTPATIENT)
Dept: RADIOLOGY | Facility: HOSPITAL | Age: 38
Discharge: HOME | End: 2023-03-02
Attending: FAMILY MEDICINE
Payer: MEDICAID

## 2023-03-02 DIAGNOSIS — N83.202 LEFT OVARIAN CYST: ICD-10-CM

## 2023-03-02 PROCEDURE — 76856 US PELVIS COMP WITH TRANSVAG NON-OB (XPD): ICD-10-PCS | Mod: 26,,, | Performed by: STUDENT IN AN ORGANIZED HEALTH CARE EDUCATION/TRAINING PROGRAM

## 2023-03-02 PROCEDURE — 76830 US PELVIS COMP WITH TRANSVAG NON-OB (XPD): ICD-10-PCS | Mod: 26,,, | Performed by: STUDENT IN AN ORGANIZED HEALTH CARE EDUCATION/TRAINING PROGRAM

## 2023-03-02 PROCEDURE — 76856 US EXAM PELVIC COMPLETE: CPT | Mod: TC

## 2023-03-02 PROCEDURE — 76830 TRANSVAGINAL US NON-OB: CPT | Mod: 26,,, | Performed by: STUDENT IN AN ORGANIZED HEALTH CARE EDUCATION/TRAINING PROGRAM

## 2023-03-02 PROCEDURE — 76856 US EXAM PELVIC COMPLETE: CPT | Mod: 26,,, | Performed by: STUDENT IN AN ORGANIZED HEALTH CARE EDUCATION/TRAINING PROGRAM

## 2023-04-04 ENCOUNTER — PATIENT MESSAGE (OUTPATIENT)
Dept: OBSTETRICS AND GYNECOLOGY | Facility: CLINIC | Age: 38
End: 2023-04-04
Payer: MEDICAID

## 2023-04-04 ENCOUNTER — TELEPHONE (OUTPATIENT)
Dept: OBSTETRICS AND GYNECOLOGY | Facility: CLINIC | Age: 38
End: 2023-04-04
Payer: MEDICAID

## 2023-04-09 ENCOUNTER — PATIENT MESSAGE (OUTPATIENT)
Dept: OBSTETRICS AND GYNECOLOGY | Facility: CLINIC | Age: 38
End: 2023-04-09
Payer: MEDICAID

## 2023-04-10 ENCOUNTER — TELEPHONE (OUTPATIENT)
Dept: OBSTETRICS AND GYNECOLOGY | Facility: CLINIC | Age: 38
End: 2023-04-10
Payer: MEDICAID

## 2023-04-10 DIAGNOSIS — R35.0 FREQUENCY OF URINATION: Primary | ICD-10-CM

## 2023-04-11 ENCOUNTER — LAB VISIT (OUTPATIENT)
Dept: LAB | Facility: HOSPITAL | Age: 38
End: 2023-04-11
Attending: OBSTETRICS & GYNECOLOGY
Payer: MEDICAID

## 2023-04-11 DIAGNOSIS — R35.0 FREQUENCY OF URINATION: ICD-10-CM

## 2023-04-11 LAB
BILIRUB UR QL STRIP: NEGATIVE
CLARITY UR REFRACT.AUTO: CLEAR
COLOR UR AUTO: YELLOW
GLUCOSE UR QL STRIP: NEGATIVE
HGB UR QL STRIP: NEGATIVE
KETONES UR QL STRIP: ABNORMAL
LEUKOCYTE ESTERASE UR QL STRIP: NEGATIVE
NITRITE UR QL STRIP: NEGATIVE
PH UR STRIP: 7 [PH] (ref 5–8)
PROT UR QL STRIP: NEGATIVE
SP GR UR STRIP: 1.02 (ref 1–1.03)
URN SPEC COLLECT METH UR: ABNORMAL

## 2023-04-11 PROCEDURE — 81003 URINALYSIS AUTO W/O SCOPE: CPT | Performed by: OBSTETRICS & GYNECOLOGY

## 2023-04-12 ENCOUNTER — OFFICE VISIT (OUTPATIENT)
Dept: PRIMARY CARE CLINIC | Facility: CLINIC | Age: 38
End: 2023-04-12
Payer: MEDICAID

## 2023-04-12 ENCOUNTER — PATIENT MESSAGE (OUTPATIENT)
Dept: OBSTETRICS AND GYNECOLOGY | Facility: CLINIC | Age: 38
End: 2023-04-12
Payer: MEDICAID

## 2023-04-12 DIAGNOSIS — R10.2 SUPRAPUBIC PRESSURE: Primary | ICD-10-CM

## 2023-04-12 DIAGNOSIS — Z11.3 SCREEN FOR STD (SEXUALLY TRANSMITTED DISEASE): ICD-10-CM

## 2023-04-12 DIAGNOSIS — R10.2 PELVIC CRAMPING: ICD-10-CM

## 2023-04-12 PROCEDURE — 1159F MED LIST DOCD IN RCRD: CPT | Mod: CPTII,95,, | Performed by: FAMILY MEDICINE

## 2023-04-12 PROCEDURE — 99214 PR OFFICE/OUTPT VISIT, EST, LEVL IV, 30-39 MIN: ICD-10-PCS | Mod: 95,,, | Performed by: FAMILY MEDICINE

## 2023-04-12 PROCEDURE — 1159F PR MEDICATION LIST DOCUMENTED IN MEDICAL RECORD: ICD-10-PCS | Mod: CPTII,95,, | Performed by: FAMILY MEDICINE

## 2023-04-12 PROCEDURE — 99214 OFFICE O/P EST MOD 30 MIN: CPT | Mod: 95,,, | Performed by: FAMILY MEDICINE

## 2023-04-12 NOTE — PROGRESS NOTES
Subjective:       Patient ID: Clayton Duarte is a 37 y.o. female.    Chief Complaint: Dizziness, Headache, Fatigue, and Abdominal Pain    Abdominal Pain  This is a recurrent problem. The current episode started in the past 7 days. The onset quality is sudden. The problem occurs 2 to 4 times per day. The most recent episode lasted 1 hours. The problem has been gradually worsening. The pain is located in the suprapubic region. The pain is at a severity of 5/10. The pain is mild. The quality of the pain is cramping. Associated symptoms include frequency, headaches and nausea. Pertinent negatives include no anorexia, arthralgias, belching, constipation, diarrhea, dysuria, fever, flatus, hematochezia, hematuria, melena, myalgias, vomiting or weight loss. Nothing aggravates the pain. The pain is relieved by Nothing. She has tried nothing for the symptoms. The treatment provided no relief. Prior diagnostic workup includes ultrasound. There is no history of abdominal surgery, colon cancer, Crohn's disease, gallstones, GERD, irritable bowel syndrome, pancreatitis, PUD or ulcerative colitis. Patient's medical history does not include kidney stones and UTI.       38 y/o female with Iron def anemia, Vitamin D deficiency, post partum SCAD s/p CABG 9/2013 is here to discuss abdominal pain.    She has been feeling like she has a UTI since 4/4/23 when she started her menstrual cycle, her cycles was light and stopped the next day, she has been having suprapubic cramping sensation, this pain is different then her abdominal pain in Feb,  pain comes and goes 2-3 times an hour and its fleeting, pain 4/10, she denies dysuria/hematuria/vaginal discharge/f, she has mild nausea, she denies v, she has been constipated as she was recently on vacation at [place] last week, she did have a small stool yesterday, she has Miralax at home but has not used it, she denies d/cp, she feels her sob has been baseline since 2013.   She is trying  to stay hydrated, she is eating healthy and regularly. No recent antibiotic use or recent illness.    Tranvaginal US 3/2023 bilateral complex cycstic lesions, UA yesterday was normal    Has plans to set up visit with her GYN      SCAD: following with cardiology, lipitor 80 mg daily, asa 81 mg twice weekly  Low TSH/free T4 normal  Vitamin D deficiency: on gummie vitamin 5,000 IU daily  Iron def anemia: on supplement three times daily  Eye exam utd  Dental exam utd  GYN: following with Dr. Los ocp      Review of Systems   Constitutional:  Negative for fever and weight loss.   Gastrointestinal:  Positive for abdominal pain and nausea. Negative for anorexia, constipation, diarrhea, flatus, hematochezia, melena and vomiting.   Genitourinary:  Positive for frequency. Negative for dysuria and hematuria.   Musculoskeletal:  Negative for arthralgias and myalgias.   Neurological:  Positive for headaches.     Objective:        Physical Exam  Vitals and nursing note reviewed.   Constitutional:       Appearance: She is well-developed.   HENT:      Head: Normocephalic and atraumatic.   Neck:      Thyroid: No thyromegaly.   Cardiovascular:      Rate and Rhythm: Normal rate and regular rhythm.      Heart sounds: Normal heart sounds.   Pulmonary:      Effort: No respiratory distress.      Breath sounds: Normal breath sounds.   Musculoskeletal:      Cervical back: Normal range of motion and neck supple.   Lymphadenopathy:      Cervical: No cervical adenopathy.   Skin:     General: Skin is warm and dry.   Neurological:      Mental Status: She is alert.       Assessment:       1. Suprapubic pressure    2. Pelvic cramping    3. Screen for STD (sexually transmitted disease)        Plan:   Clayton was seen today for dizziness, headache, fatigue and abdominal pain.    Diagnoses and all orders for this visit:    Suprapubic pressure  -     CBC Auto Differential; Future  -     Comprehensive Metabolic Panel; Future  -     Hepatitis Panel,  Acute; Future  -     C. trachomatis/N. gonorrhoeae by AMP DNA; Future  -     HIV 1/2 Ag/Ab (4th Gen); Future  -     RPR; Future  -     CULTURE, URINE    Pelvic cramping  -     CBC Auto Differential; Future  -     Comprehensive Metabolic Panel; Future  -     Hepatitis Panel, Acute; Future  -     C. trachomatis/N. gonorrhoeae by AMP DNA; Future  -     HIV 1/2 Ag/Ab (4th Gen); Future  -     RPR; Future  -     CULTURE, URINE    Screen for STD (sexually transmitted disease)  -     Hepatitis Panel, Acute; Future  -     C. trachomatis/N. gonorrhoeae by AMP DNA; Future  -     HIV 1/2 Ag/Ab (4th Gen); Future  -     RPR; Future    The patient location is: [place]  The chief complaint leading to consultation is: pelvic pain    Visit type: audiovisual    Face to Face time with patient: 20 minutes of total time spent on the encounter, which includes face to face time and non-face to face time preparing to see the patient (eg, review of tests), Obtaining and/or reviewing separately obtained history, Documenting clinical information in the electronic or other health record, Independently interpreting results (not separately reported) and communicating results to the patient/family/caregiver, or Care coordination (not separately reported).         Each patient to whom he or she provides medical services by telemedicine is:  (1) informed of the relationship between the physician and patient and the respective role of any other health care provider with respect to management of the patient; and (2) notified that he or she may decline to receive medical services by telemedicine and may withdraw from such care at any time.    Notes:

## 2023-04-12 NOTE — PATIENT INSTRUCTIONS
Start Miralax 1 cap twice daily until stools are loose then decrease to once daily for a few days then stop

## 2023-04-13 ENCOUNTER — LAB VISIT (OUTPATIENT)
Dept: LAB | Facility: HOSPITAL | Age: 38
End: 2023-04-13
Attending: FAMILY MEDICINE
Payer: MEDICAID

## 2023-04-13 ENCOUNTER — PATIENT MESSAGE (OUTPATIENT)
Dept: OBSTETRICS AND GYNECOLOGY | Facility: CLINIC | Age: 38
End: 2023-04-13
Payer: MEDICAID

## 2023-04-13 ENCOUNTER — TELEPHONE (OUTPATIENT)
Dept: PRIMARY CARE CLINIC | Facility: CLINIC | Age: 38
End: 2023-04-13
Payer: MEDICAID

## 2023-04-13 DIAGNOSIS — R10.2 SUPRAPUBIC PRESSURE: ICD-10-CM

## 2023-04-13 DIAGNOSIS — R10.2 PELVIC CRAMPING: ICD-10-CM

## 2023-04-13 DIAGNOSIS — Z11.3 SCREEN FOR STD (SEXUALLY TRANSMITTED DISEASE): ICD-10-CM

## 2023-04-13 DIAGNOSIS — D50.9 IRON DEFICIENCY ANEMIA, UNSPECIFIED IRON DEFICIENCY ANEMIA TYPE: ICD-10-CM

## 2023-04-13 DIAGNOSIS — E55.9 VITAMIN D DEFICIENCY: ICD-10-CM

## 2023-04-13 LAB
ALBUMIN SERPL BCP-MCNC: 4 G/DL (ref 3.5–5.2)
ALP SERPL-CCNC: 45 U/L (ref 55–135)
ALT SERPL W/O P-5'-P-CCNC: 15 U/L (ref 10–44)
ANION GAP SERPL CALC-SCNC: 7 MMOL/L (ref 8–16)
AST SERPL-CCNC: 15 U/L (ref 10–40)
BASOPHILS # BLD AUTO: 0.03 K/UL (ref 0–0.2)
BASOPHILS NFR BLD: 0.4 % (ref 0–1.9)
BILIRUB SERPL-MCNC: 0.5 MG/DL (ref 0.1–1)
BUN SERPL-MCNC: 8 MG/DL (ref 6–20)
CALCIUM SERPL-MCNC: 10.7 MG/DL (ref 8.7–10.5)
CHLORIDE SERPL-SCNC: 106 MMOL/L (ref 95–110)
CO2 SERPL-SCNC: 22 MMOL/L (ref 23–29)
CREAT SERPL-MCNC: 0.7 MG/DL (ref 0.5–1.4)
DIFFERENTIAL METHOD: ABNORMAL
EOSINOPHIL # BLD AUTO: 0.1 K/UL (ref 0–0.5)
EOSINOPHIL NFR BLD: 1.9 % (ref 0–8)
ERYTHROCYTE [DISTWIDTH] IN BLOOD BY AUTOMATED COUNT: 14.2 % (ref 11.5–14.5)
EST. GFR  (NO RACE VARIABLE): >60 ML/MIN/1.73 M^2
FERRITIN SERPL-MCNC: 5 NG/ML (ref 20–300)
GLUCOSE SERPL-MCNC: 87 MG/DL (ref 70–110)
HCT VFR BLD AUTO: 35.1 % (ref 37–48.5)
HGB BLD-MCNC: 10.6 G/DL (ref 12–16)
IMM GRANULOCYTES # BLD AUTO: 0.01 K/UL (ref 0–0.04)
IMM GRANULOCYTES NFR BLD AUTO: 0.1 % (ref 0–0.5)
LYMPHOCYTES # BLD AUTO: 1.7 K/UL (ref 1–4.8)
LYMPHOCYTES NFR BLD: 24.3 % (ref 18–48)
MCH RBC QN AUTO: 25 PG (ref 27–31)
MCHC RBC AUTO-ENTMCNC: 30.2 G/DL (ref 32–36)
MCV RBC AUTO: 83 FL (ref 82–98)
MONOCYTES # BLD AUTO: 0.5 K/UL (ref 0.3–1)
MONOCYTES NFR BLD: 7.1 % (ref 4–15)
NEUTROPHILS # BLD AUTO: 4.6 K/UL (ref 1.8–7.7)
NEUTROPHILS NFR BLD: 66.2 % (ref 38–73)
NRBC BLD-RTO: 0 /100 WBC
PLATELET # BLD AUTO: 367 K/UL (ref 150–450)
PMV BLD AUTO: 10.5 FL (ref 9.2–12.9)
POTASSIUM SERPL-SCNC: 4.1 MMOL/L (ref 3.5–5.1)
PROT SERPL-MCNC: 7.2 G/DL (ref 6–8.4)
RBC # BLD AUTO: 4.24 M/UL (ref 4–5.4)
SODIUM SERPL-SCNC: 135 MMOL/L (ref 136–145)
WBC # BLD AUTO: 7 K/UL (ref 3.9–12.7)

## 2023-04-13 PROCEDURE — 84466 ASSAY OF TRANSFERRIN: CPT | Performed by: FAMILY MEDICINE

## 2023-04-13 PROCEDURE — 80074 ACUTE HEPATITIS PANEL: CPT | Performed by: FAMILY MEDICINE

## 2023-04-13 PROCEDURE — 80053 COMPREHEN METABOLIC PANEL: CPT | Performed by: FAMILY MEDICINE

## 2023-04-13 PROCEDURE — 86592 SYPHILIS TEST NON-TREP QUAL: CPT | Performed by: FAMILY MEDICINE

## 2023-04-13 PROCEDURE — 85025 COMPLETE CBC W/AUTO DIFF WBC: CPT | Performed by: FAMILY MEDICINE

## 2023-04-13 PROCEDURE — 36415 COLL VENOUS BLD VENIPUNCTURE: CPT | Mod: PO | Performed by: FAMILY MEDICINE

## 2023-04-13 PROCEDURE — 87389 HIV-1 AG W/HIV-1&-2 AB AG IA: CPT | Performed by: FAMILY MEDICINE

## 2023-04-13 PROCEDURE — 82306 VITAMIN D 25 HYDROXY: CPT | Performed by: FAMILY MEDICINE

## 2023-04-13 PROCEDURE — 82728 ASSAY OF FERRITIN: CPT | Performed by: FAMILY MEDICINE

## 2023-04-14 ENCOUNTER — PATIENT MESSAGE (OUTPATIENT)
Dept: PRIMARY CARE CLINIC | Facility: CLINIC | Age: 38
End: 2023-04-14
Payer: MEDICAID

## 2023-04-14 ENCOUNTER — LAB VISIT (OUTPATIENT)
Dept: LAB | Facility: HOSPITAL | Age: 38
End: 2023-04-14
Attending: INTERNAL MEDICINE
Payer: MEDICAID

## 2023-04-14 DIAGNOSIS — Z32.01 POSITIVE PREGNANCY TEST: Primary | ICD-10-CM

## 2023-04-14 DIAGNOSIS — Z32.01 POSITIVE PREGNANCY TEST: ICD-10-CM

## 2023-04-14 LAB
25(OH)D3+25(OH)D2 SERPL-MCNC: 19 NG/ML (ref 30–96)
HAV IGM SERPL QL IA: NORMAL
HBV CORE IGM SERPL QL IA: NORMAL
HBV SURFACE AG SERPL QL IA: NORMAL
HCG INTACT+B SERPL-ACNC: 9911 MIU/ML
HCV AB SERPL QL IA: NORMAL
HIV 1+2 AB+HIV1 P24 AG SERPL QL IA: NORMAL
IRON SERPL-MCNC: 35 UG/DL (ref 30–160)
PROGEST SERPL-MCNC: 25.2 NG/ML
RPR SER QL: NORMAL
SATURATED IRON: 6 % (ref 20–50)
TOTAL IRON BINDING CAPACITY: 554 UG/DL (ref 250–450)
TRANSFERRIN SERPL-MCNC: 374 MG/DL (ref 200–375)

## 2023-04-14 PROCEDURE — 84144 ASSAY OF PROGESTERONE: CPT | Performed by: INTERNAL MEDICINE

## 2023-04-14 PROCEDURE — 36415 COLL VENOUS BLD VENIPUNCTURE: CPT | Mod: PO | Performed by: INTERNAL MEDICINE

## 2023-04-14 PROCEDURE — 84702 CHORIONIC GONADOTROPIN TEST: CPT | Performed by: INTERNAL MEDICINE

## 2023-04-15 ENCOUNTER — HOSPITAL ENCOUNTER (EMERGENCY)
Facility: HOSPITAL | Age: 38
Discharge: HOME | End: 2023-04-15
Attending: EMERGENCY MEDICINE
Payer: MEDICAID

## 2023-04-15 VITALS
HEART RATE: 82 BPM | OXYGEN SATURATION: 99 % | HEIGHT: 65 IN | SYSTOLIC BLOOD PRESSURE: 104 MMHG | BODY MASS INDEX: 25.99 KG/M2 | TEMPERATURE: 99 F | RESPIRATION RATE: 16 BRPM | WEIGHT: 156 LBS | DIASTOLIC BLOOD PRESSURE: 55 MMHG

## 2023-04-15 DIAGNOSIS — O20.0 THREATENED MISCARRIAGE: ICD-10-CM

## 2023-04-15 DIAGNOSIS — O20.9 VAGINAL BLEEDING IN PREGNANCY, FIRST TRIMESTER: Primary | ICD-10-CM

## 2023-04-15 LAB
ABO + RH BLD: NORMAL
ALBUMIN SERPL BCP-MCNC: 4.1 G/DL (ref 3.5–5.2)
ALP SERPL-CCNC: 51 U/L (ref 55–135)
ALT SERPL W/O P-5'-P-CCNC: 13 U/L (ref 10–44)
ANION GAP SERPL CALC-SCNC: 10 MMOL/L (ref 8–16)
AST SERPL-CCNC: 16 U/L (ref 10–40)
B-HCG UR QL: POSITIVE
BASOPHILS # BLD AUTO: 0.03 K/UL (ref 0–0.2)
BASOPHILS NFR BLD: 0.4 % (ref 0–1.9)
BILIRUB SERPL-MCNC: 0.2 MG/DL (ref 0.1–1)
BILIRUB UR QL STRIP: NEGATIVE
BUN SERPL-MCNC: 9 MG/DL (ref 6–20)
CALCIUM SERPL-MCNC: 11 MG/DL (ref 8.7–10.5)
CHLORIDE SERPL-SCNC: 107 MMOL/L (ref 95–110)
CLARITY UR REFRACT.AUTO: CLEAR
CO2 SERPL-SCNC: 21 MMOL/L (ref 23–29)
COLOR UR AUTO: YELLOW
CREAT SERPL-MCNC: 0.8 MG/DL (ref 0.5–1.4)
CTP QC/QA: YES
DIFFERENTIAL METHOD: ABNORMAL
EOSINOPHIL # BLD AUTO: 0.1 K/UL (ref 0–0.5)
EOSINOPHIL NFR BLD: 1.7 % (ref 0–8)
ERYTHROCYTE [DISTWIDTH] IN BLOOD BY AUTOMATED COUNT: 14.1 % (ref 11.5–14.5)
EST. GFR  (NO RACE VARIABLE): >60 ML/MIN/1.73 M^2
GLUCOSE SERPL-MCNC: 109 MG/DL (ref 70–110)
GLUCOSE UR QL STRIP: NEGATIVE
HCG INTACT+B SERPL-ACNC: NORMAL MIU/ML
HCT VFR BLD AUTO: 35.4 % (ref 37–48.5)
HGB BLD-MCNC: 10.5 G/DL (ref 12–16)
HGB UR QL STRIP: ABNORMAL
IMM GRANULOCYTES # BLD AUTO: 0.02 K/UL (ref 0–0.04)
IMM GRANULOCYTES NFR BLD AUTO: 0.3 % (ref 0–0.5)
KETONES UR QL STRIP: ABNORMAL
LEUKOCYTE ESTERASE UR QL STRIP: NEGATIVE
LYMPHOCYTES # BLD AUTO: 1.9 K/UL (ref 1–4.8)
LYMPHOCYTES NFR BLD: 24.9 % (ref 18–48)
MCH RBC QN AUTO: 24.2 PG (ref 27–31)
MCHC RBC AUTO-ENTMCNC: 29.7 G/DL (ref 32–36)
MCV RBC AUTO: 82 FL (ref 82–98)
MICROSCOPIC COMMENT: ABNORMAL
MONOCYTES # BLD AUTO: 0.5 K/UL (ref 0.3–1)
MONOCYTES NFR BLD: 7.1 % (ref 4–15)
NEUTROPHILS # BLD AUTO: 4.9 K/UL (ref 1.8–7.7)
NEUTROPHILS NFR BLD: 65.6 % (ref 38–73)
NITRITE UR QL STRIP: NEGATIVE
NRBC BLD-RTO: 0 /100 WBC
PH UR STRIP: 6 [PH] (ref 5–8)
PLATELET # BLD AUTO: 387 K/UL (ref 150–450)
PMV BLD AUTO: 10.4 FL (ref 9.2–12.9)
POTASSIUM SERPL-SCNC: 4.3 MMOL/L (ref 3.5–5.1)
PROT SERPL-MCNC: 7.5 G/DL (ref 6–8.4)
PROT UR QL STRIP: ABNORMAL
RBC # BLD AUTO: 4.33 M/UL (ref 4–5.4)
RBC #/AREA URNS AUTO: 33 /HPF (ref 0–4)
SODIUM SERPL-SCNC: 138 MMOL/L (ref 136–145)
SP GR UR STRIP: 1.02 (ref 1–1.03)
SQUAMOUS #/AREA URNS AUTO: 1 /HPF
URN SPEC COLLECT METH UR: ABNORMAL
WBC # BLD AUTO: 7.51 K/UL (ref 3.9–12.7)
WBC #/AREA URNS AUTO: 4 /HPF (ref 0–5)

## 2023-04-15 PROCEDURE — 81001 URINALYSIS AUTO W/SCOPE: CPT | Performed by: PHYSICIAN ASSISTANT

## 2023-04-15 PROCEDURE — 81025 URINE PREGNANCY TEST: CPT | Performed by: PHYSICIAN ASSISTANT

## 2023-04-15 PROCEDURE — 80053 COMPREHEN METABOLIC PANEL: CPT | Performed by: PHYSICIAN ASSISTANT

## 2023-04-15 PROCEDURE — 99284 EMERGENCY DEPT VISIT MOD MDM: CPT | Mod: 25

## 2023-04-15 PROCEDURE — 99284 PR EMERGENCY DEPT VISIT,LEVEL IV: ICD-10-PCS | Mod: ,,, | Performed by: PHYSICIAN ASSISTANT

## 2023-04-15 PROCEDURE — 84702 CHORIONIC GONADOTROPIN TEST: CPT | Performed by: PHYSICIAN ASSISTANT

## 2023-04-15 PROCEDURE — 85025 COMPLETE CBC W/AUTO DIFF WBC: CPT | Performed by: PHYSICIAN ASSISTANT

## 2023-04-15 PROCEDURE — 86900 BLOOD TYPING SEROLOGIC ABO: CPT | Performed by: PHYSICIAN ASSISTANT

## 2023-04-15 PROCEDURE — 99284 EMERGENCY DEPT VISIT MOD MDM: CPT | Mod: ,,, | Performed by: PHYSICIAN ASSISTANT

## 2023-04-15 NOTE — ED PROVIDER NOTES
Encounter Date: 4/15/2023       History     Chief Complaint   Patient presents with    Vaginal Bleeding     + upt tues      37 year old female with PMHx of coronary artery dissection who presents to the ED with chief complaint of vaginal bleeding that started thirty minutes ago while having intercourse. Recently found out this week that she is pregnant. Her last normal menstrual cycle was . She had one day of bleeding on  and . Has mild cramping in the lower abdomen. Denies fever, chills, NV, dysuria. No medication prior to arrival. Denies worsening or alleviating factors.     Review of patient's allergies indicates:   Allergen Reactions    Bananas [banana] Itching    Peanut butter flavor Hives     Past Medical History:   Diagnosis Date    Abnormal Pap smear of cervix ,     colposcopy    Anemia     Coronary artery dissection 2013    Postpartum depression     Spinal headache complicating labor and delivery, postpartum condition 2013     Past Surgical History:   Procedure Laterality Date    CARDIAC SURGERY      CORONARY ARTERY BYPASS GRAFT      tanner to lad, svg OM1    WISDOM TOOTH EXTRACTION       Family History   Problem Relation Age of Onset    Hypertension Mother     Diabetes Mother     Stroke Mother     Breast cancer Neg Hx     Ovarian cancer Neg Hx     Colon cancer Neg Hx     Cancer Neg Hx     Heart disease Neg Hx      Social History     Tobacco Use    Smoking status: Never    Smokeless tobacco: Never   Substance Use Topics    Alcohol use: Yes    Drug use: No     Review of Systems   Gastrointestinal:  Positive for abdominal pain.   Genitourinary:  Positive for vaginal bleeding.     Physical Exam     Initial Vitals [04/15/23 1406]   BP Pulse Resp Temp SpO2   131/71 92 16 98.7 °F (37.1 °C) 99 %      MAP       --         Physical Exam    Nursing note and vitals reviewed.  Constitutional: She appears well-developed and well-nourished. She is not diaphoretic. No distress.    HENT:   Head: Normocephalic and atraumatic.   Mouth/Throat: Oropharynx is clear and moist.   Eyes: Conjunctivae and EOM are normal. Pupils are equal, round, and reactive to light.   Neck: Neck supple.   Normal range of motion.  Cardiovascular:  Normal rate.           Pulmonary/Chest: No respiratory distress.   Abdominal: Abdomen is soft. Bowel sounds are normal. There is no abdominal tenderness.   Genitourinary:    Genitourinary Comments: Pelvic exam performed with nursing chaperone present  Scant amount of blood in vaginal vault   No oozing of blood   Cervix closed, no lesions   No CMT or adnexal TTP bilaterally      Musculoskeletal:         General: Normal range of motion.      Cervical back: Normal range of motion and neck supple.     Neurological: She is alert and oriented to person, place, and time. She has normal strength. No cranial nerve deficit or sensory deficit. GCS score is 15. GCS eye subscore is 4. GCS verbal subscore is 5. GCS motor subscore is 6.   Skin: Skin is warm and dry. Capillary refill takes less than 2 seconds.   Psychiatric: She has a normal mood and affect. Her behavior is normal. Judgment and thought content normal.       ED Course   Procedures  Labs Reviewed   CBC W/ AUTO DIFFERENTIAL - Abnormal; Notable for the following components:       Result Value    Hemoglobin 10.5 (*)     Hematocrit 35.4 (*)     MCH 24.2 (*)     MCHC 29.7 (*)     All other components within normal limits    Narrative:     Release to patient->Immediate   URINALYSIS, REFLEX TO URINE CULTURE - Abnormal; Notable for the following components:    Protein, UA Trace (*)     Ketones, UA Trace (*)     Occult Blood UA 3+ (*)     All other components within normal limits    Narrative:     Specimen Source->Urine   URINALYSIS MICROSCOPIC - Abnormal; Notable for the following components:    RBC, UA 33 (*)     All other components within normal limits    Narrative:     Specimen Source->Urine   POCT URINE PREGNANCY - Abnormal;  Notable for the following components:    POC Preg Test, Ur Positive (*)     All other components within normal limits   COMPREHENSIVE METABOLIC PANEL   HCG, QUANTITATIVE   GROUP & RH          Imaging Results    None          Medications - No data to display  Medical Decision Making:   History:   Old Medical Records: I decided to obtain old medical records.  Initial Assessment:   Emergent evaluation of a 37 y.o. female presenting to the emergency department complaining of vaginal bleeding and lower abdominal cramping. Patient is afebrile, hemodynamically stable, and non toxic appearing. Will order labs, US, UA.     Differential Diagnosis:   Differential diagnosis includes but isn't limited to 1st trimester bleeding, subchorionic hemorrhage, pregnancy, spontaneous .  Clinical Tests:   Lab Tests: Ordered and Reviewed  Radiological Study: Ordered and Reviewed  ED Management:  Stable anemia with hemoglobin of 10.5 and hematocrit 35.  UPT positive.  UA without infection.  33 rbc's and 3+ occult blood, which I aches suspect to be contamination.  Ultrasound and remainder of labs pending.  Due to shift change, will sign out to oncoming ED provider who will continue to monitor until final disposition is reached.  Discussed with patient.  All questions answered.             ED Course as of 04/15/23 1531   Sat Apr 15, 2023   1519 WBC: 7.51 [JM]   1519 Hemoglobin(!): 10.5 [JM]   1519 Hematocrit(!): 35.4 [JM]   1519 Platelets: 387 [JM]   1519 Preg Test, Ur(!): Positive [JM]   1519 Occult Blood UA(!): 3+ [JM]   1519 RBC, UA(!): 33  Suspect contamination [JM]      ED Course User Index  [JM] Abigail Carter PA-C                 Clinical Impression:   Final diagnoses:  [O20.9] Vaginal bleeding in pregnancy, first trimester (Primary)               Abigail Carter PA-C  04/15/23 1531

## 2023-04-15 NOTE — DISCHARGE INSTRUCTIONS
Follow up closely with OBGYN. You will need repeat labs and US.   You can take acetaminophen/tylenol 650 mg every 6 hours or 1000 mg every 8 hours for added relief.  Return to the ER for new or worsening symptoms.  Future Appointments   Date Time Provider Department Center   4/21/2023  9:45 AM Mitzy Madison MD [facility]       Imaging Results              US OB <14 Wks TransAbd & TransVag, Single Gestation (XPD) (Final result)  Result time 04/15/23 17:52:54      Final result by Alirio Vázquez MD (04/15/23 17:52:54)                   Impression:      Single intrauterine pregnancy of uncertain viability with estimated gestational age 5 weeks 5 days and an MANJU of 12/11/2023.  Mean sac diameter measures less than 12 mm and no embryo visualized which may be due to the very early stage of pregnancy.  Recommend follow-up ultrasound in 2 weeks.    Left corpus luteum cyst.    Re-demonstrated minimally complex left ovarian cystic lesion, possibly an involving hemorrhagic follicle or cyst.  Continued attention on expected follow-up exams.    Electronically signed by resident: Jose Jones  Date:    04/15/2023  Time:    17:36    Electronically signed by: Alirio Vázquez MD  Date:    04/15/2023  Time:    17:52               Narrative:    EXAMINATION:  US OB <14 WEEKS, TRANSABDOM & TRANSVAG, SINGLE GESTATION (XPD)    CLINICAL HISTORY:  Vag Bleeding;    TECHNIQUE:  Transabdominal sonography of the pelvis was performed, followed by transvaginal sonography to better evaluate the uterus and ovaries.    COMPARISON:  Pelvic ultrasound 03/02/2023.    FINDINGS:  Uterus: Measures 13.3 x 7.9 x 7.0 cm.    Intrauterine gestation(s): Single    Mean gestational sac diameter: 11.3 mm    Yolk sac: Present, measuring 5.2 mm    Crown-rump length (CRL): N/a    Cardiac activity: Not detected.    Subchorionic hemorrhage: None.    Right ovary: Measures 3.3 x 2.1 x 2.5 cm.  Normal appearance.    Left ovary: Measures 3.3 x 3.0 x 2.9 cm.   2.0 x 1.3 x 1.3 cm thick-walled cyst with peripheral hypervascularity consistent with a corpus luteum cyst.  Complex cystic left adnexal structure which may arise from the left ovary measuring 2.2 x 1.3 x 1.5 cm, previously measured up to 1.4 cm.    Miscellaneous: Free fluid in the posterior cul-de-sac.

## 2023-04-15 NOTE — Clinical Note
"Clayton Espana" Gerald was seen and treated in our emergency department on 4/15/2023.  She may return to work on 04/18/2023.       If you have any questions or concerns, please don't hesitate to call.      Lauren Rabago PA-C"

## 2023-04-15 NOTE — PROVIDER PROGRESS NOTES - EMERGENCY DEPT.
Encounter Date: 4/15/2023    ED Physician Progress Notes        Physician Note:   ED Physician Hand-off Note:    ED Course: I assumed care of patient from off-going ED primary team. Briefly, patient is a 37 year old  female with PMHx of coronary artery dissection who presents to the ED with chief complaint of vaginal bleeding that started 30 ago while having intercourse.  She is currently pregnant.  HCG appropriate.  She has baseline normocytic anemia.  Vitals are stable.  Previous provider performed pelvic exam with close os.    At the time of signout, the plan was pending ultrasound.    Medications given in the ED:    Medications - No data to display    ED course:  Patient reassessed.  Her vitals remained stable.  She endorses 1/10 pain and declines any medication at this time. We are awaiting US.     US shows single IUP estimated at 5 wks and 5 days. Sac measures < 12 mm and no embryo which may be due to early stage.     Patient and partner updated. She is not heavily bleeding. VSS. We discussed findings. She is having a threatening miscarriage with new onset vaginal bleeding. We discussed repeat hcg in 2 days and US in 2 weeks. Notify OBGYN and follow up. Return to ER precautions given for signs and symptoms as discussed, and patient voiced her understanding.     Disposition: discharge    Impression:   Vaginal bleeding in pregnancy, first trimester  (primary encounter diagnosis)  Threatened miscarriage        Lauren Rabago PA-C  Emergent Department  [facility]  Spectralink #[id number] or #[phone]

## 2023-04-15 NOTE — ED TRIAGE NOTES
Results Positive pregnancy test received yesterday. The pt was instructed to come to er if she noticed bleeding. Bleeding started 30 minutes ago.

## 2023-04-16 ENCOUNTER — PATIENT MESSAGE (OUTPATIENT)
Dept: OBSTETRICS AND GYNECOLOGY | Facility: CLINIC | Age: 38
End: 2023-04-16
Payer: MEDICAID

## 2023-04-16 DIAGNOSIS — Z32.01 POSITIVE PREGNANCY TEST: Primary | ICD-10-CM

## 2023-04-17 ENCOUNTER — PATIENT MESSAGE (OUTPATIENT)
Dept: PRIMARY CARE CLINIC | Facility: CLINIC | Age: 38
End: 2023-04-17
Payer: MEDICAID

## 2023-04-17 NOTE — TELEPHONE ENCOUNTER
Please review mychart message   Patient would like to know what is a god vitamin D and iron supplement

## 2023-04-18 ENCOUNTER — LAB VISIT (OUTPATIENT)
Dept: LAB | Facility: HOSPITAL | Age: 38
End: 2023-04-18
Attending: OBSTETRICS & GYNECOLOGY
Payer: MEDICAID

## 2023-04-18 DIAGNOSIS — Z32.01 POSITIVE PREGNANCY TEST: ICD-10-CM

## 2023-04-18 LAB — HCG INTACT+B SERPL-ACNC: NORMAL MIU/ML

## 2023-04-18 PROCEDURE — 84702 CHORIONIC GONADOTROPIN TEST: CPT | Performed by: OBSTETRICS & GYNECOLOGY

## 2023-04-18 PROCEDURE — 36415 COLL VENOUS BLD VENIPUNCTURE: CPT | Mod: PO | Performed by: OBSTETRICS & GYNECOLOGY

## 2023-04-21 ENCOUNTER — OFFICE VISIT (OUTPATIENT)
Dept: OBSTETRICS AND GYNECOLOGY | Facility: CLINIC | Age: 38
End: 2023-04-21
Payer: MEDICAID

## 2023-04-21 VITALS — HEIGHT: 65 IN | SYSTOLIC BLOOD PRESSURE: 128 MMHG | DIASTOLIC BLOOD PRESSURE: 74 MMHG | BODY MASS INDEX: 25.96 KG/M2

## 2023-04-21 DIAGNOSIS — Z32.01 POSITIVE PREGNANCY TEST: ICD-10-CM

## 2023-04-21 DIAGNOSIS — Z12.4 SCREENING FOR CERVICAL CANCER: Primary | ICD-10-CM

## 2023-04-21 DIAGNOSIS — O21.9 NAUSEA AND VOMITING DURING PREGNANCY: ICD-10-CM

## 2023-04-21 PROCEDURE — 3074F PR MOST RECENT SYSTOLIC BLOOD PRESSURE < 130 MM HG: ICD-10-PCS | Mod: CPTII,,, | Performed by: OBSTETRICS & GYNECOLOGY

## 2023-04-21 PROCEDURE — 3074F SYST BP LT 130 MM HG: CPT | Mod: CPTII,,, | Performed by: OBSTETRICS & GYNECOLOGY

## 2023-04-21 PROCEDURE — 99999 PR PBB SHADOW E&M-EST. PATIENT-LVL III: ICD-10-PCS | Mod: PBBFAC,,, | Performed by: OBSTETRICS & GYNECOLOGY

## 2023-04-21 PROCEDURE — 99999 PR PBB SHADOW E&M-EST. PATIENT-LVL III: CPT | Mod: PBBFAC,,, | Performed by: OBSTETRICS & GYNECOLOGY

## 2023-04-21 PROCEDURE — 99203 PR OFFICE/OUTPT VISIT, NEW, LEVL III, 30-44 MIN: ICD-10-PCS | Mod: S$PBB,TH,, | Performed by: OBSTETRICS & GYNECOLOGY

## 2023-04-21 PROCEDURE — 87086 URINE CULTURE/COLONY COUNT: CPT | Performed by: OBSTETRICS & GYNECOLOGY

## 2023-04-21 PROCEDURE — 1159F MED LIST DOCD IN RCRD: CPT | Mod: CPTII,,, | Performed by: OBSTETRICS & GYNECOLOGY

## 2023-04-21 PROCEDURE — 88141 CYTOPATH C/V INTERPRET: CPT | Mod: ,,, | Performed by: PATHOLOGY

## 2023-04-21 PROCEDURE — 88175 CYTOPATH C/V AUTO FLUID REDO: CPT | Performed by: PATHOLOGY

## 2023-04-21 PROCEDURE — 1160F RVW MEDS BY RX/DR IN RCRD: CPT | Mod: CPTII,,, | Performed by: OBSTETRICS & GYNECOLOGY

## 2023-04-21 PROCEDURE — 1159F PR MEDICATION LIST DOCUMENTED IN MEDICAL RECORD: ICD-10-PCS | Mod: CPTII,,, | Performed by: OBSTETRICS & GYNECOLOGY

## 2023-04-21 PROCEDURE — 3008F PR BODY MASS INDEX (BMI) DOCUMENTED: ICD-10-PCS | Mod: CPTII,,, | Performed by: OBSTETRICS & GYNECOLOGY

## 2023-04-21 PROCEDURE — 87624 HPV HI-RISK TYP POOLED RSLT: CPT | Performed by: OBSTETRICS & GYNECOLOGY

## 2023-04-21 PROCEDURE — 99203 OFFICE O/P NEW LOW 30 MIN: CPT | Mod: S$PBB,TH,, | Performed by: OBSTETRICS & GYNECOLOGY

## 2023-04-21 PROCEDURE — 3078F PR MOST RECENT DIASTOLIC BLOOD PRESSURE < 80 MM HG: ICD-10-PCS | Mod: CPTII,,, | Performed by: OBSTETRICS & GYNECOLOGY

## 2023-04-21 PROCEDURE — 1160F PR REVIEW ALL MEDS BY PRESCRIBER/CLIN PHARMACIST DOCUMENTED: ICD-10-PCS | Mod: CPTII,,, | Performed by: OBSTETRICS & GYNECOLOGY

## 2023-04-21 PROCEDURE — 99213 OFFICE O/P EST LOW 20 MIN: CPT | Mod: PBBFAC,TH,PN | Performed by: OBSTETRICS & GYNECOLOGY

## 2023-04-21 PROCEDURE — 3008F BODY MASS INDEX DOCD: CPT | Mod: CPTII,,, | Performed by: OBSTETRICS & GYNECOLOGY

## 2023-04-21 PROCEDURE — 3078F DIAST BP <80 MM HG: CPT | Mod: CPTII,,, | Performed by: OBSTETRICS & GYNECOLOGY

## 2023-04-21 PROCEDURE — 88141 PR  CYTOPATH CERV/VAG INTERPRET: ICD-10-PCS | Mod: ,,, | Performed by: PATHOLOGY

## 2023-04-21 RX ORDER — DOXYLAMINE SUCCINATE AND PYRIDOXINE HYDROCHLORIDE, DELAYED RELEASE TABLETS 10 MG/10 MG 10; 10 MG/1; MG/1
2 TABLET, DELAYED RELEASE ORAL NIGHTLY
Qty: 60 TABLET | Refills: 1 | Status: SHIPPED | OUTPATIENT
Start: 2023-04-21 | End: 2023-04-24 | Stop reason: SDUPTHER

## 2023-04-21 NOTE — PROGRESS NOTES
Clayton Duarte is a 37 y.o. , presents today for amenorrhea.    Has not seen any other provider for this possible pregnancy.     C/C: pregnancy    HPI: Patient's last menstrual period was 2023. She had some bleeding for one day on . She had light spotting on . She had some heavier vaginal bleeding after intercourse and went to the ED. US showed GS measuring 12 mm, YS present, no FP. She has continued to have some light spotting. Reports her SOB is at baseline. She reports occasional CP in the past with weather changes, none recently. Reports nausea but no vomiting.     SOCIAL HISTORY: Denies emotional/mental/physical/sexual violence or abuse. Feels safe at home.     PAP HISTORY: 2019 - LSIL, HPV pos 18, colpo not done    Review of patient's allergies indicates:   Allergen Reactions    Bananas [banana] Itching    Peanut butter flavor Hives     Past Medical History:   Diagnosis Date    Abnormal Pap smear of cervix ,     colposcopy    Anemia     Coronary artery dissection 2013    Postpartum depression     Spinal headache complicating labor and delivery, postpartum condition 2013     Past Surgical History:   Procedure Laterality Date    CARDIAC SURGERY      CORONARY ARTERY BYPASS GRAFT      tanner to lad, svg OM1    WISDOM TOOTH EXTRACTION       Past Surgical History:   Procedure Laterality Date    CARDIAC SURGERY      CORONARY ARTERY BYPASS GRAFT      tanner to lad, svg OM1    WISDOM TOOTH EXTRACTION       OB History    Para Term  AB Living   3 3 3     3   SAB IAB Ectopic Multiple Live Births           3      # Outcome Date GA Lbr Jose Alberto/2nd Weight Sex Delivery Anes PTL Lv   3 Term 13 39w2d 103:30 / 00:23 3.025 kg (6 lb 10.7 oz) F Vag-Spont EPI N RENEE      Birth Comments: 28    2 Term 09 39w0d 10:00 3.629 kg (8 lb) M Vag-Spont EPI N RENEE   1 Term 06 39w0d 09:00 3.033 kg (6 lb 11 oz) F Vag-Spont EPI N RENEE     OB History          3     Para   3    Term   3            AB        Living   3         SAB        IAB        Ectopic        Multiple        Live Births   3               Social History     Socioeconomic History    Marital status: Single    Number of children: 3    Years of education: College   Occupational History    Occupation: unemployed     Comment: [employer]   Tobacco Use    Smoking status: Never    Smokeless tobacco: Never   Substance and Sexual Activity    Alcohol use: Yes    Drug use: No    Sexual activity: Yes     Partners: Male     Family History   Problem Relation Age of Onset    Hypertension Mother     Diabetes Mother     Stroke Mother     Breast cancer Neg Hx     Ovarian cancer Neg Hx     Colon cancer Neg Hx     Cancer Neg Hx     Heart disease Neg Hx      Social History     Substance and Sexual Activity   Sexual Activity Yes    Partners: Male       GENETIC SCREENING   Patient's age 35 years or older as of estimated date of delivery? yes  Nural tube defect (meningomyelocele, spina bifida, or anencephaly)? no  Down syndrome? no  Todd-Sachs (Ashkenazi Adventist, Cajun, Citizen of [place]ian)? no  Canavan disease (Ashkenazi Adventist)? no  Familial dysautonomia (Ashkenazi Adventist)? no  Sickle cell disease or trait ()? no  Hemophilia or other blood disorders? no  Cystic fibrosis? no  Muscular dystrophy? no  Palmer's chorea? no  Thalassemia (Italian, Greek, Mediterranean, or  background) MCV less than 80? no  Congenital heart defect? no  Mental retardation/autism? no   If Yes, was person tested for Fragile X? no  Other inherited genetic or chromosomal disorder? no  Maternal metabolic disorder (e.g. type 1 diabetes, PKU)? no  Patient or baby's father had a child with birth defects not listed above? no  Recurrent pregnancy loss or a stillbirth: no  Medications (including supplements, vitamins, herbs or OTC drugs)/illicit/recreational drugs/alcohol since last menstrual period? no  List any other genetic risks:  "no  Comments/counseling: no    INFECTION HISTORY  Live with someone with TB or exposed to TB: no  Patient or partner has history of genital herpes: no  Rash or viral illness since last menstrual period: no  Patient or partner has hepatitis B or C: no  History of STD, gonorrhea, chlamydia, HPV, HIV, syphilis (list all that apply): no  List other infections: none  Additional comments: none    Lesley Vanessa MD     ROS:   Constitutional/Gen: Denies fevers, chills, malaise, or weight loss. Pos fatigue   Psych: Denies depression, anxiety  Eyes: Denies changes in vision or scotomata  Ears, nose, mouth, throat: Denies sinus tenderness, swelling, or dentition problems  CV/vasc: Denies heart palpitations or edema  Resp: Denies SOB or dyspnea  Breasts: Denies mass, nipple discharge, or trauma. Pos breast tenderness.  GI: Denies constipation, diarrhea, or vomiting. Pos nausea.  : Denies vaginal discharge, dysuria or pelvic pain. Pos urinary frequency  MS: Denies weakness, soreness, or changes in ROM    OBJECTIVE:  /74   Ht 5' 5" (1.651 m)   LMP 03/06/2023   BMI 25.96 kg/m²   Constitutional/Gen: NAD, appears stated age, well groomed  Neck: supple, no masses or enlargement  Head: normocephalic  Skin: warm and dry w/o rash  Lung: normal resp effort, CTAB  Heart: normal HR, RRR   Back: negative CVAT  Breasts: bilaterally--no masses, tenderness, skin changes, or nipple discharge noted  Abdomen: soft, nontender, no masses, and bowel sounds normal, no enlargement  External genitalia: no lesions or discharge, normal hair distribution  Urethral meatus: normal size and location, no lesions or prolapse  Vagina: normal appearance, no lesions, no discharge, no evidence cystocele or rectocele.  Cervix: normal appearance, no discharge, no lesions, negative CMT  Uterus: nontender, mobile, approx 6 week size, contour, and position.  Adnexa: no masses or tenderness  Anus/Perineum: normal appearance, with no lesions or discharge. " Internal exam deferred.  Extremities: FROM, with no edema or tenderness.  Neurologic: A&O x 4, non-focal, cranial nerves 2-12 grossly intact  Psych: affect appropriate and without signs of mood, thought or memory difficulty appreciated    UPT pos in office    ASSESSMENT:  37 y.o. female  with pos pregnancy test. Repeat dating scan scheduled (11 days after US done in ED on 4/15).   Body mass index is 25.96 kg/m².  Patient Active Problem List   Diagnosis    Chronic anemia    History of CAD (coronary artery disease)    Preoperative cardiovascular examination    Iron deficiency anemia    Chest pain, non-cardiac    Umbilical hernia without obstruction and without gangrene    Low TSH level    Chest pain at rest    Palpitations    Microcytic anemia    Vitamin D deficiency    Serum calcium elevated       PLAN:  1. Amenorrhea  -- + UPT in office, Patient's last menstrual period was 2023. --> Estimated Date of Delivery: None noted.  -- Dating US scheduled  -- Anatomical US 19-20 weeks  -- Routine serum and urine prenatal labs today    2. Physical exam today  -- Discussed ASCCP pap guidelines. Pap/HPV collected.     3. BMI   -- Discussed IOM recommended weight gain of:   Underweight Less than 18.5 28-40    Normal Weight 18.5-24.9  25-35    Overweight 25-29.9  15-25    Obese   30 and greater  11-20   -- Discussed criteria for delivery at [facility] r/t excessive pre-preg weight or excessive weight gain:   Pre-pregnancy BMI over 40 or excess pregnancy weight gain defined as:   Pre-preg BMI < 18.5; Excess weight gain = > 60 pound   Pre-preg BMI 18.5-24.9;  Excess weight gain = > 53 pounds   Pre-preg BMI 25-29.9;  Excess weight gain = > 38 pounds   Pre-preg BMI > 30;  Excess weight gain = > 30 pounds    4. Discussed nausea and vomiting in pregnancy  -- Education regarding lifestyle and dietary modifications  -- Reviewed use of B6/Unisom prn. Pt will notify us if no relief/worsening symptoms, will consider alternative  therapies prn    5. Pregnancy education and couseling; handouts and booklet provided  -- Oriented to practice and anticipated prenatal course of care and how to contact us  -- Precautions/warning signs reviewed  -- Common complaints of pregnancy  -- Routine prenatal labs including HIV  -- Ultrasounds  -- Nutrition, prepregnant BMI, and recommended weight gain  -- Toxoplasmosis precautions (Cats/Raw Meat)  -- Sexual activity and exercise  -- Environmental/Work hazards  -- Travel  -- Tobacco (Ask, Advise, Assess, Assist, and Arrange), as well as alcohol and drug use  -- Use of any medications (Including supplements, Vitamins, Herbs, or OTC Drugs)  -- Domestic violence screen neg    6. Reviewed genetic testing options. Reviewed available first trimester and/or second  trimester screening options. Reviewed risk of false positive/negative results and recommendation of referral to MFM in event of a positive result, for NIPT, US, and/or amniocentesis. Reviewed the possible estimated 1 in 300/500 risk of miscarriage with amniocentesis, even with a healthy fetus. Will address after dating confirmed.     7. AMA  -- Baby ASA 12 weeks  -- Growth scan 32 weeks.     8. Hx coronary artery dissection postpartum  -- Follows with Cardiology. MFM consult once pregnancy confirmed.     RTC x 4 weeks, call or present sooner prn.     Updated Medication List:  Current Outpatient Medications   Medication Sig Dispense Refill    aspirin (ECOTRIN) 81 MG EC tablet Take 81 mg by mouth twice a week.      atorvastatin (LIPITOR) 80 MG tablet Take 1 tablet (80 mg total) by mouth once daily. 90 tablet 3    doxylamine-pyridoxine, vit B6, (DICLEGIS) 10-10 mg TbEC Take 2 tablets by mouth every evening. If no improvement in nausea, can take 1 pill in morning and 2 at bedtime. 60 tablet 1    ferrous sulfate (FEOSOL) 325 mg (65 mg iron) Tab tablet Take 325 mg by mouth daily with breakfast.      norethindrone (MICRONOR) 0.35 mg tablet Take 1 tablet (0.35 mg  total) by mouth once daily. 30 tablet 12    PNV,calcium 72-iron-folic acid (PRENATAL VITAMIN PLUS LOW IRON) 27 mg iron- 1 mg Tab Take 1 tablet (1 each total) by mouth once daily. 30 tablet 11     No current facility-administered medications for this visit.         Mitzy Madison MD  4/23/2023 9:44 AM

## 2023-04-23 LAB
BACTERIA UR CULT: NORMAL
BACTERIA UR CULT: NORMAL

## 2023-04-24 ENCOUNTER — CLINICAL SUPPORT (OUTPATIENT)
Dept: OBSTETRICS AND GYNECOLOGY | Facility: CLINIC | Age: 38
End: 2023-04-24
Payer: MEDICAID

## 2023-04-24 ENCOUNTER — PATIENT MESSAGE (OUTPATIENT)
Dept: OBSTETRICS AND GYNECOLOGY | Facility: CLINIC | Age: 38
End: 2023-04-24
Payer: MEDICAID

## 2023-04-24 DIAGNOSIS — N91.2 AMENORRHEA: Primary | ICD-10-CM

## 2023-04-24 DIAGNOSIS — O21.9 NAUSEA AND VOMITING DURING PREGNANCY: ICD-10-CM

## 2023-04-24 RX ORDER — DOXYLAMINE SUCCINATE AND PYRIDOXINE HYDROCHLORIDE, DELAYED RELEASE TABLETS 10 MG/10 MG 10; 10 MG/1; MG/1
2 TABLET, DELAYED RELEASE ORAL NIGHTLY
Qty: 60 TABLET | Refills: 1 | Status: SHIPPED | OUTPATIENT
Start: 2023-04-24 | End: 2023-05-30

## 2023-04-25 ENCOUNTER — TELEPHONE (OUTPATIENT)
Dept: PHARMACY | Facility: CLINIC | Age: 38
End: 2023-04-25
Payer: MEDICAID

## 2023-04-27 ENCOUNTER — HOSPITAL ENCOUNTER (OUTPATIENT)
Dept: PERINATAL CARE | Facility: OTHER | Age: 38
Discharge: HOME | End: 2023-04-27
Attending: OBSTETRICS & GYNECOLOGY
Payer: MEDICAID

## 2023-04-27 DIAGNOSIS — Z86.79 HISTORY OF CAD (CORONARY ARTERY DISEASE): Primary | ICD-10-CM

## 2023-04-27 DIAGNOSIS — O09.91 SUPERVISION OF HIGH RISK PREGNANCY IN FIRST TRIMESTER: ICD-10-CM

## 2023-04-27 DIAGNOSIS — Z32.01 POSITIVE PREGNANCY TEST: ICD-10-CM

## 2023-04-27 PROCEDURE — 76801 OB US < 14 WKS SINGLE FETUS: CPT

## 2023-04-27 PROCEDURE — 76801 OB US < 14 WKS SINGLE FETUS: CPT | Mod: 26,,, | Performed by: OBSTETRICS & GYNECOLOGY

## 2023-04-27 PROCEDURE — 76801 US OB/GYN PROCEDURE (VIEWPOINT): ICD-10-PCS | Mod: 26,,, | Performed by: OBSTETRICS & GYNECOLOGY

## 2023-04-28 LAB
FINAL PATHOLOGIC DIAGNOSIS: ABNORMAL
Lab: ABNORMAL

## 2023-05-02 ENCOUNTER — HOSPITAL ENCOUNTER (OUTPATIENT)
Dept: CARDIOLOGY | Facility: HOSPITAL | Age: 38
Discharge: HOME | End: 2023-05-02
Attending: OBSTETRICS & GYNECOLOGY
Payer: MEDICAID

## 2023-05-02 VITALS — HEIGHT: 65 IN | BODY MASS INDEX: 25.99 KG/M2 | WEIGHT: 156 LBS

## 2023-05-02 DIAGNOSIS — Z86.79 HISTORY OF CAD (CORONARY ARTERY DISEASE): ICD-10-CM

## 2023-05-02 DIAGNOSIS — O09.91 SUPERVISION OF HIGH RISK PREGNANCY IN FIRST TRIMESTER: ICD-10-CM

## 2023-05-02 LAB
AORTIC ROOT ANNULUS: 2.55 CM
AORTIC VALVE CUSP SEPERATION: 2.07 CM
ASCENDING AORTA: 2.53 CM
AV INDEX (PROSTH): 0.61
AV MEAN GRADIENT: 4 MMHG
AV PEAK GRADIENT: 5 MMHG
AV VALVE AREA: 1.88 CM2
AV VELOCITY RATIO: 0.61
BSA FOR ECHO PROCEDURE: 1.8 M2
CV ECHO LV RWT: 0.28 CM
DOP CALC AO PEAK VEL: 1.14 M/S
DOP CALC AO VTI: 21.6 CM
DOP CALC LVOT AREA: 3.1 CM2
DOP CALC LVOT DIAMETER: 1.98 CM
DOP CALC LVOT PEAK VEL: 0.69 M/S
DOP CALC LVOT STROKE VOLUME: 40.62 CM3
DOP CALCLVOT PEAK VEL VTI: 13.2 CM
E WAVE DECELERATION TIME: 221.71 MSEC
E/A RATIO: 1.05
E/E' RATIO: 7.8 M/S
ECHO LV POSTERIOR WALL: 0.71 CM (ref 0.6–1.1)
EJECTION FRACTION: 50 %
FRACTIONAL SHORTENING: 28 % (ref 28–44)
HPV HR 12 DNA SPEC QL NAA+PROBE: POSITIVE
HPV16 AG SPEC QL: NEGATIVE
HPV18 DNA SPEC QL NAA+PROBE: POSITIVE
INTERVENTRICULAR SEPTUM: 0.74 CM (ref 0.6–1.1)
IVRT: 110.73 MSEC
LA MAJOR: 4.18 CM
LA MINOR: 4.35 CM
LEFT ATRIUM SIZE: 2.97 CM
LEFT INTERNAL DIMENSION IN SYSTOLE: 3.57 CM (ref 2.1–4)
LEFT VENTRICLE DIASTOLIC VOLUME INDEX: 66.19 ML/M2
LEFT VENTRICLE DIASTOLIC VOLUME: 117.82 ML
LEFT VENTRICLE MASS INDEX: 67 G/M2
LEFT VENTRICLE SYSTOLIC VOLUME INDEX: 29.9 ML/M2
LEFT VENTRICLE SYSTOLIC VOLUME: 53.18 ML
LEFT VENTRICULAR INTERNAL DIMENSION IN DIASTOLE: 4.99 CM (ref 3.5–6)
LEFT VENTRICULAR MASS: 119.44 G
LV LATERAL E/E' RATIO: 6.5 M/S
LV SEPTAL E/E' RATIO: 9.75 M/S
LVOT MG: 1.17 MMHG
LVOT MV: 0.51 CM/S
MV PEAK A VEL: 0.74 M/S
MV PEAK E VEL: 0.78 M/S
PISA TR MAX VEL: 1.52 M/S
PV PEAK VELOCITY: 0.8 CM/S
RA MAJOR: 3.66 CM
RA WIDTH: 2.6 CM
RIGHT VENTRICULAR END-DIASTOLIC DIMENSION: 2.24 CM
SINUS: 2.4 CM
STJ: 2.15 CM
TDI LATERAL: 0.12 M/S
TDI SEPTAL: 0.08 M/S
TDI: 0.1 M/S
TR MAX PG: 9 MMHG
TRICUSPID ANNULAR PLANE SYSTOLIC EXCURSION: 1.8 CM
TV PEAK GRADIENT: 2.82 MMHG

## 2023-05-02 PROCEDURE — 93306 ECHO (CUPID ONLY): ICD-10-PCS | Mod: 26,,, | Performed by: INTERNAL MEDICINE

## 2023-05-02 PROCEDURE — 93010 EKG 12-LEAD: ICD-10-PCS | Mod: ,,, | Performed by: INTERNAL MEDICINE

## 2023-05-02 PROCEDURE — 93306 TTE W/DOPPLER COMPLETE: CPT

## 2023-05-02 PROCEDURE — 93306 TTE W/DOPPLER COMPLETE: CPT | Mod: 26,,, | Performed by: INTERNAL MEDICINE

## 2023-05-02 PROCEDURE — 93010 ELECTROCARDIOGRAM REPORT: CPT | Mod: ,,, | Performed by: INTERNAL MEDICINE

## 2023-05-02 PROCEDURE — 93005 ELECTROCARDIOGRAM TRACING: CPT

## 2023-05-05 ENCOUNTER — PATIENT MESSAGE (OUTPATIENT)
Dept: MATERNAL FETAL MEDICINE | Facility: CLINIC | Age: 38
End: 2023-05-05
Payer: MEDICAID

## 2023-05-05 PROBLEM — I51.9: Status: ACTIVE | Noted: 2023-05-05

## 2023-05-05 PROBLEM — O99.419: Status: ACTIVE | Noted: 2023-05-05

## 2023-05-05 PROBLEM — O09.529 AMA (ADVANCED MATERNAL AGE) MULTIGRAVIDA 35+: Status: ACTIVE | Noted: 2023-05-05

## 2023-05-05 NOTE — ASSESSMENT & PLAN NOTE
Advanced Maternal Age  The patient was counseled regarding advanced maternal age. The risk of fetal aneuploidy increases with age. We reviewed these risks at length, and we discussed the screening options available for risk assessment for fetal aneuploidy during pregnancy.  We briefly discussed diagnostic options as well.     Patients of advanced age are also at increased risks of pregnancy complications, including miscarriage, preeclampsia, abnormalities with placentation, and  delivery.  These risks increase with increasing maternal age and comorbidities.      Recommendations:   Ms. Duarte would like to proceed with cfDNA, to be ordered by primary OB.          Consider low dose aspirin at 12-16 weeks for preeclampsia risk reduction   Targeted anatomical ultrasound at 18-20 weeks.     Consider delivery at 39-40 weeks, but no later than 40 weeks 6 days.   Fetal growth ultrasound at 32-34 weeks

## 2023-05-05 NOTE — ASSESSMENT & PLAN NOTE
On 2013 (PPD 16 s/p ), patient underwent an emergency salvage CABG x2 for a post partum left main coronary artery dissection, proximal LAD dissection, proximal circumflex and ramus dissection with acute cardiogenic shock.  She presented with chest pain after an uneventful pregnancy and delivery. Her preop EF was 25% on JESE with anteroseptal and apical akinesia. Post op, EF had recovered to 40% with LV function globally improved and aortic balloon pump placed. Most recent echo 2023, demonstrating EF 50% with low normal systolic function and segmental LV wall motion abnormalities.     Last cardiology visit was 2022, although patient is scheduled to see Dr. Narvaez Friday. Previously on Lipitor/Atorvastatin. This was self discontinued approximately 4 weeks ago after positive UPT. We discussed that although statins were traditionally thought to be teratogenic, there is evidence that in certain patient populations benefits may outweigh small potential risks. These studies were conducted with pravastatin. We will reach out to Dr. Narvaez to gather her input on restarting a statin for Ms. Duarte.     Patient reports that she is able to accomplish her activities of daily living without significant dyspnea. She does not exercise regularly, but does shop and walk at the mall. She is able to walk up a flight of stairs without significant dyspnea (although she states some shortness of breath accompanies any activity). She completed a stress test 2022 which was negative for myocardial ischemia and demonstrated some segments of hypokinesia at peak stress. However, the test was terminated due to leg pain.     We discussed Ms. Duarte' CARPREG score (>4)  demonstrating a 41% risk of cardiac event in pregnancy. We discussed that this may include arrhthymias, worsening of heart function, myocardial infarction, etc. We also discussed her history of SCAD and limited data on recurrence risk in pregnancy. Overall, studies  have demonstrated that recurrence risk of SCAD is not increased by pregnancy. We discussed that Ms. Duarte' overall functional status and prolonged length of time without a cardiac event are encouraging.     The options for pregnancy continuation vs. termination were discussed non-directively. The legal limitations and availability of termination in the state [facility] were discussed. We discussed possible out-of-state options, as well. She is aware that this is her choice, and that we will support her in any decision she elects. At this time, Ms. Duarte plans to proceed with continuation of pregnancy.       Cardiology: Brice  Last Cards visit: 5/2022  NYHA Class: Class II  [facility] Risk Class: II/III  Last EKG 5/2/23: NSR; rightward axis with septal infarct; T wave abnl, consider anterior ischemia  Last TTE 5/2/2023: EF 50%; low normal systolic function; normal LV diastolic function; segmental left ventricular wall motion abnormalities  Other evaluations: Stress test 1/2022; Negative for myocardial ischemia; Some segments were hypokinetic at peak stress (discontinued due to leg pain)  Pre-pregnancy regimen:  - Atorvastatin  - ASA 81 mg     Current regimen:   - None  - Counseled to start ASA 81 mg qD at 12 weeks for pre-e prevention  - Will reach out to Cardiology to discuss potential benefit/risks of resuming statin.

## 2023-05-08 ENCOUNTER — OFFICE VISIT (OUTPATIENT)
Dept: MATERNAL FETAL MEDICINE | Facility: CLINIC | Age: 38
End: 2023-05-08
Payer: MEDICAID

## 2023-05-08 ENCOUNTER — PROCEDURE VISIT (OUTPATIENT)
Dept: MATERNAL FETAL MEDICINE | Facility: CLINIC | Age: 38
End: 2023-05-08
Payer: MEDICAID

## 2023-05-08 VITALS
BODY MASS INDEX: 26.74 KG/M2 | WEIGHT: 160.5 LBS | DIASTOLIC BLOOD PRESSURE: 68 MMHG | HEIGHT: 65 IN | SYSTOLIC BLOOD PRESSURE: 110 MMHG

## 2023-05-08 DIAGNOSIS — O99.411 HEART DISEASE IN MOTHER AFFECTING PREGNANCY IN FIRST TRIMESTER: ICD-10-CM

## 2023-05-08 DIAGNOSIS — Z86.79 HISTORY OF CAD (CORONARY ARTERY DISEASE): ICD-10-CM

## 2023-05-08 DIAGNOSIS — O09.521 MULTIGRAVIDA OF ADVANCED MATERNAL AGE IN FIRST TRIMESTER: ICD-10-CM

## 2023-05-08 DIAGNOSIS — I51.9 HEART DISEASE IN MOTHER AFFECTING PREGNANCY IN FIRST TRIMESTER: ICD-10-CM

## 2023-05-08 PROCEDURE — 99215 PR OFFICE/OUTPT VISIT, EST, LEVL V, 40-54 MIN: ICD-10-PCS | Mod: TH,25,S$PBB, | Performed by: OBSTETRICS & GYNECOLOGY

## 2023-05-08 PROCEDURE — 3074F PR MOST RECENT SYSTOLIC BLOOD PRESSURE < 130 MM HG: ICD-10-PCS | Mod: CPTII,,, | Performed by: OBSTETRICS & GYNECOLOGY

## 2023-05-08 PROCEDURE — 1159F MED LIST DOCD IN RCRD: CPT | Mod: CPTII,,, | Performed by: OBSTETRICS & GYNECOLOGY

## 2023-05-08 PROCEDURE — 3078F PR MOST RECENT DIASTOLIC BLOOD PRESSURE < 80 MM HG: ICD-10-PCS | Mod: CPTII,,, | Performed by: OBSTETRICS & GYNECOLOGY

## 2023-05-08 PROCEDURE — 99999 PR PBB SHADOW E&M-EST. PATIENT-LVL III: ICD-10-PCS | Mod: PBBFAC,,, | Performed by: OBSTETRICS & GYNECOLOGY

## 2023-05-08 PROCEDURE — 3078F DIAST BP <80 MM HG: CPT | Mod: CPTII,,, | Performed by: OBSTETRICS & GYNECOLOGY

## 2023-05-08 PROCEDURE — 76815 US MFM PROCEDURE (VIEWPOINT): ICD-10-PCS | Mod: 26,S$PBB,, | Performed by: OBSTETRICS & GYNECOLOGY

## 2023-05-08 PROCEDURE — 99213 OFFICE O/P EST LOW 20 MIN: CPT | Mod: PBBFAC,TH | Performed by: OBSTETRICS & GYNECOLOGY

## 2023-05-08 PROCEDURE — 76815 OB US LIMITED FETUS(S): CPT | Mod: PBBFAC | Performed by: OBSTETRICS & GYNECOLOGY

## 2023-05-08 PROCEDURE — 3074F SYST BP LT 130 MM HG: CPT | Mod: CPTII,,, | Performed by: OBSTETRICS & GYNECOLOGY

## 2023-05-08 PROCEDURE — 99999 PR PBB SHADOW E&M-EST. PATIENT-LVL III: CPT | Mod: PBBFAC,,, | Performed by: OBSTETRICS & GYNECOLOGY

## 2023-05-08 PROCEDURE — 1159F PR MEDICATION LIST DOCUMENTED IN MEDICAL RECORD: ICD-10-PCS | Mod: CPTII,,, | Performed by: OBSTETRICS & GYNECOLOGY

## 2023-05-08 PROCEDURE — 3008F PR BODY MASS INDEX (BMI) DOCUMENTED: ICD-10-PCS | Mod: CPTII,,, | Performed by: OBSTETRICS & GYNECOLOGY

## 2023-05-08 PROCEDURE — 3008F BODY MASS INDEX DOCD: CPT | Mod: CPTII,,, | Performed by: OBSTETRICS & GYNECOLOGY

## 2023-05-08 PROCEDURE — 99215 OFFICE O/P EST HI 40 MIN: CPT | Mod: TH,25,S$PBB, | Performed by: OBSTETRICS & GYNECOLOGY

## 2023-05-08 NOTE — PROGRESS NOTES
MATERNAL-FETAL MEDICINE   CONSULT NOTE    Provider requesting consultation: Dr. Madison    SUBJECTIVE:     Ms. Clayton Duarte is a 37 y.o.  female with IUP at 9w1d who is seen in consultation by MFM for evaluation and management of:  Problem   Cardiac disease in mother affecting pregnancy - H/o post partum SCAD s/p CABG    [place] (Advanced Maternal Age) Multigravida 35+     Patient doing well today, accompanied by her brother. No acute complaints.      Patient reports that she is able to accomplish her activities of daily living without significant dyspnea. She does not exercise regularly, but does shop and walk at the mall. She is able to walk up a flight of stairs without significant dyspnea (although she states some shortness of breath accompanies any activity). No current chest pain or SOB.    Medication List with Changes/Refills   Current Medications    ASPIRIN (ECOTRIN) 81 MG EC TABLET    Take 81 mg by mouth twice a week.    ATORVASTATIN (LIPITOR) 80 MG TABLET    Take 1 tablet (80 mg total) by mouth once daily.    DOXYLAMINE-PYRIDOXINE, VIT B6, (DICLEGIS) 10-10 MG TBEC    Take 2 tablets by mouth every evening. If no improvement in nausea, can take 1 pill in morning and 2 at bedtime.    FERROUS SULFATE (FEOSOL) 325 MG (65 MG IRON) TAB TABLET    Take 325 mg by mouth daily with breakfast.    NORETHINDRONE (MICRONOR) 0.35 MG TABLET    Take 1 tablet (0.35 mg total) by mouth once daily.    PNV,CALCIUM 72-IRON-FOLIC ACID (PRENATAL VITAMIN PLUS LOW IRON) 27 MG IRON- 1 MG TAB    Take 1 tablet (1 each total) by mouth once daily.       Review of patient's allergies indicates:   Allergen Reactions    Bananas [banana] Itching    Peanut butter flavor Hives       PMH:  Past Medical History:   Diagnosis Date    Abnormal Pap smear of cervix ,     colposcopy    Anemia     Coronary artery dissection 2013    Postpartum depression     Spinal headache complicating labor and delivery, postpartum condition 2013  "      PObHx:  OB History    Para Term  AB Living   4 3 3     3   SAB IAB Ectopic Multiple Live Births           3      # Outcome Date GA Lbr Jose Alberto/2nd Weight Sex Delivery Anes PTL Lv   4 Current            3 Term 13 39w2d 103:30 / 00:23 3.025 kg (6 lb 10.7 oz) F Vag-Spont EPI N RENEE      Birth Comments: 28    2 Term 09 39w0d 10:00 3.629 kg (8 lb) M Vag-Spont EPI N RENEE   1 Term 06 39w0d 09:00 3.033 kg (6 lb 11 oz) F Vag-Spont EPI N RENEE       PSH:  Past Surgical History:   Procedure Laterality Date    CARDIAC SURGERY      CORONARY ARTERY BYPASS GRAFT      tanner to lad, svg OM1    WISDOM TOOTH EXTRACTION         Family history:family history includes Diabetes in her mother; Hypertension in her mother; Stroke in her mother.    Social history: reports that she has never smoked. She has never used smokeless tobacco. She reports that she does not currently use alcohol. She reports that she does not use drugs.    Genetic history:  The patient denies any inherited genetic diseases or birth defects in herself or her partner's personal history or family.    Objective:   /68 (BP Location: Left arm, Patient Position: Sitting, BP Method: Medium (Manual))   Ht 5' 5" (1.651 m)   Wt 72.8 kg (160 lb 7.9 oz)   LMP 2023   BMI 26.71 kg/m²     Ultrasound performed. See viewpoint for full ultrasound report.  A contreras living IUP is present. Fetal size is consistent with established dating    Significant labs/imaging:    Echocardiogram :  TDS  Low normal systolic function.  The estimated ejection fraction is 50%.  Normal left ventricular diastolic function.  There are segmental left ventricular wall motion abnormalities. Basal-mid anteroseptal hypokinesis  Normal right ventricular size with normal right ventricular systolic function.  Mild left atrial enlargement.  Mild mitral regurgitation.    EKG :  Normal sinus rhythm   Rightward axis   Septal infarct (cited on or before " 02-MAY-2023)   T wave abnormality, consider anterior ischemia   Abnormal ECG     ASSESSMENT/PLAN:     37 y.o.  female with IUP at 9w1d     Cardiac disease in mother affecting pregnancy - H/o post partum SCAD s/p CABG   On 2013 (PPD 16 s/p ), patient underwent an emergency salvage CABG x2 for a post partum left main coronary artery dissection, proximal LAD dissection, proximal circumflex and ramus dissection with acute cardiogenic shock.  She presented with chest pain after an uneventful pregnancy and delivery. Her preop EF was 25% on JESE with anteroseptal and apical akinesia. Post op, EF had recovered to 40% with LV function globally improved and aortic balloon pump placed. Most recent echo 2023, demonstrating EF 50% with low normal systolic function and segmental LV wall motion abnormalities.     Last cardiology visit was 2022, although patient is scheduled to see Dr. Narvaez Friday. Previously on Lipitor/Atorvastatin. This was self discontinued approximately 4 weeks ago after positive UPT. We discussed that although statins were traditionally thought to be teratogenic, there is evidence that in certain patient populations benefits may outweigh small potential risks. These studies were conducted with pravastatin. We will reach out to Dr. Narvaez to gather her input on restarting a statin for Ms. Duarte.     Patient reports that she is able to accomplish her activities of daily living without significant dyspnea. She does not exercise regularly, but does shop and walk at the mall. She is able to walk up a flight of stairs without significant dyspnea (although she states some shortness of breath accompanies any activity). She completed a stress test 2022 which was negative for myocardial ischemia and demonstrated some segments of hypokinesia at peak stress. However, the test was terminated due to leg pain.     We discussed Ms. Duarte' CARPREG score (>4)  demonstrating a 41% risk of cardiac  event in pregnancy. We discussed that this may include arrhthymias, worsening of heart function, myocardial infarction, etc. We also discussed her history of SCAD and limited data on recurrence risk in pregnancy. Overall, studies have demonstrated that recurrence risk of SCAD is not increased by pregnancy. We discussed that Ms. Duarte' overall functional status and prolonged length of time without a cardiac event are encouraging.     The options for pregnancy continuation vs. termination were discussed non-directively. The legal limitations and availability of termination in the state of [place] were discussed. We discussed possible out-of-state options, as well. She is aware that this is her choice, and that we will support her in any decision she elects. At this time, Ms. Duarte plans to proceed with continuation of pregnancy.       Cardiology: Brice  Last Cards visit: 5/2022  NYHA Class: Class II  [facility] Risk Class: II/III  Last EKG 5/2/23: NSR; rightward axis with septal infarct; T wave abnl, consider anterior ischemia  Last TTE 5/2/2023: EF 50%; low normal systolic function; normal LV diastolic function; segmental left ventricular wall motion abnormalities  Other evaluations: Stress test 1/2022; Negative for myocardial ischemia; Some segments were hypokinetic at peak stress (discontinued due to leg pain)  Pre-pregnancy regimen:  - Atorvastatin  - ASA 81 mg     Current regimen:   - None  - Counseled to start ASA 81 mg qD at 12 weeks for pre-e prevention  - Will reach out to Cardiology to discuss potential benefit/risks of resuming statin.       AMA (advanced maternal age) multigravida 35+  Advanced Maternal Age  The patient was counseled regarding advanced maternal age. The risk of fetal aneuploidy increases with age. We reviewed these risks at length, and we discussed the screening options available for risk assessment for fetal aneuploidy during pregnancy.  We briefly discussed diagnostic options as well.      Patients of advanced age are also at increased risks of pregnancy complications, including miscarriage, preeclampsia, abnormalities with placentation, and  delivery.  These risks increase with increasing maternal age and comorbidities.      Recommendations:   Ms. Duarte would like to proceed with cfDNA, to be ordered by primary OB.         Consider low dose aspirin at 12-16 weeks for preeclampsia risk reduction   Targeted anatomical ultrasound at 18-20 weeks.     Consider delivery at 39-40 weeks, but no later than 40 weeks 6 days.   Fetal growth ultrasound at 32-34 weeks      FOLLOW UP:     F/u in 1 weeks for MFM visit to discuss any medications/diagnostic testing recommended by Cardiology.     60 minutes of total time spent on the encounter, which includes face to face time and non-face to face time preparing to see the patient (eg, review of tests), obtaining and/or reviewing separately obtained history, documenting clinical information in the electronic or other health record, independently interpreting results (not separately reported) and communicating results to the patient/family/caregiver, or care coordination (not separately reported).    Ariana Azar  Maternal-Fetal Medicine  [id number]    Electronically Signed by Ariana Azar May 8, 2023

## 2023-05-12 ENCOUNTER — OFFICE VISIT (OUTPATIENT)
Dept: CARDIOLOGY | Facility: CLINIC | Age: 38
End: 2023-05-12
Payer: MEDICAID

## 2023-05-12 VITALS
HEIGHT: 65 IN | SYSTOLIC BLOOD PRESSURE: 101 MMHG | DIASTOLIC BLOOD PRESSURE: 69 MMHG | HEART RATE: 92 BPM | BODY MASS INDEX: 26.49 KG/M2 | WEIGHT: 159 LBS

## 2023-05-12 DIAGNOSIS — O99.411 HEART DISEASE IN MOTHER AFFECTING PREGNANCY IN FIRST TRIMESTER: ICD-10-CM

## 2023-05-12 DIAGNOSIS — Z86.79 HISTORY OF CAD (CORONARY ARTERY DISEASE): Primary | ICD-10-CM

## 2023-05-12 DIAGNOSIS — I51.9 HEART DISEASE IN MOTHER AFFECTING PREGNANCY IN FIRST TRIMESTER: ICD-10-CM

## 2023-05-12 DIAGNOSIS — Z95.1 S/P CABG X 2: ICD-10-CM

## 2023-05-12 DIAGNOSIS — Z3A.10 10 WEEKS GESTATION OF PREGNANCY: ICD-10-CM

## 2023-05-12 DIAGNOSIS — I25.42 CORONARY ARTERY DISSECTION: ICD-10-CM

## 2023-05-12 PROCEDURE — 99214 PR OFFICE/OUTPT VISIT, EST, LEVL IV, 30-39 MIN: ICD-10-PCS | Mod: S$GLB,,, | Performed by: INTERNAL MEDICINE

## 2023-05-12 PROCEDURE — 3078F PR MOST RECENT DIASTOLIC BLOOD PRESSURE < 80 MM HG: ICD-10-PCS | Mod: CPTII,S$GLB,, | Performed by: INTERNAL MEDICINE

## 2023-05-12 PROCEDURE — 3074F SYST BP LT 130 MM HG: CPT | Mod: CPTII,S$GLB,, | Performed by: INTERNAL MEDICINE

## 2023-05-12 PROCEDURE — 3008F PR BODY MASS INDEX (BMI) DOCUMENTED: ICD-10-PCS | Mod: CPTII,S$GLB,, | Performed by: INTERNAL MEDICINE

## 2023-05-12 PROCEDURE — 1160F RVW MEDS BY RX/DR IN RCRD: CPT | Mod: CPTII,S$GLB,, | Performed by: INTERNAL MEDICINE

## 2023-05-12 PROCEDURE — 1159F PR MEDICATION LIST DOCUMENTED IN MEDICAL RECORD: ICD-10-PCS | Mod: CPTII,S$GLB,, | Performed by: INTERNAL MEDICINE

## 2023-05-12 PROCEDURE — 3078F DIAST BP <80 MM HG: CPT | Mod: CPTII,S$GLB,, | Performed by: INTERNAL MEDICINE

## 2023-05-12 PROCEDURE — 1160F PR REVIEW ALL MEDS BY PRESCRIBER/CLIN PHARMACIST DOCUMENTED: ICD-10-PCS | Mod: CPTII,S$GLB,, | Performed by: INTERNAL MEDICINE

## 2023-05-12 PROCEDURE — 3074F PR MOST RECENT SYSTOLIC BLOOD PRESSURE < 130 MM HG: ICD-10-PCS | Mod: CPTII,S$GLB,, | Performed by: INTERNAL MEDICINE

## 2023-05-12 PROCEDURE — 3008F BODY MASS INDEX DOCD: CPT | Mod: CPTII,S$GLB,, | Performed by: INTERNAL MEDICINE

## 2023-05-12 PROCEDURE — 99214 OFFICE O/P EST MOD 30 MIN: CPT | Mod: S$GLB,,, | Performed by: INTERNAL MEDICINE

## 2023-05-12 PROCEDURE — 1159F MED LIST DOCD IN RCRD: CPT | Mod: CPTII,S$GLB,, | Performed by: INTERNAL MEDICINE

## 2023-05-12 NOTE — PROGRESS NOTES
Subjective:   Patient ID:  Clayton Duarte is a 37 y.o. female who presents for follow-up of Hyperlipidemia    Clayton Duarte is a 36 y.o. female is a new patient who presents for evaluation of Chest pain, non-cardiac     PREOPERATIVE DIAGNOSES:  1.  Postpartum left main coronary artery dissection.  2.  Proximal LAD dissection.  3.  Proximal circumflex and ramus dissection  4.  Acute cardiogenic shock.  5.  Status post intraaortic balloon pump.     POSTOPERATIVE DIAGNOSES:  1.  Postpartum left main coronary artery dissection.  2.  Proximal LAD dissection.  3.  Proximal circumflex and ramus dissection  4.  Acute cardiogenic shock.  5.  Status post intraaortic balloon pump.     PROCEDURES:  Emergency salvage CABG x2 with LIMA to LAD and saphenous vein graft to OM1   to OM1.     HPI:   Preop clearance for tubal ligation  She has 3 kids, no issues with the pregnancies of 2 now teenage kids, with the last pregnancy after delivery few hours late she developed severe chest pain ST changes. Her diagnostic catheterization revealed what appeared to be a likely spontaneous postpartum coronary dissection and she underwent a 2 vessel CABG  Occasional chest pain when she gets cold.   Walks 3,4 times a week  Non smoker     Cath 2013  Left Main Coronary Artery:              The distal LM has a 70% stenosis. There is MARY ELLEN 2 flow.      - Left Anterior Descending Artery:             The LAD has a 90% stenosis. There is MARY ELLEN 2 flow.      - Ramus:              The ramus has a 90% stenosis. There is MARY ELLEN 2 flow.      - Left Circumflex Artery:              The LCX has a 75% stenosis. There is MARY ELLEN 3 flow. The remaining portion of the vessel has luminal irregularities.      - OM1:              The OM1 is normal. There is MARY ELLEN 3 flow.      - OM2:              The OM2 is normal. There is MARY ELLEN 3 flow. The remaining portion of the vessel is of small caliber.      - OM3:              The OM3 is normal. There is MARY ELLEN 3 flow. The  remaining portion of the vessel is of small caliber.      - Left Posterior Descending Artery:              The left PDA is normal. There is MARY ELLEN 3 flow. The remaining portion of the vessel is of small caliber.      - Right Coronary Artery:              The RCA is normal. There is MARY ELLEN 3 flow.      - Posterior Lateral Branch:              The PLB is normal. There is MARY ELLEN 3 flow.      - Posterior Descending Artery:              The PDA is normal. There is MARY ELLEN 3 flow.      Echo 2017  CONCLUSIONS     1 - Mildly depressed left ventricular systolic function (EF 45-50%).     2 - Normal left ventricular diastolic function.     3 - Normal right ventricular systolic function .     4 - The estimated PA systolic pressure is 22 mmHg.     5 - Trivial mitral regurgitation.     6 - Trivial tricuspid regurgitation.     Non-specific finding demonstrating failure to augment involving the mid anteroseptum, apical septum, mid inferoseptum, apical inferior wall which may be associated with ischemia; clinical correlation required.      HPI:   No chest pain, Orthopnea, PND of heart failure symptoms.   Denies palpitations or fluttering in the chest  Very active with kids  Works    Echo 2023  TDS  Low normal systolic function.  The estimated ejection fraction is 50%.  Normal left ventricular diastolic function.  There are segmental left ventricular wall motion abnormalities. Basal-mid anteroseptal hypokinesis  Normal right ventricular size with normal right ventricular systolic function.  Mild left atrial enlargement.  Mild mitral regurgitation.       HPI:     Patient Active Problem List   Diagnosis    Chronic anemia    History of CAD (coronary artery disease)    Preoperative cardiovascular examination    Iron deficiency anemia    Chest pain, non-cardiac    Umbilical hernia without obstruction and without gangrene    Low TSH level    Chest pain at rest    Palpitations    Microcytic anemia    Vitamin D deficiency    Serum calcium elevated     "Positive pregnancy test    Cardiac disease in mother affecting pregnancy - H/o post partum SCAD s/p CABG     AMA (advanced maternal age) multigravida 35+     /69   Pulse 92   Ht 5' 5" (1.651 m)   Wt 72.1 kg (159 lb)   LMP 03/06/2023   BMI 26.46 kg/m²   Body mass index is 26.46 kg/m².  CrCl cannot be calculated (Patient's most recent lab result is older than the maximum 7 days allowed.).    Lab Results   Component Value Date     04/15/2023    K 4.3 04/15/2023     04/15/2023    CO2 21 (L) 04/15/2023    BUN 9 04/15/2023    CREATININE 0.8 04/15/2023     04/15/2023    HGBA1C 5.2 12/12/2022    MG 2.0 09/24/2013    AST 16 04/15/2023    ALT 13 04/15/2023    ALBUMIN 4.1 04/15/2023    PROT 7.5 04/15/2023    BILITOT 0.2 04/15/2023    WBC 7.51 04/15/2023    HGB 10.5 (L) 04/15/2023    HCT 35.4 (L) 04/15/2023    HCT 45 12/30/2021    MCV 82 04/15/2023     04/15/2023    INR 1.0 02/15/2017    TSH 0.326 (L) 12/12/2022    CHOL 153 12/12/2022    HDL 48 12/12/2022    LDLCALC 93.2 12/12/2022    TRIG 59 12/12/2022       Current Outpatient Medications   Medication Sig    PNV,calcium 72-iron-folic acid (PRENATAL VITAMIN PLUS LOW IRON) 27 mg iron- 1 mg Tab Take 1 tablet (1 each total) by mouth once daily.    aspirin (ECOTRIN) 81 MG EC tablet Take 81 mg by mouth twice a week.    atorvastatin (LIPITOR) 80 MG tablet Take 1 tablet (80 mg total) by mouth once daily. (Patient not taking: Reported on 5/12/2023)    doxylamine-pyridoxine, vit B6, (DICLEGIS) 10-10 mg TbEC Take 2 tablets by mouth every evening. If no improvement in nausea, can take 1 pill in morning and 2 at bedtime. (Patient not taking: Reported on 5/8/2023)    ferrous sulfate (FEOSOL) 325 mg (65 mg iron) Tab tablet Take 325 mg by mouth daily with breakfast.     No current facility-administered medications for this visit.       Review of Systems   Constitutional: Negative for chills, decreased appetite, malaise/fatigue, night sweats, weight gain " and weight loss.   Eyes:  Negative for blurred vision, double vision, visual disturbance and visual halos.   Cardiovascular:  Negative for chest pain, claudication, cyanosis, dyspnea on exertion, irregular heartbeat, leg swelling, near-syncope, orthopnea, palpitations, paroxysmal nocturnal dyspnea and syncope.   Respiratory:  Negative for cough, hemoptysis, snoring, sputum production and wheezing.    Endocrine: Negative for cold intolerance, heat intolerance, polydipsia and polyphagia.   Hematologic/Lymphatic: Negative for adenopathy and bleeding problem. Does not bruise/bleed easily.   Skin:  Negative for flushing, itching, poor wound healing and rash.   Musculoskeletal:  Negative for arthritis, back pain, falls, gout, joint pain, joint swelling, muscle cramps, muscle weakness, myalgias, neck pain and stiffness.   Gastrointestinal:  Negative for bloating, abdominal pain, anorexia, diarrhea, dysphagia, excessive appetite, flatus, hematemesis, jaundice, melena and nausea.   Genitourinary:  Negative for hesitancy and incomplete emptying.   Neurological:  Negative for aphonia, brief paralysis, difficulty with concentration, disturbances in coordination, excessive daytime sleepiness, dizziness, focal weakness, light-headedness, loss of balance and weakness.   Psychiatric/Behavioral:  Negative for altered mental status, depression, hallucinations, hypervigilance, memory loss, substance abuse and suicidal ideas. The patient does not have insomnia and is not nervous/anxious.      Objective:   Physical Exam  Constitutional:       General: She is not in acute distress.     Appearance: She is well-developed. She is not diaphoretic.   HENT:      Head: Normocephalic and atraumatic.      Nose: Nose normal.      Mouth/Throat:      Pharynx: No oropharyngeal exudate.   Eyes:      General: No scleral icterus.        Right eye: No discharge.         Left eye: No discharge.      Conjunctiva/sclera: Conjunctivae normal.      Pupils:  Pupils are equal, round, and reactive to light.   Neck:      Thyroid: No thyromegaly.      Vascular: No JVD.      Trachea: No tracheal deviation.   Cardiovascular:      Rate and Rhythm: Normal rate and regular rhythm.      Pulses: Intact distal pulses.      Heart sounds: Normal heart sounds. No murmur heard.    No friction rub. No gallop.   Pulmonary:      Effort: Pulmonary effort is normal. No respiratory distress.      Breath sounds: Normal breath sounds. No stridor. No wheezing or rales.   Chest:      Chest wall: No tenderness.   Abdominal:      General: Bowel sounds are normal. There is no distension.      Palpations: Abdomen is soft. There is no mass.      Tenderness: There is no abdominal tenderness. There is no guarding or rebound.   Musculoskeletal:         General: No tenderness. Normal range of motion.      Cervical back: Normal range of motion and neck supple.   Lymphadenopathy:      Cervical: No cervical adenopathy.   Skin:     General: Skin is warm.      Coloration: Skin is not pale.      Findings: No erythema or rash.   Neurological:      Mental Status: She is alert and oriented to person, place, and time.      Cranial Nerves: No cranial nerve deficit.      Motor: No abnormal muscle tone.      Coordination: Coordination normal.      Deep Tendon Reflexes: Reflexes are normal and symmetric. Reflexes normal.   Psychiatric:         Behavior: Behavior normal.         Thought Content: Thought content normal.         Judgment: Judgment normal.       Assessment:     1. History of CAD (coronary artery disease)    2. Heart disease in mother affecting pregnancy in first trimester    3. S/P CABG x 2    4. Coronary artery dissection    5. 10 weeks gestation of pregnancy      Plan:   Clayton was seen today for hyperlipidemia.    Diagnoses and all orders for this visit:    History of CAD (coronary artery disease)  -     Echo Saline Bubble? No; Future    Heart disease in mother affecting pregnancy in first  trimester  -     Echo Saline Bubble? No; Future    S/P CABG x 2  -     Echo Saline Bubble? No; Future    Coronary artery dissection  -     Echo Saline Bubble? No; Future    10 weeks gestation of pregnancy  -     Echo Saline Bubble? No; Future    Patient has already stopped her statin and I am advising her to continue ASA in the light of prior SCAD based on expert opinion.

## 2023-05-18 ENCOUNTER — PATIENT MESSAGE (OUTPATIENT)
Dept: MATERNAL FETAL MEDICINE | Facility: CLINIC | Age: 38
End: 2023-05-18

## 2023-05-18 ENCOUNTER — OFFICE VISIT (OUTPATIENT)
Dept: MATERNAL FETAL MEDICINE | Facility: CLINIC | Age: 38
End: 2023-05-18
Payer: MEDICAID

## 2023-05-18 DIAGNOSIS — I25.42 SPONTANEOUS DISSECTION OF CORONARY ARTERY: ICD-10-CM

## 2023-05-18 DIAGNOSIS — I51.9 HEART DISEASE IN MOTHER AFFECTING PREGNANCY IN FIRST TRIMESTER: ICD-10-CM

## 2023-05-18 DIAGNOSIS — Z36.89 ENCOUNTER FOR ULTRASOUND TO ASSESS FETAL GROWTH: ICD-10-CM

## 2023-05-18 DIAGNOSIS — Z86.79 HISTORY OF CAD (CORONARY ARTERY DISEASE): Primary | ICD-10-CM

## 2023-05-18 DIAGNOSIS — Z36.2 ENCOUNTER FOR FOLLOW-UP ULTRASOUND OF FETAL ANATOMY: Primary | ICD-10-CM

## 2023-05-18 DIAGNOSIS — O99.411 HEART DISEASE IN MOTHER AFFECTING PREGNANCY IN FIRST TRIMESTER: ICD-10-CM

## 2023-05-18 PROCEDURE — 99214 OFFICE O/P EST MOD 30 MIN: CPT | Mod: TH,95,, | Performed by: OBSTETRICS & GYNECOLOGY

## 2023-05-18 PROCEDURE — 99214 PR OFFICE/OUTPT VISIT, EST, LEVL IV, 30-39 MIN: ICD-10-PCS | Mod: TH,95,, | Performed by: OBSTETRICS & GYNECOLOGY

## 2023-05-18 NOTE — PROGRESS NOTES
The patient location is: home  The chief complaint leading to consultation is: h/o SCAD    Visit type: audiovisual    Face to Face time with patient: 8 minutes  20 minutes of total time spent on the encounter, which includes face to face time and non-face to face time preparing to see the patient (eg, review of tests), Obtaining and/or reviewing separately obtained history, Documenting clinical information in the electronic or other health record, Independently interpreting results (not separately reported) and communicating results to the patient/family/caregiver, or Care coordination (not separately reported).     Each patient to whom he or she provides medical services by telemedicine is:  (1) informed of the relationship between the physician and patient and the respective role of any other health care provider with respect to management of the patient; and (2) notified that he or she may decline to receive medical services by telemedicine and may withdraw from such care at any time.    Notes:   Maternal Fetal Medicine follow up consult    SUBJECTIVE:     Clayton Duarte is a 37 y.o.  female with IUP at 10w4d who is seen in follow up consultation by M.  Pregnancy complications include:   Problem   Cardiac disease in mother affecting pregnancy - H/o post partum SCAD s/p CABG        Previous notes reviewed.   No changes to medical, surgical, family, social, or obstetric history.    Interval history since last MFM visit: doing well today without complaints.     Medications:  Current Outpatient Medications   Medication Instructions    aspirin (ECOTRIN) 81 mg, Oral, Twice weekly    doxylamine-pyridoxine, vit B6, (DICLEGIS) 10-10 mg TbEC 2 tablets, Oral, Nightly, If no improvement in nausea, can take 1 pill in morning and 2 at bedtime.    ferrous sulfate (FEOSOL) 325 mg, Oral, With breakfast    PNV,calcium 72-iron-folic acid (PRENATAL VITAMIN PLUS LOW IRON) 27 mg iron- 1 mg Tab 1 each, Oral, Daily        Care team members:  Mitzy Madison MD - Primary OB    ASSESSMENT/PLAN:     37 y.o.  female with IUP at 10w4d    Cardiac disease in mother affecting pregnancy - H/o post partum SCAD s/p CABG   See prior note by Dr. Azar for full counseling and recommendations  Patient has seen Dr. Narvaez with cardiology. No additional recommendations at this time. Patient has been extensively counseled by MFM regarding her risk of recurrence and cardiac event during pregnancy. She has been counseled regarding the options of pregnancy continuation versus pregnancy termination. She desires pregnancy continuation. Statins are not indicated at this time. Daily ASA 81 mg recommended.   [place] [facility] [place] for evaluation and to provide further recommendations. Referral placed.   Patient has echocardiogram scheduled 23.     Recommendations  ASA 81 mg daily  Recommend early anatomic survey and MFM MD at 16 weeks  Detailed anatomic survey at 20 weeks for AMA  Desires [id number], to be ordered by patient's primary OB  Recommend frequent maternal evaluation in the 3rd trimester  Monthly MFM follow up for growth and maternal evaluation after 16 weeks  Recommend follow up every 2 weeks with primary OB starting at 28 weeks  Patient should be enrolled in Connected MOM for close BP monitoring. Recommend maintaining BP <140/90 throughout pregnancy. The patient does not have a history of hypertension. If anti-hypertensives are indicated, would recommend discussion with MFM prior to initiating.   If the patient has any cardiac symptoms - chest pain, palpitations, shortness of breath - she should be urgently evaluated in a hospital or ED setting  Will make recommendations for delivery timing pending maternal status as pregnancy progresses  Recommend telemetry during labor and for at least 48 hours postpartum. Consider ICU admission after delivery.   Close monitoring of  fluid status intrapartum with strict I/Os  Anticipate hospitalization for at least 3-4 days postpartum to monitor maternal cardiac status   Recommend continued regular follow up with cardiology every trimester or more often if indicated per cardiology    Follow up to be scheduled at 16 weeks for next [facility] follow up    Chandler Bliss MD  PGY 6  Maternal Fetal Medicine  [facility]

## 2023-05-18 NOTE — ASSESSMENT & PLAN NOTE
See prior note by Dr. Azar for full counseling and recommendations  Patient has seen Dr. Narvaez with cardiology. No additional recommendations at this time. Patient has been extensively counseled by MFM regarding her risk of recurrence and cardiac event during pregnancy. She has been counseled regarding the options of pregnancy continuation versus pregnancy termination. She desires pregnancy continuation. Statins are not indicated at this time. Daily ASA 81 mg recommended.   Patient was discussed with [place] failure team [place] [facility] [place], recommend consultation for evaluation and to provide further recommendations. Referral placed.   Patient has echocardiogram scheduled 7/14/23.     Recommendations  · ASA 81 mg daily  · Recommend early anatomic survey and MFM MD at 16 weeks  · Detailed anatomic survey at 20 weeks for AMA  · Desires [id number], to be ordered by patient's primary OB  · Recommend frequent maternal evaluation in the 3rd trimester  · Monthly MFM follow up for growth and maternal evaluation after 16 weeks  · Recommend follow up every 2 weeks with primary OB starting at 28 weeks  · Patient should be enrolled in Connected MOM for close BP monitoring. Recommend maintaining BP <140/90 throughout pregnancy. The patient does not have a history of hypertension. If anti-hypertensives are indicated, would recommend discussion with MFM prior to initiating.   · If the patient has any cardiac symptoms - chest pain, palpitations, shortness of breath - she should be urgently evaluated in a hospital or ED setting  · Will make recommendations for delivery timing pending maternal status as pregnancy progresses  · Recommend telemetry during labor and for at least 48 hours postpartum. Consider ICU admission after delivery.   · Close monitoring of fluid status intrapartum with strict I/Os  · Anticipate hospitalization for at least 3-4 days postpartum to monitor maternal cardiac status   · Recommend continued regular follow up  with cardiology every trimester or more often if indicated per cardiology

## 2023-05-18 NOTE — Clinical Note
Thank you for talking to me about this patient. I placed the ambulatory referral to heart failure. Please call me with any questions or concerns.

## 2023-05-24 ENCOUNTER — INITIAL PRENATAL (OUTPATIENT)
Dept: OBSTETRICS AND GYNECOLOGY | Facility: CLINIC | Age: 38
End: 2023-05-24
Payer: MEDICAID

## 2023-05-24 ENCOUNTER — LAB VISIT (OUTPATIENT)
Dept: LAB | Facility: OTHER | Age: 38
End: 2023-05-24
Attending: OBSTETRICS & GYNECOLOGY
Payer: MEDICAID

## 2023-05-24 VITALS
WEIGHT: 158.75 LBS | DIASTOLIC BLOOD PRESSURE: 66 MMHG | SYSTOLIC BLOOD PRESSURE: 100 MMHG | BODY MASS INDEX: 26.41 KG/M2

## 2023-05-24 DIAGNOSIS — D50.9 IRON DEFICIENCY ANEMIA, UNSPECIFIED IRON DEFICIENCY ANEMIA TYPE: ICD-10-CM

## 2023-05-24 DIAGNOSIS — O09.891 SUPERVISION OF OTHER HIGH RISK PREGNANCIES, FIRST TRIMESTER: Primary | ICD-10-CM

## 2023-05-24 DIAGNOSIS — Z3A.11 11 WEEKS GESTATION OF PREGNANCY: ICD-10-CM

## 2023-05-24 DIAGNOSIS — Z86.79 HISTORY OF CAD (CORONARY ARTERY DISEASE): ICD-10-CM

## 2023-05-24 DIAGNOSIS — Z32.01 POSITIVE PREGNANCY TEST: ICD-10-CM

## 2023-05-24 DIAGNOSIS — O09.891 SUPERVISION OF OTHER HIGH RISK PREGNANCIES, FIRST TRIMESTER: ICD-10-CM

## 2023-05-24 LAB
BASOPHILS # BLD AUTO: 0.04 K/UL (ref 0–0.2)
BASOPHILS NFR BLD: 0.4 % (ref 0–1.9)
DIFFERENTIAL METHOD: ABNORMAL
EOSINOPHIL # BLD AUTO: 0.1 K/UL (ref 0–0.5)
EOSINOPHIL NFR BLD: 1.2 % (ref 0–8)
ERYTHROCYTE [DISTWIDTH] IN BLOOD BY AUTOMATED COUNT: 17.3 % (ref 11.5–14.5)
FERRITIN SERPL-MCNC: 15 NG/ML (ref 20–300)
HCT VFR BLD AUTO: 37.4 % (ref 37–48.5)
HGB BLD-MCNC: 11.6 G/DL (ref 12–16)
IMM GRANULOCYTES # BLD AUTO: 0.02 K/UL (ref 0–0.04)
IMM GRANULOCYTES NFR BLD AUTO: 0.2 % (ref 0–0.5)
IRON SERPL-MCNC: 56 UG/DL (ref 30–160)
LYMPHOCYTES # BLD AUTO: 1.7 K/UL (ref 1–4.8)
LYMPHOCYTES NFR BLD: 18.3 % (ref 18–48)
MCH RBC QN AUTO: 26.5 PG (ref 27–31)
MCHC RBC AUTO-ENTMCNC: 31 G/DL (ref 32–36)
MCV RBC AUTO: 86 FL (ref 82–98)
MONOCYTES # BLD AUTO: 0.4 K/UL (ref 0.3–1)
MONOCYTES NFR BLD: 3.8 % (ref 4–15)
NEUTROPHILS # BLD AUTO: 7.3 K/UL (ref 1.8–7.7)
NEUTROPHILS NFR BLD: 76.1 % (ref 38–73)
NRBC BLD-RTO: 0 /100 WBC
PLATELET # BLD AUTO: 275 K/UL (ref 150–450)
PMV BLD AUTO: 9.7 FL (ref 9.2–12.9)
RBC # BLD AUTO: 4.37 M/UL (ref 4–5.4)
SATURATED IRON: 12 % (ref 20–50)
TOTAL IRON BINDING CAPACITY: 477 UG/DL (ref 250–450)
TRANSFERRIN SERPL-MCNC: 322 MG/DL (ref 200–375)
WBC # BLD AUTO: 9.53 K/UL (ref 3.9–12.7)

## 2023-05-24 PROCEDURE — 36415 COLL VENOUS BLD VENIPUNCTURE: CPT | Performed by: OBSTETRICS & GYNECOLOGY

## 2023-05-24 PROCEDURE — 99999 PR PBB SHADOW E&M-EST. PATIENT-LVL III: CPT | Mod: PBBFAC,,, | Performed by: OBSTETRICS & GYNECOLOGY

## 2023-05-24 PROCEDURE — 84466 ASSAY OF TRANSFERRIN: CPT | Performed by: OBSTETRICS & GYNECOLOGY

## 2023-05-24 PROCEDURE — 82728 ASSAY OF FERRITIN: CPT | Performed by: OBSTETRICS & GYNECOLOGY

## 2023-05-24 PROCEDURE — 99213 PR OFFICE/OUTPT VISIT, EST, LEVL III, 20-29 MIN: ICD-10-PCS | Mod: TH,S$PBB,, | Performed by: OBSTETRICS & GYNECOLOGY

## 2023-05-24 PROCEDURE — 86762 RUBELLA ANTIBODY: CPT | Performed by: OBSTETRICS & GYNECOLOGY

## 2023-05-24 PROCEDURE — 85025 COMPLETE CBC W/AUTO DIFF WBC: CPT | Performed by: OBSTETRICS & GYNECOLOGY

## 2023-05-24 PROCEDURE — 99213 OFFICE O/P EST LOW 20 MIN: CPT | Mod: PBBFAC,TH | Performed by: OBSTETRICS & GYNECOLOGY

## 2023-05-24 PROCEDURE — 99213 OFFICE O/P EST LOW 20 MIN: CPT | Mod: TH,S$PBB,, | Performed by: OBSTETRICS & GYNECOLOGY

## 2023-05-24 PROCEDURE — 99999 PR PBB SHADOW E&M-EST. PATIENT-LVL III: ICD-10-PCS | Mod: PBBFAC,,, | Performed by: OBSTETRICS & GYNECOLOGY

## 2023-05-24 RX ORDER — ASCORBIC ACID, CHOLECALCIFEROL, .ALPHA.-TOCOPHEROL ACETATE, DL-, PYRIDOXINE HYDROCHLORIDE, FOLIC ACID, CYANOCOBALAMIN, BIOTIN, CALCIUM CARBONATE, FERROUS ASPARTO GLYCINATE, IRON, POTASSIUM IODIDE, MAGNESIUM OXIDE, DOCONEXENT AND LOWBUSH BLUEBERRY 60; 1000; 10; 26; 400; 13; 280; 80; 9; 9; 150; 25; 350; 25; 600 MG/1; [IU]/1; [IU]/1; MG/1; UG/1; UG/1; UG/1; MG/1; MG/1; MG/1; UG/1; MG/1; MG/1; MG/1; UG/1
1 CAPSULE, GELATIN COATED ORAL DAILY
Qty: 30 CAPSULE | Refills: 11 | Status: ON HOLD | OUTPATIENT
Start: 2023-05-24 | End: 2023-12-01 | Stop reason: HOSPADM

## 2023-05-24 NOTE — PROGRESS NOTES
Doing well.   Reviewed pap smear and recommendation for colposcopy. She would like to defer until next visit.      /66   Wt 72 kg (158 lb 11.7 oz)   LMP 2023   BMI 26.41 kg/m²     37 y.o., at 12w1d by Estimated Date of Delivery: 12/10/23  Patient Active Problem List   Diagnosis    Chronic anemia    History of CAD (coronary artery disease)    Preoperative cardiovascular examination    Iron deficiency anemia    Chest pain, non-cardiac    Umbilical hernia without obstruction and without gangrene    Low TSH level    Chest pain at rest    Palpitations    Microcytic anemia    Vitamin D deficiency    Serum calcium elevated    Positive pregnancy test    Cardiac disease in mother affecting pregnancy - H/o post partum SCAD s/p CABG     AMA (advanced maternal age) multigravida 35+     OB History    Para Term  AB Living   4 3 3     3   SAB IAB Ectopic Multiple Live Births           3      # Outcome Date GA Lbr Jose Alberto/2nd Weight Sex Delivery Anes PTL Lv   4 Current            3 Term 13 39w2d 103:30 / 00:23 3.025 kg (6 lb 10.7 oz) F Vag-Spont EPI N RENEE      Birth Comments: 28    2 Term 09 39w0d 10:00 3.629 kg (8 lb) M Vag-Spont EPI N RENEE   1 Term 06 39w0d 09:00 3.033 kg (6 lb 11 oz) F Vag-Spont EPI N RENEE       Dating reviewed    Allergies and problem list reviewed and updated    Medical and surgical history reviewed    Prenatal labs reviewed and updated    Physical Exam:  ABD: soft, gravid, nontender    Assessment:  Clayton was seen today for initial prenatal visit.    Diagnoses and all orders for this visit:    Supervision of other high risk pregnancies, first trimester  -     Connected MOM Enrollment  -     Assign Connected MOM Program Consent Questionnaire  -     Rubella Antibody, IgG; Future  -     CBC Auto Differential; Future  -     prenatal vit 87-iron-folic-dha (PRENATE MINI, FERR ASP GLYCIN,) 18-1-350 mg Cap; Take 1 each by mouth Daily.    Iron deficiency  anemia, unspecified iron deficiency anemia type  -     CBC Auto Differential; Future  -     FERRITIN; Future  -     IRON AND TIBC; Future  -     Ambulatory referral/consult to Hematology / Oncology; Future    History of CAD (coronary artery disease)  -     Protein/Creatinine Ratio, Urine        Orders Placed This Encounter   Procedures    Rubella Antibody, IgG    CBC Auto Differential    FERRITIN    IRON AND TIBC    Protein/Creatinine Ratio, Urine    Ambulatory referral/consult to Hematology / Oncology    Connected MOM Enrollment     [id number] today.  Reviewed recommendation for low dose ASA for remainder of pregnancy.  Appreciate MFM and Cardiology recommendations.   Colposcopy next visit.     No follow-ups on file.

## 2023-05-25 LAB
RUBV IGG SER-ACNC: 10.8 IU/ML
RUBV IGG SER-IMP: REACTIVE

## 2023-05-30 ENCOUNTER — OFFICE VISIT (OUTPATIENT)
Dept: TRANSPLANT | Facility: CLINIC | Age: 38
End: 2023-05-30
Payer: MEDICAID

## 2023-05-30 VITALS
HEART RATE: 92 BPM | DIASTOLIC BLOOD PRESSURE: 70 MMHG | HEIGHT: 65 IN | WEIGHT: 162.06 LBS | SYSTOLIC BLOOD PRESSURE: 119 MMHG | BODY MASS INDEX: 27 KG/M2

## 2023-05-30 DIAGNOSIS — I25.42 SPONTANEOUS DISSECTION OF CORONARY ARTERY: ICD-10-CM

## 2023-05-30 DIAGNOSIS — Z86.79 HISTORY OF CAD (CORONARY ARTERY DISEASE): ICD-10-CM

## 2023-05-30 PROCEDURE — 3074F SYST BP LT 130 MM HG: CPT | Mod: CPTII,,, | Performed by: INTERNAL MEDICINE

## 2023-05-30 PROCEDURE — 99204 PR OFFICE/OUTPT VISIT, NEW, LEVL IV, 45-59 MIN: ICD-10-PCS | Mod: S$PBB,,, | Performed by: INTERNAL MEDICINE

## 2023-05-30 PROCEDURE — 3008F PR BODY MASS INDEX (BMI) DOCUMENTED: ICD-10-PCS | Mod: CPTII,,, | Performed by: INTERNAL MEDICINE

## 2023-05-30 PROCEDURE — 99204 OFFICE O/P NEW MOD 45 MIN: CPT | Mod: S$PBB,,, | Performed by: INTERNAL MEDICINE

## 2023-05-30 PROCEDURE — 3078F DIAST BP <80 MM HG: CPT | Mod: CPTII,,, | Performed by: INTERNAL MEDICINE

## 2023-05-30 PROCEDURE — 3078F PR MOST RECENT DIASTOLIC BLOOD PRESSURE < 80 MM HG: ICD-10-PCS | Mod: CPTII,,, | Performed by: INTERNAL MEDICINE

## 2023-05-30 PROCEDURE — 99999 PR PBB SHADOW E&M-EST. PATIENT-LVL III: ICD-10-PCS | Mod: PBBFAC,,, | Performed by: INTERNAL MEDICINE

## 2023-05-30 PROCEDURE — 99999 PR PBB SHADOW E&M-EST. PATIENT-LVL III: CPT | Mod: PBBFAC,,, | Performed by: INTERNAL MEDICINE

## 2023-05-30 PROCEDURE — 3008F BODY MASS INDEX DOCD: CPT | Mod: CPTII,,, | Performed by: INTERNAL MEDICINE

## 2023-05-30 PROCEDURE — 99213 OFFICE O/P EST LOW 20 MIN: CPT | Mod: PBBFAC | Performed by: INTERNAL MEDICINE

## 2023-05-30 PROCEDURE — 3074F PR MOST RECENT SYSTOLIC BLOOD PRESSURE < 130 MM HG: ICD-10-PCS | Mod: CPTII,,, | Performed by: INTERNAL MEDICINE

## 2023-06-01 ENCOUNTER — PATIENT MESSAGE (OUTPATIENT)
Dept: CARDIOLOGY | Facility: CLINIC | Age: 38
End: 2023-06-01
Payer: MEDICAID

## 2023-06-25 ENCOUNTER — PATIENT MESSAGE (OUTPATIENT)
Dept: OTHER | Facility: OTHER | Age: 38
End: 2023-06-25
Payer: MEDICAID

## 2023-06-26 ENCOUNTER — ROUTINE PRENATAL (OUTPATIENT)
Dept: OBSTETRICS AND GYNECOLOGY | Facility: CLINIC | Age: 38
End: 2023-06-26
Payer: MEDICAID

## 2023-06-26 ENCOUNTER — PROCEDURE VISIT (OUTPATIENT)
Dept: MATERNAL FETAL MEDICINE | Facility: CLINIC | Age: 38
End: 2023-06-26
Payer: MEDICAID

## 2023-06-26 ENCOUNTER — PATIENT MESSAGE (OUTPATIENT)
Dept: ADMINISTRATIVE | Facility: OTHER | Age: 38
End: 2023-06-26
Payer: MEDICAID

## 2023-06-26 ENCOUNTER — OFFICE VISIT (OUTPATIENT)
Dept: MATERNAL FETAL MEDICINE | Facility: CLINIC | Age: 38
End: 2023-06-26
Payer: MEDICAID

## 2023-06-26 VITALS
SYSTOLIC BLOOD PRESSURE: 112 MMHG | DIASTOLIC BLOOD PRESSURE: 62 MMHG | WEIGHT: 162.5 LBS | BODY MASS INDEX: 27.07 KG/M2 | HEIGHT: 65 IN

## 2023-06-26 VITALS — WEIGHT: 162.5 LBS | SYSTOLIC BLOOD PRESSURE: 112 MMHG | BODY MASS INDEX: 27.04 KG/M2 | DIASTOLIC BLOOD PRESSURE: 62 MMHG

## 2023-06-26 DIAGNOSIS — R87.613 HSIL ON PAP SMEAR OF CERVIX: ICD-10-CM

## 2023-06-26 DIAGNOSIS — Z86.79 HISTORY OF CAD (CORONARY ARTERY DISEASE): ICD-10-CM

## 2023-06-26 DIAGNOSIS — Z3A.16 16 WEEKS GESTATION OF PREGNANCY: ICD-10-CM

## 2023-06-26 DIAGNOSIS — Z36.89 ENCOUNTER FOR ULTRASOUND TO ASSESS FETAL GROWTH: ICD-10-CM

## 2023-06-26 DIAGNOSIS — O99.419 HEART DISEASE IN MOTHER AFFECTING PREGNANCY, ANTEPARTUM: ICD-10-CM

## 2023-06-26 DIAGNOSIS — O09.92 SUPERVISION OF HIGH RISK PREGNANCY IN SECOND TRIMESTER: Primary | ICD-10-CM

## 2023-06-26 DIAGNOSIS — Z36.2 ENCOUNTER FOR FOLLOW-UP ULTRASOUND OF FETAL ANATOMY: ICD-10-CM

## 2023-06-26 DIAGNOSIS — I51.9 HEART DISEASE IN MOTHER AFFECTING PREGNANCY, ANTEPARTUM: ICD-10-CM

## 2023-06-26 PROCEDURE — 1159F MED LIST DOCD IN RCRD: CPT | Mod: CPTII,,, | Performed by: OBSTETRICS & GYNECOLOGY

## 2023-06-26 PROCEDURE — 99213 OFFICE O/P EST LOW 20 MIN: CPT | Mod: TH,25,S$PBB, | Performed by: OBSTETRICS & GYNECOLOGY

## 2023-06-26 PROCEDURE — 3078F DIAST BP <80 MM HG: CPT | Mod: CPTII,,, | Performed by: OBSTETRICS & GYNECOLOGY

## 2023-06-26 PROCEDURE — 99213 OFFICE O/P EST LOW 20 MIN: CPT | Mod: PBBFAC,TH,25 | Performed by: OBSTETRICS & GYNECOLOGY

## 2023-06-26 PROCEDURE — 88342 IMHCHEM/IMCYTCHM 1ST ANTB: CPT | Performed by: PATHOLOGY

## 2023-06-26 PROCEDURE — 99212 OFFICE O/P EST SF 10 MIN: CPT | Mod: PBBFAC,27,TH,25 | Performed by: OBSTETRICS & GYNECOLOGY

## 2023-06-26 PROCEDURE — 99214 PR OFFICE/OUTPT VISIT, EST, LEVL IV, 30-39 MIN: ICD-10-PCS | Mod: S$PBB,TH,25, | Performed by: OBSTETRICS & GYNECOLOGY

## 2023-06-26 PROCEDURE — 99999 PR PBB SHADOW E&M-EST. PATIENT-LVL II: ICD-10-PCS | Mod: PBBFAC,,, | Performed by: OBSTETRICS & GYNECOLOGY

## 2023-06-26 PROCEDURE — 99999 PR PBB SHADOW E&M-EST. PATIENT-LVL III: ICD-10-PCS | Mod: PBBFAC,,, | Performed by: OBSTETRICS & GYNECOLOGY

## 2023-06-26 PROCEDURE — 88305 TISSUE EXAM BY PATHOLOGIST: CPT | Mod: 59 | Performed by: PATHOLOGY

## 2023-06-26 PROCEDURE — 76816 PR  US,PREGNANT UTERUS,F/U,TRANSABD APP: ICD-10-PCS | Mod: 26,S$PBB,, | Performed by: OBSTETRICS & GYNECOLOGY

## 2023-06-26 PROCEDURE — 99213 PR OFFICE/OUTPT VISIT, EST, LEVL III, 20-29 MIN: ICD-10-PCS | Mod: TH,25,S$PBB, | Performed by: OBSTETRICS & GYNECOLOGY

## 2023-06-26 PROCEDURE — 3074F PR MOST RECENT SYSTOLIC BLOOD PRESSURE < 130 MM HG: ICD-10-PCS | Mod: CPTII,,, | Performed by: OBSTETRICS & GYNECOLOGY

## 2023-06-26 PROCEDURE — 1159F PR MEDICATION LIST DOCUMENTED IN MEDICAL RECORD: ICD-10-PCS | Mod: CPTII,,, | Performed by: OBSTETRICS & GYNECOLOGY

## 2023-06-26 PROCEDURE — 76816 OB US FOLLOW-UP PER FETUS: CPT | Mod: PBBFAC | Performed by: OBSTETRICS & GYNECOLOGY

## 2023-06-26 PROCEDURE — 99214 OFFICE O/P EST MOD 30 MIN: CPT | Mod: S$PBB,TH,25, | Performed by: OBSTETRICS & GYNECOLOGY

## 2023-06-26 PROCEDURE — 99999 PR PBB SHADOW E&M-EST. PATIENT-LVL III: CPT | Mod: PBBFAC,,, | Performed by: OBSTETRICS & GYNECOLOGY

## 2023-06-26 PROCEDURE — 88342 CHG IMMUNOCYTOCHEMISTRY: ICD-10-PCS | Mod: 26,,, | Performed by: PATHOLOGY

## 2023-06-26 PROCEDURE — 57454 BX/CURETT OF CERVIX W/SCOPE: CPT | Mod: PBBFAC | Performed by: OBSTETRICS & GYNECOLOGY

## 2023-06-26 PROCEDURE — 88305 TISSUE EXAM BY PATHOLOGIST: ICD-10-PCS | Mod: 26,,, | Performed by: PATHOLOGY

## 2023-06-26 PROCEDURE — 3008F BODY MASS INDEX DOCD: CPT | Mod: CPTII,,, | Performed by: OBSTETRICS & GYNECOLOGY

## 2023-06-26 PROCEDURE — 3078F PR MOST RECENT DIASTOLIC BLOOD PRESSURE < 80 MM HG: ICD-10-PCS | Mod: CPTII,,, | Performed by: OBSTETRICS & GYNECOLOGY

## 2023-06-26 PROCEDURE — 99999 PR PBB SHADOW E&M-EST. PATIENT-LVL II: CPT | Mod: PBBFAC,,, | Performed by: OBSTETRICS & GYNECOLOGY

## 2023-06-26 PROCEDURE — 88342 IMHCHEM/IMCYTCHM 1ST ANTB: CPT | Mod: 26,,, | Performed by: PATHOLOGY

## 2023-06-26 PROCEDURE — 3074F SYST BP LT 130 MM HG: CPT | Mod: CPTII,,, | Performed by: OBSTETRICS & GYNECOLOGY

## 2023-06-26 PROCEDURE — 57454 COLPOSCOPY: ICD-10-PCS | Mod: S$PBB,,, | Performed by: OBSTETRICS & GYNECOLOGY

## 2023-06-26 PROCEDURE — 88305 TISSUE EXAM BY PATHOLOGIST: CPT | Mod: 26,,, | Performed by: PATHOLOGY

## 2023-06-26 PROCEDURE — 3008F PR BODY MASS INDEX (BMI) DOCUMENTED: ICD-10-PCS | Mod: CPTII,,, | Performed by: OBSTETRICS & GYNECOLOGY

## 2023-06-26 PROCEDURE — 76816 OB US FOLLOW-UP PER FETUS: CPT | Mod: 26,S$PBB,, | Performed by: OBSTETRICS & GYNECOLOGY

## 2023-06-26 NOTE — PROGRESS NOTES
"Maternal Fetal Medicine follow up consult    SUBJECTIVE:     Clayton Duarte is a 37 y.o.  female with IUP at 16w1d who is seen in follow up consultation by MFM.  Pregnancy complications include:   Problem   Cardiac disease in mother affecting pregnancy - H/o post partum SCAD s/p CABG        Previous notes reviewed.   No changes to medical, surgical, family, social, or obstetric history.    Interval history since last MFM visit:   Patient has no complaints today. She is overall feeling well.  Patient denies any contractions/cramping, vaginal bleeding or leakage of fluid.  Rare episodes of SOB ( baseline), denies palpitation or CP    Medications:  Current Outpatient Medications   Medication Instructions    aspirin (ECOTRIN) 81 mg, Oral, Twice weekly    metoprolol succinate (TOPROL-XL) 25 mg, Oral, Daily    prenatal vit 87-iron-folic-dha (PRENATE MINI, FERR ASP GLYCIN,) 18-1-350 mg Cap 1 each, Oral, Daily     Care team members:  Los - Primary OB  Brice - Cardiology     OBJECTIVE:   /62 (BP Location: Left arm, Patient Position: Sitting, BP Method: Medium (Manual))   Ht 5' 5" (1.651 m)   Wt 73.7 kg (162 lb 7.7 oz)   LMP 2023   BMI 27.04 kg/m²     Physical Exam  deferred    Ultrasound performed. See viewpoint for full ultrasound report.  A contreras living IUP is identified.   Biometry is consistent with previously established dating.  Embryo grossly appears normal with normal cardiac activity. Limited early anatomy appears unremarkable.   Normal uterus, cervix and adnexae as noted above.     Significant labs/imaging:  Mat T21 - low risk    ASSESSMENT/PLAN:     37 y.o.  female with IUP at 16w1d    Cardiac disease in mother affecting pregnancy - H/o post partum SCAD s/p CABG   See prior notes full counseling and recommendations  Patient has seen Dr. Narvaez with cardiology.   Recommend starting metoprolol for heart rate control. We reviewed the minimal risks of using B-Blocker " in pregnancy, with slight increased risk of growth restriction. However, we discussed the balance of R/B for this medication given her history. We agree with the medication as recommended.   Patient has been extensively counseled by MFM regarding her risk of recurrence and cardiac event during pregnancy. S  Patient was discussed with heart failure team at [facility] [place] - consultation in September.  Patient has echocardiogram scheduled 7/14/23.     Recommendations  Continue ASA 81 mg daily  Start Toprol 25XL daily per cardiology  Detailed anatomic survey at 20 weeks for AMA with MD visit  Recommend frequent maternal evaluation in the 3rd trimester  Monthly MFM follow up for growth and maternal evaluation after 16 weeks  Recommend follow up every 2 weeks with primary OB starting at 28 weeks  Patient should be enrolled in Connected MOM for close BP monitoring. Recommend maintaining BP <140/90 throughout pregnancy. The patient does not have a history of hypertension. If anti-hypertensives are indicated, would recommend discussion with MFM prior to initiating.   If the patient has any cardiac symptoms - chest pain, palpitations, shortness of breath - she should be urgently evaluated in a hospital or ED setting  Will make recommendations for delivery timing pending maternal status as pregnancy progresses  Recommend telemetry during labor and for at least 48 hours postpartum. Consider ICU admission after delivery pending maternal status and pathology evolution.   Close monitoring of fluid status intrapartum with strict I/Os. Maintain euvolemia.  Anticipate hospitalization for at least 3-4 days postpartum to monitor maternal cardiac status   Recommend continued regular follow up with cardiology every trimester or more often as indicated per cardiology.      Patient was counseled that prenatal ultrasound studies have limitations. They do not detect all fetal, genetic, placental, and maternal abnormalities. A normal appearing prenatal  ultrasound is reassuring. However, it does not guarantee the absence of an abnormality or predict a normal outcome for the fetus or the mother.       FOLLOW UP:   A follow up ultrasound will be made for 4 weeks from today. A follow up MFM MD visit will be made for same day.       The patient was given an opportunity to ask questions about the management of her high risk pregnancy problems. She expressed an understanding of and agreement to the above impression and plan. All questions were answered to her satisfaction.    31 minutes of total time spent on the encounter, which includes face to face time and non-face to face time preparing to see the patient (eg, review of tests), obtaining and/or reviewing separately obtained history, documenting clinical information in the electronic or other health record, independently interpreting results (not separately reported) and communicating results to the patient/family/caregiver, or care coordination (not separately reported).        Jonny Uriostegui MD   Maternal-Fetal Medicine      Electronically Signed by Jonny Uriostegui June 26, 2023

## 2023-06-26 NOTE — ASSESSMENT & PLAN NOTE
See prior notes full counseling and recommendations  Patient has seen Dr. Narvaez with cardiology.   Recommend starting metoprolol for heart rate control. We reviewed the minimal risks of using B-Blocker in pregnancy, with slight increased risk of growth restriction. However, we discussed the balance of R/B for this medication given her history. We agree with the medication as recommended.   Patient has been extensively counseled by MFM regarding her risk of recurrence and cardiac event during pregnancy. S  Patient was discussed with heart failure team at [facility] [place] - consultation in September.  Patient has echocardiogram scheduled 7/14/23.     Recommendations  · Continue ASA 81 mg daily  · Start Toprol 25XL daily per cardiology  · Detailed anatomic survey at 20 weeks for AMA with MD visit  · Recommend frequent maternal evaluation in the 3rd trimester  · Monthly MFM follow up for growth and maternal evaluation after 16 weeks  · Recommend follow up every 2 weeks with primary OB starting at 28 weeks  · Patient should be enrolled in Connected MOM for close BP monitoring. Recommend maintaining BP <140/90 throughout pregnancy. The patient does not have a history of hypertension. If anti-hypertensives are indicated, would recommend discussion with MFM prior to initiating.   · If the patient has any cardiac symptoms - chest pain, palpitations, shortness of breath - she should be urgently evaluated in a hospital or ED setting  · Will make recommendations for delivery timing pending maternal status as pregnancy progresses  · Recommend telemetry during labor and for at least 48 hours postpartum. Consider ICU admission after delivery pending maternal status and pathology evolution.   · Close monitoring of fluid status intrapartum with strict I/Os. Maintain euvolemia.  · Anticipate hospitalization for at least 3-4 days postpartum to monitor maternal cardiac status   · Recommend continued regular follow up with cardiology every  trimester or more often as indicated per cardiology.

## 2023-06-26 NOTE — PROCEDURES
Colposcopy    Date/Time: 6/26/2023 10:45 AM  Performed by: Mitzy Madison MD  Authorized by: Mitzy Madison MD     Consent Done?:  Yes (Written)  Timeout:Immediately prior to procedure a time out was called to verify the correct patient, procedure, equipment, support staff and site/side marked as required  Prep:Patient was prepped and draped in the usual sterile fashion  Assistants?: No      Colposcopy Site:  Cervix  Position:  Supine  Acrowhite Lesion? Yes    Atypical Vessels: No    Transformation Zone Adequate?: Yes    Biopsy?: Yes         Location:  Cervix ((6 00 and 12 00))  ECC Performed?: Yes    LEEP Performed?: Yes     Patient tolerated the procedure well with no immediate complications.   Post-operative instructions were provided for the patient.   Patient was discharged and will follow up if any complications occur

## 2023-06-26 NOTE — PROGRESS NOTES
Good FM. Denies VB, LOF, CTX.     /62   Wt 73.7 kg (162 lb 7.7 oz)   LMP 2023   BMI 27.04 kg/m²     37 y.o., at 16w1d by Estimated Date of Delivery: 12/10/23  Patient Active Problem List   Diagnosis    Chronic anemia    History of CAD (coronary artery disease)    Preoperative cardiovascular examination    Iron deficiency anemia    Chest pain, non-cardiac    Umbilical hernia without obstruction and without gangrene    Low TSH level    Chest pain at rest    Palpitations    Microcytic anemia    Vitamin D deficiency    Serum calcium elevated    Positive pregnancy test    Cardiac disease in mother affecting pregnancy - H/o post partum SCAD s/p CABG     AMA (advanced maternal age) multigravida 35+     OB History    Para Term  AB Living   4 3 3     3   SAB IAB Ectopic Multiple Live Births           3      # Outcome Date GA Lbr Jose Alberto/2nd Weight Sex Delivery Anes PTL Lv   4 Current            3 Term 13 39w2d 103:30 / 00:23 3.025 kg (6 lb 10.7 oz) F Vag-Spont EPI N RENEE      Birth Comments: 28    2 Term 09 39w0d 10:00 3.629 kg (8 lb) M Vag-Spont EPI N RENEE   1 Term 06 39w0d 09:00 3.033 kg (6 lb 11 oz) F Vag-Spont EPI N RENEE       Dating reviewed    Allergies and problem list reviewed and updated    Medical and surgical history reviewed    Prenatal labs reviewed and updated    Physical Exam:  ABD: soft, gravid, nontender    Assessment:  Clayton was seen today for routine prenatal visit.    Diagnoses and all orders for this visit:    History of CAD (coronary artery disease)  -     Protein/Creatinine Ratio, Urine    Supervision of high risk pregnancy in second trimester  -     OB Glucose Screen; Future  -     CBC Auto Differential; Future    HSIL on Pap smear of cervix  -     Specimen to Pathology, Ob/Gyn        Orders Placed This Encounter   Procedures    Protein/Creatinine Ratio, Urine    OB Glucose Screen    CBC Auto Differential     Colpo done, see  note.  Discussed AFP and she declined.  MFM/US to be scheduled in 4 weeks. I am out that week so for now patient will keep MFM appt and contact me if any issues between now and 24 weeks.   Glucola scheduled at 24 weeks with next visit with me.  Labor, ROM and bleeding precautions.     No follow-ups on file.

## 2023-06-27 ENCOUNTER — PATIENT MESSAGE (OUTPATIENT)
Dept: MATERNAL FETAL MEDICINE | Facility: CLINIC | Age: 38
End: 2023-06-27
Payer: MEDICAID

## 2023-06-27 DIAGNOSIS — I51.9 HEART DISEASE IN MOTHER AFFECTING PREGNANCY, ANTEPARTUM: ICD-10-CM

## 2023-06-27 DIAGNOSIS — Z36.89 ENCOUNTER FOR FETAL ANATOMIC SURVEY: Primary | ICD-10-CM

## 2023-06-27 DIAGNOSIS — O99.419 HEART DISEASE IN MOTHER AFFECTING PREGNANCY, ANTEPARTUM: ICD-10-CM

## 2023-06-30 LAB
FINAL PATHOLOGIC DIAGNOSIS: NORMAL
GROSS: NORMAL
Lab: NORMAL

## 2023-07-02 ENCOUNTER — PATIENT MESSAGE (OUTPATIENT)
Dept: OTHER | Facility: OTHER | Age: 38
End: 2023-07-02
Payer: MEDICAID

## 2023-07-06 ENCOUNTER — PATIENT MESSAGE (OUTPATIENT)
Dept: OBSTETRICS AND GYNECOLOGY | Facility: CLINIC | Age: 38
End: 2023-07-06
Payer: MEDICAID

## 2023-07-12 NOTE — PROGRESS NOTES
Subjective:   Initial evaluation of SCAD/peripartum care in setting of high risk pregnancy    HPI:  Ms. Duarte is a 37 y.o. year old Black or  female who has presents to be considered for advanced surgical options (LVAD/OHT).  Clayton Duarte is a 36 y.o. female who presents with second pregnancy with history of SCAD s/p CABG x 2 with LIMA to LAD and SVG to om1. She feels great, feels she has not significant heart fialure symptoms, or trouble with chest pain, chest pressure, abomdinal pain. Patient denies N/V/F/C, lightheadedness, dizziness, PND, orthopnea, LE edema, abdominal pain, abdominal pressure, chest pain, chest pressure, syncope, pre-syncope. She has 3 kids, no issues with the last 2 pregnancies, but the 3rd pregnancy led to severe chest pain with ST changes. Diagnostic cath revealed spontaneous postpartum coronary dissection and she underwent 2 vessel cabg as a resutl. She is doing well now, not smoking, not drinking, and is going to see both us and Dr. Mondragon in [facility]. Of note, she does not seem to be on BB, she believes was taken off.      TTE 05/02/23:  TDS  Low normal systolic function.  The estimated ejection fraction is 50%.  Normal left ventricular diastolic function.  There are segmental left ventricular wall motion abnormalities. Basal-mid anteroseptal hypokinesis  Normal right ventricular size with normal right ventricular systolic function.  Mild left atrial enlargement.  Mild mitral regurgitation.     Past Medical History:   Diagnosis Date    Abnormal Pap smear of cervix 2008, 2009    colposcopy    Anemia     Coronary artery dissection 9/19/2013    Postpartum depression     Spinal headache complicating labor and delivery, postpartum condition 9/6/2013     Past Surgical History:   Procedure Laterality Date    CARDIAC SURGERY      CORONARY ARTERY BYPASS GRAFT  9/13    tanner to lad, svg OM1    WISDOM TOOTH EXTRACTION         Family History   Problem Relation Age of  "Onset    Hypertension Mother     Diabetes Mother     Stroke Mother     Breast cancer Neg Hx     Ovarian cancer Neg Hx     Colon cancer Neg Hx     Cancer Neg Hx     Heart disease Neg Hx        Review of Systems   Constitutional: Negative for chills, decreased appetite, diaphoresis, fever, malaise/fatigue, weight gain and weight loss.   Eyes:  Negative for visual disturbance.   Cardiovascular:  Negative for chest pain, claudication, cyanosis, dyspnea on exertion, irregular heartbeat, leg swelling, near-syncope, orthopnea, palpitations, paroxysmal nocturnal dyspnea and syncope.   Respiratory:  Negative for cough, hemoptysis, shortness of breath, sleep disturbances due to breathing, snoring, sputum production and wheezing.    Hematologic/Lymphatic: Negative for adenopathy and bleeding problem. Does not bruise/bleed easily.   Skin:  Negative for color change, poor wound healing, rash, skin cancer and suspicious lesions.   Musculoskeletal:  Negative for back pain, falls, gout, joint pain and muscle weakness.   Gastrointestinal:  Negative for bloating, abdominal pain, anorexia, constipation, diarrhea, heartburn, hematemesis, hematochezia, hemorrhoids, melena, nausea and vomiting.   Genitourinary:  Negative for nocturia and urgency.   Neurological:  Negative for excessive daytime sleepiness, dizziness, focal weakness, headaches, light-headedness, paresthesias, tremors and weakness.   Psychiatric/Behavioral:  Negative for depression and memory loss. The patient does not have insomnia and is not nervous/anxious.      Objective:   Blood pressure 119/70, pulse 92, height 5' 5" (1.651 m), weight 73.5 kg (162 lb 0.6 oz), last menstrual period 03/06/2023.body mass index is 26.96 kg/m².    Physical Exam  Vitals and nursing note reviewed.   Constitutional:       General: She is not in acute distress.     Appearance: She is well-developed. She is not diaphoretic.   HENT:      Head: Normocephalic and atraumatic.   Eyes:      General:  "        Right eye: No discharge.         Left eye: No discharge.      Conjunctiva/sclera: Conjunctivae normal.   Neck:      Vascular: No JVD.   Cardiovascular:      Rate and Rhythm: Normal rate and regular rhythm.      Heart sounds: No murmur heard.    No friction rub. No gallop.   Pulmonary:      Effort: Pulmonary effort is normal.      Breath sounds: Normal breath sounds. No wheezing or rales.   Chest:      Chest wall: No tenderness.   Abdominal:      General: Bowel sounds are normal. There is no distension.      Palpations: Abdomen is soft. There is no mass.      Tenderness: There is no abdominal tenderness. There is no guarding or rebound.   Musculoskeletal:         General: No tenderness. Normal range of motion.      Cervical back: Normal range of motion and neck supple.   Skin:     General: Skin is warm and dry.      Findings: No erythema or rash.   Neurological:      Mental Status: She is alert and oriented to person, place, and time.   Psychiatric:         Behavior: Behavior normal.         Thought Content: Thought content normal.         Judgment: Judgment normal.       Labs:      Chemistry        Component Value Date/Time     04/15/2023 1455    K 4.3 04/15/2023 1455     04/15/2023 1455    CO2 21 (L) 04/15/2023 1455    BUN 9 04/15/2023 1455    CREATININE 0.8 04/15/2023 1455     04/15/2023 1455        Component Value Date/Time    CALCIUM 11.0 (H) 04/15/2023 1455    ALKPHOS 51 (L) 04/15/2023 1455    AST 16 04/15/2023 1455    ALT 13 04/15/2023 1455    BILITOT 0.2 04/15/2023 1455    ESTGFRAFRICA >60.0 05/13/2022 0736    EGFRNONAA >60.0 05/13/2022 0736          Magnesium   Date Value Ref Range Status   09/24/2013 2.0 1.6 - 2.6 mg/dL Final     Lab Results   Component Value Date    WBC 9.53 05/24/2023    HGB 11.6 (L) 05/24/2023    HCT 37.4 05/24/2023    MCV 86 05/24/2023     05/24/2023     BNP   Date Value Ref Range Status   12/04/2017 32 0 - 99 pg/mL Final     Comment:     Values of less  than 100 pg/ml are consistent with non-CHF populations.   02/15/2017 34 0 - 99 pg/mL Final     Comment:     Values of less than 100 pg/ml are consistent with non-CHF populations.   02/11/2014 56 0 - 99 pg/mL Final     Comment:     Values of less than 100 pg/ml are consistent with non-CHF populations.     No results found for this or any previous visit.      Labs were reviewed with the patient.    Assessment:      1. History of CAD (coronary artery disease)    2. Spontaneous dissection of coronary artery        Plan:   Discussed with patient, will see her back in next trimester of pregnancy with repeat echo again. Discussed with Dr. Narvaez she will place her back on BB given her hx of SCAD and LVEF 50%. Monitor closely and continue current therapy otherwise. Seems well compensated and asymptomatic.   Thank you for allowing us to participate in the cardiovascular management of this patient. We look forward to partnering in their care. Please contact us with any questions or concerns you may have regarding this patient.   Recommend 2 gram sodium restriction and 1500cc fluid restriction.  Encourage physical activity with graded exercise program.  Requested patient to weigh themselves daily, and to notify us if their weight increases by more than 3 lbs in 1 day or 5 lbs in 1 week.     Martha Mckenzie MD

## 2023-07-14 ENCOUNTER — HOSPITAL ENCOUNTER (OUTPATIENT)
Dept: CARDIOLOGY | Facility: HOSPITAL | Age: 38
Discharge: HOME | End: 2023-07-14
Attending: INTERNAL MEDICINE
Payer: MEDICAID

## 2023-07-14 VITALS
WEIGHT: 162 LBS | DIASTOLIC BLOOD PRESSURE: 82 MMHG | HEIGHT: 65 IN | SYSTOLIC BLOOD PRESSURE: 128 MMHG | BODY MASS INDEX: 26.99 KG/M2 | HEART RATE: 68 BPM

## 2023-07-14 DIAGNOSIS — Z95.1 S/P CABG X 2: ICD-10-CM

## 2023-07-14 DIAGNOSIS — I25.42 CORONARY ARTERY DISSECTION: ICD-10-CM

## 2023-07-14 DIAGNOSIS — Z3A.10 10 WEEKS GESTATION OF PREGNANCY: ICD-10-CM

## 2023-07-14 DIAGNOSIS — I51.9 HEART DISEASE IN MOTHER AFFECTING PREGNANCY IN FIRST TRIMESTER: ICD-10-CM

## 2023-07-14 DIAGNOSIS — Z86.79 HISTORY OF CAD (CORONARY ARTERY DISEASE): ICD-10-CM

## 2023-07-14 DIAGNOSIS — O99.411 HEART DISEASE IN MOTHER AFFECTING PREGNANCY IN FIRST TRIMESTER: ICD-10-CM

## 2023-07-14 LAB
ASCENDING AORTA: 2.51 CM
AV INDEX (PROSTH): 0.66
AV MEAN GRADIENT: 2 MMHG
AV PEAK GRADIENT: 4 MMHG
AV VALVE AREA: 1.81 CM2
AV VELOCITY RATIO: 0.65
BSA FOR ECHO PROCEDURE: 1.84 M2
CV ECHO LV RWT: 0.26 CM
DOP CALC AO PEAK VEL: 1 M/S
DOP CALC AO VTI: 17.3 CM
DOP CALC LVOT AREA: 2.7 CM2
DOP CALC LVOT DIAMETER: 1.87 CM
DOP CALC LVOT PEAK VEL: 0.65 M/S
DOP CALC LVOT STROKE VOLUME: 31.24 CM3
DOP CALCLVOT PEAK VEL VTI: 11.38 CM
E WAVE DECELERATION TIME: 155.43 MSEC
E/A RATIO: 1.06
E/E' RATIO: 4.76 M/S
ECHO LV POSTERIOR WALL: 0.64 CM (ref 0.6–1.1)
EJECTION FRACTION: 48 %
FRACTIONAL SHORTENING: 25 % (ref 28–44)
INTERVENTRICULAR SEPTUM: 0.66 CM (ref 0.6–1.1)
LA MAJOR: 3.59 CM
LA MINOR: 3.93 CM
LA WIDTH: 2.58 CM
LEFT ATRIUM SIZE: 2.75 CM
LEFT ATRIUM VOLUME INDEX MOD: 9.3 ML/M2
LEFT ATRIUM VOLUME INDEX: 12.5 ML/M2
LEFT ATRIUM VOLUME MOD: 16.88 CM3
LEFT ATRIUM VOLUME: 22.63 CM3
LEFT INTERNAL DIMENSION IN SYSTOLE: 3.68 CM (ref 2.1–4)
LEFT VENTRICLE DIASTOLIC VOLUME INDEX: 62.99 ML/M2
LEFT VENTRICLE DIASTOLIC VOLUME: 114.02 ML
LEFT VENTRICLE MASS INDEX: 56 G/M2
LEFT VENTRICLE SYSTOLIC VOLUME INDEX: 31.7 ML/M2
LEFT VENTRICLE SYSTOLIC VOLUME: 57.39 ML
LEFT VENTRICULAR INTERNAL DIMENSION IN DIASTOLE: 4.92 CM (ref 3.5–6)
LEFT VENTRICULAR MASS: 101.73 G
LV LATERAL E/E' RATIO: 4.31 M/S
LV SEPTAL E/E' RATIO: 5.31 M/S
MV PEAK A VEL: 0.65 M/S
MV PEAK E VEL: 0.69 M/S
MV STENOSIS PRESSURE HALF TIME: 45.08 MS
MV VALVE AREA P 1/2 METHOD: 4.88 CM2
RA MAJOR: 4.04 CM
RA PRESSURE: 3 MMHG
RA WIDTH: 3.41 CM
RIGHT VENTRICULAR END-DIASTOLIC DIMENSION: 3.28 CM
SINUS: 2.53 CM
STJ: 2.53 CM
TDI LATERAL: 0.16 M/S
TDI SEPTAL: 0.13 M/S
TDI: 0.15 M/S
TRICUSPID ANNULAR PLANE SYSTOLIC EXCURSION: 1.39 CM

## 2023-07-14 PROCEDURE — 93306 ECHO (CUPID ONLY): ICD-10-PCS | Mod: 26,,, | Performed by: INTERNAL MEDICINE

## 2023-07-14 PROCEDURE — 93306 TTE W/DOPPLER COMPLETE: CPT | Mod: 26,,, | Performed by: INTERNAL MEDICINE

## 2023-07-14 PROCEDURE — 93306 TTE W/DOPPLER COMPLETE: CPT

## 2023-07-19 ENCOUNTER — PATIENT MESSAGE (OUTPATIENT)
Dept: CARDIOLOGY | Facility: CLINIC | Age: 38
End: 2023-07-19
Payer: MEDICAID

## 2023-07-19 NOTE — PROGRESS NOTES
As Dr. Narvaez requested me to do is to inform Ms. Duarte about her test results. I tried to call but did not get a response. I then messaged her stating that her echo (ultrasound of the heart) is borderline abnormal  With heart function low normal but unchanged.

## 2023-07-19 NOTE — PROGRESS NOTES
plz let pt. Know that echo (ultrasound of the heart) is borderline abnormal  With heart function low normal but unchanged.

## 2023-07-23 ENCOUNTER — PATIENT MESSAGE (OUTPATIENT)
Dept: OTHER | Facility: OTHER | Age: 38
End: 2023-07-23
Payer: MEDICAID

## 2023-07-26 ENCOUNTER — PATIENT MESSAGE (OUTPATIENT)
Dept: MATERNAL FETAL MEDICINE | Facility: CLINIC | Age: 38
End: 2023-07-26
Payer: MEDICAID

## 2023-07-27 ENCOUNTER — PROCEDURE VISIT (OUTPATIENT)
Dept: MATERNAL FETAL MEDICINE | Facility: CLINIC | Age: 38
End: 2023-07-27
Payer: MEDICAID

## 2023-07-27 ENCOUNTER — OFFICE VISIT (OUTPATIENT)
Dept: MATERNAL FETAL MEDICINE | Facility: CLINIC | Age: 38
End: 2023-07-27
Payer: MEDICAID

## 2023-07-27 VITALS
DIASTOLIC BLOOD PRESSURE: 59 MMHG | BODY MASS INDEX: 27.1 KG/M2 | HEIGHT: 65 IN | SYSTOLIC BLOOD PRESSURE: 117 MMHG | WEIGHT: 162.69 LBS

## 2023-07-27 DIAGNOSIS — I51.9 HEART DISEASE IN MOTHER AFFECTING PREGNANCY IN SECOND TRIMESTER: ICD-10-CM

## 2023-07-27 DIAGNOSIS — I51.9 HEART DISEASE IN MOTHER AFFECTING PREGNANCY, ANTEPARTUM: ICD-10-CM

## 2023-07-27 DIAGNOSIS — O99.419 HEART DISEASE IN MOTHER AFFECTING PREGNANCY, ANTEPARTUM: ICD-10-CM

## 2023-07-27 DIAGNOSIS — Z36.2 ENCOUNTER FOR FOLLOW-UP ULTRASOUND OF FETAL ANATOMY: ICD-10-CM

## 2023-07-27 DIAGNOSIS — Z36.89 ENCOUNTER FOR ULTRASOUND TO ASSESS FETAL GROWTH: Primary | ICD-10-CM

## 2023-07-27 DIAGNOSIS — O99.412 HEART DISEASE IN MOTHER AFFECTING PREGNANCY IN SECOND TRIMESTER: ICD-10-CM

## 2023-07-27 DIAGNOSIS — Z36.89 ENCOUNTER FOR FETAL ANATOMIC SURVEY: ICD-10-CM

## 2023-07-27 PROCEDURE — 3078F PR MOST RECENT DIASTOLIC BLOOD PRESSURE < 80 MM HG: ICD-10-PCS | Mod: CPTII,,, | Performed by: OBSTETRICS & GYNECOLOGY

## 2023-07-27 PROCEDURE — 1159F MED LIST DOCD IN RCRD: CPT | Mod: CPTII,,, | Performed by: OBSTETRICS & GYNECOLOGY

## 2023-07-27 PROCEDURE — 3008F PR BODY MASS INDEX (BMI) DOCUMENTED: ICD-10-PCS | Mod: CPTII,,, | Performed by: OBSTETRICS & GYNECOLOGY

## 2023-07-27 PROCEDURE — 99214 OFFICE O/P EST MOD 30 MIN: CPT | Mod: TH,25,S$PBB, | Performed by: OBSTETRICS & GYNECOLOGY

## 2023-07-27 PROCEDURE — 99999 PR PBB SHADOW E&M-EST. PATIENT-LVL III: CPT | Mod: PBBFAC,,, | Performed by: OBSTETRICS & GYNECOLOGY

## 2023-07-27 PROCEDURE — 99214 PR OFFICE/OUTPT VISIT, EST, LEVL IV, 30-39 MIN: ICD-10-PCS | Mod: TH,25,S$PBB, | Performed by: OBSTETRICS & GYNECOLOGY

## 2023-07-27 PROCEDURE — 3074F PR MOST RECENT SYSTOLIC BLOOD PRESSURE < 130 MM HG: ICD-10-PCS | Mod: CPTII,,, | Performed by: OBSTETRICS & GYNECOLOGY

## 2023-07-27 PROCEDURE — 76811 OB US DETAILED SNGL FETUS: CPT | Mod: PBBFAC | Performed by: OBSTETRICS & GYNECOLOGY

## 2023-07-27 PROCEDURE — 76811 US MFM PROCEDURE (VIEWPOINT): ICD-10-PCS | Mod: 26,S$PBB,, | Performed by: OBSTETRICS & GYNECOLOGY

## 2023-07-27 PROCEDURE — 3074F SYST BP LT 130 MM HG: CPT | Mod: CPTII,,, | Performed by: OBSTETRICS & GYNECOLOGY

## 2023-07-27 PROCEDURE — 99999 PR PBB SHADOW E&M-EST. PATIENT-LVL III: ICD-10-PCS | Mod: PBBFAC,,, | Performed by: OBSTETRICS & GYNECOLOGY

## 2023-07-27 PROCEDURE — 3078F DIAST BP <80 MM HG: CPT | Mod: CPTII,,, | Performed by: OBSTETRICS & GYNECOLOGY

## 2023-07-27 PROCEDURE — 1159F PR MEDICATION LIST DOCUMENTED IN MEDICAL RECORD: ICD-10-PCS | Mod: CPTII,,, | Performed by: OBSTETRICS & GYNECOLOGY

## 2023-07-27 PROCEDURE — 3008F BODY MASS INDEX DOCD: CPT | Mod: CPTII,,, | Performed by: OBSTETRICS & GYNECOLOGY

## 2023-07-27 PROCEDURE — 99213 OFFICE O/P EST LOW 20 MIN: CPT | Mod: PBBFAC,TH,25 | Performed by: OBSTETRICS & GYNECOLOGY

## 2023-07-27 NOTE — ASSESSMENT & PLAN NOTE
Cardiology: [facility] ([facility])  Last Cards visit: 5/2023  NYHA Class: Class II  [facility] Risk Class: II/III  Last EKG 5/2/23: NSR; rightward axis with septal infarct; T wave abnl, consider anterior ischemia  Last TTE 7/2023:   The left ventricle is normal in size with mildly decreased systolic function.   The estimated ejection fraction is 48%.   There is abnormal septal wall motion.   There are segmental left ventricular wall motion abnormalities.   Grade I left ventricular diastolic dysfunction.   Normal right ventricular size with low normal right ventricular systolic function.   Normal central venous pressure (3 mmHg).  Other evaluations: Stress test 1/2022; Negative for myocardial ischemia; Some segments were hypokinetic at peak stress (discontinued due to leg pain)  Pre-pregnancy regimen:  - Atorvastatin  - ASA 81 mg      Current regimen:   - Metoprolol 25mg daily  - ASA 81mg daily    She has previously been counseled regarding SCAD recurrence risk of approximately 10%. Recent literature suggests exposure to pregnancy does not necessarily increase this recurrence risk; however, her cardiac history does give her a CARPREG 2 score of >4, making her risk of acute cardiac event during pregnancy at least 40%.     See prior notes full counseling and recommendations  She denies cardiac symptoms today - no persistent CP, SOB, dyspnea on exertion.     Recommendations  · Continue medications as listed above  · MFM MD visit and ultrasound q4 weeks for the duration of pregnancy  · Recommend follow up every 2 weeks with primary OB starting at 28 weeks  · Will coordinate OB anesthesia consult at 28-32 weeks  · Continue home BP monitoring on Connected MOM. Recommend maintaining BP <140/90 throughout pregnancy. The patient does not have a history of hypertension. If anti-hypertensives are indicated, would recommend discussion with MFM prior to initiating.   · If the patient has any cardiac symptoms - chest pain,  palpitations, shortness of breath - she should be urgently evaluated in a hospital or ED setting  · Delivery timing: likely 38 or 39 weeks, will determine as pregnancy progresses  · Recommend telemetry during labor and for at least 48 hours postpartum. Consider ICU admission after delivery pending maternal status and pathology evolution.   · Close monitoring of fluid status intrapartum with strict I/Os. Maintain euvolemia.  · Anticipate hospitalization for at least 3-4 days postpartum to monitor maternal cardiac status   · Recommend continued regular follow up with cardiology every trimester or more often as indicated per cardiology.

## 2023-07-27 NOTE — PROGRESS NOTES
Maternal Fetal Medicine follow up consult    SUBJECTIVE:     Padmini Duarte is a 37 y.o.  female with IUP at 20w4d  who is seen in follow up consultation by MFM.  Pregnancy complications include:   Problem   Cardiac disease in mother affecting pregnancy - H/o post partum SCAD s/p CABG        Previous notes reviewed.   No changes to medical, surgical, family, social, or obstetric history.    Interval history since last [facility] visit: no changes. The patient reports adequate fetal movement. She denies leakage of fluid, vaginal bleeding, contractions.  The patient denies symptoms of pre-eclampsia including headache, blurred vision, right upper quadrant pain, chest pain, and shortness of breath.     Medications:  Current Outpatient Medications on File Prior to Visit   Medication Sig Dispense Refill    aspirin (ECOTRIN) 81 MG EC tablet Take 81 mg by mouth twice a week.      metoprolol succinate (TOPROL-XL) 25 MG 24 hr tablet Take 1 tablet (25 mg total) by mouth once daily. 90 tablet 3    prenatal vit 87-iron-folic-dha (PRENATE MINI, FERR ASP GLYCIN,) 18-1-350 mg Cap Take 1 each by mouth Daily. 30 capsule 11     No current facility-administered medications on file prior to visit.        OBJECTIVE:     Blood Pressure: 117/59    Ultrasound performed. See viewpoint for full ultrasound report.      ASSESSMENT/PLAN:     37 y.o.  female with IUP at 20w4d     Problems addressed at today's visit:  Cardiac disease in mother affecting pregnancy - H/o post partum SCAD s/p CABG   Cardiology: [facility] ([facility])  Last Cards visit: 2023  NYHA Class: Class II  [facility] Risk Class: II/III  Last EKG 23: NSR; rightward axis with septal infarct; T wave abnl, consider anterior ischemia  Last TTE 2023:  The left ventricle is normal in size with mildly decreased systolic function.  The estimated ejection fraction is 48%.  There is abnormal septal wall motion.  There are segmental left ventricular wall motion  abnormalities.  Grade I left ventricular diastolic dysfunction.  Normal right ventricular size with low normal right ventricular systolic function.  Normal central venous pressure (3 mmHg).  Other evaluations: Stress test 1/2022; Negative for myocardial ischemia; Some segments were hypokinetic at peak stress (discontinued due to leg pain)  Pre-pregnancy regimen:  - Atorvastatin  - ASA 81 mg      Current regimen:   - Metoprolol 25mg daily  - ASA 81mg daily    She has previously been counseled regarding SCAD recurrence risk of approximately 10%. Recent literature suggests exposure to pregnancy does not necessarily increase this recurrence risk; however, her cardiac history does give her a CARPREG 2 score of >4, making her risk of acute cardiac event during pregnancy at least 40%.     See prior notes full counseling and recommendations  She denies cardiac symptoms today - no persistent CP, SOB, dyspnea on exertion.     Recommendations  Continue medications as listed above  MFM MD visit and ultrasound q4 weeks for the duration of pregnancy  Recommend follow up every 2 weeks with primary OB starting at 28 weeks  Will coordinate OB anesthesia consult at 28-32 weeks  Continue home BP monitoring on Connected MOM. Recommend maintaining BP <140/90 throughout pregnancy. The patient does not have a history of hypertension. If anti-hypertensives are indicated, would recommend discussion with MFM prior to initiating.   If the patient has any cardiac symptoms - chest pain, palpitations, shortness of breath - she should be urgently evaluated in a hospital or ED setting  Delivery timing: likely 38 or 39 weeks, will determine as pregnancy progresses  Recommend telemetry during labor and for at least 48 hours postpartum. Consider ICU admission after delivery pending maternal status and pathology evolution.   Close monitoring of fluid status intrapartum with strict I/Os. Maintain euvolemia.  Anticipate hospitalization for at least 3-4  days postpartum to monitor maternal cardiac status   Recommend continued regular follow up with cardiology every trimester or more often as indicated per cardiology.    Please see original MFM consultation for full details regarding management recommendations of these and other obstetric co-morbidities    FOLLOW UP:   F/u in 4 weeks for US/MFM visit    Today I have spent 30 minutes in the care of the patient. This includes face to face time and non-face to face time preparing to see the patient (eg, review of tests), obtaining and/or reviewing separately obtained history, documenting clinical information in the electronic or other health record, independently interpreting results and communicating results to the patient/family/caregiver, or care coordination.     Sophia Yuan MD  Maternal Fetal Medicine

## 2023-08-20 ENCOUNTER — PATIENT MESSAGE (OUTPATIENT)
Dept: OTHER | Facility: OTHER | Age: 38
End: 2023-08-20
Payer: MEDICAID

## 2023-08-21 DIAGNOSIS — Z86.79 HISTORY OF CAD (CORONARY ARTERY DISEASE): Primary | ICD-10-CM

## 2023-08-23 ENCOUNTER — ROUTINE PRENATAL (OUTPATIENT)
Dept: OBSTETRICS AND GYNECOLOGY | Facility: CLINIC | Age: 38
End: 2023-08-23
Payer: MEDICAID

## 2023-08-23 ENCOUNTER — LAB VISIT (OUTPATIENT)
Dept: LAB | Facility: OTHER | Age: 38
End: 2023-08-23
Attending: OBSTETRICS & GYNECOLOGY
Payer: MEDICAID

## 2023-08-23 VITALS
SYSTOLIC BLOOD PRESSURE: 112 MMHG | DIASTOLIC BLOOD PRESSURE: 72 MMHG | BODY MASS INDEX: 27.33 KG/M2 | WEIGHT: 164.25 LBS

## 2023-08-23 DIAGNOSIS — Z86.79 HISTORY OF CAD (CORONARY ARTERY DISEASE): ICD-10-CM

## 2023-08-23 DIAGNOSIS — Z3A.24 24 WEEKS GESTATION OF PREGNANCY: ICD-10-CM

## 2023-08-23 DIAGNOSIS — I51.9 HEART DISEASE IN MOTHER AFFECTING PREGNANCY IN SECOND TRIMESTER: ICD-10-CM

## 2023-08-23 DIAGNOSIS — O99.412 HEART DISEASE IN MOTHER AFFECTING PREGNANCY IN SECOND TRIMESTER: ICD-10-CM

## 2023-08-23 DIAGNOSIS — O09.92 SUPERVISION OF HIGH RISK PREGNANCY IN SECOND TRIMESTER: Primary | ICD-10-CM

## 2023-08-23 DIAGNOSIS — O09.92 SUPERVISION OF HIGH RISK PREGNANCY IN SECOND TRIMESTER: ICD-10-CM

## 2023-08-23 LAB
BASOPHILS # BLD AUTO: 0.02 K/UL (ref 0–0.2)
BASOPHILS NFR BLD: 0.2 % (ref 0–1.9)
CREAT UR-MCNC: 87 MG/DL (ref 15–325)
DIFFERENTIAL METHOD: ABNORMAL
EOSINOPHIL # BLD AUTO: 0.1 K/UL (ref 0–0.5)
EOSINOPHIL NFR BLD: 0.9 % (ref 0–8)
ERYTHROCYTE [DISTWIDTH] IN BLOOD BY AUTOMATED COUNT: 14.1 % (ref 11.5–14.5)
GLUCOSE SERPL-MCNC: 94 MG/DL (ref 70–140)
HCT VFR BLD AUTO: 33.6 % (ref 37–48.5)
HGB BLD-MCNC: 10.6 G/DL (ref 12–16)
IMM GRANULOCYTES # BLD AUTO: 0.06 K/UL (ref 0–0.04)
IMM GRANULOCYTES NFR BLD AUTO: 0.7 % (ref 0–0.5)
LYMPHOCYTES # BLD AUTO: 1.5 K/UL (ref 1–4.8)
LYMPHOCYTES NFR BLD: 16 % (ref 18–48)
MCH RBC QN AUTO: 28.3 PG (ref 27–31)
MCHC RBC AUTO-ENTMCNC: 31.5 G/DL (ref 32–36)
MCV RBC AUTO: 90 FL (ref 82–98)
MONOCYTES # BLD AUTO: 0.6 K/UL (ref 0.3–1)
MONOCYTES NFR BLD: 6.1 % (ref 4–15)
NEUTROPHILS # BLD AUTO: 7 K/UL (ref 1.8–7.7)
NEUTROPHILS NFR BLD: 76.1 % (ref 38–73)
NRBC BLD-RTO: 0 /100 WBC
PLATELET # BLD AUTO: 225 K/UL (ref 150–450)
PMV BLD AUTO: 10.1 FL (ref 9.2–12.9)
PROT UR-MCNC: 8 MG/DL (ref 0–15)
PROT/CREAT UR: 0.09 MG/G{CREAT} (ref 0–0.2)
RBC # BLD AUTO: 3.75 M/UL (ref 4–5.4)
WBC # BLD AUTO: 9.18 K/UL (ref 3.9–12.7)

## 2023-08-23 PROCEDURE — 82950 GLUCOSE TEST: CPT | Performed by: OBSTETRICS & GYNECOLOGY

## 2023-08-23 PROCEDURE — 82570 ASSAY OF URINE CREATININE: CPT | Performed by: OBSTETRICS & GYNECOLOGY

## 2023-08-23 PROCEDURE — 99213 PR OFFICE/OUTPT VISIT, EST, LEVL III, 20-29 MIN: ICD-10-PCS | Mod: TH,S$PBB,, | Performed by: OBSTETRICS & GYNECOLOGY

## 2023-08-23 PROCEDURE — 99213 OFFICE O/P EST LOW 20 MIN: CPT | Mod: TH,S$PBB,, | Performed by: OBSTETRICS & GYNECOLOGY

## 2023-08-23 PROCEDURE — 99212 OFFICE O/P EST SF 10 MIN: CPT | Mod: PBBFAC,TH | Performed by: OBSTETRICS & GYNECOLOGY

## 2023-08-23 PROCEDURE — 85025 COMPLETE CBC W/AUTO DIFF WBC: CPT | Performed by: OBSTETRICS & GYNECOLOGY

## 2023-08-23 PROCEDURE — 99999 PR PBB SHADOW E&M-EST. PATIENT-LVL II: ICD-10-PCS | Mod: PBBFAC,,, | Performed by: OBSTETRICS & GYNECOLOGY

## 2023-08-23 PROCEDURE — 99999 PR PBB SHADOW E&M-EST. PATIENT-LVL II: CPT | Mod: PBBFAC,,, | Performed by: OBSTETRICS & GYNECOLOGY

## 2023-08-23 PROCEDURE — 36415 COLL VENOUS BLD VENIPUNCTURE: CPT | Performed by: OBSTETRICS & GYNECOLOGY

## 2023-08-23 NOTE — PROGRESS NOTES
Good FM. Denies VB, LOF, CTX. .  Reports some nipple leaking.   Reports a bump on right groin, not painful. Declines exam.     /72   Wt 74.5 kg (164 lb 3.9 oz)   LMP 2023   BMI 27.33 kg/m²     38 y.o., at 24w3d by Estimated Date of Delivery: 12/10/23  Patient Active Problem List   Diagnosis    Chronic anemia    History of CAD (coronary artery disease)    Preoperative cardiovascular examination    Iron deficiency anemia    Chest pain, non-cardiac    Umbilical hernia without obstruction and without gangrene    Low TSH level    Chest pain at rest    Palpitations    Microcytic anemia    Vitamin D deficiency    Serum calcium elevated    Positive pregnancy test    Cardiac disease in mother affecting pregnancy - H/o post partum SCAD s/p CABG     AMA (advanced maternal age) multigravida 35+     OB History    Para Term  AB Living   4 3 3     3   SAB IAB Ectopic Multiple Live Births           3      # Outcome Date GA Lbr Jose Alberto/2nd Weight Sex Delivery Anes PTL Lv   4 Current            3 Term 13 39w2d 103:30 / 00:23 3.025 kg (6 lb 10.7 oz) F Vag-Spont EPI N RENEE      Birth Comments: 28    2 Term 09 39w0d 10:00 3.629 kg (8 lb) M Vag-Spont EPI N RENEE   1 Term 06 39w0d 09:00 3.033 kg (6 lb 11 oz) F Vag-Spont EPI N RENEE       Dating reviewed    Allergies and problem list reviewed and updated    Medical and surgical history reviewed    Prenatal labs reviewed and updated    Physical Exam:  ABD: soft, gravid, nontender    Assessment:  Clayton was seen today for routine prenatal visit.    Diagnoses and all orders for this visit:    History of CAD (coronary artery disease)  -     Protein/Creatinine Ratio, Urine    Other orders  -     (In Office Administered) Tdap Vaccine; Future        Orders Placed This Encounter   Procedures    (In Office Administered) Tdap Vaccine    Protein/Creatinine Ratio, Urine     Glucola today  P/C sent for baseline  Labor, ROM and bleeding  precautions  Tdap next visit  Appreciate MFM and Cardiology recs    No follow-ups on file.

## 2023-08-24 ENCOUNTER — PATIENT MESSAGE (OUTPATIENT)
Dept: OBSTETRICS AND GYNECOLOGY | Facility: CLINIC | Age: 38
End: 2023-08-24
Payer: MEDICAID

## 2023-08-24 DIAGNOSIS — O09.891 SUPERVISION OF OTHER HIGH RISK PREGNANCIES, FIRST TRIMESTER: ICD-10-CM

## 2023-08-24 RX ORDER — ASCORBIC ACID, CHOLECALCIFEROL, .ALPHA.-TOCOPHEROL ACETATE, DL-, PYRIDOXINE HYDROCHLORIDE, FOLIC ACID, CYANOCOBALAMIN, BIOTIN, CALCIUM CARBONATE, FERROUS ASPARTO GLYCINATE, IRON, POTASSIUM IODIDE, MAGNESIUM OXIDE, DOCONEXENT AND LOWBUSH BLUEBERRY 60; 1000; 10; 26; 400; 13; 280; 80; 9; 9; 150; 25; 350; 25; 600 MG/1; [IU]/1; [IU]/1; MG/1; UG/1; UG/1; UG/1; MG/1; MG/1; MG/1; UG/1; MG/1; MG/1; MG/1; UG/1
1 CAPSULE, GELATIN COATED ORAL DAILY
Qty: 30 CAPSULE | Refills: 11 | Status: CANCELLED | OUTPATIENT
Start: 2023-08-24

## 2023-08-28 ENCOUNTER — PROCEDURE VISIT (OUTPATIENT)
Dept: MATERNAL FETAL MEDICINE | Facility: CLINIC | Age: 38
End: 2023-08-28
Payer: MEDICAID

## 2023-08-28 ENCOUNTER — OFFICE VISIT (OUTPATIENT)
Dept: MATERNAL FETAL MEDICINE | Facility: CLINIC | Age: 38
End: 2023-08-28
Payer: MEDICAID

## 2023-08-28 VITALS
BODY MASS INDEX: 27.36 KG/M2 | DIASTOLIC BLOOD PRESSURE: 67 MMHG | WEIGHT: 164.25 LBS | SYSTOLIC BLOOD PRESSURE: 126 MMHG | HEIGHT: 65 IN

## 2023-08-28 DIAGNOSIS — I51.9 HEART DISEASE IN MOTHER AFFECTING PREGNANCY, ANTEPARTUM: ICD-10-CM

## 2023-08-28 DIAGNOSIS — Z86.79 HISTORY OF CAD (CORONARY ARTERY DISEASE): Primary | ICD-10-CM

## 2023-08-28 DIAGNOSIS — Z36.89 ENCOUNTER FOR ULTRASOUND TO ASSESS FETAL GROWTH: ICD-10-CM

## 2023-08-28 DIAGNOSIS — Z36.2 ENCOUNTER FOR FOLLOW-UP ULTRASOUND OF FETAL ANATOMY: ICD-10-CM

## 2023-08-28 DIAGNOSIS — O99.412 HEART DISEASE IN MOTHER AFFECTING PREGNANCY IN SECOND TRIMESTER: ICD-10-CM

## 2023-08-28 DIAGNOSIS — O99.419 HEART DISEASE IN MOTHER AFFECTING PREGNANCY, ANTEPARTUM: ICD-10-CM

## 2023-08-28 DIAGNOSIS — I51.9 HEART DISEASE IN MOTHER AFFECTING PREGNANCY IN SECOND TRIMESTER: ICD-10-CM

## 2023-08-28 PROCEDURE — 99999 PR PBB SHADOW E&M-EST. PATIENT-LVL III: ICD-10-PCS | Mod: PBBFAC,,, | Performed by: OBSTETRICS & GYNECOLOGY

## 2023-08-28 PROCEDURE — 99215 PR OFFICE/OUTPT VISIT, EST, LEVL V, 40-54 MIN: ICD-10-PCS | Mod: TH,25,S$PBB, | Performed by: OBSTETRICS & GYNECOLOGY

## 2023-08-28 PROCEDURE — 99215 OFFICE O/P EST HI 40 MIN: CPT | Mod: TH,25,S$PBB, | Performed by: OBSTETRICS & GYNECOLOGY

## 2023-08-28 PROCEDURE — 3074F SYST BP LT 130 MM HG: CPT | Mod: CPTII,,, | Performed by: OBSTETRICS & GYNECOLOGY

## 2023-08-28 PROCEDURE — 3008F BODY MASS INDEX DOCD: CPT | Mod: CPTII,,, | Performed by: OBSTETRICS & GYNECOLOGY

## 2023-08-28 PROCEDURE — 1159F MED LIST DOCD IN RCRD: CPT | Mod: CPTII,,, | Performed by: OBSTETRICS & GYNECOLOGY

## 2023-08-28 PROCEDURE — 3078F PR MOST RECENT DIASTOLIC BLOOD PRESSURE < 80 MM HG: ICD-10-PCS | Mod: CPTII,,, | Performed by: OBSTETRICS & GYNECOLOGY

## 2023-08-28 PROCEDURE — 99213 OFFICE O/P EST LOW 20 MIN: CPT | Mod: PBBFAC,TH | Performed by: OBSTETRICS & GYNECOLOGY

## 2023-08-28 PROCEDURE — 3074F PR MOST RECENT SYSTOLIC BLOOD PRESSURE < 130 MM HG: ICD-10-PCS | Mod: CPTII,,, | Performed by: OBSTETRICS & GYNECOLOGY

## 2023-08-28 PROCEDURE — 3008F PR BODY MASS INDEX (BMI) DOCUMENTED: ICD-10-PCS | Mod: CPTII,,, | Performed by: OBSTETRICS & GYNECOLOGY

## 2023-08-28 PROCEDURE — 1159F PR MEDICATION LIST DOCUMENTED IN MEDICAL RECORD: ICD-10-PCS | Mod: CPTII,,, | Performed by: OBSTETRICS & GYNECOLOGY

## 2023-08-28 PROCEDURE — 76816 US MFM PROCEDURE (VIEWPOINT): ICD-10-PCS | Mod: 26,S$PBB,, | Performed by: OBSTETRICS & GYNECOLOGY

## 2023-08-28 PROCEDURE — 99999 PR PBB SHADOW E&M-EST. PATIENT-LVL III: CPT | Mod: PBBFAC,,, | Performed by: OBSTETRICS & GYNECOLOGY

## 2023-08-28 PROCEDURE — 76816 OB US FOLLOW-UP PER FETUS: CPT | Mod: PBBFAC | Performed by: OBSTETRICS & GYNECOLOGY

## 2023-08-28 PROCEDURE — 3078F DIAST BP <80 MM HG: CPT | Mod: CPTII,,, | Performed by: OBSTETRICS & GYNECOLOGY

## 2023-08-28 NOTE — PROGRESS NOTES
Maternal Fetal Medicine follow up consult    SUBJECTIVE:     Padmini Duarte is a 38 y.o.  female with IUP at 25w1d  who is seen in follow up consultation by MFM.  Pregnancy complications include:   Problem   Cardiac disease in mother affecting pregnancy - H/o post partum SCAD s/p CABG        Previous notes reviewed.   No changes to medical, surgical, family, social, or obstetric history.    Interval history since last [facility] visit: no changes. Feels well. The patient reports adequate fetal movement. She denies leakage of fluid, vaginal bleeding, contractions.  The patient denies symptoms of pre-eclampsia including headache, blurred vision, right upper quadrant pain, chest pain, and shortness of breath.     Medications:  ASA  PNV  Metoprolol     OBJECTIVE:     Blood Pressure: 126/67    Ultrasound performed. See viewpoint for full ultrasound report.      ASSESSMENT/PLAN:     38 y.o.  female with IUP at 25w1d     Problems addressed at today's visit:  Cardiac disease in mother affecting pregnancy - H/o post partum SCAD s/p CABG   Cardiology: [facility] ([facility])  NYHA Class: Class II  [facility] Risk Class: II/III  Last EKG 23: NSR; rightward axis with septal infarct; T wave abnl, consider anterior ischemia  Last TTE 2023:  The left ventricle is normal in size with mildly decreased systolic function.  The estimated ejection fraction is 48%.  There is abnormal septal wall motion.  There are segmental left ventricular wall motion abnormalities.  Grade I left ventricular diastolic dysfunction.  Normal right ventricular size with low normal right ventricular systolic function.  Normal central venous pressure (3 mmHg).  Other evaluations: Stress test 2022; Negative for myocardial ischemia; Some segments were hypokinetic at peak stress (discontinued due to leg pain)  Pre-pregnancy regimen:  - Atorvastatin  - ASA 81 mg      Current regimen:   - Metoprolol 25mg daily  - ASA 81mg daily    She has previously been  counseled regarding SCAD recurrence risk of approximately 10%. Recent literature suggests exposure to pregnancy does not necessarily increase this recurrence risk; however, her cardiac history does give her a CARPREG 2 score of >4, making her risk of acute cardiac event during pregnancy at least 40%.     See prior notes full counseling and recommendations  She denies cardiac symptoms today - no persistent CP, SOB, dyspnea on exertion.     Recommendations  Continue medications as listed above  MFM MD visit and ultrasound q4 weeks for the duration of pregnancy  Recommend follow up every 2 weeks with primary OB starting at 28 weeks  Will coordinate OB anesthesia consult at 28 weeks  Continue home BP monitoring on Connected MOM. Recommend maintaining BP <140/90 throughout pregnancy. The patient does not have a history of hypertension. If anti-hypertensives are indicated, would recommend discussion with MFM prior to initiating.   If the patient has any cardiac symptoms - chest pain, palpitations, shortness of breath - she should be urgently evaluated in a hospital or ED setting  Delivery timin weeks or sooner if indicated  Recommend telemetry during labor and for at least 48 hours postpartum. Consider ICU admission after delivery pending maternal status and pathology evolution.   Close monitoring of fluid status intrapartum with strict I/Os. Maintain euvolemia.  Anticipate hospitalization for at least 3-4 days postpartum to monitor maternal cardiac status   Recommend continued regular follow up with cardiology every trimester or more often as indicated per cardiology.  Patient desires BTL - will defer to primary OB    Please see original MFM consultation for full details regarding management recommendations of these and other obstetric co-morbidities    FOLLOW UP:   F/u in 4 weeks for US/MFM visit  She sees cardiology on  and heart transplant on     Today I have spent 45 minutes in the care of the patient.  This includes face to face time and non-face to face time preparing to see the patient (eg, review of tests), obtaining and/or reviewing separately obtained history, documenting clinical information in the electronic or other health record, independently interpreting results and communicating results to the patient/family/caregiver, or care coordination.     Sophia Yuan MD  Maternal Fetal Medicine

## 2023-08-28 NOTE — ASSESSMENT & PLAN NOTE
Cardiology: [facility] ([facility] [place])  NYHA Class: Class II  [facility] Risk Class: II/III  Last EKG 5/2/23: NSR; rightward axis with septal infarct; T wave abnl, consider anterior ischemia  Last TTE 7/2023:   The left ventricle is normal in size with mildly decreased systolic function.   The estimated ejection fraction is 48%.   There is abnormal septal wall motion.   There are segmental left ventricular wall motion abnormalities.   Grade I left ventricular diastolic dysfunction.   Normal right ventricular size with low normal right ventricular systolic function.   Normal central venous pressure (3 mmHg).  Other evaluations: Stress test 1/2022; Negative for myocardial ischemia; Some segments were hypokinetic at peak stress (discontinued due to leg pain)  Pre-pregnancy regimen:  - Atorvastatin  - ASA 81 mg      Current regimen:   - Metoprolol 25mg daily  - ASA 81mg daily    She has previously been counseled regarding SCAD recurrence risk of approximately 10%. Recent literature suggests exposure to pregnancy does not necessarily increase this recurrence risk; however, her cardiac history does give her a CARPREG 2 score of >4, making her risk of acute cardiac event during pregnancy at least 40%.     See prior notes full counseling and recommendations  She denies cardiac symptoms today - no persistent CP, SOB, dyspnea on exertion.     Recommendations  · Continue medications as listed above  · MFM MD visit and ultrasound q4 weeks for the duration of pregnancy  · Recommend follow up every 2 weeks with primary OB starting at 28 weeks  · Will coordinate OB anesthesia consult at 28 weeks  · Continue home BP monitoring on Connected MOM. Recommend maintaining BP <140/90 throughout pregnancy. The patient does not have a history of hypertension. If anti-hypertensives are indicated, would recommend discussion with MFM prior to initiating.   · If the patient has any cardiac symptoms - chest pain, palpitations, shortness of  breath - she should be urgently evaluated in a hospital or ED setting  · Delivery timin weeks or sooner if indicated  · Recommend telemetry during labor and for at least 48 hours postpartum. Consider ICU admission after delivery pending maternal status and pathology evolution.   · Close monitoring of fluid status intrapartum with strict I/Os. Maintain euvolemia.  · Anticipate hospitalization for at least 3-4 days postpartum to monitor maternal cardiac status   · Recommend continued regular follow up with cardiology every trimester or more often as indicated per cardiology.  · Patient desires BTL - will defer to primary OB

## 2023-09-03 ENCOUNTER — PATIENT MESSAGE (OUTPATIENT)
Dept: OTHER | Facility: OTHER | Age: 38
End: 2023-09-03
Payer: MEDICAID

## 2023-09-08 ENCOUNTER — OFFICE VISIT (OUTPATIENT)
Dept: CARDIOLOGY | Facility: CLINIC | Age: 38
End: 2023-09-08
Payer: MEDICAID

## 2023-09-08 VITALS
HEART RATE: 101 BPM | HEIGHT: 65 IN | BODY MASS INDEX: 27.82 KG/M2 | SYSTOLIC BLOOD PRESSURE: 111 MMHG | WEIGHT: 167 LBS | DIASTOLIC BLOOD PRESSURE: 75 MMHG

## 2023-09-08 DIAGNOSIS — I25.42 SPONTANEOUS DISSECTION OF CORONARY ARTERY: ICD-10-CM

## 2023-09-08 DIAGNOSIS — Z86.79 HISTORY OF CAD (CORONARY ARTERY DISEASE): ICD-10-CM

## 2023-09-08 DIAGNOSIS — Z95.1 S/P CABG X 2: ICD-10-CM

## 2023-09-08 DIAGNOSIS — O99.412 HEART DISEASE IN MOTHER AFFECTING PREGNANCY IN SECOND TRIMESTER: Primary | ICD-10-CM

## 2023-09-08 DIAGNOSIS — O09.522 MULTIGRAVIDA OF ADVANCED MATERNAL AGE IN SECOND TRIMESTER: ICD-10-CM

## 2023-09-08 DIAGNOSIS — I51.9 LEFT VENTRICULAR DYSFUNCTION: ICD-10-CM

## 2023-09-08 DIAGNOSIS — I25.5 ISCHEMIC CARDIOMYOPATHY: ICD-10-CM

## 2023-09-08 DIAGNOSIS — I51.9 HEART DISEASE IN MOTHER AFFECTING PREGNANCY IN SECOND TRIMESTER: Primary | ICD-10-CM

## 2023-09-08 PROCEDURE — 3074F SYST BP LT 130 MM HG: CPT | Mod: CPTII,S$GLB,, | Performed by: INTERNAL MEDICINE

## 2023-09-08 PROCEDURE — 3074F PR MOST RECENT SYSTOLIC BLOOD PRESSURE < 130 MM HG: ICD-10-PCS | Mod: CPTII,S$GLB,, | Performed by: INTERNAL MEDICINE

## 2023-09-08 PROCEDURE — 99214 OFFICE O/P EST MOD 30 MIN: CPT | Mod: S$GLB,,, | Performed by: INTERNAL MEDICINE

## 2023-09-08 PROCEDURE — 1159F MED LIST DOCD IN RCRD: CPT | Mod: CPTII,S$GLB,, | Performed by: INTERNAL MEDICINE

## 2023-09-08 PROCEDURE — 3078F DIAST BP <80 MM HG: CPT | Mod: CPTII,S$GLB,, | Performed by: INTERNAL MEDICINE

## 2023-09-08 PROCEDURE — 3078F PR MOST RECENT DIASTOLIC BLOOD PRESSURE < 80 MM HG: ICD-10-PCS | Mod: CPTII,S$GLB,, | Performed by: INTERNAL MEDICINE

## 2023-09-08 PROCEDURE — 3008F BODY MASS INDEX DOCD: CPT | Mod: CPTII,S$GLB,, | Performed by: INTERNAL MEDICINE

## 2023-09-08 PROCEDURE — 1160F RVW MEDS BY RX/DR IN RCRD: CPT | Mod: CPTII,S$GLB,, | Performed by: INTERNAL MEDICINE

## 2023-09-08 PROCEDURE — 99214 PR OFFICE/OUTPT VISIT, EST, LEVL IV, 30-39 MIN: ICD-10-PCS | Mod: S$GLB,,, | Performed by: INTERNAL MEDICINE

## 2023-09-08 PROCEDURE — 3008F PR BODY MASS INDEX (BMI) DOCUMENTED: ICD-10-PCS | Mod: CPTII,S$GLB,, | Performed by: INTERNAL MEDICINE

## 2023-09-08 PROCEDURE — 1159F PR MEDICATION LIST DOCUMENTED IN MEDICAL RECORD: ICD-10-PCS | Mod: CPTII,S$GLB,, | Performed by: INTERNAL MEDICINE

## 2023-09-08 PROCEDURE — 1160F PR REVIEW ALL MEDS BY PRESCRIBER/CLIN PHARMACIST DOCUMENTED: ICD-10-PCS | Mod: CPTII,S$GLB,, | Performed by: INTERNAL MEDICINE

## 2023-09-08 NOTE — PROGRESS NOTES
Subjective:   Patient ID:  Clayton Duarte is a 38 y.o. female who presents for follow-up of History of CAD (coronary artery disease) and Hyperlipidemia  ech  PREOPERATIVE DIAGNOSES:  1.  Postpartum left main coronary artery dissection.  2.  Proximal LAD dissection.  3.  Proximal circumflex and ramus dissection  4.  Acute cardiogenic shock.  5.  Status post intraaortic balloon pump.     POSTOPERATIVE DIAGNOSES:  1.  Postpartum left main coronary artery dissection.  2.  Proximal LAD dissection.  3.  Proximal circumflex and ramus dissection  4.  Acute cardiogenic shock.  5.  Status post intraaortic balloon pump.     PROCEDURES:  Emergency salvage CABG x2 with LIMA to LAD and saphenous vein graft to OM1   to OM1.     HPI:   Preop clearance for tubal ligation  She has 3 kids, no issues with the pregnancies of 2 now teenage kids, with the last pregnancy after delivery few hours late she developed severe chest pain ST changes. Her diagnostic catheterization revealed what appeared to be a likely spontaneous postpartum coronary dissection and she underwent a 2 vessel CABG  Occasional chest pain when she gets cold.   Walks 3,4 times a week  Non smoker     Cath 2013  Left Main Coronary Artery:              The distal LM has a 70% stenosis. There is MARY ELLEN 2 flow.      - Left Anterior Descending Artery:             The LAD has a 90% stenosis. There is MARY ELLEN 2 flow.      - Ramus:              The ramus has a 90% stenosis. There is MARY ELLEN 2 flow.      - Left Circumflex Artery:              The LCX has a 75% stenosis. There is MARY ELLEN 3 flow. The remaining portion of the vessel has luminal irregularities.      - OM1:              The OM1 is normal. There is MARY ELLEN 3 flow.      - OM2:              The OM2 is normal. There is MARY ELLEN 3 flow. The remaining portion of the vessel is of small caliber.      - OM3:              The OM3 is normal. There is MARY ELLEN 3 flow. The remaining portion of the vessel is of small caliber.      - Left Posterior  Descending Artery:              The left PDA is normal. There is MARY ELLEN 3 flow. The remaining portion of the vessel is of small caliber.      - Right Coronary Artery:              The RCA is normal. There is MARY ELLEN 3 flow.      - Posterior Lateral Branch:              The PLB is normal. There is MARY ELLEN 3 flow.      - Posterior Descending Artery:              The PDA is normal. There is MARY ELLEN 3 flow.      Echo 2017  CONCLUSIONS     1 - Mildly depressed left ventricular systolic function (EF 45-50%).     2 - Normal left ventricular diastolic function.     3 - Normal right ventricular systolic function .     4 - The estimated PA systolic pressure is 22 mmHg.     5 - Trivial mitral regurgitation.     6 - Trivial tricuspid regurgitation.     Non-specific finding demonstrating failure to augment involving the mid anteroseptum, apical septum, mid inferoseptum, apical inferior wall which may be associated with ischemia; clinical correlation required.      HPI:   No chest pain, Orthopnea, PND of heart failure symptoms.   Denies palpitations or fluttering in the chest  Very active with kids  Works     Echo 2023  TDS  Low normal systolic function.  The estimated ejection fraction is 50%.  Normal left ventricular diastolic function.  There are segmental left ventricular wall motion abnormalities. Basal-mid anteroseptal hypokinesis  Normal right ventricular size with normal right ventricular systolic function.  Mild left atrial enlargement.  Mild mitral regurgitation.    Echo 7/23  The left ventricle is normal in size with mildly decreased systolic function.  The estimated ejection fraction is 48%.  There is abnormal septal wall motion.  There are segmental left ventricular wall motion abnormalities.  Grade I left ventricular diastolic dysfunction.  Normal right ventricular size with low normal right ventricular systolic function.  Normal central venous pressure (3 mmHg).     HPI:   No chest pain, Orthopnea, PND of heart failure  "symptoms.   Denies palpitations or fluttering in the chest  Taking care of daily activities with kids.   She had severe Chest pain at the time of her prior SCAD  Patient Active Problem List   Diagnosis    Chronic anemia    History of CAD (coronary artery disease)    Preoperative cardiovascular examination    Iron deficiency anemia    Chest pain, non-cardiac    Umbilical hernia without obstruction and without gangrene    Low TSH level    Chest pain at rest    Palpitations    Microcytic anemia    Vitamin D deficiency    Serum calcium elevated    Positive pregnancy test    Cardiac disease in mother affecting pregnancy - H/o post partum SCAD s/p CABG     AMA (advanced maternal age) multigravida 35+     /75   Pulse 101   Ht 5' 5" (1.651 m)   Wt 75.8 kg (167 lb)   LMP 03/06/2023   BMI 27.79 kg/m²   Body mass index is 27.79 kg/m².  CrCl cannot be calculated (Patient's most recent lab result is older than the maximum 7 days allowed.).    Lab Results   Component Value Date     04/15/2023    K 4.3 04/15/2023     04/15/2023    CO2 21 (L) 04/15/2023    BUN 9 04/15/2023    CREATININE 0.8 04/15/2023     04/15/2023    HGBA1C 5.2 12/12/2022    MG 2.0 09/24/2013    AST 16 04/15/2023    ALT 13 04/15/2023    ALBUMIN 4.1 04/15/2023    PROT 7.5 04/15/2023    BILITOT 0.2 04/15/2023    WBC 9.18 08/23/2023    HGB 10.6 (L) 08/23/2023    HCT 33.6 (L) 08/23/2023    HCT 45 12/30/2021    MCV 90 08/23/2023     08/23/2023    INR 1.0 02/15/2017    TSH 0.326 (L) 12/12/2022    CHOL 153 12/12/2022    HDL 48 12/12/2022    LDLCALC 93.2 12/12/2022    TRIG 59 12/12/2022       Current Outpatient Medications   Medication Sig    aspirin (ECOTRIN) 81 MG EC tablet Take 81 mg by mouth twice a week.    metoprolol succinate (TOPROL-XL) 25 MG 24 hr tablet Take 1 tablet (25 mg total) by mouth once daily.    prenatal vit 87-iron-folic-dha (PRENATE MINI, FERR ASP GLYCIN,) 18-1-350 mg Cap Take 1 each by mouth Daily.     No current " facility-administered medications for this visit.       Review of Systems   Constitutional: Negative for chills, decreased appetite, malaise/fatigue, night sweats, weight gain and weight loss.   Eyes:  Negative for blurred vision, double vision, visual disturbance and visual halos.   Cardiovascular:  Negative for chest pain, claudication, cyanosis, dyspnea on exertion, irregular heartbeat, leg swelling, near-syncope, orthopnea, palpitations, paroxysmal nocturnal dyspnea and syncope.   Respiratory:  Negative for cough, hemoptysis, snoring, sputum production and wheezing.    Endocrine: Negative for cold intolerance, heat intolerance, polydipsia and polyphagia.   Hematologic/Lymphatic: Negative for adenopathy and bleeding problem. Does not bruise/bleed easily.   Skin:  Negative for flushing, itching, poor wound healing and rash.   Musculoskeletal:  Negative for arthritis, back pain, falls, gout, joint pain, joint swelling, muscle cramps, muscle weakness, myalgias, neck pain and stiffness.   Gastrointestinal:  Negative for bloating, abdominal pain, anorexia, diarrhea, dysphagia, excessive appetite, flatus, hematemesis, jaundice, melena and nausea.   Genitourinary:  Negative for hesitancy and incomplete emptying.   Neurological:  Negative for aphonia, brief paralysis, difficulty with concentration, disturbances in coordination, excessive daytime sleepiness, dizziness, focal weakness, light-headedness, loss of balance and weakness.   Psychiatric/Behavioral:  Negative for altered mental status, depression, hallucinations, hypervigilance, memory loss, substance abuse and suicidal ideas. The patient does not have insomnia and is not nervous/anxious.        Objective:   Physical Exam  Vitals reviewed: gravide uterus.   Constitutional:       General: She is not in acute distress.     Appearance: She is well-developed. She is not diaphoretic.   HENT:      Head: Normocephalic and atraumatic.      Nose: Nose normal.       Mouth/Throat:      Pharynx: No oropharyngeal exudate.   Eyes:      General: No scleral icterus.        Right eye: No discharge.         Left eye: No discharge.      Conjunctiva/sclera: Conjunctivae normal.      Pupils: Pupils are equal, round, and reactive to light.   Neck:      Thyroid: No thyromegaly.      Vascular: No JVD.      Trachea: No tracheal deviation.   Cardiovascular:      Rate and Rhythm: Normal rate and regular rhythm.      Pulses: Intact distal pulses.      Heart sounds: Normal heart sounds. No murmur heard.     No friction rub. No gallop.   Pulmonary:      Effort: Pulmonary effort is normal. No respiratory distress.      Breath sounds: Normal breath sounds. No stridor. No wheezing or rales.   Chest:      Chest wall: No tenderness.   Abdominal:      General: Bowel sounds are normal. There is no distension.      Palpations: Abdomen is soft. There is no mass.      Tenderness: There is no abdominal tenderness. There is no guarding or rebound.   Musculoskeletal:         General: No tenderness. Normal range of motion.      Cervical back: Normal range of motion and neck supple.   Lymphadenopathy:      Cervical: No cervical adenopathy.   Skin:     General: Skin is warm.      Coloration: Skin is not pale.      Findings: No erythema or rash.   Neurological:      Mental Status: She is alert and oriented to person, place, and time.      Cranial Nerves: No cranial nerve deficit.      Motor: No abnormal muscle tone.      Coordination: Coordination normal.      Deep Tendon Reflexes: Reflexes are normal and symmetric. Reflexes normal.   Psychiatric:         Behavior: Behavior normal.         Thought Content: Thought content normal.         Judgment: Judgment normal.       Assessment:     1. Heart disease in mother affecting pregnancy in second trimester    2. Multigravida of advanced maternal age in second trimester    3. History of CAD (coronary artery disease)    4. Spontaneous dissection of coronary artery    5.  S/P CABG x 2      Plan:   Doing well. Will repeat Echo in 4 weeks and see her before delivery she is also seeing [facility] which I encouraged her to keep her appointment.   Clayton was seen today for history of cad (coronary artery disease) and hyperlipidemia.    Diagnoses and all orders for this visit:    Heart disease in mother affecting pregnancy in second trimester  -     Echo Saline Bubble? No; Future    Multigravida of advanced maternal age in second trimester  -     Echo Saline Bubble? No; Future    History of CAD (coronary artery disease)    Spontaneous dissection of coronary artery    S/P CABG x 2      We discussed potential complications of  PPCM and SCAD in the third trimester or post partum and to have a low threshold for seeking care. She is also seeing [facility] clinic which I have encouraged her to do so.

## 2023-09-08 NOTE — Clinical Note
She is doing well. I encouraged her to keep her appointment with you. She will also see me before she delivers

## 2023-09-17 ENCOUNTER — PATIENT MESSAGE (OUTPATIENT)
Dept: OTHER | Facility: OTHER | Age: 38
End: 2023-09-17
Payer: MEDICAID

## 2023-09-20 ENCOUNTER — CLINICAL SUPPORT (OUTPATIENT)
Dept: OBSTETRICS AND GYNECOLOGY | Facility: CLINIC | Age: 38
End: 2023-09-20
Payer: MEDICAID

## 2023-09-20 ENCOUNTER — ROUTINE PRENATAL (OUTPATIENT)
Dept: OBSTETRICS AND GYNECOLOGY | Facility: CLINIC | Age: 38
End: 2023-09-20
Payer: MEDICAID

## 2023-09-20 VITALS
BODY MASS INDEX: 27.77 KG/M2 | DIASTOLIC BLOOD PRESSURE: 64 MMHG | WEIGHT: 166.88 LBS | SYSTOLIC BLOOD PRESSURE: 120 MMHG

## 2023-09-20 DIAGNOSIS — Z23 NEEDS FLU SHOT: ICD-10-CM

## 2023-09-20 DIAGNOSIS — Z23 NEED FOR TDAP VACCINATION: Primary | ICD-10-CM

## 2023-09-20 DIAGNOSIS — Z3A.28 28 WEEKS GESTATION OF PREGNANCY: ICD-10-CM

## 2023-09-20 DIAGNOSIS — O09.893 SUPERVISION OF OTHER HIGH RISK PREGNANCIES, THIRD TRIMESTER: Primary | ICD-10-CM

## 2023-09-20 PROCEDURE — 99999PBSHW TDAP VACCINE GREATER THAN OR EQUAL TO 7YO IM: Mod: PBBFAC,,,

## 2023-09-20 PROCEDURE — 99999 PR PBB SHADOW E&M-EST. PATIENT-LVL II: CPT | Mod: PBBFAC,,, | Performed by: OBSTETRICS & GYNECOLOGY

## 2023-09-20 PROCEDURE — 90715 TDAP VACCINE 7 YRS/> IM: CPT | Mod: PBBFAC

## 2023-09-20 PROCEDURE — 90686 IIV4 VACC NO PRSV 0.5 ML IM: CPT | Mod: PBBFAC

## 2023-09-20 PROCEDURE — 99212 OFFICE O/P EST SF 10 MIN: CPT | Mod: PBBFAC,TH | Performed by: OBSTETRICS & GYNECOLOGY

## 2023-09-20 PROCEDURE — 99999PBSHW FLU VACCINE (QUAD) GREATER THAN OR EQUAL TO 3YO PRESERVATIVE FREE IM: Mod: PBBFAC,,,

## 2023-09-20 PROCEDURE — 99213 PR OFFICE/OUTPT VISIT, EST, LEVL III, 20-29 MIN: ICD-10-PCS | Mod: TH,S$PBB,, | Performed by: OBSTETRICS & GYNECOLOGY

## 2023-09-20 PROCEDURE — 99213 OFFICE O/P EST LOW 20 MIN: CPT | Mod: TH,S$PBB,, | Performed by: OBSTETRICS & GYNECOLOGY

## 2023-09-20 PROCEDURE — 99999PBSHW FLU VACCINE (QUAD) GREATER THAN OR EQUAL TO 3YO PRESERVATIVE FREE IM: ICD-10-PCS | Mod: PBBFAC,,,

## 2023-09-20 PROCEDURE — 90472 IMMUNIZATION ADMIN EACH ADD: CPT | Mod: PBBFAC

## 2023-09-20 PROCEDURE — 99999 PR PBB SHADOW E&M-EST. PATIENT-LVL II: ICD-10-PCS | Mod: PBBFAC,,, | Performed by: OBSTETRICS & GYNECOLOGY

## 2023-09-20 NOTE — PROGRESS NOTES
Here for Tdap injection. Patient without complaint of pain at this time, injection given. Tolerated well no pain noted post injection advised to wait in lobby 15 minutes and report any adverse reactions.     Site:ld        Here for Influenza Quadvalient PF* (adult vaccine) vaccine information given to patient. Patient without complaint of pain prior to or post injection immunization tolerated well and patient advised to wait in lobby for 15 minutes and report any adverse reactions.      Site: rd

## 2023-09-20 NOTE — PROGRESS NOTES
Good FM. Denies VB, LOF, CTX.     LMP 2023     38 y.o., at 28w3d by Estimated Date of Delivery: 12/10/23  Patient Active Problem List   Diagnosis    Chronic anemia    History of CAD (coronary artery disease)    Preoperative cardiovascular examination    Iron deficiency anemia    Chest pain, non-cardiac    Umbilical hernia without obstruction and without gangrene    Low TSH level    Chest pain at rest    Palpitations    Microcytic anemia    Vitamin D deficiency    Serum calcium elevated    Positive pregnancy test    Cardiac disease in mother affecting pregnancy - H/o post partum SCAD s/p CABG     AMA (advanced maternal age) multigravida 35+     OB History    Para Term  AB Living   4 3 3     3   SAB IAB Ectopic Multiple Live Births           3      # Outcome Date GA Lbr Jose Alberto/2nd Weight Sex Delivery Anes PTL Lv   4 Current            3 Term 13 39w2d 103:30 / 00:23 3.025 kg (6 lb 10.7 oz) F Vag-Spont EPI N RENEE      Birth Comments: 29    2 Term 09 39w0d 10:00 3.629 kg (8 lb) M Vag-Spont EPI N RENEE   1 Term 06 39w0d 09:00 3.033 kg (6 lb 11 oz) F Vag-Spont EPI N RENEE       Dating reviewed    Allergies and problem list reviewed and updated    Medical and surgical history reviewed    Prenatal labs reviewed and updated    Physical Exam:  ABD: soft, gravid, nontender    Assessment:  There are no diagnoses linked to this encounter.    No orders of the defined types were placed in this encounter.    Colpo next visit  Flu and Tdap done  Labor, ROM and bleeding precautions.     No follow-ups on file.

## 2023-09-25 ENCOUNTER — OFFICE VISIT (OUTPATIENT)
Dept: MATERNAL FETAL MEDICINE | Facility: CLINIC | Age: 38
End: 2023-09-25
Payer: MEDICAID

## 2023-09-25 ENCOUNTER — PROCEDURE VISIT (OUTPATIENT)
Dept: MATERNAL FETAL MEDICINE | Facility: CLINIC | Age: 38
End: 2023-09-25
Payer: MEDICAID

## 2023-09-25 ENCOUNTER — OFFICE VISIT (OUTPATIENT)
Dept: ANESTHESIOLOGY | Facility: OTHER | Age: 38
End: 2023-09-25
Attending: OBSTETRICS & GYNECOLOGY
Payer: MEDICAID

## 2023-09-25 VITALS
SYSTOLIC BLOOD PRESSURE: 121 MMHG | BODY MASS INDEX: 27.73 KG/M2 | HEIGHT: 65 IN | DIASTOLIC BLOOD PRESSURE: 76 MMHG | WEIGHT: 166.44 LBS

## 2023-09-25 DIAGNOSIS — Z86.79 HISTORY OF CAD (CORONARY ARTERY DISEASE): ICD-10-CM

## 2023-09-25 DIAGNOSIS — Z36.89 ENCOUNTER FOR ULTRASOUND TO ASSESS FETAL GROWTH: ICD-10-CM

## 2023-09-25 DIAGNOSIS — O99.413 HEART DISEASE IN MOTHER AFFECTING PREGNANCY IN THIRD TRIMESTER: ICD-10-CM

## 2023-09-25 DIAGNOSIS — I51.9 HEART DISEASE IN MOTHER AFFECTING PREGNANCY IN THIRD TRIMESTER: ICD-10-CM

## 2023-09-25 PROCEDURE — 3008F PR BODY MASS INDEX (BMI) DOCUMENTED: ICD-10-PCS | Mod: CPTII,,, | Performed by: OBSTETRICS & GYNECOLOGY

## 2023-09-25 PROCEDURE — 99214 OFFICE O/P EST MOD 30 MIN: CPT | Mod: TH,25,S$PBB, | Performed by: OBSTETRICS & GYNECOLOGY

## 2023-09-25 PROCEDURE — 99999 PR PBB SHADOW E&M-EST. PATIENT-LVL III: ICD-10-PCS | Mod: PBBFAC,,, | Performed by: OBSTETRICS & GYNECOLOGY

## 2023-09-25 PROCEDURE — 99213 OFFICE O/P EST LOW 20 MIN: CPT | Mod: PBBFAC,TH | Performed by: OBSTETRICS & GYNECOLOGY

## 2023-09-25 PROCEDURE — 3078F PR MOST RECENT DIASTOLIC BLOOD PRESSURE < 80 MM HG: ICD-10-PCS | Mod: CPTII,,, | Performed by: OBSTETRICS & GYNECOLOGY

## 2023-09-25 PROCEDURE — 1159F MED LIST DOCD IN RCRD: CPT | Mod: CPTII,,, | Performed by: OBSTETRICS & GYNECOLOGY

## 2023-09-25 PROCEDURE — 99214 PR OFFICE/OUTPT VISIT, EST, LEVL IV, 30-39 MIN: ICD-10-PCS | Mod: TH,25,S$PBB, | Performed by: OBSTETRICS & GYNECOLOGY

## 2023-09-25 PROCEDURE — 3008F BODY MASS INDEX DOCD: CPT | Mod: CPTII,,, | Performed by: OBSTETRICS & GYNECOLOGY

## 2023-09-25 PROCEDURE — 1159F PR MEDICATION LIST DOCUMENTED IN MEDICAL RECORD: ICD-10-PCS | Mod: CPTII,,, | Performed by: OBSTETRICS & GYNECOLOGY

## 2023-09-25 PROCEDURE — 3074F PR MOST RECENT SYSTOLIC BLOOD PRESSURE < 130 MM HG: ICD-10-PCS | Mod: CPTII,,, | Performed by: OBSTETRICS & GYNECOLOGY

## 2023-09-25 PROCEDURE — 76816 US MFM PROCEDURE (VIEWPOINT): ICD-10-PCS | Mod: 26,S$PBB,, | Performed by: OBSTETRICS & GYNECOLOGY

## 2023-09-25 PROCEDURE — 3074F SYST BP LT 130 MM HG: CPT | Mod: CPTII,,, | Performed by: OBSTETRICS & GYNECOLOGY

## 2023-09-25 PROCEDURE — 3078F DIAST BP <80 MM HG: CPT | Mod: CPTII,,, | Performed by: OBSTETRICS & GYNECOLOGY

## 2023-09-25 PROCEDURE — 99999 PR PBB SHADOW E&M-EST. PATIENT-LVL III: CPT | Mod: PBBFAC,,, | Performed by: OBSTETRICS & GYNECOLOGY

## 2023-09-25 PROCEDURE — 76816 OB US FOLLOW-UP PER FETUS: CPT | Mod: PBBFAC | Performed by: OBSTETRICS & GYNECOLOGY

## 2023-09-25 NOTE — PROGRESS NOTES
Maternal Fetal Medicine follow up consult    SUBJECTIVE:     Padmini Duarte is a 38 y.o.  female with IUP at 29w1d  who is seen in follow up consultation by MFM.  Pregnancy complications include:   Problem   Cardiac disease in mother affecting pregnancy - H/o post partum SCAD s/p CABG        Previous notes reviewed.   No changes to medical, surgical, family, social, or obstetric history.    Interval history since last M visit: no changes. Feels well. The patient reports adequate fetal movement. She denies leakage of fluid, vaginal bleeding, contractions.  The patient denies symptoms of pre-eclampsia including headache, blurred vision, right upper quadrant pain, chest pain, and shortness of breath.     Medications:  Metoprolol 25 qd  ASA  PNV     OBJECTIVE:     Blood Pressure: 121/76    Ultrasound performed. See viewpoint for full ultrasound report.    ASSESSMENT/PLAN:     38 y.o.  female with IUP at 29w1d     Problems addressed at today's visit:  Cardiac disease in mother affecting pregnancy - H/o post partum SCAD s/p CABG   Cardiology: [facility] ([facility])  NYHA Class: Class II  [facility] Risk Class: II/III  Last EKG 23: NSR; rightward axis with septal infarct; T wave abnl, consider anterior ischemia  Last TTE 2023:  The left ventricle is normal in size with mildly decreased systolic function.  The estimated ejection fraction is 48%.  There is abnormal septal wall motion.  There are segmental left ventricular wall motion abnormalities.  Grade I left ventricular diastolic dysfunction.  Normal right ventricular size with low normal right ventricular systolic function.  Normal central venous pressure (3 mmHg).  Other evaluations: Stress test 2022; Negative for myocardial ischemia; Some segments were hypokinetic at peak stress (discontinued due to leg pain)  Pre-pregnancy regimen:  - Atorvastatin  - ASA 81 mg      Current regimen:   - Metoprolol 25mg daily  - ASA 81mg daily    She has previously  been counseled regarding SCAD recurrence risk of approximately 10%. Recent literature suggests exposure to pregnancy does not necessarily increase this recurrence risk; however, her cardiac history does give her a CARPREG 2 score of >4, making her risk of acute cardiac event during pregnancy at least 40%.     See prior notes full counseling and recommendations  She denies cardiac symptoms today - no persistent CP, SOB, dyspnea on exertion.     Recommendations  Continue medications as listed above  MFM MD visit and ultrasound q4 weeks for the duration of pregnancy  Recommend follow up every 2 weeks with primary OB starting at 28 weeks  OB anesthesia consult today  Continue home BP monitoring on Connected MOM. Recommend maintaining BP <140/90 throughout pregnancy. The patient does not have a history of hypertension. If anti-hypertensives are indicated, would recommend discussion with MFM prior to initiating.   If the patient has any cardiac symptoms - chest pain, palpitations, shortness of breath - she should be urgently evaluated in a hospital or ED setting  Delivery timin weeks or sooner if indicated  Contraception: desires BTL  Recommend telemetry during labor and for at least 48 hours postpartum. Consider ICU admission after delivery pending maternal status and pathology evolution.   Close monitoring of fluid status intrapartum with strict I/Os. Maintain euvolemia.  Anticipate hospitalization for at least 3-4 days postpartum to monitor maternal cardiac status   Recommend continued regular follow up with cardiology every trimester or more often as indicated per cardiology. Has echo scheduled 10/24, cardiology appt  and HTS appt .    Please see original M consultation for full details regarding management recommendations of these and other obstetric co-morbidities    FOLLOW UP:   F/u in 4 weeks for US/MFM visit    Today I have spent 30 minutes in the care of the patient. This includes face to face  time and non-face to face time preparing to see the patient (eg, review of tests), obtaining and/or reviewing separately obtained history, documenting clinical information in the electronic or other health record, independently interpreting results and communicating results to the patient/family/caregiver, or care coordination.     Sophia Yuan MD  Maternal Fetal Medicine

## 2023-09-25 NOTE — ASSESSMENT & PLAN NOTE
Cardiology: [facility] ([facility])  NYHA Class: Class II  [facility] Risk Class: II/III  Last EKG 5/2/23: NSR; rightward axis with septal infarct; T wave abnl, consider anterior ischemia  Last TTE 7/2023:   The left ventricle is normal in size with mildly decreased systolic function.   The estimated ejection fraction is 48%.   There is abnormal septal wall motion.   There are segmental left ventricular wall motion abnormalities.   Grade I left ventricular diastolic dysfunction.   Normal right ventricular size with low normal right ventricular systolic function.   Normal central venous pressure (3 mmHg).  Other evaluations: Stress test 1/2022; Negative for myocardial ischemia; Some segments were hypokinetic at peak stress (discontinued due to leg pain)  Pre-pregnancy regimen:  - Atorvastatin  - ASA 81 mg      Current regimen:   - Metoprolol 25mg daily  - ASA 81mg daily    She has previously been counseled regarding SCAD recurrence risk of approximately 10%. Recent literature suggests exposure to pregnancy does not necessarily increase this recurrence risk; however, her cardiac history does give her a CARPREG 2 score of >4, making her risk of acute cardiac event during pregnancy at least 40%.     See prior notes full counseling and recommendations  She denies cardiac symptoms today - no persistent CP, SOB, dyspnea on exertion.     Recommendations  · Continue medications as listed above  · MFM MD visit and ultrasound q4 weeks for the duration of pregnancy  · Recommend follow up every 2 weeks with primary OB starting at 28 weeks  · OB anesthesia consult today  · Continue home BP monitoring on Connected MOM. Recommend maintaining BP <140/90 throughout pregnancy. The patient does not have a history of hypertension. If anti-hypertensives are indicated, would recommend discussion with MFM prior to initiating.   · If the patient has any cardiac symptoms - chest pain, palpitations, shortness of breath - she should be  urgently evaluated in a hospital or ED setting  · Delivery timin weeks or sooner if indicated  · Contraception: desires BTL  · Recommend telemetry during labor and for at least 48 hours postpartum. Consider ICU admission after delivery pending maternal status and pathology evolution.   · Close monitoring of fluid status intrapartum with strict I/Os. Maintain euvolemia.  · Anticipate hospitalization for at least 3-4 days postpartum to monitor maternal cardiac status   · Recommend continued regular follow up with cardiology every trimester or more often as indicated per cardiology. Has echo scheduled 10/24, cardiology appt  and HTS appt .

## 2023-09-25 NOTE — CONSULTS
Consults    Outpatient OB Anesthesia Consult      Date and Time: 2023 10:05 AM     Request from: [facility]    CC requesting physician or midwife: Yes    Reason for Consult: Anesthetic recommendations for delivery    Initial Consult?: Yes    Chief Complaint: Post partum SCAD s/p CABG     HPI: Patient is a 38 y.o. year old woman,  at 29w1d presenting with a hx of SCAD s/p CABG in . Pt reports intermittent chest pain since surgery that lasts between 30 seconds and 5 minutes. She has not had any change in quality or quantity of these episodes.   Cardiology has evaluated patient with plans to repeat Echo in 4 weeks.  Her prior deliveries involved epidural analgesia. She did have an inadvertent dural puncture with her last epidural placement in  that resolved after a blood patch performed 5 days post partum.       EKG [2023]:   - NSR, T wave abnormality   Echo [2023]:   - EF 48%  - segmental left ventricular wall motion abnormalities   - grade I LV diastolic dysfunction     CTA Cardiac [2017]:   1) Summary no evidence of proximal epicardial stenosis.    2) Occluded FAVIO and saphenous vein graft.    Past medical history:    Past Medical History:   Diagnosis Date    Abnormal Pap smear of cervix ,     colposcopy    Anemia     Coronary artery dissection 2013    Postpartum depression     Spinal headache complicating labor and delivery, postpartum condition 2013       Past surgical history:    Past Surgical History:   Procedure Laterality Date    CARDIAC SURGERY      CORONARY ARTERY BYPASS GRAFT      tanner to lad, svg OM1    WISDOM TOOTH EXTRACTION         Family history:    Family History   Problem Relation Age of Onset    Hypertension Mother     Diabetes Mother     Stroke Mother     Breast cancer Neg Hx     Ovarian cancer Neg Hx     Colon cancer Neg Hx     Cancer Neg Hx     Heart disease Neg Hx        Social History:    Social History     Socioeconomic History    Marital status:  Single    Number of children: 3    Years of education: College   Occupational History    Occupation: unemployed     Comment: [employer]   Tobacco Use    Smoking status: Never    Smokeless tobacco: Never   Substance and Sexual Activity    Alcohol use: Not Currently    Drug use: No    Sexual activity: Yes     Partners: Male       Medication:    Current Outpatient Medications on File Prior to Visit   Medication Sig Dispense Refill    aspirin (ECOTRIN) 81 MG EC tablet Take 81 mg by mouth twice a week.      metoprolol succinate (TOPROL-XL) 25 MG 24 hr tablet Take 1 tablet (25 mg total) by mouth once daily. 90 tablet 3    prenatal vit 87-iron-folic-dha (PRENATE MINI, FERR ASP GLYCIN,) 18-1-350 mg Cap Take 1 each by mouth Daily. 30 capsule 11     No current facility-administered medications on file prior to visit.       Allergies:    Bananas [banana] and Peanut butter flavor    Family or personal history of anesthetic complications: No    Diagnostic Studies: I have reviewed the following relevant findings as noted below:  CBC:  Lab Results   Component Value Date    WBC 9.18 08/23/2023    HGB 10.6 (L) 08/23/2023    HCT 33.6 (L) 08/23/2023    MCV 90 08/23/2023     08/23/2023      CMP:  Sodium   Date Value Ref Range Status   04/15/2023 138 136 - 145 mmol/L Final     Potassium   Date Value Ref Range Status   04/15/2023 4.3 3.5 - 5.1 mmol/L Final     Chloride   Date Value Ref Range Status   04/15/2023 107 95 - 110 mmol/L Final     CO2   Date Value Ref Range Status   04/15/2023 21 (L) 23 - 29 mmol/L Final     Glucose   Date Value Ref Range Status   04/15/2023 109 70 - 110 mg/dL Final     BUN   Date Value Ref Range Status   04/15/2023 9 6 - 20 mg/dL Final     Creatinine   Date Value Ref Range Status   04/15/2023 0.8 0.5 - 1.4 mg/dL Final     Calcium   Date Value Ref Range Status   04/15/2023 11.0 (H) 8.7 - 10.5 mg/dL Final     Total Protein   Date Value Ref Range Status   04/15/2023 7.5 6.0 - 8.4 g/dL Final      Albumin   Date Value Ref Range Status   04/15/2023 4.1 3.5 - 5.2 g/dL Final     Total Bilirubin   Date Value Ref Range Status   04/15/2023 0.2 0.1 - 1.0 mg/dL Final     Comment:     For infants and newborns, interpretation of results should be based  on gestational age, weight and in agreement with clinical  observations.    Premature Infant recommended reference ranges:  Up to 24 hours.............<8.0 mg/dL  Up to 48 hours............<12.0 mg/dL  3-5 days..................<15.0 mg/dL  6-29 days.................<15.0 mg/dL       Alkaline Phosphatase   Date Value Ref Range Status   04/15/2023 51 (L) 55 - 135 U/L Final     AST   Date Value Ref Range Status   04/15/2023 16 10 - 40 U/L Final     ALT   Date Value Ref Range Status   04/15/2023 13 10 - 44 U/L Final     Anion Gap   Date Value Ref Range Status   04/15/2023 10 8 - 16 mmol/L Final     eGFR if    Date Value Ref Range Status   05/13/2022 >60.0 >60 mL/min/1.73 m^2 Final     eGFR if non    Date Value Ref Range Status   05/13/2022 >60.0 >60 mL/min/1.73 m^2 Final     Comment:     Calculation used to obtain the estimated glomerular filtration  rate (eGFR) is the CKD-EPI equation.        BMP:   Lab Results   Component Value Date     04/15/2023    K 4.3 04/15/2023     04/15/2023    CO2 21 (L) 04/15/2023    BUN 9 04/15/2023    CREATININE 0.8 04/15/2023    CALCIUM 11.0 (H) 04/15/2023    ANIONGAP 10 04/15/2023    ESTGFRAFRICA >60.0 05/13/2022    EGFRNONAA >60.0 05/13/2022     Coagulation studies:   Lab Results   Component Value Date    INR 1.0 02/15/2017    APTT 20.9 (L) 09/24/2013     Review of Systems:   Constitutional: Negative for fever and chills; well appearing female  Eyes: no visual changes  Ear, nose, mouth and throat: no loose or broken teeth  Cardiovascular: no chest pain  Respiratory: no shortness of breath  Gastrointestinal: no nausea or vomiting  Genitourinary: no dysuria  Musculoskeletal: no joint pain  Skin:  warm and dry, no rashes  Neurologic: no seizures  Psychiatric: no anxiety or depression  Endocrinology: no heat or cold intolerance  Hematologic: does not bruise or bleed easily      Physical Exam:  Vitals:    See MFM Visit Vitals  Constitutional: alert, no distress  Eyes: normal lids, pupils symmetric  Ear, nose, mouth and throat: Mallampati I, normal thyromental distance, normal lips, teeth, gums and tongue   Cardiovascular: normal rate  Respiratory: normal effort, no wheezes  Musculoskeletal: normal gait, spinous processes palpable. Spine appears midline.   Skin: no rashes, no induration  Neurologic: normal reflexes and sensation  Psychiatric: oriented to person, place, time; normal affect    ASA Score: 3    Assessment and Plan:  38 year old with a hx of SCAD s/p CABG 2013.   1) s/p CABG: pt has stable cardiac function with assessments by cardiology outpatient and an echocardiogram showing low-normal EF. Repeat Echo scheduled for 10/24/2023, will follow up. Recommend telemetry during delivery and close monitoring post partum.   2) Hx of PDPH: pt desires epidural analgesia for her delivery but is concerned about the possibility of another headache. Explained that this is a possible complication of the procedure despite having successful placements prior to this. She would like to proceed with epidural placement early in her labor and ideally it will be done by a senior resident, fellow or staff.     Complexity: Moderate    Entertained and answered all questions to the patient's satisfaction.      Hansel Young MD

## 2023-10-01 ENCOUNTER — PATIENT MESSAGE (OUTPATIENT)
Dept: OTHER | Facility: OTHER | Age: 38
End: 2023-10-01
Payer: MEDICAID

## 2023-10-15 ENCOUNTER — PATIENT MESSAGE (OUTPATIENT)
Dept: OTHER | Facility: OTHER | Age: 38
End: 2023-10-15
Payer: MEDICAID

## 2023-10-18 ENCOUNTER — PATIENT MESSAGE (OUTPATIENT)
Dept: OBSTETRICS AND GYNECOLOGY | Facility: CLINIC | Age: 38
End: 2023-10-18

## 2023-10-20 NOTE — ASSESSMENT & PLAN NOTE
Cardiology: [facility] ([facility])  NYHA Class: Class II  [facility] Risk Class: II/III  Last EKG 5/2/23: NSR; rightward axis with septal infarct; T wave abnl, consider anterior ischemia  Last TTE 7/2023:   The left ventricle is normal in size with mildly decreased systolic function.   The estimated ejection fraction is 48%.   There is abnormal septal wall motion.   There are segmental left ventricular wall motion abnormalities.   Grade I left ventricular diastolic dysfunction.   Normal right ventricular size with low normal right ventricular systolic function.   Normal central venous pressure (3 mmHg).  Other evaluations: Stress test 1/2022; Negative for myocardial ischemia; Some segments were hypokinetic at peak stress (discontinued due to leg pain)  Pre-pregnancy regimen:  - Atorvastatin  - ASA 81 mg      Current regimen:   - Metoprolol 25mg daily  - ASA 81mg daily    She has previously been counseled regarding SCAD recurrence risk of approximately 10%. Recent literature suggests exposure to pregnancy does not necessarily increase this recurrence risk; however, her cardiac history does give her a CARPREG 2 score of >4, making her risk of acute cardiac event during pregnancy at least 40%.     See prior notes full counseling and recommendations  She denies cardiac symptoms today - no persistent CP, SOB, dyspnea on exertion.     Recommendations  · Continue medications as listed above  · MFM MD visit and ultrasound q4 weeks for the duration of pregnancy  · Recommend follow up every 2 weeks with primary OB starting at 28 weeks  · OB anesthesia consult completed  · Recommend starting weekly PNT (BPP 8/8 today)    · Continue home BP monitoring on Connected MOM. Recommend maintaining BP <140/90 throughout pregnancy. The patient does not have a history of hypertension. If anti-hypertensives are indicated, would recommend discussion with MFM prior to initiating.   · If the patient has any cardiac symptoms - chest pain,  palpitations, shortness of breath - she should be urgently evaluated in a hospital or ED setting    · Delivery timin weeks or sooner if indicated  · Contraception: desires BTL  · Recommend telemetry during labor and for at least 48 hours postpartum. Consider ICU admission after delivery pending maternal status and pathology evolution.   · Close monitoring of fluid status intrapartum with strict I/Os. Maintain euvolemia.  · Anticipate hospitalization for at least 3-4 days postpartum to monitor maternal cardiac status     · Recommend continued regular follow up with cardiology every trimester or more often as indicated per cardiology. Has echo scheduled 10/24, cardiology appt  and HTS appt .

## 2023-10-23 ENCOUNTER — OFFICE VISIT (OUTPATIENT)
Dept: MATERNAL FETAL MEDICINE | Facility: CLINIC | Age: 38
End: 2023-10-23
Payer: MEDICAID

## 2023-10-23 ENCOUNTER — ROUTINE PRENATAL (OUTPATIENT)
Dept: OBSTETRICS AND GYNECOLOGY | Facility: CLINIC | Age: 38
End: 2023-10-23
Payer: MEDICAID

## 2023-10-23 ENCOUNTER — PATIENT MESSAGE (OUTPATIENT)
Dept: MATERNAL FETAL MEDICINE | Facility: CLINIC | Age: 38
End: 2023-10-23

## 2023-10-23 ENCOUNTER — PROCEDURE VISIT (OUTPATIENT)
Dept: MATERNAL FETAL MEDICINE | Facility: CLINIC | Age: 38
End: 2023-10-23
Payer: MEDICAID

## 2023-10-23 VITALS — WEIGHT: 164.69 LBS | DIASTOLIC BLOOD PRESSURE: 64 MMHG | BODY MASS INDEX: 27.4 KG/M2 | SYSTOLIC BLOOD PRESSURE: 116 MMHG

## 2023-10-23 VITALS
BODY MASS INDEX: 27.44 KG/M2 | WEIGHT: 164.69 LBS | HEIGHT: 65 IN | DIASTOLIC BLOOD PRESSURE: 64 MMHG | SYSTOLIC BLOOD PRESSURE: 116 MMHG

## 2023-10-23 DIAGNOSIS — O99.412 HEART DISEASE IN MOTHER AFFECTING PREGNANCY IN SECOND TRIMESTER: ICD-10-CM

## 2023-10-23 DIAGNOSIS — Z30.09 UNWANTED FERTILITY: ICD-10-CM

## 2023-10-23 DIAGNOSIS — Z86.79 HISTORY OF CAD (CORONARY ARTERY DISEASE): ICD-10-CM

## 2023-10-23 DIAGNOSIS — I51.9 HEART DISEASE IN MOTHER AFFECTING PREGNANCY IN SECOND TRIMESTER: ICD-10-CM

## 2023-10-23 DIAGNOSIS — Z36.89 ENCOUNTER FOR ULTRASOUND TO ASSESS FETAL GROWTH: ICD-10-CM

## 2023-10-23 DIAGNOSIS — O99.413 HEART DISEASE IN MOTHER AFFECTING PREGNANCY IN THIRD TRIMESTER: ICD-10-CM

## 2023-10-23 DIAGNOSIS — O99.413 HEART DISEASE IN MOTHER AFFECTING PREGNANCY IN THIRD TRIMESTER: Primary | ICD-10-CM

## 2023-10-23 DIAGNOSIS — O09.893 SUPERVISION OF OTHER HIGH RISK PREGNANCIES, THIRD TRIMESTER: Primary | ICD-10-CM

## 2023-10-23 DIAGNOSIS — I51.9 HEART DISEASE IN MOTHER AFFECTING PREGNANCY IN THIRD TRIMESTER: Primary | ICD-10-CM

## 2023-10-23 DIAGNOSIS — Z3A.33 33 WEEKS GESTATION OF PREGNANCY: ICD-10-CM

## 2023-10-23 DIAGNOSIS — I51.9 HEART DISEASE IN MOTHER AFFECTING PREGNANCY IN THIRD TRIMESTER: ICD-10-CM

## 2023-10-23 PROCEDURE — 99999 PR PBB SHADOW E&M-EST. PATIENT-LVL II: CPT | Mod: PBBFAC,,, | Performed by: OBSTETRICS & GYNECOLOGY

## 2023-10-23 PROCEDURE — 3008F BODY MASS INDEX DOCD: CPT | Mod: CPTII,,, | Performed by: OBSTETRICS & GYNECOLOGY

## 2023-10-23 PROCEDURE — 76819 US MFM PROCEDURE (VIEWPOINT): ICD-10-PCS | Mod: 26,S$PBB,, | Performed by: OBSTETRICS & GYNECOLOGY

## 2023-10-23 PROCEDURE — 99999 PR PBB SHADOW E&M-EST. PATIENT-LVL II: ICD-10-PCS | Mod: PBBFAC,,, | Performed by: OBSTETRICS & GYNECOLOGY

## 2023-10-23 PROCEDURE — 3008F PR BODY MASS INDEX (BMI) DOCUMENTED: ICD-10-PCS | Mod: CPTII,,, | Performed by: OBSTETRICS & GYNECOLOGY

## 2023-10-23 PROCEDURE — 3074F PR MOST RECENT SYSTOLIC BLOOD PRESSURE < 130 MM HG: ICD-10-PCS | Mod: CPTII,,, | Performed by: OBSTETRICS & GYNECOLOGY

## 2023-10-23 PROCEDURE — 99214 OFFICE O/P EST MOD 30 MIN: CPT | Mod: TH,25,S$PBB, | Performed by: OBSTETRICS & GYNECOLOGY

## 2023-10-23 PROCEDURE — 76816 US MFM PROCEDURE (VIEWPOINT): ICD-10-PCS | Mod: 26,S$PBB,, | Performed by: OBSTETRICS & GYNECOLOGY

## 2023-10-23 PROCEDURE — 99214 PR OFFICE/OUTPT VISIT, EST, LEVL IV, 30-39 MIN: ICD-10-PCS | Mod: TH,25,S$PBB, | Performed by: OBSTETRICS & GYNECOLOGY

## 2023-10-23 PROCEDURE — 76816 OB US FOLLOW-UP PER FETUS: CPT | Mod: PBBFAC | Performed by: OBSTETRICS & GYNECOLOGY

## 2023-10-23 PROCEDURE — 99212 OFFICE O/P EST SF 10 MIN: CPT | Mod: PBBFAC,27,TH,25 | Performed by: OBSTETRICS & GYNECOLOGY

## 2023-10-23 PROCEDURE — 99213 OFFICE O/P EST LOW 20 MIN: CPT | Mod: TH,S$PBB,, | Performed by: OBSTETRICS & GYNECOLOGY

## 2023-10-23 PROCEDURE — 3078F PR MOST RECENT DIASTOLIC BLOOD PRESSURE < 80 MM HG: ICD-10-PCS | Mod: CPTII,,, | Performed by: OBSTETRICS & GYNECOLOGY

## 2023-10-23 PROCEDURE — 99212 OFFICE O/P EST SF 10 MIN: CPT | Mod: PBBFAC,TH,25 | Performed by: OBSTETRICS & GYNECOLOGY

## 2023-10-23 PROCEDURE — 3078F DIAST BP <80 MM HG: CPT | Mod: CPTII,,, | Performed by: OBSTETRICS & GYNECOLOGY

## 2023-10-23 PROCEDURE — 3074F SYST BP LT 130 MM HG: CPT | Mod: CPTII,,, | Performed by: OBSTETRICS & GYNECOLOGY

## 2023-10-23 PROCEDURE — 76819 FETAL BIOPHYS PROFIL W/O NST: CPT | Mod: 26,S$PBB,, | Performed by: OBSTETRICS & GYNECOLOGY

## 2023-10-23 PROCEDURE — 99213 PR OFFICE/OUTPT VISIT, EST, LEVL III, 20-29 MIN: ICD-10-PCS | Mod: TH,S$PBB,, | Performed by: OBSTETRICS & GYNECOLOGY

## 2023-10-23 NOTE — PROGRESS NOTES
"Maternal Fetal Medicine follow up consult    SUBJECTIVE:     Clayton Duarte is a 38 y.o.  female with IUP at 33w1d who is seen in follow up consultation by MFM.  Pregnancy complications include:   Problem   Cardiac disease in mother affecting pregnancy - H/o post partum SCAD s/p CABG        Previous notes reviewed.   No changes to medical, surgical, family, social, or obstetric history.    Interval history since last MFM visit:   Pt doing well today. She reports occasional lower abdominal pain, c/w round ligament pain. Denies VB, LOF. Reports occasional headaches that are minor and resolve with rest.     Medications:  Current Outpatient Medications   Medication Instructions    aspirin (ECOTRIN) 81 mg, Oral, Twice weekly    metoprolol succinate (TOPROL-XL) 25 mg, Oral, Daily    prenatal vit 87-iron-folic-dha (PRENATE MINI, FERR ASP GLYCIN,) 18-1-350 mg Cap 1 each, Oral, Daily       Care team members:  Dr. Madison - Primary OB     OBJECTIVE:   /64 (BP Location: Left arm, Patient Position: Sitting, BP Method: Medium (Automatic))   Ht 5' 5" (1.651 m)   Wt 74.7 kg (164 lb 10.9 oz)   LMP 2023   BMI 27.40 kg/m²         Ultrasound performed. See viewpoint for full ultrasound report.  Fetal size is AGA with the EFW plotting at the 18% and the AC plotting at the 28%.   The EFW is 1968 g.  A limited repeat fetal anatomic survey shows no abnormalities of the structures that were adequately imaged.    The BPP score is reassuring at 8/8, and the AFV is normal.     Significant labs/imaging:  None    ASSESSMENT/PLAN:     38 y.o.  female with IUP at 33w1d    Cardiac disease in mother affecting pregnancy - H/o post partum SCAD s/p CABG   Cardiology: [facility] ([facility])  NYHA Class: Class II  mWHO Risk Class: II/III  Last EKG 23: NSR; rightward axis with septal infarct; T wave abnl, consider anterior ischemia  Last TTE 2023:  The left ventricle is normal in size with mildly decreased " systolic function.  The estimated ejection fraction is 48%.  There is abnormal septal wall motion.  There are segmental left ventricular wall motion abnormalities.  Grade I left ventricular diastolic dysfunction.  Normal right ventricular size with low normal right ventricular systolic function.  Normal central venous pressure (3 mmHg).  Other evaluations: Stress test 2022; Negative for myocardial ischemia; Some segments were hypokinetic at peak stress (discontinued due to leg pain)  Pre-pregnancy regimen:  - Atorvastatin  - ASA 81 mg      Current regimen:   - Metoprolol 25mg daily  - ASA 81mg daily    She has previously been counseled regarding SCAD recurrence risk of approximately 10%. Recent literature suggests exposure to pregnancy does not necessarily increase this recurrence risk; however, her cardiac history does give her a CARPREG 2 score of >4, making her risk of acute cardiac event during pregnancy at least 40%.     See prior notes full counseling and recommendations  She denies cardiac symptoms today - no persistent CP, SOB, dyspnea on exertion.     Recommendations  Continue medications as listed above  MFM MD visit and ultrasound q4 weeks for the duration of pregnancy  Recommend follow up every 2 weeks with primary OB starting at 28 weeks  OB anesthesia consult completed  Recommend starting weekly PNT (BPP  today)    Continue home BP monitoring on Connected MOM. Recommend maintaining BP <140/90 throughout pregnancy. The patient does not have a history of hypertension. If anti-hypertensives are indicated, would recommend discussion with MFM prior to initiating.   If the patient has any cardiac symptoms - chest pain, palpitations, shortness of breath - she should be urgently evaluated in a hospital or ED setting    Delivery timin weeks or sooner if indicated  Contraception: desires BTL  Recommend telemetry during labor and for at least 48 hours postpartum. Consider ICU admission after delivery  pending maternal status and pathology evolution.   Close monitoring of fluid status intrapartum with strict I/Os. Maintain euvolemia.  Anticipate hospitalization for at least 3-4 days postpartum to monitor maternal cardiac status     Recommend continued regular follow up with cardiology every trimester or more often as indicated per cardiology. Has echo scheduled 10/24, cardiology appt 11/1 and HTS appt 11/9.    F/u in 4 weeks for MFM visit. Start weekly PNT (BPP completed today).     Ariana Azar MD  PGY 6  Maternal Fetal Medicine  [facility]

## 2023-10-24 ENCOUNTER — HOSPITAL ENCOUNTER (OUTPATIENT)
Dept: CARDIOLOGY | Facility: HOSPITAL | Age: 38
Discharge: HOME | End: 2023-10-24
Attending: INTERNAL MEDICINE
Payer: MEDICAID

## 2023-10-24 VITALS
BODY MASS INDEX: 27.66 KG/M2 | WEIGHT: 166 LBS | SYSTOLIC BLOOD PRESSURE: 132 MMHG | HEIGHT: 65 IN | HEART RATE: 74 BPM | DIASTOLIC BLOOD PRESSURE: 80 MMHG

## 2023-10-24 DIAGNOSIS — O09.522 MULTIGRAVIDA OF ADVANCED MATERNAL AGE IN SECOND TRIMESTER: ICD-10-CM

## 2023-10-24 DIAGNOSIS — O99.412 HEART DISEASE IN MOTHER AFFECTING PREGNANCY IN SECOND TRIMESTER: ICD-10-CM

## 2023-10-24 DIAGNOSIS — I51.9 HEART DISEASE IN MOTHER AFFECTING PREGNANCY IN SECOND TRIMESTER: ICD-10-CM

## 2023-10-24 LAB
ASCENDING AORTA: 2.49 CM
AV INDEX (PROSTH): 0.78
AV MEAN GRADIENT: 6 MMHG
AV PEAK GRADIENT: 10 MMHG
AV VALVE AREA BY VELOCITY RATIO: 2.49 CM²
AV VALVE AREA: 2.43 CM²
AV VELOCITY RATIO: 0.8
BSA FOR ECHO PROCEDURE: 1.86 M2
CV ECHO LV RWT: 0.28 CM
DOP CALC AO PEAK VEL: 1.55 M/S
DOP CALC AO VTI: 26.96 CM
DOP CALC LVOT AREA: 3.1 CM2
DOP CALC LVOT DIAMETER: 1.99 CM
DOP CALC LVOT PEAK VEL: 1.24 M/S
DOP CALC LVOT STROKE VOLUME: 65.62 CM3
DOP CALCLVOT PEAK VEL VTI: 21.11 CM
E WAVE DECELERATION TIME: 197.76 MSEC
E/A RATIO: 0.98
E/E' RATIO: 7.73 M/S
ECHO LV POSTERIOR WALL: 0.69 CM (ref 0.6–1.1)
FRACTIONAL SHORTENING: 29 % (ref 28–44)
INTERVENTRICULAR SEPTUM: 0.55 CM (ref 0.6–1.1)
LA MAJOR: 5.15 CM
LA WIDTH: 3.49 CM
LEFT ATRIUM SIZE: 3.38 CM
LEFT ATRIUM VOLUME INDEX MOD: 20.3 ML/M2
LEFT ATRIUM VOLUME MOD: 37.1 CM3
LEFT INTERNAL DIMENSION IN SYSTOLE: 3.56 CM (ref 2.1–4)
LEFT VENTRICLE DIASTOLIC VOLUME INDEX: 64.16 ML/M2
LEFT VENTRICLE DIASTOLIC VOLUME: 117.42 ML
LEFT VENTRICLE MASS INDEX: 54 G/M2
LEFT VENTRICLE SYSTOLIC VOLUME INDEX: 28.9 ML/M2
LEFT VENTRICLE SYSTOLIC VOLUME: 52.84 ML
LEFT VENTRICULAR INTERNAL DIMENSION IN DIASTOLE: 4.99 CM (ref 3.5–6)
LEFT VENTRICULAR MASS: 98.4 G
LV LATERAL E/E' RATIO: 6.07 M/S
LV SEPTAL E/E' RATIO: 10.63 M/S
MV A" WAVE DURATION": 8.75 MSEC
MV PEAK A VEL: 0.87 M/S
MV PEAK E VEL: 0.85 M/S
MV STENOSIS PRESSURE HALF TIME: 57.35 MS
MV VALVE AREA P 1/2 METHOD: 3.84 CM2
OHS LV EJECTION FRACTION SIMPSONS BIPLANE MOD: 53 %
PISA TR MAX VEL: 1.59 M/S
PULM VEIN S/D RATIO: 0.89
PV PEAK D VEL: 0.55 M/S
PV PEAK S VEL: 0.49 M/S
RA MAJOR: 4.66 CM
RA PRESSURE ESTIMATED: 3 MMHG
RA WIDTH: 3.63 CM
RIGHT VENTRICULAR END-DIASTOLIC DIMENSION: 3.77 CM
RV TB RVSP: 5 MMHG
SINUS: 2.61 CM
STJ: 2.39 CM
TDI LATERAL: 0.14 M/S
TDI SEPTAL: 0.08 M/S
TDI: 0.11 M/S
TR MAX PG: 10 MMHG
TRICUSPID ANNULAR PLANE SYSTOLIC EXCURSION: 1.23 CM
TV REST PULMONARY ARTERY PRESSURE: 13 MMHG
Z-SCORE OF LEFT VENTRICULAR DIMENSION IN END DIASTOLE: -0.16
Z-SCORE OF LEFT VENTRICULAR DIMENSION IN END SYSTOLE: 1.01

## 2023-10-24 PROCEDURE — 93306 TTE W/DOPPLER COMPLETE: CPT | Mod: 26,,, | Performed by: INTERNAL MEDICINE

## 2023-10-24 PROCEDURE — 93306 TTE W/DOPPLER COMPLETE: CPT

## 2023-10-24 PROCEDURE — 93306 ECHO (CUPID ONLY): ICD-10-PCS | Mod: 26,,, | Performed by: INTERNAL MEDICINE

## 2023-10-24 NOTE — PROGRESS NOTES
Good FM. Denies VB, LOF, CTX.   Declines colposcopy today because had cramping last time, wants to reschedule to next week.  Interested in BTL after delivery.  Reports some SOB but not increased much from baseline. Denies CP.  MFM US done today.     /64   Wt 74.7 kg (164 lb 10.9 oz)   LMP 2023   BMI 27.40 kg/m²     38 y.o., at 33w2d by Estimated Date of Delivery: 12/10/23  Patient Active Problem List   Diagnosis    Chronic anemia    History of CAD (coronary artery disease)    Preoperative cardiovascular examination    Iron deficiency anemia    Chest pain, non-cardiac    Umbilical hernia without obstruction and without gangrene    Low TSH level    Chest pain at rest    Palpitations    Microcytic anemia    Vitamin D deficiency    Serum calcium elevated    Positive pregnancy test    Cardiac disease in mother affecting pregnancy - H/o post partum SCAD s/p CABG     AMA (advanced maternal age) multigravida 35+     OB History    Para Term  AB Living   4 3 3     3   SAB IAB Ectopic Multiple Live Births           3      # Outcome Date GA Lbr Jose Alberto/2nd Weight Sex Delivery Anes PTL Lv   4 Current            3 Term 13 39w2d 103:30 / 00:23 3.025 kg (6 lb 10.7 oz) F Vag-Spont EPI N RENEE      Birth Comments: 28    2 Term 09 39w0d 10:00 3.629 kg (8 lb) M Vag-Spont EPI N RENEE   1 Term 06 39w0d 09:00 3.033 kg (6 lb 11 oz) F Vag-Spont EPI N RENEE       Dating reviewed    Allergies and problem list reviewed and updated    Medical and surgical history reviewed    Prenatal labs reviewed and updated    Physical Exam:  ABD: soft, gravid, nontender    Assessment:  Clayton was seen today for routine prenatal visit.    Diagnoses and all orders for this visit:    Supervision of other high risk pregnancies, third trimester  -     IP OB Labor Induction; Future    History of CAD (coronary artery disease)  -     Prenatal Testing - NST/ELROY; Standing  -     IP OB Labor Induction;  Future    Heart disease in mother affecting pregnancy in second trimester  -     IP OB Labor Induction; Future    33 weeks gestation of pregnancy    Unwanted fertility - medicaid tubal signed 10/23/23    MFM note reviewed  PNT weekly scheduled  Medicaid tubal consent signed  Colpo rescheduled to next week  IOL at 38 weeks requested    Orders Placed This Encounter   Procedures    IP OB Labor Induction    Prenatal Testing - NST/ELROY       Labor, ROM and bleeding precautions.     Follow up in about 1 week (around 10/30/2023) for routine OB.

## 2023-10-26 ENCOUNTER — PATIENT MESSAGE (OUTPATIENT)
Dept: CARDIOLOGY | Facility: CLINIC | Age: 38
End: 2023-10-26
Payer: MEDICAID

## 2023-10-26 NOTE — PROGRESS NOTES
plz let pt. Know that echo (ultrasound of the heart) is low normal but unchanged from prior studies.

## 2023-10-30 ENCOUNTER — HOSPITAL ENCOUNTER (OUTPATIENT)
Dept: PERINATAL CARE | Facility: OTHER | Age: 38
Discharge: HOME | End: 2023-10-30
Attending: OBSTETRICS & GYNECOLOGY
Payer: MEDICAID

## 2023-10-30 DIAGNOSIS — Z86.79 HISTORY OF CAD (CORONARY ARTERY DISEASE): ICD-10-CM

## 2023-10-30 PROCEDURE — 59025 FETAL NON-STRESS TEST: CPT

## 2023-10-30 PROCEDURE — 76815 OB US LIMITED FETUS(S): CPT | Mod: 26,,, | Performed by: OBSTETRICS & GYNECOLOGY

## 2023-10-30 PROCEDURE — 76815 PRENATAL TESTING - NST/AFI: ICD-10-PCS | Mod: 26,,, | Performed by: OBSTETRICS & GYNECOLOGY

## 2023-10-30 PROCEDURE — 59025 FETAL NON-STRESS TEST: CPT | Mod: 26,,, | Performed by: OBSTETRICS & GYNECOLOGY

## 2023-10-30 PROCEDURE — 76815 OB US LIMITED FETUS(S): CPT

## 2023-10-30 PROCEDURE — 59025 PRENATAL TESTING - NST/AFI: ICD-10-PCS | Mod: 26,,, | Performed by: OBSTETRICS & GYNECOLOGY

## 2023-11-01 ENCOUNTER — OFFICE VISIT (OUTPATIENT)
Dept: CARDIOLOGY | Facility: CLINIC | Age: 38
End: 2023-11-01
Payer: MEDICAID

## 2023-11-01 ENCOUNTER — ROUTINE PRENATAL (OUTPATIENT)
Dept: OBSTETRICS AND GYNECOLOGY | Facility: CLINIC | Age: 38
End: 2023-11-01
Payer: MEDICAID

## 2023-11-01 VITALS
HEART RATE: 92 BPM | BODY MASS INDEX: 28.16 KG/M2 | HEIGHT: 65 IN | SYSTOLIC BLOOD PRESSURE: 117 MMHG | DIASTOLIC BLOOD PRESSURE: 65 MMHG | WEIGHT: 169 LBS | OXYGEN SATURATION: 99 %

## 2023-11-01 VITALS — SYSTOLIC BLOOD PRESSURE: 116 MMHG | BODY MASS INDEX: 28.29 KG/M2 | WEIGHT: 170 LBS | DIASTOLIC BLOOD PRESSURE: 68 MMHG

## 2023-11-01 DIAGNOSIS — D06.9 CIN III (CERVICAL INTRAEPITHELIAL NEOPLASIA GRADE III) WITH SEVERE DYSPLASIA: ICD-10-CM

## 2023-11-01 DIAGNOSIS — O99.413 HEART DISEASE IN MOTHER AFFECTING PREGNANCY IN THIRD TRIMESTER: ICD-10-CM

## 2023-11-01 DIAGNOSIS — Z3A.34 34 WEEKS GESTATION OF PREGNANCY: ICD-10-CM

## 2023-11-01 DIAGNOSIS — R00.2 PALPITATIONS: ICD-10-CM

## 2023-11-01 DIAGNOSIS — I25.42 SPONTANEOUS DISSECTION OF CORONARY ARTERY: ICD-10-CM

## 2023-11-01 DIAGNOSIS — I51.9 HEART DISEASE IN MOTHER AFFECTING PREGNANCY IN THIRD TRIMESTER: ICD-10-CM

## 2023-11-01 DIAGNOSIS — Z95.1 S/P CABG X 2: ICD-10-CM

## 2023-11-01 DIAGNOSIS — O09.523 MULTIGRAVIDA OF ADVANCED MATERNAL AGE IN THIRD TRIMESTER: ICD-10-CM

## 2023-11-01 DIAGNOSIS — Z86.79 HISTORY OF CAD (CORONARY ARTERY DISEASE): Primary | ICD-10-CM

## 2023-11-01 DIAGNOSIS — R07.9 CHEST PAIN AT REST: ICD-10-CM

## 2023-11-01 DIAGNOSIS — O09.893 SUPERVISION OF OTHER HIGH RISK PREGNANCIES, THIRD TRIMESTER: Primary | ICD-10-CM

## 2023-11-01 DIAGNOSIS — R07.89 CHEST PAIN, NON-CARDIAC: ICD-10-CM

## 2023-11-01 DIAGNOSIS — Z86.79 HISTORY OF CAD (CORONARY ARTERY DISEASE): ICD-10-CM

## 2023-11-01 PROCEDURE — 3078F DIAST BP <80 MM HG: CPT | Mod: CPTII,,, | Performed by: INTERNAL MEDICINE

## 2023-11-01 PROCEDURE — 99214 PR OFFICE/OUTPT VISIT, EST, LEVL IV, 30-39 MIN: ICD-10-PCS | Mod: S$PBB,,, | Performed by: INTERNAL MEDICINE

## 2023-11-01 PROCEDURE — 1159F MED LIST DOCD IN RCRD: CPT | Mod: CPTII,,, | Performed by: INTERNAL MEDICINE

## 2023-11-01 PROCEDURE — 1160F RVW MEDS BY RX/DR IN RCRD: CPT | Mod: CPTII,,, | Performed by: INTERNAL MEDICINE

## 2023-11-01 PROCEDURE — 99213 OFFICE O/P EST LOW 20 MIN: CPT | Mod: PBBFAC | Performed by: INTERNAL MEDICINE

## 2023-11-01 PROCEDURE — 3078F PR MOST RECENT DIASTOLIC BLOOD PRESSURE < 80 MM HG: ICD-10-PCS | Mod: CPTII,,, | Performed by: INTERNAL MEDICINE

## 2023-11-01 PROCEDURE — 99999 PR PBB SHADOW E&M-EST. PATIENT-LVL II: CPT | Mod: PBBFAC,,, | Performed by: OBSTETRICS & GYNECOLOGY

## 2023-11-01 PROCEDURE — 99213 OFFICE O/P EST LOW 20 MIN: CPT | Mod: TH,25,S$PBB, | Performed by: OBSTETRICS & GYNECOLOGY

## 2023-11-01 PROCEDURE — 1160F PR REVIEW ALL MEDS BY PRESCRIBER/CLIN PHARMACIST DOCUMENTED: ICD-10-PCS | Mod: CPTII,,, | Performed by: INTERNAL MEDICINE

## 2023-11-01 PROCEDURE — 99214 OFFICE O/P EST MOD 30 MIN: CPT | Mod: S$PBB,,, | Performed by: INTERNAL MEDICINE

## 2023-11-01 PROCEDURE — 99212 OFFICE O/P EST SF 10 MIN: CPT | Mod: PBBFAC,27,TH | Performed by: OBSTETRICS & GYNECOLOGY

## 2023-11-01 PROCEDURE — 1159F PR MEDICATION LIST DOCUMENTED IN MEDICAL RECORD: ICD-10-PCS | Mod: CPTII,,, | Performed by: INTERNAL MEDICINE

## 2023-11-01 PROCEDURE — 88342 IMHCHEM/IMCYTCHM 1ST ANTB: CPT | Performed by: PATHOLOGY

## 2023-11-01 PROCEDURE — 3074F PR MOST RECENT SYSTOLIC BLOOD PRESSURE < 130 MM HG: ICD-10-PCS | Mod: CPTII,,, | Performed by: INTERNAL MEDICINE

## 2023-11-01 PROCEDURE — 88305 TISSUE EXAM BY PATHOLOGIST: CPT | Performed by: PATHOLOGY

## 2023-11-01 PROCEDURE — 99999 PR PBB SHADOW E&M-EST. PATIENT-LVL III: ICD-10-PCS | Mod: PBBFAC,,, | Performed by: INTERNAL MEDICINE

## 2023-11-01 PROCEDURE — 99999 PR PBB SHADOW E&M-EST. PATIENT-LVL III: CPT | Mod: PBBFAC,,, | Performed by: INTERNAL MEDICINE

## 2023-11-01 PROCEDURE — 3074F SYST BP LT 130 MM HG: CPT | Mod: CPTII,,, | Performed by: INTERNAL MEDICINE

## 2023-11-01 PROCEDURE — 99213 PR OFFICE/OUTPT VISIT, EST, LEVL III, 20-29 MIN: ICD-10-PCS | Mod: TH,25,S$PBB, | Performed by: OBSTETRICS & GYNECOLOGY

## 2023-11-01 PROCEDURE — 3008F PR BODY MASS INDEX (BMI) DOCUMENTED: ICD-10-PCS | Mod: CPTII,,, | Performed by: INTERNAL MEDICINE

## 2023-11-01 PROCEDURE — 99999 PR PBB SHADOW E&M-EST. PATIENT-LVL II: ICD-10-PCS | Mod: PBBFAC,,, | Performed by: OBSTETRICS & GYNECOLOGY

## 2023-11-01 PROCEDURE — 3008F BODY MASS INDEX DOCD: CPT | Mod: CPTII,,, | Performed by: INTERNAL MEDICINE

## 2023-11-01 RX ORDER — METOPROLOL TARTRATE 25 MG/1
TABLET, FILM COATED ORAL
COMMUNITY
Start: 2023-06-01 | End: 2023-11-01

## 2023-11-01 NOTE — PROGRESS NOTES
Subjective:   Patient ID:  Clayton Duarte is a 38 y.o. female who presents for follow-up of Hypertension  Clayton Duarte is a 38 y.o. female who presents for follow-up of History of CAD (coronary artery disease) and Hyperlipidemia    PREOPERATIVE DIAGNOSES:  1.  Postpartum left main coronary artery dissection.  2.  Proximal LAD dissection.  3.  Proximal circumflex and ramus dissection  4.  Acute cardiogenic shock.  5.  Status post intraaortic balloon pump.     POSTOPERATIVE DIAGNOSES:  1.  Postpartum left main coronary artery dissection.  2.  Proximal LAD dissection.  3.  Proximal circumflex and ramus dissection  4.  Acute cardiogenic shock.  5.  Status post intraaortic balloon pump.     PROCEDURES:  Emergency salvage CABG x2 with LIMA to LAD and saphenous vein graft to OM1   to OM1.     HPI:   Preop clearance for tubal ligation  She has 3 kids, no issues with the pregnancies of 2 now teenage kids, with the last pregnancy after delivery few hours late she developed severe chest pain ST changes. Her diagnostic catheterization revealed what appeared to be a likely spontaneous postpartum coronary dissection and she underwent a 2 vessel CABG  Occasional chest pain when she gets cold.   Walks 3,4 times a week  Non smoker     Cath 2013  Left Main Coronary Artery:              The distal LM has a 70% stenosis. There is MARY ELLEN 2 flow.      - Left Anterior Descending Artery:             The LAD has a 90% stenosis. There is MARY ELLEN 2 flow.      - Ramus:              The ramus has a 90% stenosis. There is MARY ELLEN 2 flow.      - Left Circumflex Artery:              The LCX has a 75% stenosis. There is MARY ELLEN 3 flow. The remaining portion of the vessel has luminal irregularities.      - OM1:              The OM1 is normal. There is MARY ELLEN 3 flow.      - OM2:              The OM2 is normal. There is MARY ELLEN 3 flow. The remaining portion of the vessel is of small caliber.      - OM3:              The OM3 is normal. There is MARY ELLEN 3  flow. The remaining portion of the vessel is of small caliber.      - Left Posterior Descending Artery:              The left PDA is normal. There is MARY ELLEN 3 flow. The remaining portion of the vessel is of small caliber.      - Right Coronary Artery:              The RCA is normal. There is MARY ELLEN 3 flow.      - Posterior Lateral Branch:              The PLB is normal. There is MARY ELLEN 3 flow.      - Posterior Descending Artery:              The PDA is normal. There is MARY ELLEN 3 flow.      Echo 2017  CONCLUSIONS     1 - Mildly depressed left ventricular systolic function (EF 45-50%).     2 - Normal left ventricular diastolic function.     3 - Normal right ventricular systolic function .     4 - The estimated PA systolic pressure is 22 mmHg.     5 - Trivial mitral regurgitation.     6 - Trivial tricuspid regurgitation.     Non-specific finding demonstrating failure to augment involving the mid anteroseptum, apical septum, mid inferoseptum, apical inferior wall which may be associated with ischemia; clinical correlation required.      HPI:   No chest pain, Orthopnea, PND of heart failure symptoms.   Denies palpitations or fluttering in the chest  Very active with kids  Works     Echo 2023  TDS  Low normal systolic function.  The estimated ejection fraction is 50%.  Normal left ventricular diastolic function.  There are segmental left ventricular wall motion abnormalities. Basal-mid anteroseptal hypokinesis  Normal right ventricular size with normal right ventricular systolic function.  Mild left atrial enlargement.  Mild mitral regurgitation.     Echo 7/23  The left ventricle is normal in size with mildly decreased systolic function.  The estimated ejection fraction is 48%.  There is abnormal septal wall motion.  There are segmental left ventricular wall motion abnormalities.  Grade I left ventricular diastolic dysfunction.  Normal right ventricular size with low normal right ventricular systolic function.  Normal central  "venous pressure (3 mmHg).     HPI:   No chest pain, Orthopnea, PND of heart failure symptoms.   Denies palpitations or fluttering in the chest  Taking care of daily activities with kids.   She had severe Chest pain at the time of her prior SCAD    Echo 2023    Left Ventricle: The left ventricle is normal in size. Normal wall thickness. Normal wall motion. There is low normal systolic function with a visually estimated ejection fraction of 50 - 55%. There is normal diastolic function.    The following segments are hypokinetic: basal anteroseptal, basal inferoseptal, mid anteroseptal, mid inferoseptal and apical septal. All other segments are normal.    Right Ventricle: Normal right ventricular cavity size. Wall thickness is normal. Right ventricle wall motion  is normal. Systolic function is normal.    Aortic Valve: The aortic valve is a trileaflet valve.    Aorta: Aortic root is normal in size measuring 2.61 cm. Ascending aorta is normal measuring 2.49 cm.    Pulmonary Artery: The estimated pulmonary artery systolic pressure is 13 mmHg.    IVC/SVC: Normal venous pressure at 3 mmHg.    Pericardium: There is no pericardial effusion.     HPI:   No chest pain, Orthopnea, PND of heart failure symptoms.   Denies palpitations or fluttering in the chest  Taking care of daily activities with kids.       Patient Active Problem List   Diagnosis    Chronic anemia    History of CAD (coronary artery disease)    Preoperative cardiovascular examination    Iron deficiency anemia    Chest pain, non-cardiac    Umbilical hernia without obstruction and without gangrene    Low TSH level    Chest pain at rest    Palpitations    Microcytic anemia    Vitamin D deficiency    Serum calcium elevated    Positive pregnancy test    Cardiac disease in mother affecting pregnancy - H/o post partum SCAD s/p CABG     AMA (advanced maternal age) multigravida 35+     /65   Pulse 92   Ht 5' 5" (1.651 m)   Wt 76.7 kg (169 lb)   LMP 03/06/2023   " SpO2 99%   BMI 28.12 kg/m²   Body mass index is 28.12 kg/m².  CrCl cannot be calculated (Patient's most recent lab result is older than the maximum 7 days allowed.).    Lab Results   Component Value Date     04/15/2023    K 4.3 04/15/2023     04/15/2023    CO2 21 (L) 04/15/2023    BUN 9 04/15/2023    CREATININE 0.8 04/15/2023     04/15/2023    HGBA1C 5.2 12/12/2022    MG 2.0 09/24/2013    AST 16 04/15/2023    ALT 13 04/15/2023    ALBUMIN 4.1 04/15/2023    PROT 7.5 04/15/2023    BILITOT 0.2 04/15/2023    WBC 9.18 08/23/2023    HGB 10.6 (L) 08/23/2023    HCT 33.6 (L) 08/23/2023    HCT 45 12/30/2021    MCV 90 08/23/2023     08/23/2023    INR 1.0 02/15/2017    TSH 0.326 (L) 12/12/2022    CHOL 153 12/12/2022    HDL 48 12/12/2022    LDLCALC 93.2 12/12/2022    TRIG 59 12/12/2022       Current Outpatient Medications   Medication Sig    aspirin (ECOTRIN) 81 MG EC tablet Take 81 mg by mouth twice a week.    metoprolol succinate (TOPROL-XL) 25 MG 24 hr tablet Take 1 tablet (25 mg total) by mouth once daily.    prenatal vit 87-iron-folic-dha (PRENATE MINI, FERR ASP GLYCIN,) 18-1-350 mg Cap Take 1 each by mouth Daily.     No current facility-administered medications for this visit.       Review of Systems   Constitutional: Negative for chills, decreased appetite, malaise/fatigue, night sweats, weight gain and weight loss.   Eyes:  Negative for blurred vision, double vision, visual disturbance and visual halos.   Cardiovascular:  Negative for chest pain, claudication, cyanosis, dyspnea on exertion, irregular heartbeat, leg swelling, near-syncope, orthopnea, palpitations, paroxysmal nocturnal dyspnea and syncope.   Respiratory:  Positive for shortness of breath. Negative for cough, hemoptysis, snoring, sputum production and wheezing.    Endocrine: Negative for cold intolerance, heat intolerance, polydipsia and polyphagia.   Hematologic/Lymphatic: Negative for adenopathy and bleeding problem. Does not  bruise/bleed easily.   Skin:  Negative for flushing, itching, poor wound healing and rash.   Musculoskeletal:  Negative for arthritis, back pain, falls, gout, joint pain, joint swelling, muscle cramps, muscle weakness, myalgias, neck pain and stiffness.   Gastrointestinal:  Negative for bloating, abdominal pain, anorexia, diarrhea, dysphagia, excessive appetite, flatus, hematemesis, jaundice, melena and nausea.   Genitourinary:  Negative for hesitancy and incomplete emptying.   Neurological:  Negative for aphonia, brief paralysis, difficulty with concentration, disturbances in coordination, excessive daytime sleepiness, dizziness, focal weakness, light-headedness, loss of balance and weakness.   Psychiatric/Behavioral:  Negative for altered mental status, depression, hallucinations, hypervigilance, memory loss, substance abuse and suicidal ideas. The patient does not have insomnia and is not nervous/anxious.        Objective:   Physical Exam  Vitals reviewed: gravid uterus.   Constitutional:       General: She is not in acute distress.     Appearance: She is well-developed. She is not diaphoretic.   HENT:      Head: Normocephalic and atraumatic.      Nose: Nose normal.      Mouth/Throat:      Pharynx: No oropharyngeal exudate.   Eyes:      General: No scleral icterus.        Right eye: No discharge.         Left eye: No discharge.      Conjunctiva/sclera: Conjunctivae normal.      Pupils: Pupils are equal, round, and reactive to light.   Neck:      Thyroid: No thyromegaly.      Vascular: No JVD.      Trachea: No tracheal deviation.   Cardiovascular:      Rate and Rhythm: Normal rate and regular rhythm.      Pulses: Intact distal pulses.      Heart sounds: Normal heart sounds. No murmur heard.     No friction rub. No gallop.   Pulmonary:      Effort: Pulmonary effort is normal. No respiratory distress.      Breath sounds: Normal breath sounds. No stridor. No wheezing or rales.   Chest:      Chest wall: No tenderness.    Abdominal:      General: Bowel sounds are normal. There is no distension.      Palpations: Abdomen is soft. There is no mass.      Tenderness: There is no abdominal tenderness. There is no guarding or rebound.   Musculoskeletal:         General: No tenderness. Normal range of motion.      Cervical back: Normal range of motion and neck supple.   Lymphadenopathy:      Cervical: No cervical adenopathy.   Skin:     General: Skin is warm.      Coloration: Skin is not pale.      Findings: No erythema or rash.   Neurological:      Mental Status: She is alert and oriented to person, place, and time.      Cranial Nerves: No cranial nerve deficit.      Motor: No abnormal muscle tone.      Coordination: Coordination normal.      Deep Tendon Reflexes: Reflexes are normal and symmetric. Reflexes normal.   Psychiatric:         Behavior: Behavior normal.         Thought Content: Thought content normal.         Judgment: Judgment normal.         Assessment:     1. History of CAD (coronary artery disease)    2. Palpitations    3. Chest pain at rest    4. Chest pain, non-cardiac    5. Multigravida of advanced maternal age in third trimester    6. Spontaneous dissection of coronary artery    7. Heart disease in mother affecting pregnancy in third trimester    8. S/P CABG x 2      Plan:   Doing well. Continue beta blocker. We have discussed potential complications and will continue to monitor for them . LV function appears normal on repeat Echo. RTV in 8 wks after delivery  Counseled on importance of heart healthy diet low in saturated and trans fat and salt as well gradually starting a regular aerobic exercise regimen with goal of 30min 5x/week. Recommend BP diary. Call if systolic BP > 130 mmHg on checking repeatedly  Varanise was seen today for hypertension.    Diagnoses and all orders for this visit:    History of CAD (coronary artery disease)    Palpitations    Chest pain at rest    Chest pain, non-cardiac    Multigravida of  advanced maternal age in third trimester    Spontaneous dissection of coronary artery    Heart disease in mother affecting pregnancy in third trimester    S/P CABG x 2

## 2023-11-02 PROCEDURE — 88342 IMHCHEM/IMCYTCHM 1ST ANTB: CPT | Mod: 26,,, | Performed by: PATHOLOGY

## 2023-11-02 PROCEDURE — 88305 TISSUE EXAM BY PATHOLOGIST: ICD-10-PCS | Mod: 26,,, | Performed by: PATHOLOGY

## 2023-11-02 PROCEDURE — 88305 TISSUE EXAM BY PATHOLOGIST: CPT | Mod: 26,,, | Performed by: PATHOLOGY

## 2023-11-02 PROCEDURE — 88342 CHG IMMUNOCYTOCHEMISTRY: ICD-10-PCS | Mod: 26,,, | Performed by: PATHOLOGY

## 2023-11-02 NOTE — PROGRESS NOTES
Good FM. Denies VB, LOF, CTX.   SOB not markedly worsened. No CP.    /68   Wt 77.1 kg (169 lb 15.6 oz)   LMP 2023   BMI 28.29 kg/m²     38 y.o., at 34w4d by Estimated Date of Delivery: 12/10/23  Patient Active Problem List   Diagnosis    Chronic anemia    History of CAD (coronary artery disease)    Preoperative cardiovascular examination    Iron deficiency anemia    Chest pain, non-cardiac    Umbilical hernia without obstruction and without gangrene    Low TSH level    Chest pain at rest    Palpitations    Microcytic anemia    Vitamin D deficiency    Serum calcium elevated    Positive pregnancy test    Cardiac disease in mother affecting pregnancy - H/o post partum SCAD s/p CABG     AMA (advanced maternal age) multigravida 35+     OB History    Para Term  AB Living   4 3 3     3   SAB IAB Ectopic Multiple Live Births           3      # Outcome Date GA Lbr Jose Alberto/2nd Weight Sex Delivery Anes PTL Lv   4 Current            3 Term 13 39w2d 103:30 / 00:23 3.025 kg (6 lb 10.7 oz) F Vag-Spont EPI N RENEE      Birth Comments: 28    2 Term 09 39w0d 10:00 3.629 kg (8 lb) M Vag-Spont EPI N RENEE   1 Term 06 39w0d 09:00 3.033 kg (6 lb 11 oz) F Vag-Spont EPI N RENEE       Dating reviewed    Allergies and problem list reviewed and updated    Medical and surgical history reviewed    Prenatal labs reviewed and updated    Physical Exam:  ABD: soft, gravid, nontender    Assessment:  Clayton was seen today for routine prenatal visit.    Diagnoses and all orders for this visit:    Supervision of other high risk pregnancies, third trimester    ANIYAH III (cervical intraepithelial neoplasia grade III) with severe dysplasia  -     Specimen to Pathology, Ob/Gyn    History of CAD (coronary artery disease)    34 weeks gestation of pregnancy        No orders of the defined types were placed in this encounter.    Colposcopy done today.   Labor, ROM and bleeding precautions.     Follow up  in about 1 week (around 11/8/2023) for routine OB.

## 2023-11-05 ENCOUNTER — PATIENT MESSAGE (OUTPATIENT)
Dept: OBSTETRICS AND GYNECOLOGY | Facility: CLINIC | Age: 38
End: 2023-11-05
Payer: MEDICAID

## 2023-11-05 ENCOUNTER — PATIENT MESSAGE (OUTPATIENT)
Dept: OTHER | Facility: OTHER | Age: 38
End: 2023-11-05
Payer: MEDICAID

## 2023-11-08 ENCOUNTER — HOSPITAL ENCOUNTER (OUTPATIENT)
Dept: PERINATAL CARE | Facility: OTHER | Age: 38
Discharge: HOME | End: 2023-11-08
Attending: OBSTETRICS & GYNECOLOGY
Payer: MEDICAID

## 2023-11-08 ENCOUNTER — POSTPARTUM VISIT (OUTPATIENT)
Dept: OBSTETRICS AND GYNECOLOGY | Facility: CLINIC | Age: 38
End: 2023-11-08
Payer: MEDICAID

## 2023-11-08 VITALS — SYSTOLIC BLOOD PRESSURE: 124 MMHG | DIASTOLIC BLOOD PRESSURE: 84 MMHG

## 2023-11-08 DIAGNOSIS — Z30.09 UNWANTED FERTILITY: ICD-10-CM

## 2023-11-08 DIAGNOSIS — O09.893 SUPERVISION OF OTHER HIGH RISK PREGNANCIES, THIRD TRIMESTER: Primary | ICD-10-CM

## 2023-11-08 DIAGNOSIS — Z86.79 HISTORY OF CAD (CORONARY ARTERY DISEASE): ICD-10-CM

## 2023-11-08 DIAGNOSIS — Z3A.36 36 WEEKS GESTATION OF PREGNANCY: ICD-10-CM

## 2023-11-08 LAB
FINAL PATHOLOGIC DIAGNOSIS: NORMAL
GROSS: NORMAL
Lab: NORMAL

## 2023-11-08 PROCEDURE — 59025 FETAL NON-STRESS TEST: CPT | Mod: 26,,, | Performed by: OBSTETRICS & GYNECOLOGY

## 2023-11-08 PROCEDURE — 76815 PRENATAL TESTING - NST/AFI: ICD-10-PCS | Mod: 26,,, | Performed by: OBSTETRICS & GYNECOLOGY

## 2023-11-08 PROCEDURE — 99999 PR PBB SHADOW E&M-EST. PATIENT-LVL II: CPT | Mod: PBBFAC,,, | Performed by: OBSTETRICS & GYNECOLOGY

## 2023-11-08 PROCEDURE — 99213 OFFICE O/P EST LOW 20 MIN: CPT | Mod: 25,TH,S$PBB, | Performed by: OBSTETRICS & GYNECOLOGY

## 2023-11-08 PROCEDURE — 87081 CULTURE SCREEN ONLY: CPT | Performed by: OBSTETRICS & GYNECOLOGY

## 2023-11-08 PROCEDURE — 99212 OFFICE O/P EST SF 10 MIN: CPT | Mod: PBBFAC,25,TH | Performed by: OBSTETRICS & GYNECOLOGY

## 2023-11-08 PROCEDURE — 76815 OB US LIMITED FETUS(S): CPT

## 2023-11-08 PROCEDURE — 76815 OB US LIMITED FETUS(S): CPT | Mod: 26,,, | Performed by: OBSTETRICS & GYNECOLOGY

## 2023-11-08 PROCEDURE — 59025 FETAL NON-STRESS TEST: CPT

## 2023-11-08 PROCEDURE — 99213 PR OFFICE/OUTPT VISIT, EST, LEVL III, 20-29 MIN: ICD-10-PCS | Mod: 25,TH,S$PBB, | Performed by: OBSTETRICS & GYNECOLOGY

## 2023-11-08 PROCEDURE — 99999 PR PBB SHADOW E&M-EST. PATIENT-LVL II: ICD-10-PCS | Mod: PBBFAC,,, | Performed by: OBSTETRICS & GYNECOLOGY

## 2023-11-08 PROCEDURE — 59025 PRENATAL TESTING - NST/AFI: ICD-10-PCS | Mod: 26,,, | Performed by: OBSTETRICS & GYNECOLOGY

## 2023-11-09 ENCOUNTER — OFFICE VISIT (OUTPATIENT)
Dept: TRANSPLANT | Facility: CLINIC | Age: 38
End: 2023-11-09
Payer: MEDICAID

## 2023-11-09 ENCOUNTER — LAB VISIT (OUTPATIENT)
Dept: LAB | Facility: HOSPITAL | Age: 38
End: 2023-11-09
Attending: INTERNAL MEDICINE
Payer: MEDICAID

## 2023-11-09 VITALS
HEART RATE: 124 BPM | WEIGHT: 167.56 LBS | HEIGHT: 65 IN | DIASTOLIC BLOOD PRESSURE: 74 MMHG | SYSTOLIC BLOOD PRESSURE: 113 MMHG | BODY MASS INDEX: 27.92 KG/M2

## 2023-11-09 DIAGNOSIS — Z86.79 HISTORY OF CAD (CORONARY ARTERY DISEASE): ICD-10-CM

## 2023-11-09 DIAGNOSIS — O99.413 HEART DISEASE IN MOTHER AFFECTING PREGNANCY IN THIRD TRIMESTER: ICD-10-CM

## 2023-11-09 DIAGNOSIS — I51.9 HEART DISEASE IN MOTHER AFFECTING PREGNANCY IN THIRD TRIMESTER: ICD-10-CM

## 2023-11-09 DIAGNOSIS — O09.893 SUPERVISION OF OTHER HIGH RISK PREGNANCIES, THIRD TRIMESTER: ICD-10-CM

## 2023-11-09 DIAGNOSIS — Z86.79 HISTORY OF CAD (CORONARY ARTERY DISEASE): Primary | ICD-10-CM

## 2023-11-09 LAB
ALBUMIN SERPL BCP-MCNC: 2.7 G/DL (ref 3.5–5.2)
ALP SERPL-CCNC: 97 U/L (ref 55–135)
ALT SERPL W/O P-5'-P-CCNC: 17 U/L (ref 10–44)
ANION GAP SERPL CALC-SCNC: 5 MMOL/L (ref 8–16)
ANION GAP SERPL CALC-SCNC: 5 MMOL/L (ref 8–16)
AST SERPL-CCNC: 18 U/L (ref 10–40)
BASOPHILS # BLD AUTO: 0.01 K/UL (ref 0–0.2)
BASOPHILS NFR BLD: 0.1 % (ref 0–1.9)
BILIRUB SERPL-MCNC: 0.3 MG/DL (ref 0.1–1)
BNP SERPL-MCNC: 32 PG/ML (ref 0–99)
BUN SERPL-MCNC: 4 MG/DL (ref 6–20)
BUN SERPL-MCNC: 4 MG/DL (ref 6–20)
CALCIUM SERPL-MCNC: 10.1 MG/DL (ref 8.7–10.5)
CALCIUM SERPL-MCNC: 10.1 MG/DL (ref 8.7–10.5)
CHLORIDE SERPL-SCNC: 108 MMOL/L (ref 95–110)
CHLORIDE SERPL-SCNC: 108 MMOL/L (ref 95–110)
CO2 SERPL-SCNC: 24 MMOL/L (ref 23–29)
CO2 SERPL-SCNC: 24 MMOL/L (ref 23–29)
CREAT SERPL-MCNC: 0.7 MG/DL (ref 0.5–1.4)
CREAT SERPL-MCNC: 0.7 MG/DL (ref 0.5–1.4)
DIFFERENTIAL METHOD: ABNORMAL
EOSINOPHIL # BLD AUTO: 0.1 K/UL (ref 0–0.5)
EOSINOPHIL NFR BLD: 0.7 % (ref 0–8)
ERYTHROCYTE [DISTWIDTH] IN BLOOD BY AUTOMATED COUNT: 13.3 % (ref 11.5–14.5)
EST. GFR  (NO RACE VARIABLE): >60 ML/MIN/1.73 M^2
EST. GFR  (NO RACE VARIABLE): >60 ML/MIN/1.73 M^2
GLUCOSE SERPL-MCNC: 109 MG/DL (ref 70–110)
GLUCOSE SERPL-MCNC: 109 MG/DL (ref 70–110)
HCT VFR BLD AUTO: 32.4 % (ref 37–48.5)
HGB BLD-MCNC: 10 G/DL (ref 12–16)
HIV 1+2 AB+HIV1 P24 AG SERPL QL IA: NORMAL
IMM GRANULOCYTES # BLD AUTO: 0.05 K/UL (ref 0–0.04)
IMM GRANULOCYTES NFR BLD AUTO: 0.6 % (ref 0–0.5)
LYMPHOCYTES # BLD AUTO: 1.4 K/UL (ref 1–4.8)
LYMPHOCYTES NFR BLD: 15.8 % (ref 18–48)
MAGNESIUM SERPL-MCNC: 1.7 MG/DL (ref 1.6–2.6)
MCH RBC QN AUTO: 27 PG (ref 27–31)
MCHC RBC AUTO-ENTMCNC: 30.9 G/DL (ref 32–36)
MCV RBC AUTO: 87 FL (ref 82–98)
MONOCYTES # BLD AUTO: 0.6 K/UL (ref 0.3–1)
MONOCYTES NFR BLD: 6.6 % (ref 4–15)
NEUTROPHILS # BLD AUTO: 6.7 K/UL (ref 1.8–7.7)
NEUTROPHILS NFR BLD: 76.2 % (ref 38–73)
NRBC BLD-RTO: 0 /100 WBC
PLATELET # BLD AUTO: 239 K/UL (ref 150–450)
PMV BLD AUTO: 9.8 FL (ref 9.2–12.9)
POTASSIUM SERPL-SCNC: 3.5 MMOL/L (ref 3.5–5.1)
POTASSIUM SERPL-SCNC: 3.5 MMOL/L (ref 3.5–5.1)
PROT SERPL-MCNC: 6.2 G/DL (ref 6–8.4)
RBC # BLD AUTO: 3.71 M/UL (ref 4–5.4)
RPR SER QL: NORMAL
SODIUM SERPL-SCNC: 137 MMOL/L (ref 136–145)
SODIUM SERPL-SCNC: 137 MMOL/L (ref 136–145)
WBC # BLD AUTO: 8.75 K/UL (ref 3.9–12.7)

## 2023-11-09 PROCEDURE — 3078F PR MOST RECENT DIASTOLIC BLOOD PRESSURE < 80 MM HG: ICD-10-PCS | Mod: CPTII,,, | Performed by: INTERNAL MEDICINE

## 2023-11-09 PROCEDURE — 3074F PR MOST RECENT SYSTOLIC BLOOD PRESSURE < 130 MM HG: ICD-10-PCS | Mod: CPTII,,, | Performed by: INTERNAL MEDICINE

## 2023-11-09 PROCEDURE — 1159F PR MEDICATION LIST DOCUMENTED IN MEDICAL RECORD: ICD-10-PCS | Mod: CPTII,,, | Performed by: INTERNAL MEDICINE

## 2023-11-09 PROCEDURE — 1159F MED LIST DOCD IN RCRD: CPT | Mod: CPTII,,, | Performed by: INTERNAL MEDICINE

## 2023-11-09 PROCEDURE — 36415 COLL VENOUS BLD VENIPUNCTURE: CPT | Performed by: OBSTETRICS & GYNECOLOGY

## 2023-11-09 PROCEDURE — 99999 PR PBB SHADOW E&M-EST. PATIENT-LVL III: CPT | Mod: PBBFAC,,, | Performed by: INTERNAL MEDICINE

## 2023-11-09 PROCEDURE — 3074F SYST BP LT 130 MM HG: CPT | Mod: CPTII,,, | Performed by: INTERNAL MEDICINE

## 2023-11-09 PROCEDURE — 86592 SYPHILIS TEST NON-TREP QUAL: CPT | Performed by: OBSTETRICS & GYNECOLOGY

## 2023-11-09 PROCEDURE — 85025 COMPLETE CBC W/AUTO DIFF WBC: CPT | Performed by: OBSTETRICS & GYNECOLOGY

## 2023-11-09 PROCEDURE — 80053 COMPREHEN METABOLIC PANEL: CPT | Performed by: OBSTETRICS & GYNECOLOGY

## 2023-11-09 PROCEDURE — 99999 PR PBB SHADOW E&M-EST. PATIENT-LVL III: ICD-10-PCS | Mod: PBBFAC,,, | Performed by: INTERNAL MEDICINE

## 2023-11-09 PROCEDURE — 99215 OFFICE O/P EST HI 40 MIN: CPT | Mod: S$PBB,,, | Performed by: INTERNAL MEDICINE

## 2023-11-09 PROCEDURE — 83880 ASSAY OF NATRIURETIC PEPTIDE: CPT | Performed by: INTERNAL MEDICINE

## 2023-11-09 PROCEDURE — 99215 PR OFFICE/OUTPT VISIT, EST, LEVL V, 40-54 MIN: ICD-10-PCS | Mod: S$PBB,,, | Performed by: INTERNAL MEDICINE

## 2023-11-09 PROCEDURE — 87389 HIV-1 AG W/HIV-1&-2 AB AG IA: CPT | Performed by: OBSTETRICS & GYNECOLOGY

## 2023-11-09 PROCEDURE — 3008F BODY MASS INDEX DOCD: CPT | Mod: CPTII,,, | Performed by: INTERNAL MEDICINE

## 2023-11-09 PROCEDURE — 83735 ASSAY OF MAGNESIUM: CPT | Performed by: INTERNAL MEDICINE

## 2023-11-09 PROCEDURE — 3008F PR BODY MASS INDEX (BMI) DOCUMENTED: ICD-10-PCS | Mod: CPTII,,, | Performed by: INTERNAL MEDICINE

## 2023-11-09 PROCEDURE — 3078F DIAST BP <80 MM HG: CPT | Mod: CPTII,,, | Performed by: INTERNAL MEDICINE

## 2023-11-09 PROCEDURE — 99213 OFFICE O/P EST LOW 20 MIN: CPT | Mod: PBBFAC | Performed by: INTERNAL MEDICINE

## 2023-11-09 NOTE — PROGRESS NOTES
Subjective:   Patient ID:  Clayton Duarte is a 38 y.o. female who presents for follow-up of SCAD.    39 YO F w/ history of SCAD during pregnancy resulting in 2v CABG (LIMA to LAD and VG to OM1)    During her third pregnancy in  she had a normal vaginal delivery. She then presented two weeks post delivery with CP. She was taken to the cath lab and found to have SCAD of LM into LAD and Lcx. IABP was placed and she was taken emergently to the OR to have CABG with LIMA to LAD and VG to OM1. She was watched in the ICU and then eventually discharged in a hemodynamically stable condition. She has followed with our general cardiology team as an outpatient since.    She currently is pregnant with her 4th pregnancy (). Currently with [facility] and also seeing Dr. Narvaez. On metoprolol succinate 25 mg daily and aspirin. Currently approx 35 weeks. TTE that have been done so far have demonstrated stable LV function with LVEF being approx 50%.    Comes in today for follow-up.  Has not had a recurrence of any of the symptoms she experienced with her 1st episode of spontaneous coronary dissection.  Denies any exertional chest discomfort.  Notes occasional shortness of breath and some leg swelling with her pregnancy, but says that this is expected and she experienced during her prior pregnancy as well.  No other cardiovascular complaints.    Review of Systems   Constitutional: Negative for chills and fever.   HENT:  Negative for hearing loss.    Eyes:  Negative for visual disturbance.   Cardiovascular:  Positive for dyspnea on exertion and leg swelling. Negative for chest pain, irregular heartbeat, orthopnea, palpitations, paroxysmal nocturnal dyspnea and syncope.   Respiratory:  Negative for cough and shortness of breath.    Musculoskeletal:  Negative for muscle weakness.   Gastrointestinal:  Negative for diarrhea, nausea and vomiting.   Neurological:  Negative for focal weakness.   Psychiatric/Behavioral:  Negative for  memory loss.        Objective:     Vitals:    11/09/23 1144   BP: 113/74   Pulse: (!) 124     Physical Exam  Constitutional:       Appearance: Normal appearance.   HENT:      Head: Atraumatic.   Eyes:      Extraocular Movements: Extraocular movements intact.   Cardiovascular:      Rate and Rhythm: Normal rate and regular rhythm.      Pulses: Normal pulses.      Heart sounds: Normal heart sounds.   Pulmonary:      Breath sounds: Normal breath sounds.   Abdominal:      General: There is distension.      Palpations: Abdomen is soft.      Tenderness: There is no abdominal tenderness.   Musculoskeletal:         General: Normal range of motion.      Right lower leg: No edema.      Left lower leg: No edema.   Neurological:      General: No focal deficit present.      Mental Status: She is alert and oriented to person, place, and time.         Assessment:      1. History of CAD (coronary artery disease)    2. Heart disease in mother affecting pregnancy in third trimester        Plan:   History of SCAD immediately post pregnancy  S/P 2V CABG w/ LIMA to LAD and VG to OM1 2013  - presents today for follow-up.  No major cardiac complaints.  Euvolemic on exam.  - recommended, metoprolol succinate 25 mg daily.  - plan for induction in approximately 3 weeks.  Followed by [facility] team as well.  - we will recommend EKG at time of admission, on continued telemetry monitoring through hospital admission.  Would recommend echocardiogram prior to discharge, and another follow-up clinic visit with cardiology with echocardiogram 1 month postpartum.  - I also advised her on her continued vigilance for recurrence of any symptoms similar to what she experienced during her prior episode of coronary dissection.  If she has any significant chest discomfort, arm pain, neck pain, jaw pain, or worsening dyspnea she is to proceed to the emergency room immediately.

## 2023-11-10 ENCOUNTER — DOCUMENTATION ONLY (OUTPATIENT)
Dept: MATERNAL FETAL MEDICINE | Facility: CLINIC | Age: 38
End: 2023-11-10
Payer: MEDICAID

## 2023-11-10 ENCOUNTER — PATIENT MESSAGE (OUTPATIENT)
Dept: OBSTETRICS AND GYNECOLOGY | Facility: CLINIC | Age: 38
End: 2023-11-10
Payer: MEDICAID

## 2023-11-10 LAB — BACTERIA SPEC AEROBE CULT: NORMAL

## 2023-11-10 NOTE — PROGRESS NOTES
Patient plan of care discussed at [facility].     Discussed planning if concern arises for SCAD at time of delivery or in the immediate post partum period. After discussion with Anesthesia and Heart Failure team, will plan [place] [facility] [place] concern [place], and Cath lab at [facility] with limited hours and ability to place balloon pump. Plan to involve Heart Failure early if any concern for cardiac decompensation.     Delivery plan: 38 weeks or sooner if indicated  - Patient desires BTL  - Telemetry intrapartum and 48 hours post partum   - Maintain euvolemia  - Consider ICU admission after delivery    Ariana Azar MD  PGY 6  Maternal Fetal Medicine  [facility]

## 2023-11-12 NOTE — PROGRESS NOTES
Good FM. Denies VB, LOF, CTX.     /84   LMP 2023     38 y.o., at 36w0d by Estimated Date of Delivery: 12/10/23  Patient Active Problem List   Diagnosis    Chronic anemia    History of CAD (coronary artery disease)    Preoperative cardiovascular examination    Iron deficiency anemia    Chest pain, non-cardiac    Umbilical hernia without obstruction and without gangrene    Low TSH level    Chest pain at rest    Palpitations    Microcytic anemia    Vitamin D deficiency    Serum calcium elevated    Positive pregnancy test    Cardiac disease in mother affecting pregnancy - H/o post partum SCAD s/p CABG     AMA (advanced maternal age) multigravida 35+     OB History    Para Term  AB Living   4 3 3     3   SAB IAB Ectopic Multiple Live Births           3      # Outcome Date GA Lbr Jose Alberto/2nd Weight Sex Delivery Anes PTL Lv   4 Current            3 Term 13 39w2d 103:30 / 00:23 3.025 kg (6 lb 10.7 oz) F Vag-Spont EPI N RENEE      Birth Comments: 28    2 Term 09 39w0d 10:00 3.629 kg (8 lb) M Vag-Spont EPI N RENEE   1 Term 06 39w0d 09:00 3.033 kg (6 lb 11 oz) F Vag-Spont EPI N RENEE       Dating reviewed    Allergies and problem list reviewed and updated    Medical and surgical history reviewed    Prenatal labs reviewed and updated    Physical Exam:  ABD: soft, gravid, nontender    Assessment:  Diagnoses and all orders for this visit:    Supervision of other high risk pregnancies, third trimester  -     Strep B Screen, Vaginal / Rectal  -     Comprehensive Metabolic Panel; Future  -     CBC Auto Differential; Future  -     HIV 1/2 Ag/Ab (4th Gen); Future  -     RPR; Future    History of CAD (coronary artery disease)    Unwanted fertility - medicaid tubal signed 10/23/23    36 weeks gestation of pregnancy        Orders Placed This Encounter   Procedures    Strep B Screen, Vaginal / Rectal    Comprehensive Metabolic Panel    CBC Auto Differential    HIV 1/2 Ag/Ab (4th Gen)    RPR     L&D  consents signed. Desires pp BTL and circumcision for male infant.   GBS and 3T labs.   Labor, ROM and bleeding precautions.   Continue PNT.     No follow-ups on file.

## 2023-11-13 ENCOUNTER — ROUTINE PRENATAL (OUTPATIENT)
Dept: OBSTETRICS AND GYNECOLOGY | Facility: CLINIC | Age: 38
End: 2023-11-13
Payer: MEDICAID

## 2023-11-13 ENCOUNTER — HOSPITAL ENCOUNTER (OUTPATIENT)
Dept: PERINATAL CARE | Facility: OTHER | Age: 38
Discharge: HOME | End: 2023-11-13
Attending: OBSTETRICS & GYNECOLOGY
Payer: MEDICAID

## 2023-11-13 VITALS — DIASTOLIC BLOOD PRESSURE: 68 MMHG | BODY MASS INDEX: 28.29 KG/M2 | WEIGHT: 170 LBS | SYSTOLIC BLOOD PRESSURE: 122 MMHG

## 2023-11-13 DIAGNOSIS — Z86.79 HISTORY OF CAD (CORONARY ARTERY DISEASE): ICD-10-CM

## 2023-11-13 DIAGNOSIS — Z3A.36 36 WEEKS GESTATION OF PREGNANCY: ICD-10-CM

## 2023-11-13 DIAGNOSIS — O09.893 SUPERVISION OF OTHER HIGH RISK PREGNANCIES, THIRD TRIMESTER: Primary | ICD-10-CM

## 2023-11-13 PROCEDURE — 99212 OFFICE O/P EST SF 10 MIN: CPT | Mod: PBBFAC,TH,25 | Performed by: OBSTETRICS & GYNECOLOGY

## 2023-11-13 PROCEDURE — 76815 PRENATAL TESTING - NST/AFI: ICD-10-PCS | Mod: 26,,, | Performed by: OBSTETRICS & GYNECOLOGY

## 2023-11-13 PROCEDURE — 76815 OB US LIMITED FETUS(S): CPT | Mod: 26,,, | Performed by: OBSTETRICS & GYNECOLOGY

## 2023-11-13 PROCEDURE — 99213 OFFICE O/P EST LOW 20 MIN: CPT | Mod: 25,TH,S$PBB, | Performed by: OBSTETRICS & GYNECOLOGY

## 2023-11-13 PROCEDURE — 99213 PR OFFICE/OUTPT VISIT, EST, LEVL III, 20-29 MIN: ICD-10-PCS | Mod: 25,TH,S$PBB, | Performed by: OBSTETRICS & GYNECOLOGY

## 2023-11-13 PROCEDURE — 59025 FETAL NON-STRESS TEST: CPT | Mod: 26,,, | Performed by: OBSTETRICS & GYNECOLOGY

## 2023-11-13 PROCEDURE — 99999 PR PBB SHADOW E&M-EST. PATIENT-LVL II: CPT | Mod: PBBFAC,,, | Performed by: OBSTETRICS & GYNECOLOGY

## 2023-11-13 PROCEDURE — 59025 FETAL NON-STRESS TEST: CPT

## 2023-11-13 PROCEDURE — 59025 PRENATAL TESTING - NST/AFI: ICD-10-PCS | Mod: 26,,, | Performed by: OBSTETRICS & GYNECOLOGY

## 2023-11-13 PROCEDURE — 99999 PR PBB SHADOW E&M-EST. PATIENT-LVL II: ICD-10-PCS | Mod: PBBFAC,,, | Performed by: OBSTETRICS & GYNECOLOGY

## 2023-11-13 PROCEDURE — 76815 OB US LIMITED FETUS(S): CPT

## 2023-11-13 RX ORDER — ASCORBIC ACID, CHOLECALCIFEROL, .ALPHA.-TOCOPHEROL ACETATE, D-, THIAMINE HYDROCHLORIDE, RIBOFLAVIN, NIACIN, PYRIDOXINE HYDROCHLORIDE, LEVOMEFOLATE MAGNESIUM, FOLIC ACID, CYANOCOBALAMIN, IRON PENTACARBONYL, POTASSIUM IODIDE, MAGNESIUM OXIDE, DOCONEXENT, AND ICOSAPENT 55; 1000; 15; 1.3; 1.8; 5; 35; 600; 400; 14; 31; 200; 30; 200; 15 MG/1; [IU]/1; [IU]/1; MG/1; MG/1; MG/1; MG/1; UG/1; UG/1; UG/1; MG/1; UG/1; MG/1; MG/1; MG/1
1 CAPSULE, LIQUID FILLED ORAL DAILY
Qty: 30 CAPSULE | Refills: 11 | Status: ON HOLD | OUTPATIENT
Start: 2023-11-13 | End: 2023-12-01 | Stop reason: HOSPADM

## 2023-11-13 NOTE — PROGRESS NOTES
Good FM. Denies VB, LOF, CTX. One day last week had increase in watery discharge but has not continued and she is not worried about ROM.     /68   Wt 77.1 kg (169 lb 15.6 oz)   LMP 2023   BMI 28.29 kg/m²     38 y.o., at 36w1d by Estimated Date of Delivery: 12/10/23  Patient Active Problem List   Diagnosis    Chronic anemia    History of CAD (coronary artery disease)    Preoperative cardiovascular examination    Iron deficiency anemia    Chest pain, non-cardiac    Umbilical hernia without obstruction and without gangrene    Low TSH level    Chest pain at rest    Palpitations    Microcytic anemia    Vitamin D deficiency    Serum calcium elevated    Positive pregnancy test    Cardiac disease in mother affecting pregnancy - H/o post partum SCAD s/p CABG     AMA (advanced maternal age) multigravida 35+     OB History    Para Term  AB Living   4 3 3     3   SAB IAB Ectopic Multiple Live Births           3      # Outcome Date GA Lbr Jose Alberto/2nd Weight Sex Delivery Anes PTL Lv   4 Current            3 Term 13 39w2d 103:30 / 00:23 3.025 kg (6 lb 10.7 oz) F Vag-Spont EPI N RENEE      Birth Comments: 28    2 Term 09 39w0d 10:00 3.629 kg (8 lb) M Vag-Spont EPI N RENEE   1 Term 06 39w0d 09:00 3.033 kg (6 lb 11 oz) F Vag-Spont EPI N RENEE       Dating reviewed    Allergies and problem list reviewed and updated    Medical and surgical history reviewed    Prenatal labs reviewed and updated    Physical Exam:  ABD: soft, gravid, nontender    Assessment:  Clayton was seen today for routine prenatal visit.    Diagnoses and all orders for this visit:    Supervision of other high risk pregnancies, third trimester  -     prenatal 93-iron-folate 9-dha (TRISTART DHA) 31 mg iron- 1 mg-200 mg Cap; Take 1 capsule by mouth once daily.    36 weeks gestation of pregnancy    History of CAD (coronary artery disease)        No orders of the defined types were placed in this encounter.      Labor, ROM and  bleeding precautions.     No follow-ups on file.

## 2023-11-17 ENCOUNTER — HOSPITAL ENCOUNTER (EMERGENCY)
Facility: OTHER | Age: 38
Discharge: HOME | End: 2023-11-18
Attending: OBSTETRICS & GYNECOLOGY
Payer: MEDICAID

## 2023-11-17 DIAGNOSIS — R20.0 NUMBNESS: Primary | ICD-10-CM

## 2023-11-17 PROCEDURE — 59025 FETAL NON-STRESS TEST: CPT

## 2023-11-17 PROCEDURE — 99284 EMERGENCY DEPT VISIT MOD MDM: CPT | Mod: 25

## 2023-11-18 VITALS — SYSTOLIC BLOOD PRESSURE: 114 MMHG | DIASTOLIC BLOOD PRESSURE: 67 MMHG | OXYGEN SATURATION: 98 % | HEART RATE: 80 BPM

## 2023-11-18 PROCEDURE — 59025 FETAL NON-STRESS TEST: CPT | Mod: 26,,, | Performed by: OBSTETRICS & GYNECOLOGY

## 2023-11-18 PROCEDURE — 99284 PR EMERGENCY DEPT VISIT,LEVEL IV: ICD-10-PCS | Mod: 25,,, | Performed by: OBSTETRICS & GYNECOLOGY

## 2023-11-18 PROCEDURE — 59025 FETAL NON-STRESS TEST: CPT

## 2023-11-18 PROCEDURE — 59025 PR FETAL 2N-STRESS TEST: ICD-10-PCS | Mod: 26,,, | Performed by: OBSTETRICS & GYNECOLOGY

## 2023-11-18 PROCEDURE — 99284 EMERGENCY DEPT VISIT MOD MDM: CPT | Mod: 25,,, | Performed by: OBSTETRICS & GYNECOLOGY

## 2023-11-18 NOTE — ED PROVIDER NOTES
Encounter Date: 2023       History     Chief Complaint   Patient presents with    Numbness     Numbness in leg and arm with some spotting     Varanise Padmini Duarte is a 38 y.o. [id number] at 36w6d presents complaining of numbness in her left leg and right shoulder. She states that she has been experiencing on and off numbness in her left leg throughout the day with the sensation feeling similar to her leg falling asleep. She states that she decided to take and shower and go to bed earlier, however when in the shower the numbness in her leg increased and she felt that she might fall due to decreased sensation. She reports that she is also feeling some numbness over her right shoulder. She denies chest pain, shortness of breath, nausea, vomiting.  She also reports some light pink discharge when wiping earlier today.       This IUP is complicated by history of SCAD and undesired fertility.  Patient denies contractions, reports vaginal bleeding, denies LOF.   Fetal Movement: normal.     HPI  Review of patient's allergies indicates:   Allergen Reactions    Bananas [banana] Itching    Peanut butter flavor Hives     Past Medical History:   Diagnosis Date    Abnormal Pap smear of cervix ,     colposcopy    Anemia     Coronary artery dissection 2013    Postpartum depression     Spinal headache complicating labor and delivery, postpartum condition 2013     Past Surgical History:   Procedure Laterality Date    CARDIAC SURGERY      CORONARY ARTERY BYPASS GRAFT      tanner to lad, svg OM1    WISDOM TOOTH EXTRACTION       Family History   Problem Relation Age of Onset    Hypertension Mother     Diabetes Mother     Stroke Mother     Breast cancer Neg Hx     Ovarian cancer Neg Hx     Colon cancer Neg Hx     Cancer Neg Hx     Heart disease Neg Hx      Social History     Tobacco Use    Smoking status: Never    Smokeless tobacco: Never   Substance Use Topics    Alcohol use: Not Currently    Drug use: No      Review of Systems   Constitutional:  Negative for chills, fatigue and fever.   HENT:  Negative for congestion, rhinorrhea and sore throat.    Eyes:  Negative for visual disturbance.   Respiratory:  Negative for cough, chest tightness and shortness of breath.    Cardiovascular:  Negative for chest pain and leg swelling.   Gastrointestinal:  Negative for abdominal pain, nausea and vomiting.   Genitourinary:  Positive for vaginal bleeding. Negative for dysuria, pelvic pain and vaginal discharge.   Musculoskeletal:  Negative for back pain.   Neurological:  Positive for numbness. Negative for dizziness, seizures, syncope, speech difficulty and headaches.       Physical Exam     Initial Vitals   BP Pulse Resp Temp SpO2   11/17/23 2322 11/17/23 2319 -- -- 11/17/23 2319   116/67 75   100 %      MAP       --                Physical Exam    Constitutional: She appears well-developed and well-nourished. No distress.   HENT:   Head: Normocephalic and atraumatic.   Eyes: Conjunctivae and EOM are normal.   Neck: No thyromegaly present.   Normal range of motion.  Cardiovascular:  Normal rate.           Pulses:       Radial pulses are 2+ on the right side and 2+ on the left side.        Dorsalis pedis pulses are 2+ on the right side and 2+ on the left side.   Pulmonary/Chest:   Normal work of breathing.   Abdominal: Abdomen is soft. There is no abdominal tenderness.   Gravid uterus. There is no rebound and no guarding.   Musculoskeletal:         General: No edema. Normal range of motion.      Cervical back: Normal range of motion.     Neurological: She is alert and oriented to person, place, and time.   Skin: Skin is warm and dry.   Psychiatric: She has a normal mood and affect. Thought content normal.     OB LABOR EXAM:   Pre-Term Labor: No.   Membranes ruptured: No.   Method: Sterile vaginal exam per MD and Sterile speculum exam per MD.   Vaginal Bleeding: none present.     Dilatation: 0.   Station: -3.   Effacement: 50%.              ED Course   Obtain Fetal nonstress test (NST)    Date/Time: 11/19/2023 11:23 AM    Performed by: Sebas May MD  Authorized by: Sebas May MD    Nonstress Test:     Variability:  6-25 BPM    Decelerations:  None    Accelerations:  15 bpm    Baseline:  135    Uterine Irritability: No      Contractions:  Irregular  Biophysical Profile:     Nonstress Test Interpretation: reactive      Overall Impression:  Reassuring    Labs Reviewed - No data to display       Imaging Results    None          Medications - No data to display  Medical Decision Making  VSS, BP similar in both arms  Strong radial and DP pulses palpated  EKG NSR  SSE: no vaginal bleeding noted  SVE: 0/50/-3 > unchanged on recheck  Irregular contractions resolved over time  Occasional numbness/tingling in L leg but patient reports improving  Patient has appointment with M on Monday  Patient stable for discharge home. Labor and pre eclampsia precautions given. Patient verbalized understanding, all questions answered. Plan discussed with hospitalist on call, who is in agreement with plan.                 Attending Attestation:   Physician Attestation Statement for Resident:  As the supervising MD   Physician Attestation Statement: I have personally seen and examined this patient.   I agree with the above history.  -:   As the supervising MD I agree with the above PE.     As the supervising MD I agree with the above treatment, course, plan, and disposition.   -:     EKG NSR.  VSS.  Denies chest pain/SOB.  States numbness usually improves with movement/repositioning.  At time of my interview, numbness no longer present.  Overall work up unremarkable and patient stable for discharge home.  Strict return precautions given.  All questions answered.       I was personally present during the critical portions of the procedure(s) performed by the resident and was immediately available in the ED to provide services and assistance as  needed during the entire procedure.                                      Clinical Impression:  Final diagnoses:  [R20.0] Numbness (Primary)          ED Disposition Condition    Discharge           ED Prescriptions    None       Follow-up Information    None          Sebas May MD  Resident  11/19/23 1127       Ira Hernandez MD  11/22/23 2874

## 2023-11-18 NOTE — DISCHARGE INSTRUCTIONS
Call clinic [phone] or L & D after hours at [phone] for vaginal bleeding, leakage of fluids, regular contractions every 5 mins for 2 hours, decreased fetal movements ( 10 kicks in 2 hours), headache not relieved by Tylenol, blurry vision, or temp of 100.4 or greater.  Begin doing fetal kick counts, at least 10 movements in 2 hours starting at 28 weeks gestation.  Keep next clinic appointment

## 2023-11-20 ENCOUNTER — OFFICE VISIT (OUTPATIENT)
Dept: MATERNAL FETAL MEDICINE | Facility: CLINIC | Age: 38
End: 2023-11-20
Payer: MEDICAID

## 2023-11-20 ENCOUNTER — PROCEDURE VISIT (OUTPATIENT)
Dept: MATERNAL FETAL MEDICINE | Facility: CLINIC | Age: 38
End: 2023-11-20
Payer: MEDICAID

## 2023-11-20 VITALS
DIASTOLIC BLOOD PRESSURE: 77 MMHG | SYSTOLIC BLOOD PRESSURE: 113 MMHG | HEIGHT: 65 IN | BODY MASS INDEX: 28.06 KG/M2 | WEIGHT: 168.44 LBS

## 2023-11-20 DIAGNOSIS — I51.9 HEART DISEASE IN MOTHER AFFECTING PREGNANCY IN THIRD TRIMESTER: Primary | ICD-10-CM

## 2023-11-20 DIAGNOSIS — I51.9 HEART DISEASE IN MOTHER AFFECTING PREGNANCY IN THIRD TRIMESTER: ICD-10-CM

## 2023-11-20 DIAGNOSIS — O99.413 HEART DISEASE IN MOTHER AFFECTING PREGNANCY IN THIRD TRIMESTER: Primary | ICD-10-CM

## 2023-11-20 DIAGNOSIS — O99.413 HEART DISEASE IN MOTHER AFFECTING PREGNANCY IN THIRD TRIMESTER: ICD-10-CM

## 2023-11-20 PROCEDURE — 3078F DIAST BP <80 MM HG: CPT | Mod: CPTII,,, | Performed by: OBSTETRICS & GYNECOLOGY

## 2023-11-20 PROCEDURE — 3078F PR MOST RECENT DIASTOLIC BLOOD PRESSURE < 80 MM HG: ICD-10-PCS | Mod: CPTII,,, | Performed by: OBSTETRICS & GYNECOLOGY

## 2023-11-20 PROCEDURE — 99214 PR OFFICE/OUTPT VISIT, EST, LEVL IV, 30-39 MIN: ICD-10-PCS | Mod: TH,25,S$PBB, | Performed by: OBSTETRICS & GYNECOLOGY

## 2023-11-20 PROCEDURE — 3008F BODY MASS INDEX DOCD: CPT | Mod: CPTII,,, | Performed by: OBSTETRICS & GYNECOLOGY

## 2023-11-20 PROCEDURE — 76816 US MFM PROCEDURE (VIEWPOINT): ICD-10-PCS | Mod: 26,S$PBB,, | Performed by: OBSTETRICS & GYNECOLOGY

## 2023-11-20 PROCEDURE — 99214 OFFICE O/P EST MOD 30 MIN: CPT | Mod: TH,25,S$PBB, | Performed by: OBSTETRICS & GYNECOLOGY

## 2023-11-20 PROCEDURE — 3074F PR MOST RECENT SYSTOLIC BLOOD PRESSURE < 130 MM HG: ICD-10-PCS | Mod: CPTII,,, | Performed by: OBSTETRICS & GYNECOLOGY

## 2023-11-20 PROCEDURE — 76819 US MFM PROCEDURE (VIEWPOINT): ICD-10-PCS | Mod: 26,S$PBB,, | Performed by: OBSTETRICS & GYNECOLOGY

## 2023-11-20 PROCEDURE — 76816 OB US FOLLOW-UP PER FETUS: CPT | Mod: 26,S$PBB,, | Performed by: OBSTETRICS & GYNECOLOGY

## 2023-11-20 PROCEDURE — 76819 FETAL BIOPHYS PROFIL W/O NST: CPT | Mod: PBBFAC | Performed by: OBSTETRICS & GYNECOLOGY

## 2023-11-20 PROCEDURE — 3074F SYST BP LT 130 MM HG: CPT | Mod: CPTII,,, | Performed by: OBSTETRICS & GYNECOLOGY

## 2023-11-20 PROCEDURE — 99999 PR PBB SHADOW E&M-EST. PATIENT-LVL II: CPT | Mod: PBBFAC,,, | Performed by: OBSTETRICS & GYNECOLOGY

## 2023-11-20 PROCEDURE — 3008F PR BODY MASS INDEX (BMI) DOCUMENTED: ICD-10-PCS | Mod: CPTII,,, | Performed by: OBSTETRICS & GYNECOLOGY

## 2023-11-20 PROCEDURE — 99999 PR PBB SHADOW E&M-EST. PATIENT-LVL II: ICD-10-PCS | Mod: PBBFAC,,, | Performed by: OBSTETRICS & GYNECOLOGY

## 2023-11-20 PROCEDURE — 99212 OFFICE O/P EST SF 10 MIN: CPT | Mod: PBBFAC,TH,25 | Performed by: OBSTETRICS & GYNECOLOGY

## 2023-11-20 NOTE — PROGRESS NOTES
"Maternal Fetal Medicine follow up consult    SUBJECTIVE:     Clayton Duarte is a 38 y.o.  female with IUP at 37w1d who is seen in follow up consultation by MFM.  Pregnancy complications include:   Problem   Cardiac disease in mother affecting pregnancy - H/o post partum SCAD s/p CABG        Previous notes reviewed.   No changes to medical, surgical, family, social, or obstetric history.    Interval history since last MFM visit: doing well today. Denies CP, SOB, palpitations    Medications:  Current Outpatient Medications   Medication Instructions    aspirin (ECOTRIN) 81 mg, Oral, Twice weekly    metoprolol succinate (TOPROL-XL) 25 mg, Oral, Daily    prenatal 93-iron-folate 9-dha (TRISTART DHA) 31 mg iron- 1 mg-200 mg Cap 1 capsule, Oral, Daily    prenatal vit 87-iron-folic-dha (PRENATE MINI, FERR ASP GLYCIN,) 18-1-350 mg Cap 1 each, Oral, Daily       Care team members:  Mitzy Madison MD - Primary OB       OBJECTIVE:   /77 (BP Location: Left arm, Patient Position: Sitting, BP Method: Medium (Automatic))   Ht 5' 5" (1.651 m)   Wt 76.4 kg (168 lb 6.9 oz)   LMP 2023   BMI 28.03 kg/m²     Physical Exam    Ultrasound performed. See viewpoint for full ultrasound report.  Singh live IUP  Fetal size is appropriate for gestational age, with the EFW (2874 g) plotting at the 28% and the AC plotting at the 24%.   A limited repeat fetal anatomic survey appears normal.   The BPP score is reassuring at 8/8.  The MVP is normal.  cephalic presentation.       ASSESSMENT/PLAN:     38 y.o.  female with IUP at 37w1d    Cardiac disease in mother affecting pregnancy - H/o post partum SCAD s/p CABG   Cardiology: Jason/Abner ([facility])  NYHA Class: Class II  mWHO Risk Class: II/III  Last EKG 23: NSR; low voltage QRS, criteria for septal infarct no longer present, inverted t wave in anterior leads  Last TTE 10/24/2023:    Left Ventricle: The left ventricle is normal in size. Normal wall " thickness. Normal wall motion. There is low normal systolic function with a visually estimated ejection fraction of 50 - 55%. There is normal diastolic function.    The following segments are hypokinetic: basal anteroseptal, basal inferoseptal, mid anteroseptal, mid inferoseptal and apical septal. All other segments are normal.    Right Ventricle: Normal right ventricular cavity size. Wall thickness is normal. Right ventricle wall motion  is normal. Systolic function is normal.    Aortic Valve: The aortic valve is a trileaflet valve.    Aorta: Aortic root is normal in size measuring 2.61 cm. Ascending aorta is normal measuring 2.49 cm.    Pulmonary Artery: The estimated pulmonary artery systolic pressure is 13 mmHg.    IVC/SVC: Normal venous pressure at 3 mmHg.    Pericardium: There is no pericardial effusion.  Other evaluations: Stress test 1/2022; Negative for myocardial ischemia; Some segments were hypokinetic at peak stress (discontinued due to leg pain)  Pre-pregnancy regimen:  - Atorvastatin  - ASA 81 mg      Current regimen:   - Metoprolol 25mg daily  - ASA 81mg daily    She has previously been counseled regarding SCAD recurrence risk of approximately 10%. Recent literature suggests exposure to pregnancy does not necessarily increase this recurrence risk; however, her cardiac history does give her a CARPREG 2 score of >4, making her risk of acute cardiac event during pregnancy at least 40%.     See prior notes full counseling and recommendations  She denies cardiac symptoms today - no persistent CP, SOB, dyspnea on exertion.     Recommendations  Continue medications as listed above  IOL scheduled 11/29  Appreciate recommendations of heart failure team and cardiology  We will recommend EKG at time of admission, on continued telemetry monitoring through hospital admission.  Would recommend echocardiogram prior to discharge, and another follow-up clinic visit with cardiology with echocardiogram 1 month  postpartum.   If patient develops chest pain or EKG abnormalities postpartum, she needs to be transferred to [facility] for HF team assistance with management  OB anesthesia consult completed  If the patient has any cardiac symptoms - chest pain, palpitations, shortness of breath - she should be urgently evaluated in a hospital or ED setting    Delivery timing: IOL scheduled 11/29  Contraception: desires BTL  Recommend telemetry during labor and for at least 48 hours postpartum. Consider ICU admission after delivery pending maternal status and pathology evolution.   Close monitoring of fluid status intrapartum with strict I/Os. Maintain euvolemia.  Anticipate hospitalization for at least 3-4 days postpartum to monitor maternal cardiac status     No follow up with MFM has been scheduled, continue PNT as scheduled, IOL as planned    Chandler Bliss MD  PGY 7  Maternal Fetal Medicine  [facility]

## 2023-11-20 NOTE — ASSESSMENT & PLAN NOTE
Cardiology: Qamruddin and Mckenzie/Abner ([facility])  NYHA Class: Class II  [facility] Risk Class: II/III  Last EKG 11/18/23: NSR; low voltage QRS, criteria for septal infarct no longer present, inverted t wave in anterior leads  Last TTE 10/24/2023:    Left Ventricle: The left ventricle is normal in size. Normal wall thickness. Normal wall motion. There is low normal systolic function with a visually estimated ejection fraction of 50 - 55%. There is normal diastolic function.    The following segments are hypokinetic: basal anteroseptal, basal inferoseptal, mid anteroseptal, mid inferoseptal and apical septal. All other segments are normal.    Right Ventricle: Normal right ventricular cavity size. Wall thickness is normal. Right ventricle wall motion  is normal. Systolic function is normal.    Aortic Valve: The aortic valve is a trileaflet valve.    Aorta: Aortic root is normal in size measuring 2.61 cm. Ascending aorta is normal measuring 2.49 cm.    Pulmonary Artery: The estimated pulmonary artery systolic pressure is 13 mmHg.    IVC/SVC: Normal venous pressure at 3 mmHg.    Pericardium: There is no pericardial effusion.  Other evaluations: Stress test 1/2022; Negative for myocardial ischemia; Some segments were hypokinetic at peak stress (discontinued due to leg pain)  Pre-pregnancy regimen:  - Atorvastatin  - ASA 81 mg      Current regimen:   - Metoprolol 25mg daily  - ASA 81mg daily    She has previously been counseled regarding SCAD recurrence risk of approximately 10%. Recent literature suggests exposure to pregnancy does not necessarily increase this recurrence risk; however, her cardiac history does give her a CARPREG 2 score of >4, making her risk of acute cardiac event during pregnancy at least 40%.     See prior notes full counseling and recommendations  She denies cardiac symptoms today - no persistent CP, SOB, dyspnea on exertion.     Recommendations  Continue medications as listed above  IOL scheduled  11/29  Appreciate recommendations of heart failure team and cardiology  We will recommend EKG at time of admission, on continued telemetry monitoring through hospital admission.  Would recommend echocardiogram prior to discharge, and another follow-up clinic visit with cardiology with echocardiogram 1 month postpartum.   If patient develops chest pain or EKG abnormalities postpartum, she needs to be transferred to [facility] for HF team assistance with management  OB anesthesia consult completed  If the patient has any cardiac symptoms - chest pain, palpitations, shortness of breath - she should be urgently evaluated in a hospital or ED setting    Delivery timing: IOL scheduled 11/29  Contraception: desires BTL  Recommend telemetry during labor and for at least 48 hours postpartum. Consider ICU admission after delivery pending maternal status and pathology evolution.   Close monitoring of fluid status intrapartum with strict I/Os. Maintain euvolemia.  Anticipate hospitalization for at least 3-4 days postpartum to monitor maternal cardiac status

## 2023-11-22 ENCOUNTER — ROUTINE PRENATAL (OUTPATIENT)
Dept: OBSTETRICS AND GYNECOLOGY | Facility: CLINIC | Age: 38
End: 2023-11-22
Payer: MEDICAID

## 2023-11-22 VITALS — BODY MASS INDEX: 28.1 KG/M2 | SYSTOLIC BLOOD PRESSURE: 120 MMHG | DIASTOLIC BLOOD PRESSURE: 64 MMHG | WEIGHT: 168.88 LBS

## 2023-11-22 DIAGNOSIS — Z34.03 SUPERVISION OF NORMAL FIRST PREGNANCY IN THIRD TRIMESTER: Primary | ICD-10-CM

## 2023-11-22 DIAGNOSIS — Z86.79 HISTORY OF CAD (CORONARY ARTERY DISEASE): ICD-10-CM

## 2023-11-22 DIAGNOSIS — Z3A.37 37 WEEKS GESTATION OF PREGNANCY: ICD-10-CM

## 2023-11-22 PROCEDURE — 99212 OFFICE O/P EST SF 10 MIN: CPT | Mod: PBBFAC,TH | Performed by: OBSTETRICS & GYNECOLOGY

## 2023-11-22 PROCEDURE — 99999 PR PBB SHADOW E&M-EST. PATIENT-LVL II: ICD-10-PCS | Mod: PBBFAC,,, | Performed by: OBSTETRICS & GYNECOLOGY

## 2023-11-22 PROCEDURE — 99999 PR PBB SHADOW E&M-EST. PATIENT-LVL II: CPT | Mod: PBBFAC,,, | Performed by: OBSTETRICS & GYNECOLOGY

## 2023-11-22 PROCEDURE — 99213 OFFICE O/P EST LOW 20 MIN: CPT | Mod: TH,S$PBB,, | Performed by: OBSTETRICS & GYNECOLOGY

## 2023-11-22 PROCEDURE — 99213 PR OFFICE/OUTPT VISIT, EST, LEVL III, 20-29 MIN: ICD-10-PCS | Mod: TH,S$PBB,, | Performed by: OBSTETRICS & GYNECOLOGY

## 2023-11-22 NOTE — PROGRESS NOTES
Good FM. Denies VB, LOF, CTX.   She was seen in the [facility] for left leg numbess - all testing was normal.    /64   Wt 76.6 kg (168 lb 14 oz)   LMP 2023   BMI 28.10 kg/m²     38 y.o., at 37w3d by Estimated Date of Delivery: 12/10/23  Patient Active Problem List   Diagnosis    Chronic anemia    History of CAD (coronary artery disease)    Preoperative cardiovascular examination    Iron deficiency anemia    Chest pain, non-cardiac    Umbilical hernia without obstruction and without gangrene    Low TSH level    Chest pain at rest    Palpitations    Microcytic anemia    Vitamin D deficiency    Serum calcium elevated    Positive pregnancy test    Cardiac disease in mother affecting pregnancy - H/o post partum SCAD s/p CABG     AMA (advanced maternal age) multigravida 35+     OB History    Para Term  AB Living   4 3 3     3   SAB IAB Ectopic Multiple Live Births           3      # Outcome Date GA Lbr Jose Alberto/2nd Weight Sex Delivery Anes PTL Lv   4 Current            3 Term 13 39w2d 103:30 / 00:23 3.025 kg (6 lb 10.7 oz) F Vag-Spont EPI N RENEE      Birth Comments: 29    2 Term 09 39w0d 10:00 3.629 kg (8 lb) M Vag-Spont EPI N RENEE   1 Term 06 39w0d 09:00 3.033 kg (6 lb 11 oz) F Vag-Spont EPI N RENEE       Dating reviewed    Allergies and problem list reviewed and updated    Medical and surgical history reviewed    Prenatal labs reviewed and updated    Physical Exam:  ABD: soft, gravid, nontender    Assessment:  Clayton was seen today for routine prenatal visit.    Diagnoses and all orders for this visit:    Supervision of normal first pregnancy in third trimester    37 weeks gestation of pregnancy    History of CAD (coronary artery disease)        No orders of the defined types were placed in this encounter.    Discussed IOL next week.   PNT on Monday.   Labor, ROM and bleeding precautions.     Follow up in about 1 week (around 2023) for IOL.

## 2023-11-27 ENCOUNTER — HOSPITAL ENCOUNTER (OUTPATIENT)
Dept: PERINATAL CARE | Facility: OTHER | Age: 38
Discharge: HOME | End: 2023-11-27
Attending: OBSTETRICS & GYNECOLOGY
Payer: MEDICAID

## 2023-11-27 DIAGNOSIS — Z86.79 HISTORY OF CAD (CORONARY ARTERY DISEASE): ICD-10-CM

## 2023-11-27 PROCEDURE — 76815 OB US LIMITED FETUS(S): CPT

## 2023-11-27 PROCEDURE — 59025 PRENATAL TESTING - NST/AFI: ICD-10-PCS | Mod: 26,,, | Performed by: OBSTETRICS & GYNECOLOGY

## 2023-11-27 PROCEDURE — 76815 PRENATAL TESTING - NST/AFI: ICD-10-PCS | Mod: 26,,, | Performed by: OBSTETRICS & GYNECOLOGY

## 2023-11-27 PROCEDURE — 76815 OB US LIMITED FETUS(S): CPT | Mod: 26,,, | Performed by: OBSTETRICS & GYNECOLOGY

## 2023-11-27 PROCEDURE — 59025 FETAL NON-STRESS TEST: CPT | Mod: 26,,, | Performed by: OBSTETRICS & GYNECOLOGY

## 2023-11-27 PROCEDURE — 59025 FETAL NON-STRESS TEST: CPT

## 2023-11-29 ENCOUNTER — HOSPITAL ENCOUNTER (INPATIENT)
Facility: OTHER | Age: 38
LOS: 2 days | Discharge: HOME | End: 2023-12-01
Attending: OBSTETRICS & GYNECOLOGY | Admitting: OBSTETRICS & GYNECOLOGY
Payer: MEDICAID

## 2023-11-29 ENCOUNTER — ANESTHESIA (OUTPATIENT)
Dept: OBSTETRICS AND GYNECOLOGY | Facility: OTHER | Age: 38
End: 2023-11-29
Payer: MEDICAID

## 2023-11-29 ENCOUNTER — ANESTHESIA EVENT (OUTPATIENT)
Dept: OBSTETRICS AND GYNECOLOGY | Facility: OTHER | Age: 38
End: 2023-11-29
Payer: MEDICAID

## 2023-11-29 DIAGNOSIS — Z91.89 HEART DISEASE WITH HIGH RISK FOR ADVERSE EVENT: ICD-10-CM

## 2023-11-29 DIAGNOSIS — Z34.90 ENCOUNTER FOR ELECTIVE INDUCTION OF LABOR: ICD-10-CM

## 2023-11-29 DIAGNOSIS — Z34.90 ENCOUNTER FOR INDUCTION OF LABOR: ICD-10-CM

## 2023-11-29 DIAGNOSIS — I51.9 HEART DISEASE WITH HIGH RISK FOR ADVERSE EVENT: ICD-10-CM

## 2023-11-29 LAB
ABO + RH BLD: NORMAL
ALBUMIN SERPL BCP-MCNC: 2.9 G/DL (ref 3.5–5.2)
ALP SERPL-CCNC: 115 U/L (ref 55–135)
ALT SERPL W/O P-5'-P-CCNC: 24 U/L (ref 10–44)
ANION GAP SERPL CALC-SCNC: 11 MMOL/L (ref 8–16)
AST SERPL-CCNC: 29 U/L (ref 10–40)
BASOPHILS # BLD AUTO: 0.03 K/UL (ref 0–0.2)
BASOPHILS NFR BLD: 0.2 % (ref 0–1.9)
BILIRUB SERPL-MCNC: 0.5 MG/DL (ref 0.1–1)
BLD GP AB SCN CELLS X3 SERPL QL: NORMAL
BUN SERPL-MCNC: 5 MG/DL (ref 6–20)
CALCIUM SERPL-MCNC: 10.2 MG/DL (ref 8.7–10.5)
CHLORIDE SERPL-SCNC: 108 MMOL/L (ref 95–110)
CO2 SERPL-SCNC: 18 MMOL/L (ref 23–29)
CREAT SERPL-MCNC: 0.7 MG/DL (ref 0.5–1.4)
DIFFERENTIAL METHOD: ABNORMAL
EOSINOPHIL # BLD AUTO: 0.1 K/UL (ref 0–0.5)
EOSINOPHIL NFR BLD: 0.9 % (ref 0–8)
ERYTHROCYTE [DISTWIDTH] IN BLOOD BY AUTOMATED COUNT: 13.5 % (ref 11.5–14.5)
EST. GFR  (NO RACE VARIABLE): >60 ML/MIN/1.73 M^2
GLUCOSE SERPL-MCNC: 122 MG/DL (ref 70–110)
HCT VFR BLD AUTO: 32.6 % (ref 37–48.5)
HGB BLD-MCNC: 10.4 G/DL (ref 12–16)
IMM GRANULOCYTES # BLD AUTO: 0.04 K/UL (ref 0–0.04)
IMM GRANULOCYTES NFR BLD AUTO: 0.3 % (ref 0–0.5)
LYMPHOCYTES # BLD AUTO: 1.5 K/UL (ref 1–4.8)
LYMPHOCYTES NFR BLD: 12.3 % (ref 18–48)
MCH RBC QN AUTO: 26.7 PG (ref 27–31)
MCHC RBC AUTO-ENTMCNC: 31.9 G/DL (ref 32–36)
MCV RBC AUTO: 84 FL (ref 82–98)
MONOCYTES # BLD AUTO: 0.9 K/UL (ref 0.3–1)
MONOCYTES NFR BLD: 7.5 % (ref 4–15)
NEUTROPHILS # BLD AUTO: 9.7 K/UL (ref 1.8–7.7)
NEUTROPHILS NFR BLD: 78.8 % (ref 38–73)
NRBC BLD-RTO: 0 /100 WBC
PLATELET # BLD AUTO: 252 K/UL (ref 150–450)
PMV BLD AUTO: 10.1 FL (ref 9.2–12.9)
POTASSIUM SERPL-SCNC: 3.6 MMOL/L (ref 3.5–5.1)
PROT SERPL-MCNC: 6.8 G/DL (ref 6–8.4)
RBC # BLD AUTO: 3.89 M/UL (ref 4–5.4)
SODIUM SERPL-SCNC: 137 MMOL/L (ref 136–145)
SPECIMEN OUTDATE: NORMAL
WBC # BLD AUTO: 12.27 K/UL (ref 3.9–12.7)

## 2023-11-29 PROCEDURE — 27000181 HC CABLE, IUPC

## 2023-11-29 PROCEDURE — 88302 TISSUE EXAM BY PATHOLOGIST: CPT | Mod: 26,,, | Performed by: STUDENT IN AN ORGANIZED HEALTH CARE EDUCATION/TRAINING PROGRAM

## 2023-11-29 PROCEDURE — 93010 EKG 12-LEAD: ICD-10-PCS | Mod: ,,, | Performed by: INTERNAL MEDICINE

## 2023-11-29 PROCEDURE — 51702 INSERT TEMP BLADDER CATH: CPT

## 2023-11-29 PROCEDURE — 36004722: Performed by: OBSTETRICS & GYNECOLOGY

## 2023-11-29 PROCEDURE — 63600175 PHARM REV CODE 636 W HCPCS: Performed by: STUDENT IN AN ORGANIZED HEALTH CARE EDUCATION/TRAINING PROGRAM

## 2023-11-29 PROCEDURE — 11000001 HC ACUTE MED/SURG PRIVATE ROOM

## 2023-11-29 PROCEDURE — 59409 OBSTETRICAL CARE: CPT | Mod: AA,,, | Performed by: ANESTHESIOLOGY

## 2023-11-29 PROCEDURE — 37000008 HC ANESTHESIA 1ST 15 MINUTES: Mod: SZN | Performed by: OBSTETRICS & GYNECOLOGY

## 2023-11-29 PROCEDURE — 25000003 PHARM REV CODE 250

## 2023-11-29 PROCEDURE — 86901 BLOOD TYPING SEROLOGIC RH(D): CPT | Performed by: OBSTETRICS & GYNECOLOGY

## 2023-11-29 PROCEDURE — 63600175 PHARM REV CODE 636 W HCPCS

## 2023-11-29 PROCEDURE — 72200005 HC VAGINAL DELIVERY LEVEL II

## 2023-11-29 PROCEDURE — 80053 COMPREHEN METABOLIC PANEL: CPT | Performed by: OBSTETRICS & GYNECOLOGY

## 2023-11-29 PROCEDURE — 25000003 PHARM REV CODE 250: Performed by: STUDENT IN AN ORGANIZED HEALTH CARE EDUCATION/TRAINING PROGRAM

## 2023-11-29 PROCEDURE — 85025 COMPLETE CBC W/AUTO DIFF WBC: CPT | Performed by: OBSTETRICS & GYNECOLOGY

## 2023-11-29 PROCEDURE — 71000033 HC RECOVERY, INTIAL HOUR: Performed by: OBSTETRICS & GYNECOLOGY

## 2023-11-29 PROCEDURE — C1751 CATH, INF, PER/CENT/MIDLINE: HCPCS | Performed by: ANESTHESIOLOGY

## 2023-11-29 PROCEDURE — 62326 NJX INTERLAMINAR LMBR/SAC: CPT

## 2023-11-29 PROCEDURE — 72100002 HC LABOR CARE, 1ST 8 HOURS

## 2023-11-29 PROCEDURE — 72100003 HC LABOR CARE, EA. ADDL. 8 HRS

## 2023-11-29 PROCEDURE — 71000039 HC RECOVERY, EACH ADD'L HOUR: Mod: SZN | Performed by: OBSTETRICS & GYNECOLOGY

## 2023-11-29 PROCEDURE — 37000009 HC ANESTHESIA EA ADD 15 MINS: Mod: SZN | Performed by: OBSTETRICS & GYNECOLOGY

## 2023-11-29 PROCEDURE — 93005 ELECTROCARDIOGRAM TRACING: CPT

## 2023-11-29 PROCEDURE — 27200710 HC EPIDURAL INFUSION PUMP SET: Performed by: ANESTHESIOLOGY

## 2023-11-29 PROCEDURE — 93010 ELECTROCARDIOGRAM REPORT: CPT | Mod: ,,, | Performed by: INTERNAL MEDICINE

## 2023-11-29 PROCEDURE — 88302 TISSUE EXAM BY PATHOLOGIST: CPT | Mod: 59 | Performed by: STUDENT IN AN ORGANIZED HEALTH CARE EDUCATION/TRAINING PROGRAM

## 2023-11-29 PROCEDURE — 88302 PR  SURG PATH,LEVEL II: ICD-10-PCS | Mod: 26,,, | Performed by: STUDENT IN AN ORGANIZED HEALTH CARE EDUCATION/TRAINING PROGRAM

## 2023-11-29 PROCEDURE — 59409 PRA ETRICAL CARE,VAG DELIV ONLY: ICD-10-PCS | Mod: AA,,, | Performed by: ANESTHESIOLOGY

## 2023-11-29 PROCEDURE — 58605 DIVISION OF FALLOPIAN TUBE: CPT | Mod: ,,, | Performed by: OBSTETRICS & GYNECOLOGY

## 2023-11-29 PROCEDURE — 36004723: Performed by: OBSTETRICS & GYNECOLOGY

## 2023-11-29 PROCEDURE — 58605 PR LIGATE FALLOPIAN TUBE,POSTPARTUM: ICD-10-PCS | Mod: ,,, | Performed by: OBSTETRICS & GYNECOLOGY

## 2023-11-29 RX ORDER — LIDOCAINE HYDROCHLORIDE AND EPINEPHRINE 15; 5 MG/ML; UG/ML
INJECTION, SOLUTION EPIDURAL
Status: DISCONTINUED | OUTPATIENT
Start: 2023-11-29 | End: 2023-11-29

## 2023-11-29 RX ORDER — SODIUM CITRATE AND CITRIC ACID MONOHYDRATE 334; 500 MG/5ML; MG/5ML
SOLUTION ORAL
Status: COMPLETED
Start: 2023-11-29 | End: 2023-11-29

## 2023-11-29 RX ORDER — DIPHENOXYLATE HYDROCHLORIDE AND ATROPINE SULFATE 2.5; .025 MG/1; MG/1
2 TABLET ORAL EVERY 6 HOURS PRN
Status: DISCONTINUED | OUTPATIENT
Start: 2023-11-29 | End: 2023-11-29

## 2023-11-29 RX ORDER — OXYCODONE AND ACETAMINOPHEN 5; 325 MG/1; MG/1
1 TABLET ORAL EVERY 4 HOURS PRN
Status: DISCONTINUED | OUTPATIENT
Start: 2023-11-29 | End: 2023-12-01 | Stop reason: HOSPADM

## 2023-11-29 RX ORDER — PROCHLORPERAZINE EDISYLATE 5 MG/ML
5 INJECTION INTRAMUSCULAR; INTRAVENOUS EVERY 6 HOURS PRN
Status: DISCONTINUED | OUTPATIENT
Start: 2023-11-29 | End: 2023-12-01 | Stop reason: HOSPADM

## 2023-11-29 RX ORDER — OXYTOCIN/RINGER'S LACTATE 30/500 ML
95 PLASTIC BAG, INJECTION (ML) INTRAVENOUS ONCE
Status: DISCONTINUED | OUTPATIENT
Start: 2023-11-29 | End: 2023-12-01 | Stop reason: HOSPADM

## 2023-11-29 RX ORDER — DIPHENOXYLATE HYDROCHLORIDE AND ATROPINE SULFATE 2.5; .025 MG/1; MG/1
2 TABLET ORAL EVERY 6 HOURS PRN
Status: DISCONTINUED | OUTPATIENT
Start: 2023-11-29 | End: 2023-12-01 | Stop reason: HOSPADM

## 2023-11-29 RX ORDER — CHLOROPROCAINE HYDROCHLORIDE 30 MG/ML
INJECTION, SOLUTION EPIDURAL; INFILTRATION; INTRACAUDAL; PERINEURAL
Status: DISCONTINUED | OUTPATIENT
Start: 2023-11-29 | End: 2023-11-29

## 2023-11-29 RX ORDER — SODIUM CHLORIDE, SODIUM LACTATE, POTASSIUM CHLORIDE, CALCIUM CHLORIDE 600; 310; 30; 20 MG/100ML; MG/100ML; MG/100ML; MG/100ML
INJECTION, SOLUTION INTRAVENOUS CONTINUOUS
Status: DISCONTINUED | OUTPATIENT
Start: 2023-11-29 | End: 2023-11-29

## 2023-11-29 RX ORDER — IBUPROFEN 600 MG/1
600 TABLET ORAL EVERY 6 HOURS
Status: DISCONTINUED | OUTPATIENT
Start: 2023-11-29 | End: 2023-12-01 | Stop reason: HOSPADM

## 2023-11-29 RX ORDER — SIMETHICONE 80 MG
1 TABLET,CHEWABLE ORAL 4 TIMES DAILY PRN
Status: DISCONTINUED | OUTPATIENT
Start: 2023-11-29 | End: 2023-11-29

## 2023-11-29 RX ORDER — OXYTOCIN/RINGER'S LACTATE 30/500 ML
95 PLASTIC BAG, INJECTION (ML) INTRAVENOUS ONCE AS NEEDED
Status: DISCONTINUED | OUTPATIENT
Start: 2023-11-29 | End: 2023-11-29

## 2023-11-29 RX ORDER — METHYLERGONOVINE MALEATE 0.2 MG/ML
200 INJECTION INTRAVENOUS ONCE AS NEEDED
Status: DISCONTINUED | OUTPATIENT
Start: 2023-11-29 | End: 2023-11-29

## 2023-11-29 RX ORDER — LIDOCAINE HYDROCHLORIDE 10 MG/ML
10 INJECTION INFILTRATION; PERINEURAL ONCE AS NEEDED
Status: DISCONTINUED | OUTPATIENT
Start: 2023-11-29 | End: 2023-11-29

## 2023-11-29 RX ORDER — DOCUSATE SODIUM 100 MG/1
200 CAPSULE, LIQUID FILLED ORAL 2 TIMES DAILY PRN
Status: DISCONTINUED | OUTPATIENT
Start: 2023-11-29 | End: 2023-12-01 | Stop reason: HOSPADM

## 2023-11-29 RX ORDER — OXYTOCIN/RINGER'S LACTATE 30/500 ML
334 PLASTIC BAG, INJECTION (ML) INTRAVENOUS ONCE AS NEEDED
Status: DISCONTINUED | OUTPATIENT
Start: 2023-11-29 | End: 2023-12-01 | Stop reason: HOSPADM

## 2023-11-29 RX ORDER — SIMETHICONE 80 MG
1 TABLET,CHEWABLE ORAL EVERY 6 HOURS PRN
Status: DISCONTINUED | OUTPATIENT
Start: 2023-11-29 | End: 2023-12-01 | Stop reason: HOSPADM

## 2023-11-29 RX ORDER — CARBOPROST TROMETHAMINE 250 UG/ML
250 INJECTION, SOLUTION INTRAMUSCULAR
Status: DISCONTINUED | OUTPATIENT
Start: 2023-11-29 | End: 2023-11-29

## 2023-11-29 RX ORDER — PROCHLORPERAZINE EDISYLATE 5 MG/ML
5 INJECTION INTRAMUSCULAR; INTRAVENOUS EVERY 6 HOURS PRN
Status: DISCONTINUED | OUTPATIENT
Start: 2023-11-29 | End: 2023-11-29

## 2023-11-29 RX ORDER — TRANEXAMIC ACID 10 MG/ML
1000 INJECTION, SOLUTION INTRAVENOUS EVERY 30 MIN PRN
Status: DISCONTINUED | OUTPATIENT
Start: 2023-11-29 | End: 2023-12-01 | Stop reason: HOSPADM

## 2023-11-29 RX ORDER — MISOPROSTOL 200 UG/1
800 TABLET ORAL ONCE AS NEEDED
Status: DISCONTINUED | OUTPATIENT
Start: 2023-11-29 | End: 2023-12-01 | Stop reason: HOSPADM

## 2023-11-29 RX ORDER — ONDANSETRON 8 MG/1
8 TABLET, ORALLY DISINTEGRATING ORAL EVERY 8 HOURS PRN
Status: DISCONTINUED | OUTPATIENT
Start: 2023-11-29 | End: 2023-11-29

## 2023-11-29 RX ORDER — DIPHENHYDRAMINE HYDROCHLORIDE 50 MG/ML
25 INJECTION INTRAMUSCULAR; INTRAVENOUS EVERY 4 HOURS PRN
Status: DISCONTINUED | OUTPATIENT
Start: 2023-11-29 | End: 2023-12-01 | Stop reason: HOSPADM

## 2023-11-29 RX ORDER — METHYLERGONOVINE MALEATE 0.2 MG/ML
200 INJECTION INTRAVENOUS ONCE AS NEEDED
Status: DISCONTINUED | OUTPATIENT
Start: 2023-11-29 | End: 2023-12-01 | Stop reason: HOSPADM

## 2023-11-29 RX ORDER — MIDAZOLAM HYDROCHLORIDE 1 MG/ML
INJECTION INTRAMUSCULAR; INTRAVENOUS
Status: DISCONTINUED | OUTPATIENT
Start: 2023-11-29 | End: 2023-11-29

## 2023-11-29 RX ORDER — FAMOTIDINE 10 MG/ML
INJECTION INTRAVENOUS
Status: COMPLETED
Start: 2023-11-29 | End: 2023-11-29

## 2023-11-29 RX ORDER — MISOPROSTOL 200 UG/1
800 TABLET ORAL ONCE AS NEEDED
Status: DISCONTINUED | OUTPATIENT
Start: 2023-11-29 | End: 2023-11-29

## 2023-11-29 RX ORDER — CARBOPROST TROMETHAMINE 250 UG/ML
250 INJECTION, SOLUTION INTRAMUSCULAR
Status: DISCONTINUED | OUTPATIENT
Start: 2023-11-29 | End: 2023-12-01 | Stop reason: HOSPADM

## 2023-11-29 RX ORDER — OXYCODONE AND ACETAMINOPHEN 10; 325 MG/1; MG/1
1 TABLET ORAL EVERY 4 HOURS PRN
Status: DISCONTINUED | OUTPATIENT
Start: 2023-11-29 | End: 2023-12-01 | Stop reason: HOSPADM

## 2023-11-29 RX ORDER — OXYTOCIN/RINGER'S LACTATE 30/500 ML
0-30 PLASTIC BAG, INJECTION (ML) INTRAVENOUS CONTINUOUS
Status: DISCONTINUED | OUTPATIENT
Start: 2023-11-29 | End: 2023-11-29

## 2023-11-29 RX ORDER — FENTANYL/BUPIVACAINE/NS/PF 2MCG/ML-.1
PLASTIC BAG, INJECTION (ML) INJECTION CONTINUOUS
Status: DISCONTINUED | OUTPATIENT
Start: 2023-11-29 | End: 2023-11-30

## 2023-11-29 RX ORDER — OXYTOCIN/RINGER'S LACTATE 30/500 ML
95 PLASTIC BAG, INJECTION (ML) INTRAVENOUS ONCE AS NEEDED
Status: DISCONTINUED | OUTPATIENT
Start: 2023-11-29 | End: 2023-12-01 | Stop reason: HOSPADM

## 2023-11-29 RX ORDER — ACETAMINOPHEN 325 MG/1
650 TABLET ORAL EVERY 6 HOURS PRN
Status: DISCONTINUED | OUTPATIENT
Start: 2023-11-29 | End: 2023-12-01 | Stop reason: HOSPADM

## 2023-11-29 RX ORDER — FENTANYL/BUPIVACAINE/NS/PF 2MCG/ML-.1
PLASTIC BAG, INJECTION (ML) INJECTION
Status: COMPLETED
Start: 2023-11-29 | End: 2023-11-29

## 2023-11-29 RX ORDER — FAMOTIDINE 10 MG/ML
20 INJECTION INTRAVENOUS ONCE
Status: COMPLETED | OUTPATIENT
Start: 2023-11-29 | End: 2023-11-29

## 2023-11-29 RX ORDER — OXYTOCIN 10 [USP'U]/ML
10 INJECTION, SOLUTION INTRAMUSCULAR; INTRAVENOUS ONCE AS NEEDED
Status: DISCONTINUED | OUTPATIENT
Start: 2023-11-29 | End: 2023-12-01 | Stop reason: HOSPADM

## 2023-11-29 RX ORDER — DIPHENHYDRAMINE HCL 25 MG
25 CAPSULE ORAL EVERY 4 HOURS PRN
Status: DISCONTINUED | OUTPATIENT
Start: 2023-11-29 | End: 2023-12-01 | Stop reason: HOSPADM

## 2023-11-29 RX ORDER — OXYTOCIN/RINGER'S LACTATE 30/500 ML
95 PLASTIC BAG, INJECTION (ML) INTRAVENOUS ONCE
Status: DISCONTINUED | OUTPATIENT
Start: 2023-11-29 | End: 2023-11-29

## 2023-11-29 RX ORDER — SODIUM CHLORIDE 9 MG/ML
INJECTION, SOLUTION INTRAVENOUS
Status: DISCONTINUED | OUTPATIENT
Start: 2023-11-29 | End: 2023-11-29

## 2023-11-29 RX ORDER — SODIUM CHLORIDE 0.9 % (FLUSH) 0.9 %
10 SYRINGE (ML) INJECTION
Status: DISCONTINUED | OUTPATIENT
Start: 2023-11-29 | End: 2023-12-01 | Stop reason: HOSPADM

## 2023-11-29 RX ORDER — TRANEXAMIC ACID 10 MG/ML
1000 INJECTION, SOLUTION INTRAVENOUS EVERY 30 MIN PRN
Status: DISCONTINUED | OUTPATIENT
Start: 2023-11-29 | End: 2023-11-29

## 2023-11-29 RX ORDER — SODIUM CHLORIDE 0.9 % (FLUSH) 0.9 %
2 SYRINGE (ML) INJECTION
Status: DISCONTINUED | OUTPATIENT
Start: 2023-11-29 | End: 2023-11-29

## 2023-11-29 RX ORDER — CALCIUM CARBONATE 200(500)MG
500 TABLET,CHEWABLE ORAL 3 TIMES DAILY PRN
Status: DISCONTINUED | OUTPATIENT
Start: 2023-11-29 | End: 2023-11-29

## 2023-11-29 RX ORDER — OXYTOCIN/RINGER'S LACTATE 30/500 ML
334 PLASTIC BAG, INJECTION (ML) INTRAVENOUS ONCE AS NEEDED
Status: COMPLETED | OUTPATIENT
Start: 2023-11-29 | End: 2023-11-29

## 2023-11-29 RX ORDER — DIPHENHYDRAMINE HYDROCHLORIDE 50 MG/ML
25 INJECTION INTRAMUSCULAR; INTRAVENOUS EVERY 6 HOURS PRN
Status: DISCONTINUED | OUTPATIENT
Start: 2023-11-29 | End: 2023-11-29

## 2023-11-29 RX ORDER — OXYTOCIN/RINGER'S LACTATE 30/500 ML
334 PLASTIC BAG, INJECTION (ML) INTRAVENOUS ONCE
Status: DISCONTINUED | OUTPATIENT
Start: 2023-11-29 | End: 2023-11-29

## 2023-11-29 RX ORDER — ONDANSETRON 8 MG/1
8 TABLET, ORALLY DISINTEGRATING ORAL EVERY 8 HOURS PRN
Status: DISCONTINUED | OUTPATIENT
Start: 2023-11-29 | End: 2023-12-01 | Stop reason: HOSPADM

## 2023-11-29 RX ORDER — FENTANYL/BUPIVACAINE/NS/PF 2MCG/ML-.1
PLASTIC BAG, INJECTION (ML) INJECTION
Status: DISCONTINUED | OUTPATIENT
Start: 2023-11-29 | End: 2023-11-29

## 2023-11-29 RX ORDER — OXYTOCIN 10 [USP'U]/ML
10 INJECTION, SOLUTION INTRAMUSCULAR; INTRAVENOUS ONCE AS NEEDED
Status: DISCONTINUED | OUTPATIENT
Start: 2023-11-29 | End: 2023-11-29

## 2023-11-29 RX ORDER — HYDROCORTISONE 25 MG/G
CREAM TOPICAL 3 TIMES DAILY PRN
Status: DISCONTINUED | OUTPATIENT
Start: 2023-11-29 | End: 2023-12-01 | Stop reason: HOSPADM

## 2023-11-29 RX ORDER — METOPROLOL SUCCINATE 25 MG/1
25 TABLET, EXTENDED RELEASE ORAL DAILY
Status: DISCONTINUED | OUTPATIENT
Start: 2023-11-29 | End: 2023-12-01 | Stop reason: HOSPADM

## 2023-11-29 RX ORDER — ACETAMINOPHEN 500 MG
1000 TABLET ORAL ONCE
Status: COMPLETED | OUTPATIENT
Start: 2023-11-29 | End: 2023-11-29

## 2023-11-29 RX ORDER — SODIUM CITRATE AND CITRIC ACID MONOHYDRATE 334; 500 MG/5ML; MG/5ML
30 SOLUTION ORAL ONCE
Status: COMPLETED | OUTPATIENT
Start: 2023-11-29 | End: 2023-11-29

## 2023-11-29 RX ORDER — PRENATAL WITH FERROUS FUM AND FOLIC ACID 3080; 920; 120; 400; 22; 1.84; 3; 20; 10; 1; 12; 200; 27; 25; 2 [IU]/1; [IU]/1; MG/1; [IU]/1; MG/1; MG/1; MG/1; MG/1; MG/1; MG/1; UG/1; MG/1; MG/1; MG/1; MG/1
1 TABLET ORAL DAILY
Status: DISCONTINUED | OUTPATIENT
Start: 2023-11-30 | End: 2023-12-01 | Stop reason: HOSPADM

## 2023-11-29 RX ADMIN — METOPROLOL SUCCINATE 25 MG: 25 TABLET, EXTENDED RELEASE ORAL at 09:11

## 2023-11-29 RX ADMIN — SODIUM CHLORIDE, POTASSIUM CHLORIDE, SODIUM LACTATE AND CALCIUM CHLORIDE: 600; 310; 30; 20 INJECTION, SOLUTION INTRAVENOUS at 01:11

## 2023-11-29 RX ADMIN — IBUPROFEN 600 MG: 600 TABLET, FILM COATED ORAL at 06:11

## 2023-11-29 RX ADMIN — SODIUM CHLORIDE, POTASSIUM CHLORIDE, SODIUM LACTATE AND CALCIUM CHLORIDE: 600; 310; 30; 20 INJECTION, SOLUTION INTRAVENOUS at 02:11

## 2023-11-29 RX ADMIN — GENTAMICIN SULFATE 324 MG: 40 INJECTION, SOLUTION INTRAMUSCULAR; INTRAVENOUS at 04:11

## 2023-11-29 RX ADMIN — FAMOTIDINE 20 MG: 10 INJECTION, SOLUTION INTRAVENOUS at 04:11

## 2023-11-29 RX ADMIN — SODIUM CHLORIDE: 9 INJECTION, SOLUTION INTRAVENOUS at 12:11

## 2023-11-29 RX ADMIN — ACETAMINOPHEN 1000 MG: 500 TABLET ORAL at 02:11

## 2023-11-29 RX ADMIN — IBUPROFEN 600 MG: 600 TABLET, FILM COATED ORAL at 11:11

## 2023-11-29 RX ADMIN — SODIUM CHLORIDE, POTASSIUM CHLORIDE, SODIUM LACTATE AND CALCIUM CHLORIDE 1000 ML: 600; 310; 30; 20 INJECTION, SOLUTION INTRAVENOUS at 12:11

## 2023-11-29 RX ADMIN — AMPICILLIN 2 G: 2 INJECTION, POWDER, FOR SOLUTION INTRAMUSCULAR; INTRAVENOUS at 02:11

## 2023-11-29 RX ADMIN — Medication 4 MILLI-UNITS/MIN: at 03:11

## 2023-11-29 RX ADMIN — CEFAZOLIN 2 G: 2 INJECTION, POWDER, FOR SOLUTION INTRAMUSCULAR; INTRAVENOUS at 06:11

## 2023-11-29 RX ADMIN — DIPHENHYDRAMINE HYDROCHLORIDE 25 MG: 50 INJECTION, SOLUTION INTRAMUSCULAR; INTRAVENOUS at 02:11

## 2023-11-29 RX ADMIN — Medication 334 MILLI-UNITS/MIN: at 03:11

## 2023-11-29 RX ADMIN — ONDANSETRON 8 MG: 8 TABLET, ORALLY DISINTEGRATING ORAL at 08:11

## 2023-11-29 RX ADMIN — LIDOCAINE HYDROCHLORIDE,EPINEPHRINE BITARTRATE 3 ML: 15; .005 INJECTION, SOLUTION EPIDURAL; INFILTRATION; INTRACAUDAL; PERINEURAL at 01:11

## 2023-11-29 RX ADMIN — CHLOROPROCAINE HYDROCHLORIDE 5 ML: 30 INJECTION, SOLUTION EPIDURAL; INFILTRATION; INTRACAUDAL; PERINEURAL at 04:11

## 2023-11-29 RX ADMIN — SODIUM CITRATE AND CITRIC ACID MONOHYDRATE 30 ML: 500; 334 SOLUTION ORAL at 04:11

## 2023-11-29 RX ADMIN — Medication 6 ML: at 01:11

## 2023-11-29 RX ADMIN — MIDAZOLAM HYDROCHLORIDE 1 MG: 1 INJECTION, SOLUTION INTRAMUSCULAR; INTRAVENOUS at 04:11

## 2023-11-29 RX ADMIN — Medication 8 ML/HR: at 01:11

## 2023-11-29 RX ADMIN — FAMOTIDINE 20 MG: 10 INJECTION INTRAVENOUS at 04:11

## 2023-11-29 RX ADMIN — OXYCODONE AND ACETAMINOPHEN 1 TABLET: 10; 325 TABLET ORAL at 06:11

## 2023-11-29 RX ADMIN — SODIUM CITRATE AND CITRIC ACID MONOHYDRATE 30 ML: 334; 500 SOLUTION ORAL at 04:11

## 2023-11-29 NOTE — PLAN OF CARE
Patient has been screened for Social Work discharge planning needs. Based on documentation in medical record, pt has a hx of  coronary artery dissection but no complications during pregnancy. If needed  pt will be transferred to [facility] urgently. No discharge planning needs are anticipated at this time. Should any discharge planning needs arise, please consult .        23 0840   OB SCREEN   Assessment Type Discharge Planning Assessment   Source of Information health record   Received Prenatal Care Yes   Any indications/suspicions for None   Is this a teen pregnancy No   Is the baby in NICU No   Indication for adoption/Safe Haven No   Indication for DME/post-acute needs No   HIV (+) No   Any congenital  disorders No   Fetal demise/ death No

## 2023-11-29 NOTE — PROGRESS NOTES
LABOR NOTE    S:  Complaints: No.  Epidural working:  yes  Resident to bedside for cervical check    O: BP (!) 103/53   Pulse 87   Temp 98.4 °F (36.9 °C) (Oral)   Resp 18   LMP 2023   SpO2 100%   Breastfeeding No       FHT: 150 Cat 1 (reassuring)  CTX: q 2-4 minutes  SVE: 3/50/-3 demarco balloon removed, fetal head not well applied for rupture      ASSESSMENT:   38 y.o.  at 38w3d, IOL    FHT reassuring    Active Hospital Problems    Diagnosis  POA    *Encounter for elective induction of labor [Z34.90]  Not Applicable      Resolved Hospital Problems   No resolved problems to display.     Timeline:  0130: 50/-3 demarco balloon placed, pitocin started  0500: 3/50/-3 demarco balloon removed, pit @ 8 mU/min. Plan to increase. Head not well applied for AROM    History of SCAD  - VS as above  - Cont home metoprolol  - Tele  - Strict I/O for euvolemic goal  - EKG with sinus tachycardia, possible left atrial enlargement. Septal infact age undetermined. Discussed with anesthesia and similar to prior EKG.  - Tele with NSR    PLAN:    Continue Close Maternal/Fetal Monitoring  Pitocin Augmentation per protocol  Recheck 2-4 hours or PRN    Katherine Boecking MD   Ob/Gyn PGY-4

## 2023-11-29 NOTE — ANESTHESIA PREPROCEDURE EVALUATION
[facility]  Anesthesia Pre-Operative Evaluation         Patient Name: Clayton Duarte  YOB: 1985  MRN: [id number]    2023      Clayton Duarte is a 38 y.o. female  with IUP @ 38w3d who presents for IOL. Pertinent past medical history includes previous post partum spontaneous coronary dissection s/p 2 vessel CABG. IUP has been complicated AMA, anemia, and undesired fertility.     She otherwise has no noted bleeding/clotting disorder or spinal pathology.        OB History    Para Term  AB Living   4 3 3     3   SAB IAB Ectopic Multiple Live Births           3      # Outcome Date GA Lbr Jose Alberto/2nd Weight Sex Delivery Anes PTL Lv   4 Current            3 Term 13 39w2d 103:30 / 00:23 3.025 kg (6 lb 10.7 oz) F Vag-Spont EPI N RENEE      Birth Comments: 29    2 Term 09 39w0d 10:00 3.629 kg (8 lb) M Vag-Spont EPI N RENEE   1 Term 06 39w0d 09:00 3.033 kg (6 lb 11 oz) F Vag-Spont EPI N RENEE       Review of patient's allergies indicates:   Allergen Reactions    Bananas [banana] Itching    Peanut butter flavor Hives       Wt Readings from Last 1 Encounters:   23 0853 76.6 kg (168 lb 14 oz)       BP Readings from Last 3 Encounters:   23 120/64   23 113/77   23 114/67       Patient Active Problem List   Diagnosis    Chronic anemia    History of CAD (coronary artery disease)    Preoperative cardiovascular examination    Iron deficiency anemia    Chest pain, non-cardiac    Umbilical hernia without obstruction and without gangrene    Low TSH level    Chest pain at rest    Palpitations    Microcytic anemia    Vitamin D deficiency    Serum calcium elevated    Positive pregnancy test    Cardiac disease in mother affecting pregnancy - H/o post partum SCAD s/p CABG     AMA (advanced maternal age) multigravida 35+    Encounter for elective induction of labor       Past Surgical History:   Procedure Laterality Date    CARDIAC SURGERY   "    CORONARY ARTERY BYPASS GRAFT  9/13    tanner to lad, svg OM1    WISDOM TOOTH EXTRACTION         Social History     Socioeconomic History    Marital status: Single    Number of children: 3    Years of education: College   Occupational History    Occupation: unemployed     Comment: [employer]   Tobacco Use    Smoking status: Never    Smokeless tobacco: Never   Substance and Sexual Activity    Alcohol use: Not Currently    Drug use: No    Sexual activity: Yes     Partners: Male         Chemistry        Component Value Date/Time     11/09/2023 1121     11/09/2023 1121    K 3.5 11/09/2023 1121    K 3.5 11/09/2023 1121     11/09/2023 1121     11/09/2023 1121    CO2 24 11/09/2023 1121    CO2 24 11/09/2023 1121    BUN 4 (L) 11/09/2023 1121    BUN 4 (L) 11/09/2023 1121    CREATININE 0.7 11/09/2023 1121    CREATININE 0.7 11/09/2023 1121     11/09/2023 1121     11/09/2023 1121        Component Value Date/Time    CALCIUM 10.1 11/09/2023 1121    CALCIUM 10.1 11/09/2023 1121    ALKPHOS 97 11/09/2023 1121    AST 18 11/09/2023 1121    ALT 17 11/09/2023 1121    BILITOT 0.3 11/09/2023 1121    ESTGFRAFRICA >60.0 05/13/2022 0736    EGFRNONAA >60.0 05/13/2022 0736            Lab Results   Component Value Date    WBC 8.75 11/09/2023    HGB 10.0 (L) 11/09/2023    HCT 32.4 (L) 11/09/2023    MCV 87 11/09/2023     11/09/2023       No results for input(s): "PT", "INR", "PROTIME", "APTT" in the last 72 hours.          Pre-op Assessment    I have reviewed the Patient Summary Reports.     I have reviewed the Nursing Notes. I have reviewed the NPO Status.   I have reviewed the Medications.     Review of Systems  Anesthesia Hx:  No problems with previous Anesthesia               Denies Personal Hx of Anesthesia complications.                    Social:  Non-Smoker       Hematology/Oncology:    Oncology Normal    -- Anemia:                                  EENT/Dental:  EENT/Dental Normal      "      Cardiovascular:        CAD   CABG/stent           ECG has been reviewed. H/o post partum spontaneous coronary artery dissection s/p 2 vessel CABG                          Pulmonary:  Pulmonary Normal                       Renal/:  Renal/ Normal                 Hepatic/GI:  Hepatic/GI Normal                 Musculoskeletal:  Musculoskeletal Normal                Neurological:  Neurology Normal                                      Endocrine:  Endocrine Normal            Dermatological:  Skin Normal    Psych:  Psychiatric History                  Physical Exam  General: Well nourished, Cooperative and Alert    Airway:  Mallampati: II   Mouth Opening: Normal  TM Distance: Normal  Tongue: Normal  Neck ROM: Normal ROM    Dental:  Intact        Anesthesia Plan  Type of Anesthesia, risks & benefits discussed:    Anesthesia Type: Gen ETT, Spinal, CSE, Epidural  Intra-op Monitoring Plan: Standard ASA Monitors  Post Op Pain Control Plan: multimodal analgesia and IV/PO Opioids PRN  Induction:  IV  Airway Plan: Video, Post-Induction  Informed Consent: Informed consent signed with the Patient and all parties understand the risks and agree with anesthesia plan.  All questions answered. Patient consented to blood products? Yes  ASA Score: 3  Day of Surgery Review of History & Physical: H&P Update referred to the surgeon/provider.I have interviewed and examined the patient. I have reviewed the patient's H&P dated:     Ready For Surgery From Anesthesia Perspective.     .

## 2023-11-29 NOTE — ANESTHESIA PROCEDURE NOTES
Epidural    Patient location during procedure: OB   Reason for block: primary anesthetic   Reason for block: labor analgesia requested by patient and obstetrician  Diagnosis: IUP   Start time: 11/29/2023 2:38 AM  Timeout: 11/29/2023 2:37 AM  End time: 11/29/2023 2:44 AM  Surgery related to: vaginal delivery    Staffing  Performing Provider: Vance Mendez MD  Authorizing Provider: Jerome Johnson MD    Staffing  Performed by: Vance Mendez MD  Authorized by: Jerome Johnson MD        Preanesthetic Checklist  Completed: patient identified, IV checked, site marked, risks and benefits discussed, surgical consent, monitors and equipment checked, pre-op evaluation, timeout performed, anesthesia consent given, hand hygiene performed and patient being monitored  Preparation  Patient position: sitting  Prep: ChloraPrep  Patient monitoring: ECG, Pulse Ox and Blood Pressure  Reason for block: primary anesthetic   Epidural  Skin Anesthetic: lidocaine 1%  Skin Wheal: 3 mL  Administration type: continuous  Approach: midline  Interspace: L3-4    Injection technique: VERNON air  Needle and Epidural Catheter  Needle type: Tuohy   Needle gauge: 17  Needle length: 3.5 inches  Needle insertion depth: 4.5 cm  Catheter type: springwWOMN  Catheter size: 19 G  Catheter at skin depth: 9 cm  Insertion Attempts: 1  Test dose: 3 mL of lidocaine 1.5% with Epi 1-to-200,000  Additional Documentation: no paresthesia on injection, incremental injection, no significant pain on injection, negative aspiration for heme and CSF, no signs/symptoms of IV or SA injection and no significant complaints from patient  Needle localization: anatomical landmarks  Medications:  Volume per aspiration: 5 mL  Time between aspirations: 5 minutes   Assessment  Ease of block: easy  Patient's tolerance of the procedure: comfortable throughout block and no complaints No inadvertent dural puncture with Tuohy.  Dural puncture performed with spinal  needle.

## 2023-11-29 NOTE — H&P
HISTORY AND PHYSICAL                                                OBSTETRICS          Subjective:       Clayton Duarte is a 38 y.o.  female with IUP at 38w3d weeks gestation who presents for IOL for Hx of coronary artery dissection.    Patient denies contractions, denies vaginal bleeding, denies LOF.   Fetal Movement: normal.    This IUP is complicated by AMA and Hx coronary artery dissection w/bypass graft ()    Review of Systems   Constitutional:  Negative for chills, fatigue and fever.   HENT:  Negative for nasal congestion.    Eyes:  Negative for visual disturbance.   Respiratory:  Negative for cough.    Cardiovascular:  Negative for chest pain and palpitations.   Gastrointestinal:  Negative for abdominal pain, bloating, diarrhea and nausea.   Genitourinary:  Negative for dysuria, pelvic pain, vaginal bleeding and vaginal discharge.   Musculoskeletal:  Negative for back pain, joint swelling and leg pain.   Neurological:  Negative for syncope and headaches.   Hematological:  Does not bruise/bleed easily.   Psychiatric/Behavioral:  The patient is not nervous/anxious.        PMHx:   Past Medical History:   Diagnosis Date    Abnormal Pap smear of cervix ,     colposcopy    Anemia     Coronary artery dissection 2013    Postpartum depression     Spinal headache complicating labor and delivery, postpartum condition 2013       PSHx:   Past Surgical History:   Procedure Laterality Date    CARDIAC SURGERY      CORONARY ARTERY BYPASS GRAFT      tanner to lad, svg OM1    WISDOM TOOTH EXTRACTION         All:   Review of patient's allergies indicates:   Allergen Reactions    Bananas [banana] Itching    Peanut butter flavor Hives       Meds:   Medications Prior to Admission   Medication Sig Dispense Refill Last Dose    aspirin (ECOTRIN) 81 MG EC tablet Take 81 mg by mouth twice a week.       metoprolol succinate (TOPROL-XL) 25 MG 24 hr tablet Take 1 tablet (25 mg total) by mouth once  daily. 90 tablet 3     prenatal 93-iron-folate 9-dha (TRISTART DHA) 31 mg iron- 1 mg-200 mg Cap Take 1 capsule by mouth once daily. 30 capsule 11     prenatal vit 87-iron-folic-dha (PRENATE MINI, FERR ASP GLYCIN,) 18-1-350 mg Cap Take 1 each by mouth Daily. 30 capsule 11        SH:   Social History     Socioeconomic History    Marital status: Single    Number of children: 3    Years of education: College   Occupational History    Occupation: unemployed     Comment: [employer]   Tobacco Use    Smoking status: Never    Smokeless tobacco: Never   Substance and Sexual Activity    Alcohol use: Not Currently    Drug use: No    Sexual activity: Yes     Partners: Male       FH:   Family History   Problem Relation Age of Onset    Hypertension Mother     Diabetes Mother     Stroke Mother     Breast cancer Neg Hx     Ovarian cancer Neg Hx     Colon cancer Neg Hx     Cancer Neg Hx     Heart disease Neg Hx        OBHx:   OB History    Para Term  AB Living   4 3 3 0 0 3   SAB IAB Ectopic Multiple Live Births   0 0 0 0 3      # Outcome Date GA Lbr Jose Alberto/2nd Weight Sex Delivery Anes PTL Lv   4 Current            3 Term 13 39w2d 103:30 / 00:23 3.025 kg (6 lb 10.7 oz) F Vag-Spont EPI N RENEE      Birth Comments: 28       Name: TATYANA,BABY GIRL       Apgar1: 9  Apgar5: 9   2 Term 09 39w0d 10:00 3.629 kg (8 lb) M Vag-Spont EPI N RENEE      Name: Saul   1 Term 06 39w0d 09:00 3.033 kg (6 lb 11 oz) F Vag-Spont EPI N RENEE      Name: Josr       Objective:       LMP 2023     There were no vitals filed for this visit.    General:   alert, appears stated age and cooperative, no apparent distress   HENT:  normocephalic, atraumatic   Eyes:  extraocular movements and conjunctivae normal   Neck:  supple, range of motion normal, no thyromegaly   Lungs:   no respiratory distress   Heart:   regular rate   Abdomen:  soft, non-tender, non-distended but gravid, no rebound or guarding    Extremities negative  edema, negative erythema   FHT: 150, moderate BTBV, +accels, -decels;  Cat 1 (reassuring)                 TOCO: No ctx   Presentations: cephalic by ultrasound   Cervix:     Dilation: 1cm    Effacement:   50%    Station:  -3    Consistency: medium    Position: middle     EFW by Leopold's: 5    Recent Growth Scan:  37w EFW (2874 g) 28%, AC 24%    Lab Review  Blood Type A POS  GBBS: negative  Rubella: Immune  RPR: nr  HIV: negative  HepB: negative       Assessment:       38w3d weeks gestation here for induction of labor    Active Hospital Problems    Diagnosis  POA    *Encounter for elective induction of labor [Z34.90]  Not Applicable      Resolved Hospital Problems   No resolved problems to display.          Plan:     Induction of Labor   - Risks, benefits, alternatives and possible complications have been discussed in detail with the patient.   - Consents signed and to chart  - Admit to Labor and Delivery unit  - Epidural per Anesthesia  - Draw CBC, T&S  - Notify Staff  - 37w EFW (2874 g) 28%, AC 24%  - Maternal pelvis to 8lb  - Plan to start induction with demarco balloon and pitocin    2. Hx coronary artery dissection w/ CABG x2 (2013)  - CABG (with LIMA to LAD and VG to OM1 )  - EKG on admission  - TELE during admission  - continue home med Metoprolol 25  - held aspirin 81  - strict I&O  - goal Euvolemia  - close monitoring for cardiac signs and symptoms  - Per [facility] recommendations: if significant cardiac concerns - urgent transfer to [facility]    3. AMA   - [id number] NEG  - encourage ambulation    4. Contraception  - Desires BTL       Samuel Valenzuela MD  Obstetrics and Gynecology- PGY1

## 2023-11-29 NOTE — L&D DELIVERY NOTE
"[facility] - Labor & Delivery  Vaginal Delivery   Operative Note    SUMMARY     After one maternal push, normal spontaneous vaginal delivery of live infant, was placed on mothers abdomen for skin to skin and bulb suctioning performed.  Infant delivered position OA over intact perineum.  Nuchal cord: No.    Spontaneous delivery of placenta and IV pitocin given noting good uterine tone.  No lacerations noted.  Patient tolerated delivery well. Sponge needle and lap counted correctly x2.    QBL 100cc    Zuleyma Peterson MD PGY1  Obstetrics and Gynecology    Indications:  (spontaneous vaginal delivery)  Pregnancy complicated by:   Patient Active Problem List   Diagnosis    Chronic anemia    History of CAD (coronary artery disease)    Preoperative cardiovascular examination    Iron deficiency anemia    Chest pain, non-cardiac    Umbilical hernia without obstruction and without gangrene    Low TSH level    Chest pain at rest    Palpitations    Microcytic anemia    Vitamin D deficiency    Serum calcium elevated    Positive pregnancy test    Cardiac disease in mother affecting pregnancy - H/o post partum SCAD s/p CABG     AMA (advanced maternal age) multigravida 35+    Encounter for elective induction of labor     (spontaneous vaginal delivery)     Admitting GA: 38w3d    Delivery Information for Bigg Duarte    Birth information:  YOB: 2024   Time of birth: 3:18 PM   Sex: male   Head Delivery Date/Time: 2023  3:18 PM   Delivery type: Vaginal, Spontaneous   Gestational Age: 38w3d        Delivery Providers    Delivering clinician: Mitzy Madison MD   Provider Role    Sophia Yuan MD Chawla, Harsheen, MD King, Bailey, RN     Mitzy Bhatt RN               Measurements    Weight: 2860 g  Weight (lbs): 6 lb 4.9 oz  Length: 52.1 cm  Length (in): 20.5"  Head circumference: 34.3 cm  Chest circumference: 31.8 cm         Apgars    Living status: Living  Apgar Component Scores: "  1 min.:  5 min.:  10 min.:  15 min.:  20 min.:    Skin color:  1  1       Heart rate:  2  2       Reflex irritability:  2  2       Muscle tone:  2  2       Respiratory effort:  2  2       Total:  9  9       Apgars assigned by: TRACEY DAMON RN         Operative Delivery    Forceps attempted?: No  Vacuum extractor attempted?: No         Shoulder Dystocia    Shoulder dystocia present?: No           Presentation    Presentation: Vertex  Position: Occiput Anterior           Interventions/Resuscitation    Method: Bulb Suctioning, Tactile Stimulation       Cord    Vessels: 3 vessels  Complications: None  Delayed Cord Clamping?: Yes  Cord Clamped Date/Time: 2023  3:21 PM  Cord Blood Disposition: Sent with Baby  Gases Sent?: No  Stem Cell Collection (by MD): No       Placenta    Placenta delivery date/time: 2023 1523  Placenta removal: Expressed  Placenta appearance: Intact  Placenta disposition: Family           Labor Events:       labor:       Labor Onset Date/Time:         Dilation Complete Date/Time: 2023 15:01     Start Pushing Date/Time: 2023 15:17       Start Pushing Date/Time: 2023 15:17     Rupture Date/Time: 23  0810         Rupture type: ARM (Artificial Rupture)         Fluid Amount:       Fluid Color: Clear               steroids:       Antibiotics given for GBS:       Induction: balloon dilation (Garcia)     Indications for induction:        Augmentation: amniotomy;oxytocin     Indications for augmentation:       Labor complications: None     Additional complications:          Cervical ripening:                     Delivery:      Episiotomy: None     Indication for Episiotomy:       Perineal Lacerations: None Repaired:      Periurethral Laceration:   Repaired:     Labial Laceration:   Repaired:     Sulcus Laceration:   Repaired:     Vaginal Laceration:   Repaired:     Cervical Laceration:   Repaired:     Repair suture: None     Repair # of packets:        Last Value - EBL - Nursing (mL):       Sum - EBL - Nursing (mL): 0     Last Value - EBL - Anesthesia (mL):      Calculated QBL (mL): 100      Vaginal Sweep Performed: Yes     Surgicount Correct: Yes     Vaginal Packing: No Quantity:       Other providers:       Anesthesia    Method: Epidural          Details (if applicable):  Trial of Labor      Categorization:      Priority:     Indications for :     Incision Type:       Additional  information:  Forceps:    Vacuum:    Breech:    Observed anomalies    Other (Comments):

## 2023-11-29 NOTE — TRANSFER OF CARE
"Anesthesia Transfer of Care Note    Patient: Clayton Duarte    Procedure(s) Performed: * No procedures listed *    Patient location: Labor and Delivery    Anesthesia Type: epidural    Transport from OR: Transported from OR on room air with adequate spontaneous ventilation    Post pain: adequate analgesia    Post assessment: no apparent anesthetic complications    Post vital signs: stable    Level of consciousness: awake and alert    Nausea/Vomiting: no nausea/vomiting    Complications: none    Transfer of care protocol was followed      Last vitals: Visit Vitals  /60   Pulse 92   Temp 37.6 °C (99.6 °F) (Oral)   Resp 18   Ht 5' 5" (1.651 m)   Wt 76.6 kg (168 lb 14 oz)   LMP 03/06/2023   SpO2 96%   Breastfeeding No   BMI 28.10 kg/m²     "

## 2023-11-30 LAB
BASOPHILS # BLD AUTO: 0.02 K/UL (ref 0–0.2)
BASOPHILS NFR BLD: 0.1 % (ref 0–1.9)
DIFFERENTIAL METHOD: ABNORMAL
EOSINOPHIL # BLD AUTO: 0.1 K/UL (ref 0–0.5)
EOSINOPHIL NFR BLD: 0.8 % (ref 0–8)
ERYTHROCYTE [DISTWIDTH] IN BLOOD BY AUTOMATED COUNT: 13.8 % (ref 11.5–14.5)
HCT VFR BLD AUTO: 26.3 % (ref 37–48.5)
HGB BLD-MCNC: 8.3 G/DL (ref 12–16)
IMM GRANULOCYTES # BLD AUTO: 0.06 K/UL (ref 0–0.04)
IMM GRANULOCYTES NFR BLD AUTO: 0.4 % (ref 0–0.5)
LYMPHOCYTES # BLD AUTO: 1.5 K/UL (ref 1–4.8)
LYMPHOCYTES NFR BLD: 9.6 % (ref 18–48)
MCH RBC QN AUTO: 26.9 PG (ref 27–31)
MCHC RBC AUTO-ENTMCNC: 31.6 G/DL (ref 32–36)
MCV RBC AUTO: 85 FL (ref 82–98)
MONOCYTES # BLD AUTO: 1.1 K/UL (ref 0.3–1)
MONOCYTES NFR BLD: 7.2 % (ref 4–15)
NEUTROPHILS # BLD AUTO: 12.9 K/UL (ref 1.8–7.7)
NEUTROPHILS NFR BLD: 81.9 % (ref 38–73)
NRBC BLD-RTO: 0 /100 WBC
PLATELET # BLD AUTO: 178 K/UL (ref 150–450)
PMV BLD AUTO: 10 FL (ref 9.2–12.9)
RBC # BLD AUTO: 3.08 M/UL (ref 4–5.4)
WBC # BLD AUTO: 15.7 K/UL (ref 3.9–12.7)

## 2023-11-30 PROCEDURE — 25000003 PHARM REV CODE 250

## 2023-11-30 PROCEDURE — 99232 SBSQ HOSP IP/OBS MODERATE 35: CPT | Mod: TH,,, | Performed by: OBSTETRICS & GYNECOLOGY

## 2023-11-30 PROCEDURE — 36415 COLL VENOUS BLD VENIPUNCTURE: CPT | Performed by: OBSTETRICS & GYNECOLOGY

## 2023-11-30 PROCEDURE — 11000001 HC ACUTE MED/SURG PRIVATE ROOM

## 2023-11-30 PROCEDURE — 85025 COMPLETE CBC W/AUTO DIFF WBC: CPT | Performed by: OBSTETRICS & GYNECOLOGY

## 2023-11-30 PROCEDURE — 99232 PR SUBSEQUENT HOSPITAL CARE,LEVL II: ICD-10-PCS | Mod: TH,,, | Performed by: OBSTETRICS & GYNECOLOGY

## 2023-11-30 RX ORDER — DOCUSATE SODIUM 100 MG/1
100 CAPSULE, LIQUID FILLED ORAL DAILY
Status: DISCONTINUED | OUTPATIENT
Start: 2023-11-30 | End: 2023-12-01 | Stop reason: HOSPADM

## 2023-11-30 RX ORDER — LANOLIN ALCOHOL/MO/W.PET/CERES
1 CREAM (GRAM) TOPICAL DAILY
Status: DISCONTINUED | OUTPATIENT
Start: 2023-11-30 | End: 2023-12-01 | Stop reason: HOSPADM

## 2023-11-30 RX ADMIN — IBUPROFEN 600 MG: 600 TABLET, FILM COATED ORAL at 12:11

## 2023-11-30 RX ADMIN — FERROUS SULFATE TAB 325 MG (65 MG ELEMENTAL FE) 1 EACH: 325 (65 FE) TAB at 08:11

## 2023-11-30 RX ADMIN — OXYCODONE AND ACETAMINOPHEN 1 TABLET: 10; 325 TABLET ORAL at 10:11

## 2023-11-30 RX ADMIN — DOCUSATE SODIUM 100 MG: 100 CAPSULE, LIQUID FILLED ORAL at 08:11

## 2023-11-30 RX ADMIN — IBUPROFEN 600 MG: 600 TABLET, FILM COATED ORAL at 06:11

## 2023-11-30 RX ADMIN — PRENATAL VIT W/ FE FUMARATE-FA TAB 27-0.8 MG 1 TABLET: 27-0.8 TAB at 08:11

## 2023-11-30 RX ADMIN — METOPROLOL SUCCINATE 25 MG: 25 TABLET, EXTENDED RELEASE ORAL at 08:11

## 2023-11-30 NOTE — PLAN OF CARE
POC reviewed with pt throughout shift. Pt voiding, passing flatulus, and ambulating without difficulty. Pain managed with po pain meds. VSS. Uterus midline and firm without massage. Light lochia rubra without foul odor. Pt's bed locked in lowest position and call bell within reach. Pt encouraged to call with any needs.

## 2023-11-30 NOTE — PROGRESS NOTES
POSTPARTUM PROGRESS NOTE    Subjective:     PPD/POD#: 1   Procedure:  and ppBTL   EGA: 38w3d   N/V: No   F/C: No   Abd Pain: Mild, well-controlled with oral pain medication   Lochia: Mild   Voiding: Garcia recently removed, has not yet voided spontaneously   Ambulating: Not yet   Bowel fnc: No   Contraception: Postpartum BTL done     Patient denies SOB, chest pain, leg swelling.     Objective:      Temp:  [97.7 °F (36.5 °C)-103.2 °F (39.6 °C)] 97.7 °F (36.5 °C)  Pulse:  [] 66  Resp:  [16-18] 16  SpO2:  [95 %-100 %] 98 %  BP: ()/(50-80) 103/56    Abdomen: Soft, appropriately tender   Uterus: Firm, no fundal tenderness   Incision: Bandage in place without shadowing     Lab Review    Recent Labs   Lab 23  0023      K 3.6      CO2 18*   BUN 5*   CREATININE 0.7   *   PROT 6.8   BILITOT 0.5   ALKPHOS 115   ALT 24   AST 29       Recent Labs   Lab 233 23  0527   WBC 12.27 15.70*   HGB 10.4* 8.3*   HCT 32.6* 26.3*   MCV 84 85    178         I/O    Intake/Output Summary (Last 24 hours) at 2023 0629  Last data filed at 2023 0445  Gross per 24 hour   Intake 2228.2 ml   Output 1960 ml   Net 268.2 ml        Assessment and Plan:   Postpartum care:  - Patient doing well.  - Continue routine management and advances.    Chorioamnionitis  - VS as above  - currently afebrile  - no fundal tenderness  - Ampicillin/Gentamicin: status post  - s/p ancef with ppBTL    Hx coronary artery dissection w/ CABG x2 ()  - CABG (with LIMA to LAD and VG to OM1 )  - EKG on admission WNL  - continue TELE during admission  - continue home med Metoprolol 25 qd  - strict I&O  - goal Euvolemia  - Echo to be performed before discharge   - close monitoring for cardiac signs and symptoms  - If significant cardiac concerns - urgent transfer to [facility]    Miri Hoover MD  OB/GYN PGY1

## 2023-11-30 NOTE — OP NOTE
Operative Note  Post-Partum Bilateral Tubal Ligation    Date of Procedure: 11/29/2023    Pre-Operative Diagnosis:   Desires permanent sterilization,   Post-Partum Day # 0    Post-Operative Diagnosis: Same    Procedure: Post-Partum BTL via [place] method    Surgeon: Mitzy Madison MD    Assistant: Sebas May MD (RES)    Anesthesia: epidural    EBL:5mL    IVF: see anesthesia record    UOP: 150cc    Specimen: Bilateral Tubal Segments    Complications: None    Findings: Firm uterus palpated at umbilicus. Bilateral fallopian tubes visualized. Left 5mm paratubal cyst noted. Excellent hemostasis.    Procedure in Detail:  After verifying consent, the patient was taken to the OR where she was placed in the dorsal supine position after epidural anesthesia was found to be adequate.  The abdomen was then prepped and draped in the normal, sterile, fashion and two allis clamps were placed lateral to the umbilicus and lateral traction was applied. The skin incision was made using the scalpel inferior to the umbilicus. The fascia was then identified and grasped with two hemostats and entered sharply using the curved Ross scissors. The peritoneum was then identified, grasped with 2 hemostats and entered sharply using the metzenbaum scissors.    Two [employer] retractors were used to expose the right cornua of the uterus, the right tube was then identified and brought out of the incision using a fred clamp. The tube was then followed out to the fimbriated end. The isthmic portion of the tube was grasped in an avascular portion and elevated with the fred clamp and a 8 cm portion of fallopian tube was ligated with 0gut using the [place] method. The tubal portion was then excised and sent to pathology for confirmation. After hemostasis was verified, the tube was carefully placed back into the abdominal cavity. The left fallopian tube was ligated in a similar fashion.  The umbilical fascia was closed using  1-0 Vicryl.  The skin was closed in a subcuticular fashion using 4-0 Monocryl.  The patient tolerated the procedure well, S/L/N counts were correct x 2, patient was taken to recovery in stable condition.    Sebas May MD     Attestation:  I was present and scrubbed for the entire procedure.     Mitzy Madison MD  OB/GYN

## 2023-11-30 NOTE — LACTATION NOTE
Lactation visited pt assisted with latching the baby to the right breast with minimal help. Baby taking good sucks with breast compression. Pt encouraged to feed the baby 8 or more times in 24hrs on cue until content, going no longer than 3 hrs between feeding. Pt encouraged to ask her nurse/ or LC for latch assistance as needed.   11/30/23 1230   Maternal Assessment   Breast Shape Bilateral:;round   Breast Density Bilateral:;soft   Areola Bilateral:;elastic   Nipples Bilateral:;everted   Maternal Infant Feeding   Maternal Emotional State assist needed;anxious   Infant Positioning clutch/football;cross-cradle   Signs of Milk Transfer infant jaw motion present;audible swallow   Pain with Feeding no   Latch Assistance yes   Community Referrals   Community Referrals outpatient lactation program;pediatric care provider;support group

## 2023-11-30 NOTE — ANESTHESIA POSTPROCEDURE EVALUATION
Anesthesia Post Evaluation    Patient: Clayton Duarte    Procedure(s) Performed: Procedure(s) (LRB):  LIGATION, FALLOPIAN TUBE, POSTPARTUM (Bilateral)    Final Anesthesia Type: epidural      Patient location during evaluation: labor & delivery  Patient participation: Yes- Able to Participate  Level of consciousness: awake and alert  Post-procedure vital signs: reviewed and stable  Pain management: adequate  Airway patency: patent  ZAKI mitigation strategies: Use of major conduction anesthesia (spinal/epidural) or peripheral nerve block  PONV status at discharge: No PONV  Anesthetic complications: no      Cardiovascular status: blood pressure returned to baseline  Respiratory status: unassisted  Hydration status: euvolemic  Follow-up not needed.              Vitals Value Taken Time   /56 11/30/23 0340   Temp 36.5 °C (97.7 °F) 11/30/23 0340   Pulse 66 11/30/23 0408   Resp 16 11/30/23 0340   SpO2 98 % 11/30/23 0340         Event Time   Out of Recovery 20:00:00         Pain/Nas Score: Pain Rating Prior to Med Admin: 7 (11/30/2023  6:51 AM)  Pain Rating Post Med Admin: 0 (11/30/2023 12:52 AM)

## 2023-12-01 VITALS
TEMPERATURE: 99 F | HEIGHT: 65 IN | RESPIRATION RATE: 18 BRPM | HEART RATE: 88 BPM | WEIGHT: 168.88 LBS | BODY MASS INDEX: 28.14 KG/M2 | OXYGEN SATURATION: 99 % | DIASTOLIC BLOOD PRESSURE: 66 MMHG | SYSTOLIC BLOOD PRESSURE: 112 MMHG

## 2023-12-01 LAB
AORTIC ROOT ANNULUS: 2.34 CM
ASCENDING AORTA: 2.43 CM
AV INDEX (PROSTH): 0.7
AV MEAN GRADIENT: 3 MMHG
AV PEAK GRADIENT: 6 MMHG
AV VALVE AREA BY VELOCITY RATIO: 2.16 CM²
AV VALVE AREA: 1.95 CM²
AV VELOCITY RATIO: 0.78
BSA FOR ECHO PROCEDURE: 1.86 M2
CV ECHO LV RWT: 0.37 CM
DOP CALC AO PEAK VEL: 1.21 M/S
DOP CALC AO VTI: 30 CM
DOP CALC LVOT AREA: 2.8 CM2
DOP CALC LVOT DIAMETER: 1.88 CM
DOP CALC LVOT PEAK VEL: 0.94 M/S
DOP CALC LVOT STROKE VOLUME: 58.54 CM3
DOP CALCLVOT PEAK VEL VTI: 21.1 CM
E WAVE DECELERATION TIME: 232.64 MSEC
E/A RATIO: 1.47
ECHO LV POSTERIOR WALL: 0.86 CM (ref 0.6–1.1)
EJECTION FRACTION: 50 %
FRACTIONAL SHORTENING: 31 % (ref 28–44)
INTERVENTRICULAR SEPTUM: 0.81 CM (ref 0.6–1.1)
IVC DIAMETER: 1.8 CM
IVRT: 68.51 MSEC
LA MAJOR: 5.27 CM
LA MINOR: 4.36 CM
LA WIDTH: 3.6 CM
LAAS-AP2: 17 CM2
LAAS-AP4: 17 CM2
LEFT ATRIUM SIZE: 3.04 CM
LEFT ATRIUM VOLUME INDEX: 24.3 ML/M2
LEFT ATRIUM VOLUME: 44.39 CM3
LEFT INTERNAL DIMENSION IN SYSTOLE: 3.22 CM (ref 2.1–4)
LEFT VENTRICLE DIASTOLIC VOLUME INDEX: 54.36 ML/M2
LEFT VENTRICLE DIASTOLIC VOLUME: 99.48 ML
LEFT VENTRICLE MASS INDEX: 69 G/M2
LEFT VENTRICLE SYSTOLIC VOLUME INDEX: 22.7 ML/M2
LEFT VENTRICLE SYSTOLIC VOLUME: 41.62 ML
LEFT VENTRICULAR INTERNAL DIMENSION IN DIASTOLE: 4.64 CM (ref 3.5–6)
LEFT VENTRICULAR MASS: 126.52 G
LVOT MG: 2.12 MMHG
LVOT MV: 0.7 CM/S
MV PEAK A VEL: 0.75 M/S
MV PEAK E VEL: 1.1 M/S
MV STENOSIS PRESSURE HALF TIME: 67.47 MS
MV VALVE AREA P 1/2 METHOD: 3.26 CM2
PISA MRMAX VEL: 3.7 M/S
PISA TR MAX VEL: 2.63 M/S
PV MV: 0.59 M/S
PV PEAK GRADIENT: 3 MMHG
PV PEAK VELOCITY: 0.81 M/S
RA MAJOR: 4.55 CM
RA PRESSURE ESTIMATED: 3 MMHG
RA WIDTH: 3 CM
RIGHT VENTRICULAR END-DIASTOLIC DIMENSION: 2.19 CM
RV TB RVSP: 6 MMHG
SINUS: 2.4 CM
STJ: 2.63 CM
TR MAX PG: 28 MMHG
TRICUSPID ANNULAR PLANE SYSTOLIC EXCURSION: 1.8 CM
TV REST PULMONARY ARTERY PRESSURE: 31 MMHG
Z-SCORE OF LEFT VENTRICULAR DIMENSION IN END DIASTOLE: -0.89
Z-SCORE OF LEFT VENTRICULAR DIMENSION IN END SYSTOLE: 0.22

## 2023-12-01 PROCEDURE — 25000003 PHARM REV CODE 250

## 2023-12-01 PROCEDURE — 99238 HOSP IP/OBS DSCHRG MGMT 30/<: CPT | Mod: TH,,, | Performed by: OBSTETRICS & GYNECOLOGY

## 2023-12-01 PROCEDURE — 99238 PR HOSPITAL DISCHARGE DAY,<30 MIN: ICD-10-PCS | Mod: TH,,, | Performed by: OBSTETRICS & GYNECOLOGY

## 2023-12-01 RX ORDER — IBUPROFEN 600 MG/1
600 TABLET ORAL EVERY 6 HOURS
Qty: 60 TABLET | Refills: 3 | Status: SHIPPED | OUTPATIENT
Start: 2023-12-01

## 2023-12-01 RX ORDER — ACETAMINOPHEN 325 MG/1
650 TABLET ORAL EVERY 6 HOURS
Qty: 60 TABLET | Refills: 3 | Status: SHIPPED | OUTPATIENT
Start: 2023-12-01

## 2023-12-01 RX ORDER — FERROUS SULFATE 325(65) MG
325 TABLET, DELAYED RELEASE (ENTERIC COATED) ORAL DAILY
Qty: 60 TABLET | Refills: 2 | Status: SHIPPED | OUTPATIENT
Start: 2023-12-01

## 2023-12-01 RX ORDER — OXYCODONE HYDROCHLORIDE 5 MG/1
5 TABLET ORAL EVERY 4 HOURS PRN
Qty: 30 TABLET | Refills: 0 | Status: SHIPPED | OUTPATIENT
Start: 2023-12-01

## 2023-12-01 RX ORDER — DOCUSATE SODIUM 100 MG/1
200 CAPSULE, LIQUID FILLED ORAL 2 TIMES DAILY
Qty: 60 CAPSULE | Refills: 3 | Status: SHIPPED | OUTPATIENT
Start: 2023-12-01

## 2023-12-01 RX ADMIN — IBUPROFEN 600 MG: 600 TABLET, FILM COATED ORAL at 02:12

## 2023-12-01 RX ADMIN — DOCUSATE SODIUM 200 MG: 100 CAPSULE, LIQUID FILLED ORAL at 08:12

## 2023-12-01 RX ADMIN — IBUPROFEN 600 MG: 600 TABLET, FILM COATED ORAL at 12:12

## 2023-12-01 RX ADMIN — IBUPROFEN 600 MG: 600 TABLET, FILM COATED ORAL at 08:12

## 2023-12-01 RX ADMIN — PRENATAL VIT W/ FE FUMARATE-FA TAB 27-0.8 MG 1 TABLET: 27-0.8 TAB at 08:12

## 2023-12-01 RX ADMIN — METOPROLOL SUCCINATE 25 MG: 25 TABLET, EXTENDED RELEASE ORAL at 08:12

## 2023-12-01 RX ADMIN — FERROUS SULFATE TAB 325 MG (65 MG ELEMENTAL FE) 1 EACH: 325 (65 FE) TAB at 08:12

## 2023-12-01 NOTE — PLAN OF CARE
12/01/23 1005   Final Note   Assessment Type Final Discharge Note   Anticipated Discharge Disposition Home   Post-Acute Status   Discharge Delays None known at this time

## 2023-12-01 NOTE — PLAN OF CARE
Patient in no apparent distress. VSS. Ambulating without difficulty. No needs at this time. Discharge papers signed. Mother Baby care guide reviewed. All questions answered. RN reviewed to pt to follow up with Dr. Madison in a week and cardiology in a month.

## 2023-12-01 NOTE — LACTATION NOTE
12/01/23 0855   Maternal Infant Feeding   Latch Assistance no   Equipment Type   Breast Pump Type double electric, personal  (pumps for home use)   Community Referrals   Community Referrals support group;pediatric care provider;outpatient lactation program     LC did discharge lactation teaching and reviewed the Mother's Breastfeeding Guide and reviewed the breastfeeding community resources in the back of the breast feeding guide. LC answered all questions. Mother has LC phone number  for questions after DC. Call for latch on check at next feeding.

## 2023-12-01 NOTE — DISCHARGE SUMMARY
Delivery Discharge Summary  Obstetrics      Primary OB Clinician: Mitzy Madison MD      Admission date: 2023  Discharge date: 2023    Disposition: To home, self care    Discharge Diagnosis List:      Patient Active Problem List   Diagnosis    Chronic anemia    History of CAD (coronary artery disease)    Preoperative cardiovascular examination    Iron deficiency anemia    Chest pain, non-cardiac    Umbilical hernia without obstruction and without gangrene    Low TSH level    Chest pain at rest    Palpitations    Microcytic anemia    Vitamin D deficiency    Serum calcium elevated    Positive pregnancy test    Cardiac disease in mother affecting pregnancy - H/o post partum SCAD s/p CABG     AMA (advanced maternal age) multigravida 35+    Encounter for elective induction of labor     (spontaneous vaginal delivery)       Procedure:  and postpartum BTL    Hospital Course:  Clayton Duarte is a 38 y.o. now , PPD #2 and POD#2 who was admitted on 2023 at 38w3d for IOL. Patient was subsequently admitted to labor and delivery unit with signed consents.     Labor course was uncomplicated and resulted in  without complications. Patient was subsequently taken to the OR for postpartum BTL which was performed without complication.     Please see delivery note and OP note for further details. Her postpartum course was uncomplicated. On discharge day, patient's pain is controlled with oral pain medications. Pt is tolerating ambulation without SOB or CP, and regular diet without N/V. Reports lochia is mild. Denies any HA, vision changes, F/C, LE swelling. Denies any breast pain/soreness.    Pt in stable condition and ready for discharge. She has been instructed to start and/or continue medications and follow up with her obstetrics provider as listed below.    Pertinent studies:  CBC  Recent Labs   Lab 23  0023 23  0527   WBC 12.27 15.70*   HGB 10.4* 8.3*   HCT 32.6* 26.3*   MCV  "84 85    178        Immunization History   Administered Date(s) Administered    COVID-19, MRNA, LN-S, PF (Pfizer) (Purple Cap) 2021, 2021    DTP 1985, 1985, 1986, 1986, 1989    HPV 9-Valent 2021    Hepatitis B 2019    Hepatitis B, Adult 2019    Influenza 2008, 10/04/2018    Influenza - Quadrivalent - PF *Preferred* (6 months and older) 2008, 2018, 2023    MMR 1986    Measles 2000    Mumps 1986    OPV 1985, 1985, 1986, 1989    PPD Test 1986, 2019    Rubella 1986    Td (ADULT) 1998    Td - PF (ADULT) 1998    Tdap 2013, 2023        Delivery:    Episiotomy: None   Lacerations: None   Repair suture: None   Repair # of packets:     Blood loss (ml):       Birth information:  YOB: 2024   Time of birth: 3:18 PM   Sex: male   Delivery type: Vaginal, Spontaneous   Gestational Age: 38w3d     Measurements    Weight: 2860 g  Weight (lbs): 6 lb 4.9 oz  Length: 52.1 cm  Length (in): 20.5"  Head circumference: 34.3 cm  Chest circumference: 31.8 cm         Delivery Clinician: Delivery Providers    Delivering clinician: Mitzy Madison MD   Provider Role    Sophia Yuan MD Chawla, Harsheen, MD King, Bailey, RN Barcia-Pique, Melissa A, RN              Additional  information:  Forceps:    Vacuum:    Breech:    Observed anomalies      Living?:     Apgars    Living status: Living  Apgar Component Scores:  1 min.:  5 min.:  10 min.:  15 min.:  20 min.:    Skin color:  1  1       Heart rate:  2  2       Reflex irritability:  2  2       Muscle tone:  2  2       Respiratory effort:  2  2       Total:  9  9       Apgars assigned by: M. LAWRENCE-PIQUE,RN         Placenta: Delivered:       appearance    Patient Instructions:   Current Discharge Medication List        START taking these medications    Details   acetaminophen (TYLENOL) 325 " MG tablet Take 2 tablets (650 mg total) by mouth every 6 (six) hours.  Qty: 60 tablet, Refills: 3      docusate sodium (COLACE) 100 MG capsule Take 2 capsules (200 mg total) by mouth 2 (two) times daily.  Qty: 60 capsule, Refills: 3      ferrous sulfate 325 (65 FE) MG EC tablet Take 1 tablet (325 mg total) by mouth once daily.  Qty: 60 tablet, Refills: 2      ibuprofen (ADVIL,MOTRIN) 600 MG tablet Take 1 tablet (600 mg total) by mouth every 6 (six) hours.  Qty: 60 tablet, Refills: 3      oxyCODONE (ROXICODONE) 5 MG immediate release tablet Take 1 tablet (5 mg total) by mouth every 4 (four) hours as needed for Pain.  Qty: 30 tablet, Refills: 0    Comments: Quantity prescribed more than 7 day supply? No           CONTINUE these medications which have NOT CHANGED    Details   metoprolol succinate (TOPROL-XL) 25 MG 24 hr tablet Take 1 tablet (25 mg total) by mouth once daily.  Qty: 90 tablet, Refills: 3    Comments: .           STOP taking these medications       aspirin (ECOTRIN) 81 MG EC tablet Comments:   Reason for Stopping:         prenatal 93-iron-folate 9-dha (TRISTART DHA) 31 mg iron- 1 mg-200 mg Cap Comments:   Reason for Stopping:         prenatal vit 87-iron-folic-dha (PRENATE MINI, FERR ASP GLYCIN,) 18-1-350 mg Cap Comments:   Reason for Stopping:               Discharge Procedure Orders   Diet Adult Regular     No driving until:   Order Comments: No driving until not taking narcotic pain medication.     Pelvic Rest   Order Comments: Pelvic rest until 6 weeks after discharge. Nothing in vagina -no sex, tampons, douching, etc.     Notify your health care provider if you experience any of the following:  temperature >100.4     Notify your health care provider if you experience any of the following:  persistent nausea and vomiting or diarrhea     Notify your health care provider if you experience any of the following:  severe uncontrolled pain     Notify your health care provider if you experience any of the  following:  redness, tenderness, or signs of infection (pain, swelling, redness, odor or green/yellow discharge around incision site)     Notify your health care provider if you experience any of the following:  difficulty breathing or increased cough     Notify your health care provider if you experience any of the following:  severe persistent headache     Notify your health care provider if you experience any of the following:  worsening rash     Notify your health care provider if you experience any of the following:  persistent dizziness, light-headedness, or visual disturbances     Notify your health care provider if you experience any of the following:  increased confusion or weakness     Notify your health care provider if you experience any of the following:   Order Comments: Heavy vaginal bleeding saturating more than 1 pad per hr for at least consecutive 2 hrs.     Activity as tolerated        Follow-up Information       Mitzy Madison MD Follow up in 6 week(s).    Specialty: Obstetrics and Gynecology  Why: Postpartum visit  Contact information:  [address] [facility] [id number] [phone]                              Miri Hoovre MD  OB/GYN PGY1

## 2023-12-03 ENCOUNTER — PATIENT MESSAGE (OUTPATIENT)
Dept: OBSTETRICS AND GYNECOLOGY | Facility: CLINIC | Age: 38
End: 2023-12-03
Payer: MEDICAID

## 2023-12-04 ENCOUNTER — PATIENT MESSAGE (OUTPATIENT)
Dept: OBSTETRICS AND GYNECOLOGY | Facility: CLINIC | Age: 38
End: 2023-12-04
Payer: MEDICAID

## 2023-12-04 ENCOUNTER — PATIENT MESSAGE (OUTPATIENT)
Dept: MATERNAL FETAL MEDICINE | Facility: CLINIC | Age: 38
End: 2023-12-04
Payer: MEDICAID

## 2023-12-04 ENCOUNTER — HOSPITAL ENCOUNTER (INPATIENT)
Facility: HOSPITAL | Age: 38
LOS: 2 days | Discharge: HOME | DRG: 776 | End: 2023-12-06
Attending: OBSTETRICS & GYNECOLOGY | Admitting: INTERNAL MEDICINE
Payer: MEDICAID

## 2023-12-04 DIAGNOSIS — R07.9 CHEST PAIN: ICD-10-CM

## 2023-12-04 DIAGNOSIS — Z98.51 STATUS POST TUBAL LIGATION: ICD-10-CM

## 2023-12-04 DIAGNOSIS — I50.9 ACUTE DECOMPENSATED HEART FAILURE: ICD-10-CM

## 2023-12-04 DIAGNOSIS — R06.2 WHEEZING: ICD-10-CM

## 2023-12-04 DIAGNOSIS — R06.02 SOB (SHORTNESS OF BREATH): ICD-10-CM

## 2023-12-04 DIAGNOSIS — I50.9 ACUTE HEART FAILURE: Primary | ICD-10-CM

## 2023-12-04 LAB
ALBUMIN SERPL BCP-MCNC: 2.6 G/DL (ref 3.5–5.2)
ALP SERPL-CCNC: 78 U/L (ref 55–135)
ALT SERPL W/O P-5'-P-CCNC: 43 U/L (ref 10–44)
ANION GAP SERPL CALC-SCNC: 8 MMOL/L (ref 8–16)
ANION GAP SERPL CALC-SCNC: 9 MMOL/L (ref 8–16)
AST SERPL-CCNC: 38 U/L (ref 10–40)
BASOPHILS # BLD AUTO: 0.02 K/UL (ref 0–0.2)
BASOPHILS NFR BLD: 0.2 % (ref 0–1.9)
BILIRUB SERPL-MCNC: 0.3 MG/DL (ref 0.1–1)
BNP SERPL-MCNC: 1215 PG/ML (ref 0–99)
BUN SERPL-MCNC: 8 MG/DL (ref 6–20)
BUN SERPL-MCNC: 8 MG/DL (ref 6–20)
CALCIUM SERPL-MCNC: 10 MG/DL (ref 8.7–10.5)
CALCIUM SERPL-MCNC: 9.7 MG/DL (ref 8.7–10.5)
CHLORIDE SERPL-SCNC: 108 MMOL/L (ref 95–110)
CHLORIDE SERPL-SCNC: 110 MMOL/L (ref 95–110)
CO2 SERPL-SCNC: 24 MMOL/L (ref 23–29)
CO2 SERPL-SCNC: 25 MMOL/L (ref 23–29)
CREAT SERPL-MCNC: 0.7 MG/DL (ref 0.5–1.4)
CREAT SERPL-MCNC: 0.8 MG/DL (ref 0.5–1.4)
DIFFERENTIAL METHOD: ABNORMAL
EOSINOPHIL # BLD AUTO: 0.1 K/UL (ref 0–0.5)
EOSINOPHIL NFR BLD: 0.9 % (ref 0–8)
ERYTHROCYTE [DISTWIDTH] IN BLOOD BY AUTOMATED COUNT: 14 % (ref 11.5–14.5)
EST. GFR  (NO RACE VARIABLE): >60 ML/MIN/1.73 M^2
EST. GFR  (NO RACE VARIABLE): >60 ML/MIN/1.73 M^2
GLUCOSE SERPL-MCNC: 90 MG/DL (ref 70–110)
GLUCOSE SERPL-MCNC: 91 MG/DL (ref 70–110)
HCT VFR BLD AUTO: 28.8 % (ref 37–48.5)
HGB BLD-MCNC: 9 G/DL (ref 12–16)
IMM GRANULOCYTES # BLD AUTO: 0.11 K/UL (ref 0–0.04)
IMM GRANULOCYTES NFR BLD AUTO: 1.2 % (ref 0–0.5)
INFLUENZA A, MOLECULAR: NEGATIVE
INFLUENZA B, MOLECULAR: NEGATIVE
LACTATE SERPL-SCNC: 1.2 MMOL/L (ref 0.5–2.2)
LYMPHOCYTES # BLD AUTO: 1.5 K/UL (ref 1–4.8)
LYMPHOCYTES NFR BLD: 16.7 % (ref 18–48)
MAGNESIUM SERPL-MCNC: 1.7 MG/DL (ref 1.6–2.6)
MCH RBC QN AUTO: 27.3 PG (ref 27–31)
MCHC RBC AUTO-ENTMCNC: 31.3 G/DL (ref 32–36)
MCV RBC AUTO: 87 FL (ref 82–98)
MONOCYTES # BLD AUTO: 0.5 K/UL (ref 0.3–1)
MONOCYTES NFR BLD: 6 % (ref 4–15)
NEUTROPHILS # BLD AUTO: 6.8 K/UL (ref 1.8–7.7)
NEUTROPHILS NFR BLD: 75 % (ref 38–73)
NRBC BLD-RTO: 0 /100 WBC
PLATELET # BLD AUTO: 225 K/UL (ref 150–450)
PMV BLD AUTO: 9.8 FL (ref 9.2–12.9)
POTASSIUM SERPL-SCNC: 3.5 MMOL/L (ref 3.5–5.1)
POTASSIUM SERPL-SCNC: 3.6 MMOL/L (ref 3.5–5.1)
PROT SERPL-MCNC: 5.7 G/DL (ref 6–8.4)
RBC # BLD AUTO: 3.3 M/UL (ref 4–5.4)
SARS-COV-2 RDRP RESP QL NAA+PROBE: NEGATIVE
SODIUM SERPL-SCNC: 142 MMOL/L (ref 136–145)
SODIUM SERPL-SCNC: 142 MMOL/L (ref 136–145)
SPECIMEN SOURCE: NORMAL
TROPONIN I SERPL DL<=0.01 NG/ML-MCNC: 0.02 NG/ML (ref 0–0.03)
WBC # BLD AUTO: 9.02 K/UL (ref 3.9–12.7)

## 2023-12-04 PROCEDURE — 99285 EMERGENCY DEPT VISIT HI MDM: CPT | Mod: 24,,, | Performed by: OBSTETRICS & GYNECOLOGY

## 2023-12-04 PROCEDURE — 83880 ASSAY OF NATRIURETIC PEPTIDE: CPT

## 2023-12-04 PROCEDURE — U0002 COVID-19 LAB TEST NON-CDC: HCPCS

## 2023-12-04 PROCEDURE — 99285 EMERGENCY DEPT VISIT HI MDM: CPT | Mod: 25

## 2023-12-04 PROCEDURE — 83605 ASSAY OF LACTIC ACID: CPT

## 2023-12-04 PROCEDURE — 25000003 PHARM REV CODE 250: Performed by: STUDENT IN AN ORGANIZED HEALTH CARE EDUCATION/TRAINING PROGRAM

## 2023-12-04 PROCEDURE — 93010 EKG 12-LEAD: ICD-10-PCS | Mod: ,,, | Performed by: INTERNAL MEDICINE

## 2023-12-04 PROCEDURE — 25000003 PHARM REV CODE 250

## 2023-12-04 PROCEDURE — 93010 ELECTROCARDIOGRAM REPORT: CPT | Mod: ,,, | Performed by: INTERNAL MEDICINE

## 2023-12-04 PROCEDURE — 99291 CRITICAL CARE FIRST HOUR: CPT | Mod: ,,, | Performed by: INTERNAL MEDICINE

## 2023-12-04 PROCEDURE — 96374 THER/PROPH/DIAG INJ IV PUSH: CPT

## 2023-12-04 PROCEDURE — 80053 COMPREHEN METABOLIC PANEL: CPT

## 2023-12-04 PROCEDURE — 80048 BASIC METABOLIC PNL TOTAL CA: CPT | Mod: XB | Performed by: INTERNAL MEDICINE

## 2023-12-04 PROCEDURE — 99285 PR EMERGENCY DEPT VISIT,LEVEL V: ICD-10-PCS | Mod: 24,,, | Performed by: OBSTETRICS & GYNECOLOGY

## 2023-12-04 PROCEDURE — 87502 INFLUENZA DNA AMP PROBE: CPT

## 2023-12-04 PROCEDURE — 93005 ELECTROCARDIOGRAM TRACING: CPT

## 2023-12-04 PROCEDURE — 63600175 PHARM REV CODE 636 W HCPCS: Performed by: STUDENT IN AN ORGANIZED HEALTH CARE EDUCATION/TRAINING PROGRAM

## 2023-12-04 PROCEDURE — 99291 PR CRITICAL CARE, E/M 30-74 MINUTES: ICD-10-PCS | Mod: ,,, | Performed by: INTERNAL MEDICINE

## 2023-12-04 PROCEDURE — 20000000 HC ICU ROOM

## 2023-12-04 PROCEDURE — 94761 N-INVAS EAR/PLS OXIMETRY MLT: CPT

## 2023-12-04 PROCEDURE — 83735 ASSAY OF MAGNESIUM: CPT | Performed by: INTERNAL MEDICINE

## 2023-12-04 PROCEDURE — 84484 ASSAY OF TROPONIN QUANT: CPT

## 2023-12-04 PROCEDURE — 63600175 PHARM REV CODE 636 W HCPCS

## 2023-12-04 PROCEDURE — 85025 COMPLETE CBC W/AUTO DIFF WBC: CPT

## 2023-12-04 RX ORDER — FUROSEMIDE 10 MG/ML
40 INJECTION INTRAMUSCULAR; INTRAVENOUS ONCE
Status: COMPLETED | OUTPATIENT
Start: 2023-12-04 | End: 2023-12-04

## 2023-12-04 RX ORDER — ENOXAPARIN SODIUM 100 MG/ML
40 INJECTION SUBCUTANEOUS EVERY 24 HOURS
Status: DISCONTINUED | OUTPATIENT
Start: 2023-12-04 | End: 2023-12-04

## 2023-12-04 RX ORDER — MAGNESIUM SULFATE HEPTAHYDRATE 40 MG/ML
2 INJECTION, SOLUTION INTRAVENOUS ONCE
Status: COMPLETED | OUTPATIENT
Start: 2023-12-04 | End: 2023-12-05

## 2023-12-04 RX ORDER — LANOLIN ALCOHOL/MO/W.PET/CERES
1 CREAM (GRAM) TOPICAL DAILY
Status: DISCONTINUED | OUTPATIENT
Start: 2023-12-04 | End: 2023-12-06 | Stop reason: HOSPADM

## 2023-12-04 RX ORDER — POTASSIUM CHLORIDE 20 MEQ/1
40 TABLET, EXTENDED RELEASE ORAL ONCE
Status: COMPLETED | OUTPATIENT
Start: 2023-12-04 | End: 2023-12-04

## 2023-12-04 RX ORDER — HYDRALAZINE HYDROCHLORIDE 25 MG/1
25 TABLET, FILM COATED ORAL EVERY 8 HOURS PRN
Status: DISCONTINUED | OUTPATIENT
Start: 2023-12-04 | End: 2023-12-05

## 2023-12-04 RX ORDER — ACETAMINOPHEN 325 MG/1
650 TABLET ORAL EVERY 6 HOURS PRN
Status: DISCONTINUED | OUTPATIENT
Start: 2023-12-04 | End: 2023-12-06 | Stop reason: HOSPADM

## 2023-12-04 RX ORDER — SODIUM CHLORIDE 0.9 % (FLUSH) 0.9 %
10 SYRINGE (ML) INJECTION
Status: DISCONTINUED | OUTPATIENT
Start: 2023-12-04 | End: 2023-12-06 | Stop reason: HOSPADM

## 2023-12-04 RX ORDER — DOCUSATE SODIUM 100 MG/1
200 CAPSULE, LIQUID FILLED ORAL 2 TIMES DAILY
Status: DISCONTINUED | OUTPATIENT
Start: 2023-12-04 | End: 2023-12-06 | Stop reason: HOSPADM

## 2023-12-04 RX ORDER — METOPROLOL SUCCINATE 25 MG/1
25 TABLET, EXTENDED RELEASE ORAL DAILY
Status: DISCONTINUED | OUTPATIENT
Start: 2023-12-04 | End: 2023-12-06 | Stop reason: HOSPADM

## 2023-12-04 RX ADMIN — FUROSEMIDE 40 MG: 10 INJECTION, SOLUTION INTRAVENOUS at 08:12

## 2023-12-04 RX ADMIN — DOCUSATE SODIUM 200 MG: 100 CAPSULE, LIQUID FILLED ORAL at 08:12

## 2023-12-04 RX ADMIN — MAGNESIUM SULFATE HEPTAHYDRATE 2 G: 40 INJECTION, SOLUTION INTRAVENOUS at 11:12

## 2023-12-04 RX ADMIN — METOPROLOL SUCCINATE 25 MG: 25 TABLET, EXTENDED RELEASE ORAL at 03:12

## 2023-12-04 RX ADMIN — POTASSIUM CHLORIDE 40 MEQ: 1500 TABLET, EXTENDED RELEASE ORAL at 11:12

## 2023-12-04 RX ADMIN — FUROSEMIDE 40 MG: 10 INJECTION, SOLUTION INTRAMUSCULAR; INTRAVENOUS at 12:12

## 2023-12-04 RX ADMIN — FERROUS SULFATE TAB 325 MG (65 MG ELEMENTAL FE) 1 EACH: 325 (65 FE) TAB at 03:12

## 2023-12-04 RX ADMIN — POTASSIUM CHLORIDE 40 MEQ: 1500 TABLET, EXTENDED RELEASE ORAL at 05:12

## 2023-12-04 NOTE — NURSING
Nurses Note -- 4 Eyes      12/4/2023   4:37 PM      Skin assessed during: Admit      [] No Altered Skin Integrity Present    []Prevention Measures Documented      [x] Yes- Altered Skin Integrity Present or Discovered   [] LDA Added if Not in Epic (Describe Wound)   [x] New Altered Skin Integrity was Present on Admit and Documented in LDA   [] Wound Image Taken    Wound Care Consulted? No    Attending Nurse:  Mariaelena Tripp RN/Staff Member:   CHUCK Webb      Surgical incision present from tubal ligation.

## 2023-12-04 NOTE — SUBJECTIVE & OBJECTIVE
Past Medical History:   Diagnosis Date    Abnormal Pap smear of cervix 2008, 2009    colposcopy    Anemia     Coronary artery dissection 9/19/2013    Postpartum depression     Spinal headache complicating labor and delivery, postpartum condition 9/6/2013       Past Surgical History:   Procedure Laterality Date    CARDIAC SURGERY      CORONARY ARTERY BYPASS GRAFT  9/13    tanner to lad, svg OM1    POSTPARTUM LIGATION OF FALLOPIAN TUBE Bilateral 11/29/2023    Procedure: LIGATION, FALLOPIAN TUBE, POSTPARTUM;  Surgeon: Mitzy Madison MD;  [facility];  Service: OB/GYN;  Laterality: Bilateral;    WISDOM TOOTH EXTRACTION         Review of patient's allergies indicates:   Allergen Reactions    Bananas [banana] Itching    Peanut butter flavor Hives       No current facility-administered medications on file prior to encounter.     Current Outpatient Medications on File Prior to Encounter   Medication Sig    acetaminophen (TYLENOL) 325 MG tablet Take 2 tablets (650 mg total) by mouth every 6 (six) hours.    docusate sodium (COLACE) 100 MG capsule Take 2 capsules (200 mg total) by mouth 2 (two) times daily.    ferrous sulfate 325 (65 FE) MG EC tablet Take 1 tablet (325 mg total) by mouth once daily.    ibuprofen (ADVIL,MOTRIN) 600 MG tablet Take 1 tablet (600 mg total) by mouth every 6 (six) hours.    metoprolol succinate (TOPROL-XL) 25 MG 24 hr tablet Take 1 tablet (25 mg total) by mouth once daily.    oxyCODONE (ROXICODONE) 5 MG immediate release tablet Take 1 tablet (5 mg total) by mouth every 4 (four) hours as needed for Pain.     Family History       Problem Relation (Age of Onset)    Diabetes Mother    Hypertension Mother    Stroke Mother          Tobacco Use    Smoking status: Never    Smokeless tobacco: Never   Substance and Sexual Activity    Alcohol use: Not Currently    Drug use: No    Sexual activity: Yes     Partners: Male     Review of Systems   Constitutional: Positive for malaise/fatigue. Negative for  fever.   HENT:  Negative for congestion and sore throat.    Cardiovascular:  Positive for dyspnea on exertion, leg swelling and orthopnea. Negative for near-syncope, palpitations and syncope.   Respiratory:  Positive for cough, shortness of breath and wheezing. Negative for sputum production.    Musculoskeletal:  Positive for myalgias.     Objective:     Vital Signs (Most Recent):  Temp: 98.2 °F (36.8 °C) (12/04/23 1450)  Pulse: 72 (12/04/23 1450)  Resp: 18 (12/04/23 1450)  BP: (!) 162/81 (12/04/23 1450)  SpO2: 99 % (12/04/23 1450) Vital Signs (24h Range):  Temp:  [98.1 °F (36.7 °C)-98.2 °F (36.8 °C)] 98.2 °F (36.8 °C)  Pulse:  [] 72  Resp:  [18] 18  SpO2:  [94 %-99 %] 99 %  BP: (140-162)/(69-86) 162/81        There is no height or weight on file to calculate BMI.    SpO2: 99 %         Intake/Output Summary (Last 24 hours) at 12/4/2023 1515  Last data filed at 12/4/2023 1315  Gross per 24 hour   Intake --   Output 400 ml   Net -400 ml       Lines/Drains/Airways       Peripheral Intravenous Line  Duration                  Peripheral IV - Single Lumen 12/04/23 1204 Posterior;Right Hand <1 day                     Physical Exam  Vitals and nursing note reviewed.   Constitutional:       General: She is not in acute distress.     Appearance: Normal appearance. She is not ill-appearing, toxic-appearing or diaphoretic.      Comments: Pleasant, well-appearing adult female sitting up in bed in [facility]   HENT:      Head: Normocephalic and atraumatic.      Nose: No congestion or rhinorrhea.      Mouth/Throat:      Mouth: Mucous membranes are moist.      Pharynx: Oropharynx is clear.   Eyes:      Extraocular Movements: Extraocular movements intact.      Conjunctiva/sclera: Conjunctivae normal.      Pupils: Pupils are equal, round, and reactive to light.   Neck:      Vascular: JVD present.   Cardiovascular:      Rate and Rhythm: Normal rate and regular rhythm.      Pulses: Normal pulses.      Heart sounds: Normal heart sounds.    Pulmonary:      Effort: Pulmonary effort is normal. No respiratory distress.      Breath sounds: Rales present.   Abdominal:      General: Bowel sounds are normal.      Palpations: Abdomen is soft.   Musculoskeletal:         General: Normal range of motion.      Cervical back: Normal range of motion and neck supple.      Right lower leg: Edema (1+ pitting) present.      Left lower leg: Edema (1+ pitting) present.   Skin:     General: Skin is warm and dry.   Neurological:      General: No focal deficit present.      Mental Status: She is alert and oriented to person, place, and time.   Psychiatric:         Mood and Affect: Mood normal.         Behavior: Behavior normal.          Significant Labs: CMP   Recent Labs   Lab 12/04/23  1049      K 3.5      CO2 24   GLU 91   BUN 8   CREATININE 0.8   CALCIUM 9.7   PROT 5.7*   ALBUMIN 2.6*   BILITOT 0.3   ALKPHOS 78   AST 38   ALT 43   ANIONGAP 8   , CBC   Recent Labs   Lab 12/04/23  1049   WBC 9.02   HGB 9.0*   HCT 28.8*      , Troponin   Recent Labs   Lab 12/04/23  1049   TROPONINI 0.020   , and All pertinent lab results from the last 24 hours have been reviewed.    Significant Imaging:  All relevant imaging studies have been reviewed.    X-Ray Chest AP Portable   Final Result      Findings are worrisome for CHF.  Pneumonia aspiration sepsis cannot be excluded.         Electronically signed by: Elver Branch MD   Date:    12/04/2023   Time:    11:53

## 2023-12-04 NOTE — NURSING
Patient arrived per EMS on stretcher. Walked to bed without any distress. Placed on continuous cardiac monitoring, NIBP, SpO2.     Bed locked and in low position, call light within reach. Oriented to room.     Dr. Walton called to bedside.

## 2023-12-04 NOTE — ASSESSMENT & PLAN NOTE
-- Hgb on admission 9.0 (BL ~10).   -- daily CBC  -- transfuse for hgb < 7  -- continue home iron supplement

## 2023-12-04 NOTE — ED PROVIDER NOTES
Encounter Date: 2023       History     Chief Complaint   Patient presents with    Shortness of Breath    Wheezing     Evelynanise Padmini Duarte is a 38 y.o. [id number] PPD5 s/p  & POD5 s/p BTL who presents complaining of chest pain & SOB. She was discharged from the hospital Friday and began coughing, wheezing and having SOB on Saturday. She states the SOB is worst when lying flat, she had to sleep propped up last night. She endorses body aches, denies fevers, and chills. She states she cannot cough deeply due to soreness from her pp BTL but her cough has not been productive so far.     She has a history of coronary artery dissection w/ CABG x2 ().          The history is provided by the patient. No  was used.     Review of patient's allergies indicates:   Allergen Reactions    Bananas [banana] Itching    Peanut butter flavor Hives     Past Medical History:   Diagnosis Date    Abnormal Pap smear of cervix ,     colposcopy    Anemia     Coronary artery dissection 2013    Postpartum depression     Spinal headache complicating labor and delivery, postpartum condition 2013     Past Surgical History:   Procedure Laterality Date    CARDIAC SURGERY      CORONARY ARTERY BYPASS GRAFT      tanner to lad, svg OM1    POSTPARTUM LIGATION OF FALLOPIAN TUBE Bilateral 2023    Procedure: LIGATION, FALLOPIAN TUBE, POSTPARTUM;  Surgeon: Mitzy Madison MD;  Location: [facility];  Service: OB/GYN;  Laterality: Bilateral;    WISDOM TOOTH EXTRACTION       Family History   Problem Relation Age of Onset    Hypertension Mother     Diabetes Mother     Stroke Mother     Breast cancer Neg Hx     Ovarian cancer Neg Hx     Colon cancer Neg Hx     Cancer Neg Hx     Heart disease Neg Hx      Social History     Tobacco Use    Smoking status: Never    Smokeless tobacco: Never   Substance Use Topics    Alcohol use: Not Currently    Drug use: No     Review of Systems   Constitutional:  Negative for  chills, fatigue and fever.   HENT:  Negative for congestion.    Eyes:  Negative for visual disturbance.   Respiratory:  Positive for cough, chest tightness, shortness of breath and wheezing.    Cardiovascular:  Positive for chest pain. Negative for leg swelling.   Gastrointestinal:  Negative for constipation, diarrhea, nausea and vomiting.   Genitourinary:  Negative for dysuria.   Musculoskeletal:  Negative for arthralgias and joint swelling.   Skin:  Negative for rash.   Neurological:  Negative for weakness and headaches.   Psychiatric/Behavioral:  Negative for confusion and sleep disturbance.        Physical Exam     Initial Vitals [12/04/23 1025]   BP Pulse Resp Temp SpO2   (!) 153/80 75 18 98.1 °F (36.7 °C) 98 %      MAP       --         Physical Exam    Vitals reviewed.  Constitutional: She appears well-developed and well-nourished.   HENT:   Head: Normocephalic.   Eyes: EOM are normal.   Neck: JVD present.   Normal range of motion.  Cardiovascular:  Normal rate.           Pulmonary/Chest: No respiratory distress. She has wheezes.   Normal work of breathing    Abdominal:   Gravid Abdomen There is no rebound and no guarding.   Musculoskeletal:         General: Normal range of motion.      Cervical back: Normal range of motion.     Neurological: She is alert and oriented to person, place, and time.   Skin: Skin is warm and dry.   Psychiatric: She has a normal mood and affect.         ED Course   Procedures  Labs Reviewed   COMPREHENSIVE METABOLIC PANEL - Abnormal; Notable for the following components:       Result Value    Total Protein 5.7 (*)     Albumin 2.6 (*)     All other components within normal limits   CBC W/ AUTO DIFFERENTIAL - Abnormal; Notable for the following components:    RBC 3.30 (*)     Hemoglobin 9.0 (*)     Hematocrit 28.8 (*)     MCHC 31.3 (*)     Immature Granulocytes 1.2 (*)     Immature Grans (Abs) 0.11 (*)     Gran % 75.0 (*)     Lymph % 16.7 (*)     All other components within normal  limits   B-TYPE NATRIURETIC PEPTIDE - Abnormal; Notable for the following components:    BNP 1,215 (*)     All other components within normal limits   INFLUENZA A & B BY MOLECULAR   TROPONIN I   SARS-COV-2 RNA AMPLIFICATION, QUAL          Imaging Results              X-Ray Chest AP Portable (Final result)  Result time 23 11:53:13      Final result by Elver Branch III, MD (23 11:53:13)                   Impression:      Findings are worrisome for CHF.  Pneumonia aspiration sepsis cannot be excluded.      Electronically signed by: Elver Branch MD  Date:    2023  Time:    11:53               Narrative:    EXAMINATION:  XR CHEST AP PORTABLE    CLINICAL HISTORY:  Shortness of breath    FINDINGS:  Chest one view portable.    Heart size is normal.  There is bilateral edema.  There is postoperative change.  Bones are noncontributory.                                       Medications   furosemide injection 40 mg (40 mg Intravenous Given 23 1219)     Medical Decision Making  Clayton Duarte is a 38 y.o. [id number] at 38w3d presents complaining of cough, SOB, and chest pain.     - VSS, multiple mild range BP readings in NISHANT, O2 sats 92-95%  - PE as above, wheezes auscultated in lungs, JVD noted  - CBC, CMP, BNP, and troponin ordered  - CBC: stable anemia, at baseline   - CMP: WNL  - Troponin WNL  - BNP elevated - 1215  - Covid/flu ordered, both negative   - EKG unremarkable  - CXR: bilateral pulmonary edema - IV lasix given.   - Discussed with [facility] ED - Will admit to CCU at [facility].     Amount and/or Complexity of Data Reviewed  Labs: ordered. Decision-making details documented in ED Course.  Radiology: ordered.              Attending Attestation:   Physician Attestation Statement for Resident:  As the supervising MD   Physician Attestation Statement: I have personally seen and examined this patient.   I agree with the above history.  -:   As the supervising MD I agree with the above PE.      As the supervising MD I agree with the above treatment, course, plan, and disposition.   I was personally present during the critical portions of the procedure(s) performed by the resident and was immediately available in the ED to provide services and assistance as needed during the entire procedure.                  ED Course as of 23 1340   Mon Dec 04, 2023   1143 CBC auto differential(!) [AW]   1143 Hemoglobin(!): 9.0 [AW]   1143 Hematocrit(!): 28.8 [AW]   1143 Platelet Count: 225 [AW]   1143 WBC: 9.02 [AW]   1143 Comprehensive metabolic panel(!) [AW]   1143 Sodium: 142 [AW]   1143 Potassium: 3.5 [AW]   1143 Chloride: 110 [AW]   1143 Creatinine: 0.8 [AW]   1143 AST: 38 [AW]   1143 ALT: 43 [AW]   1143 Brain natriuretic peptide(!) [AW]   1143 BNP(!): 1,215 [AW]   1143 Troponin I [AW]   1143 Troponin I: 0.020 [AW]   1202 Influenza A & B by Molecular [AW]   1202 Influenza B, Molecular: Negative [AW]   1202 Influenza A, Molecular: Negative [AW]   1202 COVID-19 Rapid Screening [AW]   1202 SARS-CoV-2 RNA, Amplification, Qual: Negative [AW]   1238 SpO2: 95 % [AW]      ED Course User Index  [AW] Miri Hoover MD                             Clinical Impression:  Final diagnoses:  [R07.9] Chest pain  [R06.02] SOB (shortness of breath) (Primary)  [Z98.51] Status post tubal ligation  [O80]  (spontaneous vaginal delivery)          ED Disposition Condition    Transfer to Another Facility Stable                  Miri Hoover MD  Resident  23 1340       Ira Hernandez MD  23 1151

## 2023-12-04 NOTE — H&P
[facility]  Cardiology  History and Physical     Patient Name: Clayton Duarte  MRN: [id number]  Admission Date: 2023  Code Status: Full Code   Attending Provider: Bernard Garcia MD   Primary Care Physician: Lesley Vanessa MD  Principal Problem:Acute heart failure    Patient information was obtained from patient, spouse/SO, parent, past medical records, and ER records.     Subjective:     Chief Complaint:  SOB     HPI:  PPD5 s/p  & POD5 s/p BTL  with hx of CAD c/b SCAD resulting in 2v CABG (LIMA to LAD, VG to OM1)  (follows with Dr. Levine and Dr. Narvaez), anemia, VDD who presents as transfer from [facility] for management of post-partum HF. Patient initially presented to [facility] ED for evaluation of 2 days of worsening SOB, orthopnea, and chest pain. Patient was recently discharged from [facility] on  after an uncomplicated labor and  on . Patient was discharged home in her usual state of health. However, the day after discharge patient began experiencing coughing and difficulty breathing. That night she had significant orthopnea requiring several pillows to prop her up.     In the [facility] ED, patient afebrile, hypertensive with SBP 150s, satting well on RA. CBC showing hgb 9.0 (BL 10). CMP grossly unremarkable, renal function and electrolytes stable. BNP elevated to 1200. Trop wnl. CXR notable for bilateral edema. JVD noted on PEx. Case discussed with CCU staff at [facility] and patient transferred for continued management in CCU.     Past Medical History:   Diagnosis Date    Abnormal Pap smear of cervix ,     colposcopy    Anemia     Coronary artery dissection 2013    Postpartum depression     Spinal headache complicating labor and delivery, postpartum condition 2013       Past Surgical History:   Procedure Laterality Date    CARDIAC SURGERY      CORONARY ARTERY BYPASS GRAFT      tanner to lad, svg OM1    POSTPARTUM LIGATION OF FALLOPIAN TUBE  Bilateral 11/29/2023    Procedure: LIGATION, FALLOPIAN TUBE, POSTPARTUM;  Surgeon: Mitzy Madison MD;  Location: [facility];  Service: OB/GYN;  Laterality: Bilateral;    WISDOM TOOTH EXTRACTION         Review of patient's allergies indicates:   Allergen Reactions    Bananas [banana] Itching    Peanut butter flavor Hives       No current facility-administered medications on file prior to encounter.     Current Outpatient Medications on File Prior to Encounter   Medication Sig    acetaminophen (TYLENOL) 325 MG tablet Take 2 tablets (650 mg total) by mouth every 6 (six) hours.    docusate sodium (COLACE) 100 MG capsule Take 2 capsules (200 mg total) by mouth 2 (two) times daily.    ferrous sulfate 325 (65 FE) MG EC tablet Take 1 tablet (325 mg total) by mouth once daily.    ibuprofen (ADVIL,MOTRIN) 600 MG tablet Take 1 tablet (600 mg total) by mouth every 6 (six) hours.    metoprolol succinate (TOPROL-XL) 25 MG 24 hr tablet Take 1 tablet (25 mg total) by mouth once daily.    oxyCODONE (ROXICODONE) 5 MG immediate release tablet Take 1 tablet (5 mg total) by mouth every 4 (four) hours as needed for Pain.     Family History       Problem Relation (Age of Onset)    Diabetes Mother    Hypertension Mother    Stroke Mother          Tobacco Use    Smoking status: Never    Smokeless tobacco: Never   Substance and Sexual Activity    Alcohol use: Not Currently    Drug use: No    Sexual activity: Yes     Partners: Male     Review of Systems   Constitutional: Positive for malaise/fatigue. Negative for fever.   HENT:  Negative for congestion and sore throat.    Cardiovascular:  Positive for dyspnea on exertion, leg swelling and orthopnea. Negative for near-syncope, palpitations and syncope.   Respiratory:  Positive for cough, shortness of breath and wheezing. Negative for sputum production.    Musculoskeletal:  Positive for myalgias.     Objective:     Vital Signs (Most Recent):  Temp: 98.2 °F (36.8 °C) (12/04/23 1450)  Pulse: 72  (12/04/23 1450)  Resp: 18 (12/04/23 1450)  BP: (!) 162/81 (12/04/23 1450)  SpO2: 99 % (12/04/23 1450) Vital Signs (24h Range):  Temp:  [98.1 °F (36.7 °C)-98.2 °F (36.8 °C)] 98.2 °F (36.8 °C)  Pulse:  [] 72  Resp:  [18] 18  SpO2:  [94 %-99 %] 99 %  BP: (140-162)/(69-86) 162/81        There is no height or weight on file to calculate BMI.    SpO2: 99 %         Intake/Output Summary (Last 24 hours) at 12/4/2023 1515  Last data filed at 12/4/2023 1315  Gross per 24 hour   Intake --   Output 400 ml   Net -400 ml       Lines/Drains/Airways       Peripheral Intravenous Line  Duration                  Peripheral IV - Single Lumen 12/04/23 1204 Posterior;Right Hand <1 day                     Physical Exam  Vitals and nursing note reviewed.   Constitutional:       General: She is not in acute distress.     Appearance: Normal appearance. She is not ill-appearing, toxic-appearing or diaphoretic.      Comments: Pleasant, well-appearing adult female sitting up in bed in NAD   HENT:      Head: Normocephalic and atraumatic.      Nose: No congestion or rhinorrhea.      Mouth/Throat:      Mouth: Mucous membranes are moist.      Pharynx: Oropharynx is clear.   Eyes:      Extraocular Movements: Extraocular movements intact.      Conjunctiva/sclera: Conjunctivae normal.      Pupils: Pupils are equal, round, and reactive to light.   Neck:      Vascular: JVD present.   Cardiovascular:      Rate and Rhythm: Normal rate and regular rhythm.      Pulses: Normal pulses.      Heart sounds: Normal heart sounds.   Pulmonary:      Effort: Pulmonary effort is normal. No respiratory distress.      Breath sounds: Rales present.   Abdominal:      General: Bowel sounds are normal.      Palpations: Abdomen is soft.   Musculoskeletal:         General: Normal range of motion.      Cervical back: Normal range of motion and neck supple.      Right lower leg: Edema (1+ pitting) present.      Left lower leg: Edema (1+ pitting) present.   Skin:      General: Skin is warm and dry.   Neurological:      General: No focal deficit present.      Mental Status: She is alert and oriented to person, place, and time.   Psychiatric:         Mood and Affect: Mood normal.         Behavior: Behavior normal.          Significant Labs: CMP   Recent Labs   Lab 12/04/23  1049      K 3.5      CO2 24   GLU 91   BUN 8   CREATININE 0.8   CALCIUM 9.7   PROT 5.7*   ALBUMIN 2.6*   BILITOT 0.3   ALKPHOS 78   AST 38   ALT 43   ANIONGAP 8   , CBC   Recent Labs   Lab 12/04/23  1049   WBC 9.02   HGB 9.0*   HCT 28.8*      , Troponin   Recent Labs   Lab 12/04/23  1049   TROPONINI 0.020   , and All pertinent lab results from the last 24 hours have been reviewed.    Significant Imaging:  All relevant imaging studies have been reviewed.    X-Ray Chest AP Portable   Final Result      Findings are worrisome for CHF.  Pneumonia aspiration sepsis cannot be excluded.         Electronically signed by: Elver Branch MD   Date:    12/04/2023   Time:    11:53          Assessment and Plan:     * Acute heart failure  39 y/o F with hx of SCAD s/p CABG x2 2013 presenting with symptoms of peripartum heart failure. Patient appears volume overloaded on exam. Bedside echo showing no new reduction in EF, formal echo pending.     Patient is identified as having heart failure that is Acute. CHF is currently uncontrolled due to Continued edema of extremities and JVD, Dyspnea not returned to baseline after 1 doses of IV diuretic, >3 pillow orthopnea, Rales/crackles on pulmonary exam, and Pulmonary edema/pleural effusion on CXR. Latest ECHO performed and demonstrates- Results for orders placed during the hospital encounter of 11/29/23    Echo 11/30/23    Interpretation Summary    Left Ventricle: The left ventricle is normal in size. Ventricular mass is normal. Normal wall thickness. Regional wall motion abnormalities are present. The mid anteroseptal wall is severely hypokinetic. The apical septum  and apex are moderately hypokinetic. The remainder of the left ventricular walls contract well. There is low normal systolic function. Ejection fraction by visual approximation is 50%. There is normal diastolic function.    Right Ventricle: Normal right ventricular cavity size. Wall thickness is normal. Right ventricle wall motion  is normal. Systolic function is normal.    Mitral Valve: There is mild regurgitation with a centrally directed jet.    Tricuspid Valve: There is mild regurgitation with a centrally directed jet.    Pulmonary Artery: No pulmonary hypertension. The estimated pulmonary artery systolic pressure is 31 mmHg.    IVC/SVC: Normal venous pressure at 3 mmHg.    Continue Beta Blocker and monitor clinical status closely. Monitor on telemetry. Patient is off CHF pathway.  Monitor strict Is&Os and daily weights.  Place on fluid restriction of 1.5 L. Continue to stress to patient importance of self efficacy and  on diet for CHF. Last BNP reviewed- and noted below   Recent Labs   Lab 23  1049   BNP 1,215*     -- lasix 40mg IV once on admission and assess diuresis response  -- f/u formal echo  -- continue home toprol  -- strict I/Os and daily weights  -- serial metabolic panels  -- Mag > 2, K > 4      Postpartum care following vaginal delivery  Patient admitted with acute decompensated HF on PPD#5 following  of 4th child and BTL. OB/GYN consulted on admission to CCU for assistance with post-partum care and discussion for appropriate GDMT given current breast-feeding status.     Cardiac disease in mother affecting pregnancy - H/o post partum SCAD s/p CABG   -- s/p CABG x2  (LIMA to LAD, VG to OM1)    History of CAD (coronary artery disease)  -- c/b SCAD    Cath   Left Main Coronary Artery:              The distal LM has a 70% stenosis. There is MARY ELLEN 2 flow.      - Left Anterior Descending Artery:             The LAD has a 90% stenosis. There is MARY ELLEN 2 flow.      - Ramus:              The  ramus has a 90% stenosis. There is MARY ELLEN 2 flow.      - Left Circumflex Artery:              The LCX has a 75% stenosis. There is MARY ELLEN 3 flow. The remaining portion of the vessel has luminal irregularities.      - OM1:              The OM1 is normal. There is MARY ELLEN 3 flow.      - OM2:              The OM2 is normal. There is MARY ELLEN 3 flow. The remaining portion of the vessel is of small caliber.      - OM3:              The OM3 is normal. There is MARY ELLEN 3 flow. The remaining portion of the vessel is of small caliber.      - Left Posterior Descending Artery:              The left PDA is normal. There is MARY ELLEN 3 flow. The remaining portion of the vessel is of small caliber.      - Right Coronary Artery:              The RCA is normal. There is MARY ELLEN 3 flow.      - Posterior Lateral Branch:              The PLB is normal. There is MARY ELLEN 3 flow.      - Posterior Descending Artery:              The PDA is normal. There is MARY ELLEN 3 flow.     Chronic anemia  -- Hgb on admission 9.0 (BL ~10).   -- daily CBC  -- transfuse for hgb < 7  -- continue home iron supplement        VTE Risk Mitigation (From admission, onward)           Ordered     IP VTE HIGH RISK PATIENT  Once         12/04/23 1428     Place sequential compression device  Until discontinued         12/04/23 1428                    Jesse Bass MD  Cardiology   [facility]

## 2023-12-04 NOTE — ASSESSMENT & PLAN NOTE
-- c/b SCAD    Cath 2013  Left Main Coronary Artery:              The distal LM has a 70% stenosis. There is MARY ELLEN 2 flow.      - Left Anterior Descending Artery:             The LAD has a 90% stenosis. There is MARY ELLEN 2 flow.      - Ramus:              The ramus has a 90% stenosis. There is MARY ELLEN 2 flow.      - Left Circumflex Artery:              The LCX has a 75% stenosis. There is MARY ELLEN 3 flow. The remaining portion of the vessel has luminal irregularities.      - OM1:              The OM1 is normal. There is MARY ELLEN 3 flow.      - OM2:              The OM2 is normal. There is MARY ELLEN 3 flow. The remaining portion of the vessel is of small caliber.      - OM3:              The OM3 is normal. There is MARY ELLEN 3 flow. The remaining portion of the vessel is of small caliber.      - Left Posterior Descending Artery:              The left PDA is normal. There is MARY ELLEN 3 flow. The remaining portion of the vessel is of small caliber.      - Right Coronary Artery:              The RCA is normal. There is MARY ELLEN 3 flow.      - Posterior Lateral Branch:              The PLB is normal. There is MARY ELLEN 3 flow.      - Posterior Descending Artery:              The PDA is normal. There is MARY ELLEN 3 flow.

## 2023-12-04 NOTE — ASSESSMENT & PLAN NOTE
39 y/o F with hx of SCAD s/p CABG x2 2013 presenting with symptoms of peripartum heart failure. Patient appears volume overloaded on exam. Bedside echo showing no new reduction in EF, formal echo pending.     Patient is identified as having heart failure that is Acute. CHF is currently uncontrolled due to Continued edema of extremities and JVD, Dyspnea not returned to baseline after 1 doses of IV diuretic, >3 pillow orthopnea, Rales/crackles on pulmonary exam, and Pulmonary edema/pleural effusion on CXR. Latest ECHO performed and demonstrates- Results for orders placed during the hospital encounter of 11/29/23    Echo 11/30/23    Interpretation Summary    Left Ventricle: The left ventricle is normal in size. Ventricular mass is normal. Normal wall thickness. Regional wall motion abnormalities are present. The mid anteroseptal wall is severely hypokinetic. The apical septum and apex are moderately hypokinetic. The remainder of the left ventricular walls contract well. There is low normal systolic function. Ejection fraction by visual approximation is 50%. There is normal diastolic function.    Right Ventricle: Normal right ventricular cavity size. Wall thickness is normal. Right ventricle wall motion  is normal. Systolic function is normal.    Mitral Valve: There is mild regurgitation with a centrally directed jet.    Tricuspid Valve: There is mild regurgitation with a centrally directed jet.    Pulmonary Artery: No pulmonary hypertension. The estimated pulmonary artery systolic pressure is 31 mmHg.    IVC/SVC: Normal venous pressure at 3 mmHg.    Continue Beta Blocker and monitor clinical status closely. Monitor on telemetry. Patient is off CHF pathway.  Monitor strict Is&Os and daily weights.  Place on fluid restriction of 1.5 L. Continue to stress to patient importance of self efficacy and  on diet for CHF. Last BNP reviewed- and noted below   Recent Labs   Lab 12/04/23  1049   BNP 1,215*     -- lasix 40mg  IV once on admission and assess diuresis response  -- f/u formal echo  -- continue home toprol  -- strict I/Os and daily weights  -- serial metabolic panels  -- Mag > 2, K > 4

## 2023-12-04 NOTE — CARE UPDATE
Evening chart review completed. Patient noted to have nonsustained severe range BP (>160 systolic or >110 diastolic) as well as many mild range BP (>140 systolic or >90 diastolic). We have a low suspicion for preeclampsia at this time, however, if patient has sustained severe range BP (BP parameters as above 15 minutes apart) please notify MFM team. We suspect BP, edema, and symptoms are more likely related to heart failure/cardiac function.    Vital sign and BP parameters per critical care team.     Please reach out to maternal fetal medicine team (M - [phone], Upper Level Resident - [phone]) with any concerns. Will plan to formally round on patient tomorrow morning.     Roma Cleaning MD  OBGYN, PGY-3

## 2023-12-04 NOTE — HPI
Ms. Clayton Duarte is a 39 y/o F PPD5 s/p  & POD5 s/p BTL with hx of CAD c/b SCAD resulting in 2v CABG (LIMA to LAD, VG to OM1)  (follows with Dr. Levine and Dr. Narvaez), anemia, VDD who presents as transfer from [facility] for management of post-partum HF. Patient initially presented to [facility] ED for evaluation of 2 days of worsening SOB, orthopnea, and chest pain. Patient was recently discharged from [facility] on  after an uncomplicated labor and  on . Patient was discharged home in her usual state of health. However, the day after discharge patient began experiencing coughing and difficulty breathing. That night she had significant orthopnea requiring several pillows to prop her up.     In the [facility] ED, patient afebrile, hypertensive with SBP 150s, satting well on RA. CBC showing hgb 9.0 (BL 10). CMP grossly unremarkable, renal function and electrolytes stable. BNP elevated to 1200. Trop wnl. CXR notable for bilateral edema. JVD noted on PEx. Case discussed with CCU staff at [facility] and patient transferred for continued management in CCU.

## 2023-12-04 NOTE — ASSESSMENT & PLAN NOTE
Patient admitted with acute decompensated HF on PPD#5 following  of 4th child and BTL. OB/GYN consulted on admission to CCU for assistance with post-partum care and discussion for appropriate GDMT given current breast-feeding status.

## 2023-12-04 NOTE — PLAN OF CARE
Patient admit from [facility]. VSS. Bedside TTE performed by cardiology resident at bedside.   AM labs. Patient EBF, so baby in room with patient and father. Reports no issues with breastfeeding. Scant serosanguinous lochia noted to brief. Surgical incision CDI, approximated and open to air. OB consulted. Lasix x1 today in ED. Lasix ordered this PM. Repleted K.     Bed locked and in low position, call light within reach. Questions, concerns addressed.

## 2023-12-05 PROBLEM — I10 HYPERTENSION: Status: ACTIVE | Noted: 2023-12-05

## 2023-12-05 LAB
ALBUMIN SERPL BCP-MCNC: 2.8 G/DL (ref 3.5–5.2)
ALBUMIN SERPL BCP-MCNC: 2.9 G/DL (ref 3.5–5.2)
ALP SERPL-CCNC: 86 U/L (ref 55–135)
ALP SERPL-CCNC: 90 U/L (ref 55–135)
ALT SERPL W/O P-5'-P-CCNC: 60 U/L (ref 10–44)
ALT SERPL W/O P-5'-P-CCNC: 68 U/L (ref 10–44)
ANION GAP SERPL CALC-SCNC: 12 MMOL/L (ref 8–16)
ANION GAP SERPL CALC-SCNC: 7 MMOL/L (ref 8–16)
ASCENDING AORTA: 2.81 CM
AST SERPL-CCNC: 46 U/L (ref 10–40)
AST SERPL-CCNC: 47 U/L (ref 10–40)
AV INDEX (PROSTH): 0.88
AV MEAN GRADIENT: 4 MMHG
AV PEAK GRADIENT: 8 MMHG
AV VALVE AREA BY VELOCITY RATIO: 2.73 CM²
AV VALVE AREA: 2.72 CM²
AV VELOCITY RATIO: 0.89
BACTERIA #/AREA URNS AUTO: ABNORMAL /HPF
BASOPHILS # BLD AUTO: 0.03 K/UL (ref 0–0.2)
BASOPHILS NFR BLD: 0.4 % (ref 0–1.9)
BILIRUB SERPL-MCNC: 0.5 MG/DL (ref 0.1–1)
BILIRUB SERPL-MCNC: 0.5 MG/DL (ref 0.1–1)
BILIRUB UR QL STRIP: NEGATIVE
BSA FOR ECHO PROCEDURE: 1.86 M2
BUN SERPL-MCNC: 10 MG/DL (ref 6–20)
BUN SERPL-MCNC: 8 MG/DL (ref 6–20)
CALCIUM SERPL-MCNC: 10.1 MG/DL (ref 8.7–10.5)
CALCIUM SERPL-MCNC: 9.7 MG/DL (ref 8.7–10.5)
CHLORIDE SERPL-SCNC: 111 MMOL/L (ref 95–110)
CHLORIDE SERPL-SCNC: 112 MMOL/L (ref 95–110)
CLARITY UR REFRACT.AUTO: ABNORMAL
CO2 SERPL-SCNC: 22 MMOL/L (ref 23–29)
CO2 SERPL-SCNC: 22 MMOL/L (ref 23–29)
COLOR UR AUTO: YELLOW
CREAT SERPL-MCNC: 0.7 MG/DL (ref 0.5–1.4)
CREAT SERPL-MCNC: 0.8 MG/DL (ref 0.5–1.4)
CV ECHO LV RWT: 0.31 CM
DIFFERENTIAL METHOD: ABNORMAL
DOP CALC AO PEAK VEL: 1.42 M/S
DOP CALC AO VTI: 33.55 CM
DOP CALC LVOT AREA: 3.1 CM2
DOP CALC LVOT DIAMETER: 1.98 CM
DOP CALC LVOT PEAK VEL: 1.26 M/S
DOP CALC LVOT STROKE VOLUME: 91.22 CM3
DOP CALCLVOT PEAK VEL VTI: 29.64 CM
E WAVE DECELERATION TIME: 233.98 MSEC
E/A RATIO: 1.07
E/E' RATIO: 7.9 M/S
ECHO LV POSTERIOR WALL: 0.74 CM (ref 0.6–1.1)
EJECTION FRACTION: 50 %
EOSINOPHIL # BLD AUTO: 0.1 K/UL (ref 0–0.5)
EOSINOPHIL NFR BLD: 1.7 % (ref 0–8)
ERYTHROCYTE [DISTWIDTH] IN BLOOD BY AUTOMATED COUNT: 14.2 % (ref 11.5–14.5)
EST. GFR  (NO RACE VARIABLE): >60 ML/MIN/1.73 M^2
EST. GFR  (NO RACE VARIABLE): >60 ML/MIN/1.73 M^2
FRACTIONAL SHORTENING: 23 % (ref 28–44)
GLUCOSE SERPL-MCNC: 106 MG/DL (ref 70–110)
GLUCOSE SERPL-MCNC: 77 MG/DL (ref 70–110)
GLUCOSE UR QL STRIP: NEGATIVE
HCT VFR BLD AUTO: 30.3 % (ref 37–48.5)
HGB BLD-MCNC: 9.8 G/DL (ref 12–16)
HGB UR QL STRIP: ABNORMAL
IMM GRANULOCYTES # BLD AUTO: 0.08 K/UL (ref 0–0.04)
IMM GRANULOCYTES NFR BLD AUTO: 1 % (ref 0–0.5)
INTERVENTRICULAR SEPTUM: 0.65 CM (ref 0.6–1.1)
KETONES UR QL STRIP: NEGATIVE
LA MAJOR: 4.99 CM
LA MINOR: 4.63 CM
LA WIDTH: 3.43 CM
LEFT ATRIUM SIZE: 3.18 CM
LEFT ATRIUM VOLUME INDEX MOD: 20.2 ML/M2
LEFT ATRIUM VOLUME INDEX: 24.3 ML/M2
LEFT ATRIUM VOLUME MOD: 36.89 CM3
LEFT ATRIUM VOLUME: 44.53 CM3
LEFT INTERNAL DIMENSION IN SYSTOLE: 3.66 CM (ref 2.1–4)
LEFT VENTRICLE DIASTOLIC VOLUME INDEX: 57.01 ML/M2
LEFT VENTRICLE DIASTOLIC VOLUME: 104.32 ML
LEFT VENTRICLE MASS INDEX: 57 G/M2
LEFT VENTRICLE SYSTOLIC VOLUME INDEX: 30.9 ML/M2
LEFT VENTRICLE SYSTOLIC VOLUME: 56.61 ML
LEFT VENTRICULAR INTERNAL DIMENSION IN DIASTOLE: 4.74 CM (ref 3.5–6)
LEFT VENTRICULAR MASS: 103.64 G
LEUKOCYTE ESTERASE UR QL STRIP: ABNORMAL
LV LATERAL E/E' RATIO: 6.58 M/S
LV SEPTAL E/E' RATIO: 9.88 M/S
LYMPHOCYTES # BLD AUTO: 2.2 K/UL (ref 1–4.8)
LYMPHOCYTES NFR BLD: 25.7 % (ref 18–48)
MAGNESIUM SERPL-MCNC: 2.5 MG/DL (ref 1.6–2.6)
MCH RBC QN AUTO: 26.5 PG (ref 27–31)
MCHC RBC AUTO-ENTMCNC: 32.3 G/DL (ref 32–36)
MCV RBC AUTO: 82 FL (ref 82–98)
MICROSCOPIC COMMENT: ABNORMAL
MONOCYTES # BLD AUTO: 0.7 K/UL (ref 0.3–1)
MONOCYTES NFR BLD: 7.8 % (ref 4–15)
MV A" WAVE DURATION": 19.79 MSEC
MV PEAK A VEL: 0.74 M/S
MV PEAK E VEL: 0.79 M/S
MV STENOSIS PRESSURE HALF TIME: 67.85 MS
MV VALVE AREA P 1/2 METHOD: 3.24 CM2
NEUTROPHILS # BLD AUTO: 5.3 K/UL (ref 1.8–7.7)
NEUTROPHILS NFR BLD: 63.4 % (ref 38–73)
NITRITE UR QL STRIP: NEGATIVE
NRBC BLD-RTO: 0 /100 WBC
PH UR STRIP: 7 [PH] (ref 5–8)
PISA TR MAX VEL: 2.15 M/S
PLATELET # BLD AUTO: 248 K/UL (ref 150–450)
PMV BLD AUTO: 9.6 FL (ref 9.2–12.9)
POTASSIUM SERPL-SCNC: 4 MMOL/L (ref 3.5–5.1)
POTASSIUM SERPL-SCNC: 4.1 MMOL/L (ref 3.5–5.1)
PROT SERPL-MCNC: 6.3 G/DL (ref 6–8.4)
PROT SERPL-MCNC: 6.7 G/DL (ref 6–8.4)
PROT UR QL STRIP: NEGATIVE
PULM VEIN S/D RATIO: 1.07
PV PEAK D VEL: 0.43 M/S
PV PEAK S VEL: 0.46 M/S
RA MAJOR: 4.19 CM
RA PRESSURE ESTIMATED: 3 MMHG
RA WIDTH: 2.84 CM
RBC # BLD AUTO: 3.7 M/UL (ref 4–5.4)
RBC #/AREA URNS AUTO: >100 /HPF (ref 0–4)
RIGHT VENTRICULAR END-DIASTOLIC DIMENSION: 3 CM
RV TB RVSP: 5 MMHG
SINUS: 2.85 CM
SODIUM SERPL-SCNC: 140 MMOL/L (ref 136–145)
SODIUM SERPL-SCNC: 146 MMOL/L (ref 136–145)
SP GR UR STRIP: 1.02 (ref 1–1.03)
SQUAMOUS #/AREA URNS AUTO: 0 /HPF
STJ: 2.66 CM
TDI LATERAL: 0.12 M/S
TDI SEPTAL: 0.08 M/S
TDI: 0.1 M/S
TR MAX PG: 18 MMHG
TRICUSPID ANNULAR PLANE SYSTOLIC EXCURSION: 1.91 CM
TV REST PULMONARY ARTERY PRESSURE: 21 MMHG
URN SPEC COLLECT METH UR: ABNORMAL
WBC # BLD AUTO: 8.38 K/UL (ref 3.9–12.7)
WBC #/AREA URNS AUTO: 9 /HPF (ref 0–5)
Z-SCORE OF LEFT VENTRICULAR DIMENSION IN END DIASTOLE: -0.68
Z-SCORE OF LEFT VENTRICULAR DIMENSION IN END SYSTOLE: 1.23

## 2023-12-05 PROCEDURE — 81001 URINALYSIS AUTO W/SCOPE: CPT | Performed by: STUDENT IN AN ORGANIZED HEALTH CARE EDUCATION/TRAINING PROGRAM

## 2023-12-05 PROCEDURE — 99233 PR SUBSEQUENT HOSPITAL CARE,LEVL III: ICD-10-PCS | Mod: ,,, | Performed by: INTERNAL MEDICINE

## 2023-12-05 PROCEDURE — 80053 COMPREHEN METABOLIC PANEL: CPT

## 2023-12-05 PROCEDURE — 20600001 HC STEP DOWN PRIVATE ROOM

## 2023-12-05 PROCEDURE — 99232 SBSQ HOSP IP/OBS MODERATE 35: CPT | Mod: 24,,, | Performed by: OBSTETRICS & GYNECOLOGY

## 2023-12-05 PROCEDURE — 25000003 PHARM REV CODE 250

## 2023-12-05 PROCEDURE — 80053 COMPREHEN METABOLIC PANEL: CPT | Mod: 91 | Performed by: STUDENT IN AN ORGANIZED HEALTH CARE EDUCATION/TRAINING PROGRAM

## 2023-12-05 PROCEDURE — 94761 N-INVAS EAR/PLS OXIMETRY MLT: CPT

## 2023-12-05 PROCEDURE — 27000492 HC SLEEVE, SCD T/L

## 2023-12-05 PROCEDURE — A4216 STERILE WATER/SALINE, 10 ML: HCPCS

## 2023-12-05 PROCEDURE — 99233 SBSQ HOSP IP/OBS HIGH 50: CPT | Mod: ,,, | Performed by: INTERNAL MEDICINE

## 2023-12-05 PROCEDURE — 83735 ASSAY OF MAGNESIUM: CPT

## 2023-12-05 PROCEDURE — 85025 COMPLETE CBC W/AUTO DIFF WBC: CPT

## 2023-12-05 PROCEDURE — 25500020 PHARM REV CODE 255: Performed by: INTERNAL MEDICINE

## 2023-12-05 PROCEDURE — 99232 PR SUBSEQUENT HOSPITAL CARE,LEVL II: ICD-10-PCS | Mod: 24,,, | Performed by: OBSTETRICS & GYNECOLOGY

## 2023-12-05 RX ORDER — NIFEDIPINE 30 MG/1
30 TABLET, EXTENDED RELEASE ORAL DAILY
Status: DISCONTINUED | OUTPATIENT
Start: 2023-12-05 | End: 2023-12-06 | Stop reason: HOSPADM

## 2023-12-05 RX ORDER — HYDRALAZINE HYDROCHLORIDE 25 MG/1
25 TABLET, FILM COATED ORAL EVERY 8 HOURS PRN
Status: DISCONTINUED | OUTPATIENT
Start: 2023-12-05 | End: 2023-12-06 | Stop reason: HOSPADM

## 2023-12-05 RX ADMIN — HUMAN ALBUMIN MICROSPHERES AND PERFLUTREN 0.11 MG: 10; .22 INJECTION, SOLUTION INTRAVENOUS at 02:12

## 2023-12-05 RX ADMIN — DOCUSATE SODIUM 200 MG: 100 CAPSULE, LIQUID FILLED ORAL at 09:12

## 2023-12-05 RX ADMIN — NIFEDIPINE 30 MG: 30 TABLET, FILM COATED, EXTENDED RELEASE ORAL at 10:12

## 2023-12-05 RX ADMIN — FERROUS SULFATE TAB 325 MG (65 MG ELEMENTAL FE) 1 EACH: 325 (65 FE) TAB at 08:12

## 2023-12-05 RX ADMIN — METOPROLOL SUCCINATE 25 MG: 25 TABLET, EXTENDED RELEASE ORAL at 08:12

## 2023-12-05 RX ADMIN — Medication 10 ML: at 09:12

## 2023-12-05 NOTE — HOSPITAL COURSE
Patient admitted to CCU for management of acute decompensated HF. Patient given IV lasix with good UOP and significant improvement in sxs. Bedside echo showing low-normal EF without any acute changes from prior. Formal echo pending. Patient stable for stepdown to floor for continued management. MFM following, low suspicion for preeclampsia/HELLP at this time, hypertension likely 2/2 HF and volume overload. LFTs mildly elevated, however no evidence of hemolysis, thrombocytopenia. BP improved with volume removal and nifedipine. LFTs subsequently improved, Patient stable for discharge on 12/6 with close cardiology follow-up.   
[facility]

## 2023-12-05 NOTE — PLAN OF CARE
[facility]  Initial Discharge Assessment       Primary Care Provider: Lesley Vanessa MD    Admission Diagnosis: Wheezing [R06.2]  SOB (shortness of breath) [R06.02]   (spontaneous vaginal delivery) [O80]  Status post tubal ligation [Z98.51]  Chest pain [R07.9]    Admission Date: 2023  Expected Discharge Date:     Transition of Care Barriers: Underinsured    Payor: MEDICAID / Plan: [facility] ([employer]) / Product Type: Managed Medicaid /     Extended Emergency Contact Information  Primary Emergency Contact: Mario,Greyson  Address:  [address] [address] [place]  Mobile Phone: [phone]  Relation: Significant other  Secondary Emergency Contact: Dada Duarte   [place]  Mobile Phone: [phone]  Relation: Father    Discharge Plan A: Home with family, Home  Discharge Plan B: Home with family, Home      [id number] - [place] - [address] [facility] [address]  [address]  [place] [address]  Phone: [phone] Fax: [phone]      Initial Assessment (most recent)       Adult Discharge Assessment - 23 1017          Discharge Assessment    Assessment Type Discharge Planning Assessment     Confirmed/corrected address, phone number and insurance Yes     Confirmed Demographics Correct on Facesheet     Source of Information patient;family     Communicated MANJU with patient/caregiver Date not available/Unable to determine     People in Home child(meaghan), dependent;spouse     Facility Arrived From: [facility]     Do you have help at home or someone to help you manage your care at home? Yes     Prior to hospitilization cognitive status: Alert/Oriented     Current cognitive status: Alert/Oriented     Home Layout Able to live on 1st floor     Equipment Currently Used at Home none     Readmission within 30 days? Yes     Patient currently being followed by  outpatient case management? No     Do you currently have service(s) that help you manage your care at home? No     Do you take prescription medications? Yes     Do you have prescription coverage? Yes     Do you have any problems affording any of your prescribed medications? No     Is the patient taking medications as prescribed? yes     Who is going to help you get home at discharge? [employer] [phone]     How do you get to doctors appointments? family or friend will provide     Are you on dialysis? No     Do you take coumadin? No     DME Needed Upon Discharge  none     Discharge Plan discussed with: Patient;Spouse/sig other     Transition of Care Barriers Underinsured     Discharge Plan A Home with family;Home     Discharge Plan B Home with family;Home        Physical Activity    On average, how many days per week do you engage in moderate to strenuous exercise (like a brisk walk)? 7 days     On average, how many minutes do you engage in exercise at this level? 50 min        Financial Resource Strain    How hard is it for you to pay for the very basics like food, housing, medical care, and heating? Not hard at all        Housing Stability    In the last 12 months, was there a time when you were not able to pay the mortgage or rent on time? No     In the last 12 months, how many places have you lived? 1     In the last 12 months, was there a time when you did not have a steady place to sleep or slept in a shelter (including now)? No        Transportation Needs    In the past 12 months, has lack of transportation kept you from medical appointments or from getting medications? No     In the past 12 months, has lack of transportation kept you from meetings, work, or from getting things needed for daily living? No        Food Insecurity    Within the past 12 months, you worried that your food would run out before you got the money to buy more. Never true     Within the past 12 months, the food you bought  just didn't last and you didn't have money to get more. Never true        Stress    Do you feel stress - tense, restless, nervous, or anxious, or unable to sleep at night because your mind is troubled all the time - these days? Patient refused        Social Connections    In a typical week, how many times do you talk on the phone with family, friends, or neighbors? Twice a week     How often do you get together with friends or relatives? Twice a week     How often do you attend [employer] or Orthodoxy services? Never     Do you belong to any clubs or organizations such as [employer] groups, [employer], [employer] or athletic groups, or school groups? No     How often do you attend meetings of the clubs or organizations you belong to? Never     Are you , , , , never , or living with a partner?         Alcohol Use    Q1: How often do you have a drink containing alcohol? Patient refused     Q2: How many drinks containing alcohol do you have on a typical day when you are drinking? Patient refused     Q3: How often do you have six or more drinks on one occasion? Patient refused                 Discharge Plan A HOME and Plan B  HOME WITH FAMILY have been determined by review of patient's clinical status, future medical and therapeutic needs, and coverage/benefits for post-acute care in coordination with multidisciplinary team members.  Patient lives in a 2 story house her bed and bath on the first floor  she has 3 other children she is not on dialysis and does not take coumadin she has a ride home

## 2023-12-05 NOTE — ASSESSMENT & PLAN NOTE
-- Hgb on admission 9.0 (BL ~10). Stable during hospitalization  -- daily CBC  -- transfuse for hgb < 7  -- continue home iron supplement

## 2023-12-05 NOTE — CONSULTS
[facility] - [facility] Intensive [facility]  Obstetrics & Gynecology  Consult Note    Patient Name: Clayton Duarte  MRN: [id number]  Admission Date: 2023  Hospital Length of Stay: 1 days  Code Status: Full Code  Primary Care Provider: Lesley Vanessa MD  Principal Problem: Acute heart failure    Inpatient consult to Obstetrics  Consult performed by: Roma Cleaning MD  Consult ordered by: Jesse Bass MD        Subjective:     Chief Complaint: Acute heart failure in postpartum state     History of Present Illness:   Clayton Duarte is a 38 y.o.  now PPD #6 s/p , POD #6 s/p BTL who presented initially to the [facility] for concerns of cough, SOB, and CP. Patient has a history of SCAD s/p CABG x2 () and is followed closely by cardiology. She had an echo on PPD #1 that was overall stable with an EF of 50%, mild mitral and tricuspid valve regurgitation. She was completely asymptomatic at the time of discharge.     The day after discharge (3 days ago), patient started experiencing cough, SOB worsened with exertion, orthopnea (requiring at least 3 pillows to sleep), and chest tightness prompting her to present to the [facility]. While in the [facility] patient had persistent mild range BP, O2 sats 93%+ on RA, tachypnea to the 20s. On exam she had JVD and crackles. CBC, CMP stable, troponin negative. BNP 1215. CXR remarkable for bilateral edema concerning for CHF. Due to patients significant cardiac history and acute heart failure decision was made to transfer to cardiology at [facility] for acute care.    Interval History:   NAOEN. Patient reports feeling significantly improved since admission and diuresis. She is no longer coughing. She has not noticed CP or SOB at rest or with ambulation to the restroom. Reports lochia is mild. Minimal pain/soreness at BTL site.     No current facility-administered medications on file prior to encounter.     Current Outpatient Medications on File Prior to Encounter   Medication Sig     acetaminophen (TYLENOL) 325 MG tablet Take 2 tablets (650 mg total) by mouth every 6 (six) hours.    docusate sodium (COLACE) 100 MG capsule Take 2 capsules (200 mg total) by mouth 2 (two) times daily.    ferrous sulfate 325 (65 FE) MG EC tablet Take 1 tablet (325 mg total) by mouth once daily.    ibuprofen (ADVIL,MOTRIN) 600 MG tablet Take 1 tablet (600 mg total) by mouth every 6 (six) hours.    metoprolol succinate (TOPROL-XL) 25 MG 24 hr tablet Take 1 tablet (25 mg total) by mouth once daily.    oxyCODONE (ROXICODONE) 5 MG immediate release tablet Take 1 tablet (5 mg total) by mouth every 4 (four) hours as needed for Pain.       Review of patient's allergies indicates:   Allergen Reactions    Bananas [banana] Itching    Peanut butter flavor Hives       Past Medical History:   Diagnosis Date    Abnormal Pap smear of cervix ,     colposcopy    Anemia     Coronary artery dissection 2013    Postpartum depression     Spinal headache complicating labor and delivery, postpartum condition 2013     OB History    Para Term  AB Living   4 4 4 0 0 4   SAB IAB Ectopic Multiple Live Births   0 0 0 0 4      # Outcome Date GA Lbr Jose Alberto/2nd Weight Sex Delivery Anes PTL Lv   4 Term 23 38w3d / 00:17 2.86 kg (6 lb 4.9 oz) M Vag-Spont EPI  RENEE      Name: TATYANABOY VARANISE      Apgar1: 9  Apgar5: 9   3 Term 13 39w2d 103:30 / 00:23 3.025 kg (6 lb 10.7 oz) F Vag-Spont EPI N RENEE      Birth Comments: 28       Name: TATYANA,BABY GIRL       Apgar1: 9  Apgar5: 9   2 Term 09 39w0d 10:00 3.629 kg (8 lb) M Vag-Spont EPI N RENEE      Name: Saul   1 Term 06 39w0d 09:00 3.033 kg (6 lb 11 oz) F Vag-Spont EPI N RENEE      Name: Josr     Past Surgical History:   Procedure Laterality Date    CARDIAC SURGERY      CORONARY ARTERY BYPASS GRAFT      tanner to lad, svg OM1    POSTPARTUM LIGATION OF FALLOPIAN TUBE Bilateral 2023    Procedure: LIGATION, FALLOPIAN TUBE, POSTPARTUM;  Surgeon:  Mitzy Madison MD;  Location: [facility] L&D;  Service: OB/GYN;  Laterality: Bilateral;    WISDOM TOOTH EXTRACTION       Family History       Problem Relation (Age of Onset)    Diabetes Mother    Hypertension Mother    Stroke Mother          Tobacco Use    Smoking status: Never    Smokeless tobacco: Never   Substance and Sexual Activity    Alcohol use: Not Currently    Drug use: No    Sexual activity: Yes     Partners: Male     Review of Systems   Constitutional:  Negative for chills and fever.   HENT:  Negative for nasal congestion.    Eyes:  Negative for visual disturbance.   Respiratory:  Negative for cough, shortness of breath and wheezing.    Cardiovascular:  Negative for chest pain and palpitations.   Gastrointestinal:  Positive for abdominal pain (minimal pain around BTL site). Negative for constipation, nausea and vomiting.   Genitourinary:  Positive for vaginal bleeding (normal lochia). Negative for dysuria and frequency.   Musculoskeletal:  Negative for back pain.   Neurological:  Negative for headaches.   Hematological:  Does not bruise/bleed easily.   Breast: Negative for mastodynia    Objective:     Vital Signs (Most Recent):  Temp: 98.2 °F (36.8 °C) (12/05/23 0315)  Pulse: 64 (12/05/23 0600)  Resp: (!) 21 (12/05/23 0600)  BP: 137/76 (12/05/23 0600)  SpO2: 95 % (12/05/23 0600) Vital Signs (24h Range):  Temp:  [98.1 °F (36.7 °C)-98.5 °F (36.9 °C)] 98.2 °F (36.8 °C)  Pulse:  [] 64  Resp:  [18-41] 21  SpO2:  [94 %-100 %] 95 %  BP: ()/(57-86) 137/76        There is no height or weight on file to calculate BMI.  Patient's last menstrual period was 03/06/2023.    Physical Exam:   Constitutional: She is oriented to person, place, and time. She appears well-developed and well-nourished. No distress.    HENT:   Head: Normocephalic and atraumatic.    Eyes: EOM are normal.     Cardiovascular:  Normal rate.             Pulmonary/Chest: Effort normal.        Abdominal: Soft. She exhibits abdominal  incision (BTL site c/d/i without surrouding erythema or induration). There is no abdominal tenderness. There is no rebound and no guarding.     Genitourinary:    Genitourinary Comments: Deferred              Musculoskeletal: Normal range of motion. No edema.       Neurological: She is alert and oriented to person, place, and time.    Skin: Skin is warm and dry.    Psychiatric: She has a normal mood and affect. Her behavior is normal.       Laboratory:  Recent Results (from the past 24 hour(s))   COVID-19 Rapid Screening    Collection Time: 12/04/23 10:44 AM   Result Value Ref Range    SARS-CoV-2 RNA, Amplification, Qual Negative Negative   Influenza A & B by Molecular    Collection Time: 12/04/23 10:44 AM    Specimen: Nasopharyngeal Swab   Result Value Ref Range    Influenza A, Molecular Negative Negative    Influenza B, Molecular Negative Negative    Flu A & B Source Nasal Swab    Comprehensive metabolic panel    Collection Time: 12/04/23 10:49 AM   Result Value Ref Range    Sodium 142 136 - 145 mmol/L    Potassium 3.5 3.5 - 5.1 mmol/L    Chloride 110 95 - 110 mmol/L    CO2 24 23 - 29 mmol/L    Glucose 91 70 - 110 mg/dL    BUN 8 6 - 20 mg/dL    Creatinine 0.8 0.5 - 1.4 mg/dL    Calcium 9.7 8.7 - 10.5 mg/dL    Total Protein 5.7 (L) 6.0 - 8.4 g/dL    Albumin 2.6 (L) 3.5 - 5.2 g/dL    Total Bilirubin 0.3 0.1 - 1.0 mg/dL    Alkaline Phosphatase 78 55 - 135 U/L    AST 38 10 - 40 U/L    ALT 43 10 - 44 U/L    eGFR >60 >60 mL/min/1.73 m^2    Anion Gap 8 8 - 16 mmol/L   CBC auto differential    Collection Time: 12/04/23 10:49 AM   Result Value Ref Range    WBC 9.02 3.90 - 12.70 K/uL    RBC 3.30 (L) 4.00 - 5.40 M/uL    Hemoglobin 9.0 (L) 12.0 - 16.0 g/dL    Hematocrit 28.8 (L) 37.0 - 48.5 %    MCV 87 82 - 98 fL    MCH 27.3 27.0 - 31.0 pg    MCHC 31.3 (L) 32.0 - 36.0 g/dL    RDW 14.0 11.5 - 14.5 %    Platelets 225 150 - 450 K/uL    MPV 9.8 9.2 - 12.9 fL    Immature Granulocytes 1.2 (H) 0.0 - 0.5 %    Gran # (ANC) 6.8 1.8 -  7.7 K/uL    Immature Grans (Abs) 0.11 (H) 0.00 - 0.04 K/uL    Lymph # 1.5 1.0 - 4.8 K/uL    Mono # 0.5 0.3 - 1.0 K/uL    Eos # 0.1 0.0 - 0.5 K/uL    Baso # 0.02 0.00 - 0.20 K/uL    nRBC 0 0 /100 WBC    Gran % 75.0 (H) 38.0 - 73.0 %    Lymph % 16.7 (L) 18.0 - 48.0 %    Mono % 6.0 4.0 - 15.0 %    Eosinophil % 0.9 0.0 - 8.0 %    Basophil % 0.2 0.0 - 1.9 %    Differential Method Automated    Brain natriuretic peptide    Collection Time: 12/04/23 10:49 AM   Result Value Ref Range    BNP 1,215 (H) 0 - 99 pg/mL   Troponin I    Collection Time: 12/04/23 10:49 AM   Result Value Ref Range    Troponin I 0.020 0.000 - 0.026 ng/mL   Lactic acid, plasma    Collection Time: 12/04/23  3:49 PM   Result Value Ref Range    Lactate (Lactic Acid) 1.2 0.5 - 2.2 mmol/L   Basic metabolic panel    Collection Time: 12/04/23 10:42 PM   Result Value Ref Range    Sodium 142 136 - 145 mmol/L    Potassium 3.6 3.5 - 5.1 mmol/L    Chloride 108 95 - 110 mmol/L    CO2 25 23 - 29 mmol/L    Glucose 90 70 - 110 mg/dL    BUN 8 6 - 20 mg/dL    Creatinine 0.7 0.5 - 1.4 mg/dL    Calcium 10.0 8.7 - 10.5 mg/dL    Anion Gap 9 8 - 16 mmol/L    eGFR >60.0 >60 mL/min/1.73 m^2   Magnesium    Collection Time: 12/04/23 10:42 PM   Result Value Ref Range    Magnesium 1.7 1.6 - 2.6 mg/dL   Comprehensive metabolic panel    Collection Time: 12/05/23  3:33 AM   Result Value Ref Range    Sodium 140 136 - 145 mmol/L    Potassium 4.0 3.5 - 5.1 mmol/L    Chloride 111 (H) 95 - 110 mmol/L    CO2 22 (L) 23 - 29 mmol/L    Glucose 77 70 - 110 mg/dL    BUN 8 6 - 20 mg/dL    Creatinine 0.7 0.5 - 1.4 mg/dL    Calcium 9.7 8.7 - 10.5 mg/dL    Total Protein 6.3 6.0 - 8.4 g/dL    Albumin 2.8 (L) 3.5 - 5.2 g/dL    Total Bilirubin 0.5 0.1 - 1.0 mg/dL    Alkaline Phosphatase 86 55 - 135 U/L    AST 47 (H) 10 - 40 U/L    ALT 60 (H) 10 - 44 U/L    eGFR >60.0 >60 mL/min/1.73 m^2    Anion Gap 7 (L) 8 - 16 mmol/L   CBC auto differential    Collection Time: 12/05/23  3:33 AM   Result Value Ref  Range    WBC 8.38 3.90 - 12.70 K/uL    RBC 3.70 (L) 4.00 - 5.40 M/uL    Hemoglobin 9.8 (L) 12.0 - 16.0 g/dL    Hematocrit 30.3 (L) 37.0 - 48.5 %    MCV 82 82 - 98 fL    MCH 26.5 (L) 27.0 - 31.0 pg    MCHC 32.3 32.0 - 36.0 g/dL    RDW 14.2 11.5 - 14.5 %    Platelets 248 150 - 450 K/uL    MPV 9.6 9.2 - 12.9 fL    Immature Granulocytes 1.0 (H) 0.0 - 0.5 %    Gran # (ANC) 5.3 1.8 - 7.7 K/uL    Immature Grans (Abs) 0.08 (H) 0.00 - 0.04 K/uL    Lymph # 2.2 1.0 - 4.8 K/uL    Mono # 0.7 0.3 - 1.0 K/uL    Eos # 0.1 0.0 - 0.5 K/uL    Baso # 0.03 0.00 - 0.20 K/uL    nRBC 0 0 /100 WBC    Gran % 63.4 38.0 - 73.0 %    Lymph % 25.7 18.0 - 48.0 %    Mono % 7.8 4.0 - 15.0 %    Eosinophil % 1.7 0.0 - 8.0 %    Basophil % 0.4 0.0 - 1.9 %    Differential Method Automated    Magnesium    Collection Time: 12/05/23  3:33 AM   Result Value Ref Range    Magnesium 2.5 1.6 - 2.6 mg/dL     Diagnostic Results:  Imaging Results              X-Ray Chest AP Portable (Final result)  Result time 12/04/23 11:53:13      Final result by Elver Branch III, MD (12/04/23 11:53:13)                   Impression:      Findings are worrisome for CHF.  Pneumonia aspiration sepsis cannot be excluded.      Electronically signed by: Elver Branch MD  Date:    12/04/2023  Time:    11:53               Narrative:    EXAMINATION:  XR CHEST AP PORTABLE    CLINICAL HISTORY:  Shortness of breath    FINDINGS:  Chest one view portable.    Heart size is normal.  There is bilateral edema.  There is postoperative change.  Bones are noncontributory.                                      Assessment/Plan:     Active Diagnoses:    Diagnosis Date Noted POA    PRINCIPAL PROBLEM:  Acute heart failure [I50.9] 12/04/2023 Yes    Postpartum care following vaginal delivery [Z39.2] 12/04/2023 Not Applicable    Cardiac disease in mother affecting pregnancy - H/o post partum SCAD s/p CABG  [O99.419, I51.9] 05/05/2023 Yes    History of CAD (coronary artery disease) [Z86.79] 09/19/2013  Not Applicable    Chronic anemia [D64.9] 2013 Yes     Chronic      Problems Resolved During this Admission:       Acute Heart Failure   As above, patient presented to the [facility] for concerns of worsening CP, SOB, cough. BNP 1215 and CXR with bilateral edema concerning for acute heart failure. Patient transferred to [facility] [place] for acute cardiology care.   - Since admission and diuresis vital signs have improved. Patient primarily normotensive.    - Suspect elevated BP is related to heart failure and not preeclampsia   - UOP: 1.2 cc/kg/hr  - Labs 23:  - CBC 8/9.8/30.3/248  - Cr 0.7, AST/ALT 47/60  - K 4.0, mag 2.5  - Medications: home metoprolol continued   - S/p IV lasix 40 x2 with excellent diuresis as above   - Strict I&O, daily weights   - Echo today  - Management per primary team.  - If there are questions regarding safety of medications/interventions in recent postpartum and breastfeeding state please reach out to M    History of CAD   - See heart failure     Anemia   - H/H  on admit   - Continue home iron     Postpartum State   - Patient is now PPD #6 s/p , POD #6 s/p BTL  - Doing well, meeting all postpartum/postop milestones  - Please provide patient with pads. If she saturates 1-2 pads/hour for 2 hours please notify MFM team  - Recommend ibuprofen/tylenol, oxy PRN for pain   - Please provide patient with pump for use if requested or if patient does not have her own for use     Thank you for your consult. I will follow-up with patient. Please contact us if you have any additional questions.    [facility] spectra: [id number]  Upper level OB resident spectra: [id number]    Roma Cleaning MD  Obstetrics & Gynecology  [facility] - [facility] Int[facility]

## 2023-12-05 NOTE — ASSESSMENT & PLAN NOTE
Patient noted to be hypertensive on admission with systolics to the 160s. MFM following, low suspicion for preeclampsia/HELLP. Patient with mildly elevated LFTs though no evidence of hemolysis or thrombocytopenia. HTN likely 2/2 volume overload. BP improving with volume removal and nifedipine. Will continue to closely monitor.

## 2023-12-05 NOTE — ASSESSMENT & PLAN NOTE
Patient admitted with acute decompensated HF on PPD#5 following  of 4th child and BTL. OB/GYN consulted on admission to CCU for assistance with post-partum care and discussion for appropriate GDMT given current breast-feeding status. Per MFM low suspicion for preeclampsia or HELLP at this time.

## 2023-12-05 NOTE — NURSING
Dx: Acute heart failure    Shift Events:  Maintained SBP 120s-130s with po metoprolol. Lasix 40mg IVP given as ordered with appropriate response. Electrolytes replaced.  Patient denies pain and sob. Patient in bed with baby and father at bedside.  Bed locked in lowest position, call light in reach.    Neuro: AAO x4    Cards: NSR    Respiratory: Room Air    Vital Signs: /60 (BP Location: Left arm, Patient Position: Lying)   Pulse (!) 59   Temp 98.2 °F (36.8 °C) (Oral)   Resp (!) 25   LMP 03/06/2023   SpO2 98%   Breastfeeding Yes     Diet: Cardiac Diet ; 1500 fluid restriction    Gtts: No gtts    Urine Output: Voids Spontaneously 1050 cc/shift    Labs/Accuchecks: Daily labs.    Skin: No new skin break down noted. Surgical incision CDI.  Patient changes position independently.

## 2023-12-05 NOTE — PROGRESS NOTES
[facility]  Cardiology  Progress Note    Patient Name: Clayton Duarte  MRN: [id number]  Admission Date: 2023  Hospital Length of Stay: 1 days  Code Status: Full Code   Attending Physician: Bernard Garcia MD   Primary Care Physician: Lesley Vanessa MD  Expected Discharge Date:   Principal Problem:Acute heart failure    Subjective:     HPI:  Ms. Clayton Duarte is a 37 y/o F PPD5 s/p  & POD5 s/p BTL with hx of CAD c/b SCAD resulting in 2v CABG (LIMA to LAD, VG to OM1)  (follows with Dr. Levine and Dr. Narvaez), anemia, VDD who presents as transfer from [facility] for management of post-partum HF. Patient initially presented to [facility] ED for evaluation of 2 days of worsening SOB, orthopnea, and chest pain. Patient was recently discharged from [facility] on  after an uncomplicated labor and  on . Patient was discharged home in her usual state of health. However, the day after discharge patient began experiencing coughing and difficulty breathing. That night she had significant orthopnea requiring several pillows to prop her up.     In the [facility] ED, patient afebrile, hypertensive with SBP 150s, satting well on RA. CBC showing hgb 9.0 (BL 10). CMP grossly unremarkable, renal function and electrolytes stable. BNP elevated to 1200. Trop wnl. CXR notable for bilateral edema. JVD noted on PEx. [place] [facility] for continued management in CCU.     Hospital Course:   Patient admitted to CCU for management of acute decompensated HF. Patient given IV lasix with good UOP and significant improvement in sxs. Bedside echo showing low-normal EF without any acute changes from prior. Formal echo pending. Patient stable for stepdown to floor for continued management. MFM following, low suspicion for preeclampsia/HELLP at this time, hypertension likely 2/2 HF and volume overload. LFTs mildly elevated, however no evidence of hemolysis,  thrombocytopenia. BP improved with volume removal and nifedipine.     Interval History: NAKAYLYNNON. 1.45L UOP since admission. Formal echo pending this am. BP improving on nifedipine.     Review of Systems   Constitutional: Negative for fever and malaise/fatigue.   HENT:  Negative for congestion and sore throat.    Cardiovascular:  Negative for dyspnea on exertion, leg swelling, near-syncope, orthopnea, palpitations and syncope.   Respiratory:  Negative for cough, shortness of breath, sputum production and wheezing.    Musculoskeletal:  Positive for myalgias.     Objective:     Vital Signs (Most Recent):  Temp: 98.2 °F (36.8 °C) (12/05/23 0315)  Pulse: 75 (12/05/23 0815)  Resp: (!) 33 (12/05/23 0815)  BP: 137/76 (12/05/23 0600)  SpO2: 99 % (12/05/23 0815) Vital Signs (24h Range):  Temp:  [98.1 °F (36.7 °C)-98.5 °F (36.9 °C)] 98.2 °F (36.8 °C)  Pulse:  [] 75  Resp:  [18-41] 33  SpO2:  [94 %-100 %] 99 %  BP: ()/(57-86) 137/76        There is no height or weight on file to calculate BMI.     SpO2: 99 %         Intake/Output Summary (Last 24 hours) at 12/5/2023 0823  Last data filed at 12/4/2023 2301  Gross per 24 hour   Intake 470 ml   Output 1450 ml   Net -980 ml       Lines/Drains/Airways       Peripheral Intravenous Line  Duration                  Peripheral IV - Single Lumen 12/04/23 1204 Posterior;Right Hand <1 day                       Physical Exam  Vitals and nursing note reviewed.   Constitutional:       General: She is not in acute distress.     Appearance: Normal appearance. She is not ill-appearing, toxic-appearing or diaphoretic.   Cardiovascular:      Rate and Rhythm: Normal rate and regular rhythm.      Pulses: Normal pulses.      Heart sounds: Normal heart sounds. No murmur heard.     No friction rub. No gallop.   Pulmonary:      Effort: Pulmonary effort is normal. No respiratory distress.      Breath sounds: Normal breath sounds.   Abdominal:      General: Abdomen is flat.      Palpations:  Abdomen is soft.   Musculoskeletal:      Right lower leg: Edema (trace pitting) present.      Left lower leg: Edema (trace pitting) present.   Skin:     General: Skin is warm and dry.   Neurological:      General: No focal deficit present.      Mental Status: She is alert and oriented to person, place, and time.   Psychiatric:         Mood and Affect: Mood normal.         Behavior: Behavior normal.            Significant Labs: CMP   Recent Labs   Lab 12/04/23  1049 12/04/23  2242 12/05/23  0333    142 140   K 3.5 3.6 4.0    108 111*   CO2 24 25 22*   GLU 91 90 77   BUN 8 8 8   CREATININE 0.8 0.7 0.7   CALCIUM 9.7 10.0 9.7   PROT 5.7*  --  6.3   ALBUMIN 2.6*  --  2.8*   BILITOT 0.3  --  0.5   ALKPHOS 78  --  86   AST 38  --  47*   ALT 43  --  60*   ANIONGAP 8 9 7*    and CBC   Recent Labs   Lab 12/04/23  1049 12/05/23  0333   WBC 9.02 8.38   HGB 9.0* 9.8*   HCT 28.8* 30.3*    248       Significant Imaging:  All relevant imaging studies have been reviewed.  Assessment and Plan:       * Acute heart failure  37 y/o F with hx of SCAD s/p CABG x2 2013 presenting with symptoms of peripartum heart failure. Patient appears volume overloaded on exam. Bedside echo showing no new reduction in EF, formal echo pending. Volume status and sxs greatly improved with doses of IV lasix.     Patient is identified as having heart failure that is Acute. CHF is currently uncontrolled due to Continued edema of extremities and JVD, Dyspnea not returned to baseline after 1 doses of IV diuretic, >3 pillow orthopnea, Rales/crackles on pulmonary exam, and Pulmonary edema/pleural effusion on CXR. Latest ECHO performed and demonstrates- Results for orders placed during the hospital encounter of 11/29/23    Echo 11/30/23    Interpretation Summary    Left Ventricle: The left ventricle is normal in size. Ventricular mass is normal. Normal wall thickness. Regional wall motion abnormalities are present. The mid anteroseptal wall is  severely hypokinetic. The apical septum and apex are moderately hypokinetic. The remainder of the left ventricular walls contract well. There is low normal systolic function. Ejection fraction by visual approximation is 50%. There is normal diastolic function.    Right Ventricle: Normal right ventricular cavity size. Wall thickness is normal. Right ventricle wall motion  is normal. Systolic function is normal.    Mitral Valve: There is mild regurgitation with a centrally directed jet.    Tricuspid Valve: There is mild regurgitation with a centrally directed jet.    Pulmonary Artery: No pulmonary hypertension. The estimated pulmonary artery systolic pressure is 31 mmHg.    IVC/SVC: Normal venous pressure at 3 mmHg.    Continue Beta Blocker and monitor clinical status closely. Monitor on telemetry. Patient is off CHF pathway.  Monitor strict Is&Os and daily weights.  Place on fluid restriction of 1.5 L. Continue to stress to patient importance of self efficacy and  on diet for CHF. Last BNP reviewed- and noted below   Recent Labs   Lab 23  1049   BNP 1,215*       -- f/u formal echo  -- continue home toprol  -- strict I/Os and daily weights  -- serial metabolic panels  -- Mag > 2, K > 4      Hypertension  Patient noted to be hypertensive on admission with systolics to the 160s. MFM following, low suspicion for preeclampsia/HELLP. Patient with mildly elevated LFTs though no evidence of hemolysis or thrombocytopenia. HTN likely 2/2 volume overload. BP improving with volume removal and nifedipine. Will continue to closely monitor.     Postpartum care following vaginal delivery  Patient admitted with acute decompensated HF on PPD#5 following  of 4th child and BTL. OB/GYN consulted on admission to CCU for assistance with post-partum care and discussion for appropriate GDMT given current breast-feeding status. Per MFM low suspicion for preeclampsia or HELLP at this time.     Cardiac disease in mother  affecting pregnancy - H/o post partum SCAD s/p CABG   -- s/p CABG x2  (LIMA to LAD, VG to OM1)    History of CAD (coronary artery disease)  -- c/b SCAD    Cath 2013  Left Main Coronary Artery:              The distal LM has a 70% stenosis. There is MARY ELLEN 2 flow.      - Left Anterior Descending Artery:             The LAD has a 90% stenosis. There is MARY ELLEN 2 flow.      - Ramus:              The ramus has a 90% stenosis. There is MARY ELLEN 2 flow.      - Left Circumflex Artery:              The LCX has a 75% stenosis. There is MARY ELLEN 3 flow. The remaining portion of the vessel has luminal irregularities.      - OM1:              The OM1 is normal. There is MARY ELLEN 3 flow.      - OM2:              The OM2 is normal. There is MARY ELLEN 3 flow. The remaining portion of the vessel is of small caliber.      - OM3:              The OM3 is normal. There is MARY ELLEN 3 flow. The remaining portion of the vessel is of small caliber.      - Left Posterior Descending Artery:              The left PDA is normal. There is MARY ELLEN 3 flow. The remaining portion of the vessel is of small caliber.      - Right Coronary Artery:              The RCA is normal. There is MARY ELLEN 3 flow.      - Posterior Lateral Branch:              The PLB is normal. There is MARY ELLEN 3 flow.      - Posterior Descending Artery:              The PDA is normal. There is MARY ELLEN 3 flow.     Chronic anemia  -- Hgb on admission 9.0 (BL ~10). Stable during hospitalization  -- daily CBC  -- transfuse for hgb < 7  -- continue home iron supplement        VTE Risk Mitigation (From admission, onward)           Ordered     IP VTE HIGH RISK PATIENT  Once         12/04/23 1428     Place sequential compression device  Until discontinued         12/04/23 1428                    Jesse Bass MD  Cardiology  [facility]

## 2023-12-05 NOTE — ASSESSMENT & PLAN NOTE
39 y/o F with hx of SCAD s/p CABG x2 2013 presenting with symptoms of peripartum heart failure. Patient appears volume overloaded on exam. Bedside echo showing no new reduction in EF, formal echo pending. Volume status and sxs greatly improved with doses of IV lasix.     Patient is identified as having heart failure that is Acute. CHF is currently uncontrolled due to Continued edema of extremities and JVD, Dyspnea not returned to baseline after 1 doses of IV diuretic, >3 pillow orthopnea, Rales/crackles on pulmonary exam, and Pulmonary edema/pleural effusion on CXR. Latest ECHO performed and demonstrates- Results for orders placed during the hospital encounter of 11/29/23    Echo 11/30/23    Interpretation Summary    Left Ventricle: The left ventricle is normal in size. Ventricular mass is normal. Normal wall thickness. Regional wall motion abnormalities are present. The mid anteroseptal wall is severely hypokinetic. The apical septum and apex are moderately hypokinetic. The remainder of the left ventricular walls contract well. There is low normal systolic function. Ejection fraction by visual approximation is 50%. There is normal diastolic function.    Right Ventricle: Normal right ventricular cavity size. Wall thickness is normal. Right ventricle wall motion  is normal. Systolic function is normal.    Mitral Valve: There is mild regurgitation with a centrally directed jet.    Tricuspid Valve: There is mild regurgitation with a centrally directed jet.    Pulmonary Artery: No pulmonary hypertension. The estimated pulmonary artery systolic pressure is 31 mmHg.    IVC/SVC: Normal venous pressure at 3 mmHg.    Continue Beta Blocker and monitor clinical status closely. Monitor on telemetry. Patient is off CHF pathway.  Monitor strict Is&Os and daily weights.  Place on fluid restriction of 1.5 L. Continue to stress to patient importance of self efficacy and  on diet for CHF. Last BNP reviewed- and noted below    Recent Labs   Lab 12/04/23  1049   BNP 1,215*       -- f/u formal echo  -- continue home toprol  -- strict I/Os and daily weights  -- serial metabolic panels  -- Mag > 2, K > 4

## 2023-12-05 NOTE — NURSING TRANSFER
Nursing Transfer Note      12/5/2023   4:51 PM    Nurse giving handoff:Bill JAUREGUI   Nurse receiving handoff:CSU nurse    Reason patient is being transferred: Stepdown    Transfer From: SICU    Transfer via wheelchair    Transfer with cardiac monitoring    Transported by RN    Transfer Vital Signs:  Blood Pressure:110/80   Heart Rate:80  O2:99%  Temperature:98.4    Respirations:18    Telemetry: Rhythm NSR  Order for Tele Monitor? Yes    Additional Lines:   4eyes on Skin: yes    Medicines sent: N/A    Any special needs or follow-up needed: Pt has 5 day old with her,     Patient belongings transferred with patient: Yes    Chart send with patient: Yes    Notified: Father    Patient reassessed at: 1640 by CSU nurse  1  Upon arrival to floor: cardiac monitor applied, patient oriented to room, call bell in reach, and bed in lowest position

## 2023-12-05 NOTE — SUBJECTIVE & OBJECTIVE
Interval History: NAEON. 1.45L UOP since admission. Formal echo pending this am. BP improving on nifedipine.     Review of Systems   Constitutional: Negative for fever and malaise/fatigue.   HENT:  Negative for congestion and sore throat.    Cardiovascular:  Negative for dyspnea on exertion, leg swelling, near-syncope, orthopnea, palpitations and syncope.   Respiratory:  Negative for cough, shortness of breath, sputum production and wheezing.    Musculoskeletal:  Positive for myalgias.     Objective:     Vital Signs (Most Recent):  Temp: 98.2 °F (36.8 °C) (12/05/23 0315)  Pulse: 75 (12/05/23 0815)  Resp: (!) 33 (12/05/23 0815)  BP: 137/76 (12/05/23 0600)  SpO2: 99 % (12/05/23 0815) Vital Signs (24h Range):  Temp:  [98.1 °F (36.7 °C)-98.5 °F (36.9 °C)] 98.2 °F (36.8 °C)  Pulse:  [] 75  Resp:  [18-41] 33  SpO2:  [94 %-100 %] 99 %  BP: ()/(57-86) 137/76        There is no height or weight on file to calculate BMI.     SpO2: 99 %         Intake/Output Summary (Last 24 hours) at 12/5/2023 0823  Last data filed at 12/4/2023 2301  Gross per 24 hour   Intake 470 ml   Output 1450 ml   Net -980 ml       Lines/Drains/Airways       Peripheral Intravenous Line  Duration                  Peripheral IV - Single Lumen 12/04/23 1204 Posterior;Right Hand <1 day                       Physical Exam  Vitals and nursing note reviewed.   Constitutional:       General: She is not in acute distress.     Appearance: Normal appearance. She is not ill-appearing, toxic-appearing or diaphoretic.   Cardiovascular:      Rate and Rhythm: Normal rate and regular rhythm.      Pulses: Normal pulses.      Heart sounds: Normal heart sounds. No murmur heard.     No friction rub. No gallop.   Pulmonary:      Effort: Pulmonary effort is normal. No respiratory distress.      Breath sounds: Normal breath sounds.   Abdominal:      General: Abdomen is flat.      Palpations: Abdomen is soft.   Musculoskeletal:      Right lower leg: Edema (trace  pitting) present.      Left lower leg: Edema (trace pitting) present.   Skin:     General: Skin is warm and dry.   Neurological:      General: No focal deficit present.      Mental Status: She is alert and oriented to person, place, and time.   Psychiatric:         Mood and Affect: Mood normal.         Behavior: Behavior normal.            Significant Labs: CMP   Recent Labs   Lab 12/04/23  1049 12/04/23  2242 12/05/23  0333    142 140   K 3.5 3.6 4.0    108 111*   CO2 24 25 22*   GLU 91 90 77   BUN 8 8 8   CREATININE 0.8 0.7 0.7   CALCIUM 9.7 10.0 9.7   PROT 5.7*  --  6.3   ALBUMIN 2.6*  --  2.8*   BILITOT 0.3  --  0.5   ALKPHOS 78  --  86   AST 38  --  47*   ALT 43  --  60*   ANIONGAP 8 9 7*    and CBC   Recent Labs   Lab 12/04/23  1049 12/05/23  0333   WBC 9.02 8.38   HGB 9.0* 9.8*   HCT 28.8* 30.3*    248       Significant Imaging:  All relevant imaging studies have been reviewed.

## 2023-12-06 ENCOUNTER — PATIENT MESSAGE (OUTPATIENT)
Dept: OBSTETRICS AND GYNECOLOGY | Facility: CLINIC | Age: 38
End: 2023-12-06
Payer: MEDICAID

## 2023-12-06 VITALS
BODY MASS INDEX: 27.66 KG/M2 | WEIGHT: 166 LBS | TEMPERATURE: 98 F | OXYGEN SATURATION: 98 % | SYSTOLIC BLOOD PRESSURE: 120 MMHG | RESPIRATION RATE: 18 BRPM | DIASTOLIC BLOOD PRESSURE: 65 MMHG | HEIGHT: 65 IN | HEART RATE: 73 BPM

## 2023-12-06 LAB
ALBUMIN SERPL BCP-MCNC: 2.7 G/DL (ref 3.5–5.2)
ALP SERPL-CCNC: 79 U/L (ref 55–135)
ALT SERPL W/O P-5'-P-CCNC: 49 U/L (ref 10–44)
ANION GAP SERPL CALC-SCNC: 11 MMOL/L (ref 8–16)
AST SERPL-CCNC: 28 U/L (ref 10–40)
BASOPHILS # BLD AUTO: 0.04 K/UL (ref 0–0.2)
BASOPHILS NFR BLD: 0.5 % (ref 0–1.9)
BILIRUB SERPL-MCNC: 0.5 MG/DL (ref 0.1–1)
BUN SERPL-MCNC: 10 MG/DL (ref 6–20)
CALCIUM SERPL-MCNC: 10.1 MG/DL (ref 8.7–10.5)
CHLORIDE SERPL-SCNC: 112 MMOL/L (ref 95–110)
CO2 SERPL-SCNC: 21 MMOL/L (ref 23–29)
CREAT SERPL-MCNC: 0.7 MG/DL (ref 0.5–1.4)
DIFFERENTIAL METHOD: ABNORMAL
EOSINOPHIL # BLD AUTO: 0.1 K/UL (ref 0–0.5)
EOSINOPHIL NFR BLD: 1.7 % (ref 0–8)
ERYTHROCYTE [DISTWIDTH] IN BLOOD BY AUTOMATED COUNT: 14.4 % (ref 11.5–14.5)
EST. GFR  (NO RACE VARIABLE): >60 ML/MIN/1.73 M^2
FINAL PATHOLOGIC DIAGNOSIS: NORMAL
GLUCOSE SERPL-MCNC: 67 MG/DL (ref 70–110)
HCT VFR BLD AUTO: 30.9 % (ref 37–48.5)
HGB BLD-MCNC: 9.9 G/DL (ref 12–16)
IMM GRANULOCYTES # BLD AUTO: 0.05 K/UL (ref 0–0.04)
IMM GRANULOCYTES NFR BLD AUTO: 0.6 % (ref 0–0.5)
LYMPHOCYTES # BLD AUTO: 2.1 K/UL (ref 1–4.8)
LYMPHOCYTES NFR BLD: 27.3 % (ref 18–48)
Lab: NORMAL
MAGNESIUM SERPL-MCNC: 1.9 MG/DL (ref 1.6–2.6)
MCH RBC QN AUTO: 26.3 PG (ref 27–31)
MCHC RBC AUTO-ENTMCNC: 32 G/DL (ref 32–36)
MCV RBC AUTO: 82 FL (ref 82–98)
MONOCYTES # BLD AUTO: 0.7 K/UL (ref 0.3–1)
MONOCYTES NFR BLD: 8.7 % (ref 4–15)
NEUTROPHILS # BLD AUTO: 4.7 K/UL (ref 1.8–7.7)
NEUTROPHILS NFR BLD: 61.2 % (ref 38–73)
NRBC BLD-RTO: 0 /100 WBC
PLATELET # BLD AUTO: 276 K/UL (ref 150–450)
PMV BLD AUTO: 9.8 FL (ref 9.2–12.9)
POTASSIUM SERPL-SCNC: 4 MMOL/L (ref 3.5–5.1)
PROT SERPL-MCNC: 6 G/DL (ref 6–8.4)
RBC # BLD AUTO: 3.76 M/UL (ref 4–5.4)
SODIUM SERPL-SCNC: 144 MMOL/L (ref 136–145)
WBC # BLD AUTO: 7.73 K/UL (ref 3.9–12.7)

## 2023-12-06 PROCEDURE — 83735 ASSAY OF MAGNESIUM: CPT

## 2023-12-06 PROCEDURE — 80053 COMPREHEN METABOLIC PANEL: CPT

## 2023-12-06 PROCEDURE — 85025 COMPLETE CBC W/AUTO DIFF WBC: CPT

## 2023-12-06 PROCEDURE — 25000003 PHARM REV CODE 250

## 2023-12-06 PROCEDURE — 99239 HOSP IP/OBS DSCHRG MGMT >30: CPT | Mod: ,,, | Performed by: INTERNAL MEDICINE

## 2023-12-06 PROCEDURE — 36415 COLL VENOUS BLD VENIPUNCTURE: CPT

## 2023-12-06 PROCEDURE — 99239 PR HOSPITAL DISCHARGE DAY,>30 MIN: ICD-10-PCS | Mod: ,,, | Performed by: INTERNAL MEDICINE

## 2023-12-06 RX ORDER — FUROSEMIDE 20 MG/1
10 TABLET ORAL DAILY PRN
Qty: 30 TABLET | Refills: 11 | Status: SHIPPED | OUTPATIENT
Start: 2023-12-06 | End: 2024-12-05

## 2023-12-06 RX ORDER — NIFEDIPINE 30 MG/1
30 TABLET, EXTENDED RELEASE ORAL DAILY
Qty: 30 TABLET | Refills: 11 | Status: SHIPPED | OUTPATIENT
Start: 2023-12-07 | End: 2024-01-09 | Stop reason: SDUPTHER

## 2023-12-06 RX ADMIN — NIFEDIPINE 30 MG: 30 TABLET, FILM COATED, EXTENDED RELEASE ORAL at 08:12

## 2023-12-06 RX ADMIN — DOCUSATE SODIUM 200 MG: 100 CAPSULE, LIQUID FILLED ORAL at 08:12

## 2023-12-06 RX ADMIN — METOPROLOL SUCCINATE 25 MG: 25 TABLET, EXTENDED RELEASE ORAL at 08:12

## 2023-12-06 RX ADMIN — FERROUS SULFATE TAB 325 MG (65 MG ELEMENTAL FE) 1 EACH: 325 (65 FE) TAB at 08:12

## 2023-12-06 NOTE — NURSING
Patient is AAOX4, VSS, NAD. Discharged instructions reviewed and explained. Patient verbalized understanding. Telemetry monitor and PIV removed. Patient is to be escorted via wheel chair to vehicle by a transporter.

## 2023-12-06 NOTE — PLAN OF CARE
Patient cooperative and pleasant with routine care and procedures.  Fall precautions reviewed and patient verbalized understanding.  Instructed on fluid restrictions and monitoring intake and output.  Baby at bedside.  Resting quietly and without complaints.  Will continue to monitor.

## 2023-12-06 NOTE — PLAN OF CARE
Problem: Adult Inpatient Plan of Care  Goal: Plan of Care Review  Outcome: Ongoing, Progressing  Flowsheets (Taken 12/5/2023 1825)  Plan of Care Reviewed With:   patient   family  Goal: Optimal Comfort and Wellbeing  Outcome: Ongoing, Progressing  Intervention: Provide Person-Centered Care  Flowsheets (Taken 12/5/2023 1825)  Trust Relationship/Rapport:   care explained   empathic listening provided   questions answered   thoughts/feelings acknowledged     Problem: Fall Injury Risk  Goal: Absence of Fall and Fall-Related Injury  Outcome: Ongoing, Progressing  Intervention: Identify and Manage Contributors  Flowsheets (Taken 12/5/2023 1825)  Self-Care Promotion:   independence encouraged   meal set-up provided  Medication Review/Management: medications reviewed  Intervention: Promote Injury-Free Environment  Flowsheets (Taken 12/5/2023 1825)  Safety Promotion/Fall Prevention:   assistive device/personal item within reach   Fall Risk signage in place   family to remain at bedside   lighting adjusted   nonskid shoes/socks when out of bed   instructed to call staff for mobility

## 2023-12-06 NOTE — PLAN OF CARE
[facility] - Cardiology Stepdown  Discharge Final Note    Primary Care Provider: Lesley Vanessa MD    Expected Discharge Date: 12/6/2023    Final Discharge Note (most recent)       Final Note - 12/06/23 1517          Final Note    Assessment Type Final Discharge Note     Anticipated Discharge Disposition Home or Self Care                   [facility] notified of need for general cardiology follow up.      Alix Ocasio LMSW  [facility]  [id number]

## 2023-12-06 NOTE — PROGRESS NOTES
[facility] - Cardiology Stepdown  Obstetrics & Gynecology  Progress Note    Patient Name: Clayton Duarte  MRN: [id number]  Admission Date: 2023  Primary Care Provider: Lesley Vanessa MD  Principal Problem: Acute heart failure    Subjective:   History of Present Illness:   Clayton Duarte is a 38 y.o.  now PPD #6 s/p , POD #6 s/p BTL who presented initially to the [facility] for concerns of cough, SOB, and CP. Patient has a history of SCAD s/p CABG x2 () and is followed closely by cardiology. She had an echo on PPD #1 that was overall stable with an EF of 50%, mild mitral and tricuspid valve regurgitation. She was completely asymptomatic at the time of discharge.      The day after discharge (3 days ago), patient started experiencing cough, SOB worsened with exertion, orthopnea (requiring at least 3 pillows to sleep), and chest tightness prompting her to present to the [facility]. While in the [facility] patient had persistent mild range BP, O2 sats 93%+ on RA, tachypnea to the 20s. On exam she had JVD and crackles. CBC, CMP stable, troponin negative. BNP 1215. CXR remarkable for bilateral edema concerning for CHF. Due to patients significant cardiac history and acute heart failure decision was made to transfer to cardiology at [facility] for acute care.    Interval History: NAEON. Patient continues to report feeling well. Denies cough, SOB, CP. Reports lochia is mild. No pain.     Scheduled Meds:   docusate sodium  200 mg Oral BID    ferrous sulfate  1 tablet Oral Daily    metoprolol succinate  25 mg Oral Daily    NIFEdipine  30 mg Oral Daily     Continuous Infusions:  PRN Meds:acetaminophen, hydrALAZINE, sodium chloride 0.9%    Review of patient's allergies indicates:   Allergen Reactions    Bananas [banana] Itching    Peanut butter flavor Hives       Objective:     Vital Signs (Most Recent):  Temp: 98.6 °F (37 °C) (23)  Pulse: (!) 59 (23 0600)  Resp: 18 (23)  BP: 113/79  (12/06/23 0434)  SpO2: 96 % (12/06/23 0434) Vital Signs (24h Range):  Temp:  [98 °F (36.7 °C)-98.9 °F (37.2 °C)] 98.6 °F (37 °C)  Pulse:  [] 59  Resp:  [16-48] 18  SpO2:  [95 %-100 %] 96 %  BP: (101-151)/(60-79) 113/79     Weight: 75.3 kg (166 lb)  Body mass index is 27.62 kg/m².  Patient's last menstrual period was 03/06/2023.    I&O (Last 24H):    Intake/Output Summary (Last 24 hours) at 12/6/2023 0623  Last data filed at 12/5/2023 2300  Gross per 24 hour   Intake 540 ml   Output 950 ml   Net -410 ml       Physical Exam:   Constitutional: She is oriented to person, place, and time. She appears well-developed and well-nourished.    HENT:   Head: Normocephalic and atraumatic.    Eyes: EOM are normal.     Cardiovascular:  Normal rate.             Pulmonary/Chest: Effort normal.        Abdominal: Soft. She exhibits abdominal incision (BTL incision c/d/i without surrounding erythema or induration). There is no abdominal tenderness. There is no rebound and no guarding.     Genitourinary:    Genitourinary Comments: Deferred             Musculoskeletal: Normal range of motion. No edema.       Neurological: She is alert and oriented to person, place, and time.    Skin: Skin is warm and dry.    Psychiatric: She has a normal mood and affect. Her behavior is normal.       Laboratory:  Recent Results (from the past 24 hour(s))   Urinalysis    Collection Time: 12/05/23 10:08 AM   Result Value Ref Range    Specimen UA Urine, Clean Catch     Color, UA Yellow Yellow, Straw, Karo    Appearance, UA Hazy (A) Clear    pH, UA 7.0 5.0 - 8.0    Specific Gravity, UA 1.020 1.005 - 1.030    Protein, UA Negative Negative    Glucose, UA Negative Negative    Ketones, UA Negative Negative    Bilirubin (UA) Negative Negative    Occult Blood UA 3+ (A) Negative    Nitrite, UA Negative Negative    Leukocytes, UA Trace (A) Negative   Urinalysis Microscopic    Collection Time: 12/05/23 10:08 AM   Result Value Ref Range    RBC, UA >100 (H) 0 -  "4 /hpf    WBC, UA 9 (H) 0 - 5 /hpf    Bacteria Rare None-Occ /hpf    Squam Epithel, UA 0 /hpf    Microscopic Comment SEE COMMENT    Echo    Collection Time: 12/05/23  2:11 PM   Result Value Ref Range    RA Width 2.84 cm    LA volume (mod) 36.89 cm3    Left Atrium Major Axis 4.99 cm    Left Atrium Minor Axis 4.63 cm    RA Major Axis 4.19 cm    LV Diastolic Volume 104.32 mL    LV Systolic Volume 56.61 mL    PV Peak D Brian 0.43 m/s    PV Peak S Brian 0.46 m/s    MV Peak A Brian 0.74 m/s    MV stenosis pressure 1/2 time 67.85 ms    TR Max Brian 2.15 m/s    MV Peak E Brian 0.79 m/s    Ao VTI 33.55 cm    Ao peak brian 1.42 m/s    LVOT peak VTI 29.64 cm    LVOT peak brian 1.26 m/s    LVOT diameter 1.98 cm    MV "A" wave duration 19.79 msec    E wave deceleration time 233.98 msec    AV mean gradient 4 mmHg    TAPSE 1.91 cm    RVDD 3.00 cm    LA size 3.18 cm    Ascending aorta 2.81 cm    STJ 2.66 cm    Sinus 2.85 cm    LVIDs 3.66 2.1 - 4.0 cm    Posterior Wall 0.74 0.6 - 1.1 cm    IVS 0.65 0.6 - 1.1 cm    LVIDd 4.74 3.5 - 6.0 cm    TDI LATERAL 0.12 m/s    LA WIDTH 3.43 cm    TDI SEPTAL 0.08 m/s    LV LATERAL E/E' RATIO 6.58 m/s    LV SEPTAL E/E' RATIO 9.88 m/s    FS 23 (A) 28 - 44 %    LA volume 44.53 cm3    LV mass 103.64 g    Left Ventricle Relative Wall Thickness 0.31 cm    AV valve area 2.72 cm²    AV Velocity Ratio 0.89     AV index (prosthetic) 0.88     MV valve area p 1/2 method 3.24 cm2    E/A ratio 1.07     Mean e' 0.10 m/s    Pulm vein S/D ratio 1.07     LVOT area 3.1 cm2    LVOT stroke volume 91.22 cm3    AV peak gradient 8 mmHg    E/E' ratio 7.90 m/s    Triscuspid Valve Regurgitation Peak Gradient 18 mmHg    NIKKY by Velocity Ratio 2.73 cm²    BSA 1.86 m2    LV Systolic Volume Index 30.9 mL/m2    LV Diastolic Volume Index 57.01 mL/m2    LV Mass Index 57 g/m2    LA Volume Index 24.3 mL/m2    LA Volume Index (Mod) 20.2 mL/m2    ZLVIDS 1.23     ZLVIDD -0.68     TV resting pulmonary artery pressure 21 mmHg    RV TB RVSP 5 mmHg    " Est. RA pres 3 mmHg    EF 50 %   Comprehensive metabolic panel    Collection Time: 12/05/23  4:26 PM   Result Value Ref Range    Sodium 146 (H) 136 - 145 mmol/L    Potassium 4.1 3.5 - 5.1 mmol/L    Chloride 112 (H) 95 - 110 mmol/L    CO2 22 (L) 23 - 29 mmol/L    Glucose 106 70 - 110 mg/dL    BUN 10 6 - 20 mg/dL    Creatinine 0.8 0.5 - 1.4 mg/dL    Calcium 10.1 8.7 - 10.5 mg/dL    Total Protein 6.7 6.0 - 8.4 g/dL    Albumin 2.9 (L) 3.5 - 5.2 g/dL    Total Bilirubin 0.5 0.1 - 1.0 mg/dL    Alkaline Phosphatase 90 55 - 135 U/L    AST 46 (H) 10 - 40 U/L    ALT 68 (H) 10 - 44 U/L    eGFR >60.0 >60 mL/min/1.73 m^2    Anion Gap 12 8 - 16 mmol/L   Comprehensive metabolic panel    Collection Time: 12/06/23  3:14 AM   Result Value Ref Range    Sodium 144 136 - 145 mmol/L    Potassium 4.0 3.5 - 5.1 mmol/L    Chloride 112 (H) 95 - 110 mmol/L    CO2 21 (L) 23 - 29 mmol/L    Glucose 67 (L) 70 - 110 mg/dL    BUN 10 6 - 20 mg/dL    Creatinine 0.7 0.5 - 1.4 mg/dL    Calcium 10.1 8.7 - 10.5 mg/dL    Total Protein 6.0 6.0 - 8.4 g/dL    Albumin 2.7 (L) 3.5 - 5.2 g/dL    Total Bilirubin 0.5 0.1 - 1.0 mg/dL    Alkaline Phosphatase 79 55 - 135 U/L    AST 28 10 - 40 U/L    ALT 49 (H) 10 - 44 U/L    eGFR >60.0 >60 mL/min/1.73 m^2    Anion Gap 11 8 - 16 mmol/L   CBC auto differential    Collection Time: 12/06/23  3:14 AM   Result Value Ref Range    WBC 7.73 3.90 - 12.70 K/uL    RBC 3.76 (L) 4.00 - 5.40 M/uL    Hemoglobin 9.9 (L) 12.0 - 16.0 g/dL    Hematocrit 30.9 (L) 37.0 - 48.5 %    MCV 82 82 - 98 fL    MCH 26.3 (L) 27.0 - 31.0 pg    MCHC 32.0 32.0 - 36.0 g/dL    RDW 14.4 11.5 - 14.5 %    Platelets 276 150 - 450 K/uL    MPV 9.8 9.2 - 12.9 fL    Immature Granulocytes 0.6 (H) 0.0 - 0.5 %    Gran # (ANC) 4.7 1.8 - 7.7 K/uL    Immature Grans (Abs) 0.05 (H) 0.00 - 0.04 K/uL    Lymph # 2.1 1.0 - 4.8 K/uL    Mono # 0.7 0.3 - 1.0 K/uL    Eos # 0.1 0.0 - 0.5 K/uL    Baso # 0.04 0.00 - 0.20 K/uL    nRBC 0 0 /100 WBC    Gran % 61.2 38.0 - 73.0 %     Lymph % 27.3 18.0 - 48.0 %    Mono % 8.7 4.0 - 15.0 %    Eosinophil % 1.7 0.0 - 8.0 %    Basophil % 0.5 0.0 - 1.9 %    Differential Method Automated    Magnesium    Collection Time: 12/06/23  3:14 AM   Result Value Ref Range    Magnesium 1.9 1.6 - 2.6 mg/dL       Diagnostic Results:  Imaging Results              X-Ray Chest AP Portable (Final result)  Result time 12/04/23 11:53:13      Final result by Elver Branch III, MD (12/04/23 11:53:13)                   Impression:      Findings are worrisome for CHF.  Pneumonia aspiration sepsis cannot be excluded.      Electronically signed by: Elver Branch MD  Date:    12/04/2023  Time:    11:53               Narrative:    EXAMINATION:  XR CHEST AP PORTABLE    CLINICAL HISTORY:  Shortness of breath    FINDINGS:  Chest one view portable.    Heart size is normal.  There is bilateral edema.  There is postoperative change.  Bones are noncontributory.                                    Echo 12/5/23:   Left Ventricle: The left ventricle is normal in size. Normal wall thickness. Regional wall motion abnormalities present. See diagram for wall motion findings. There is low normal systolic function with a visually estimated ejection fraction of 50 - 55%. Ejection fraction by visual approximation is 50%. There is normal diastolic function.    Right Ventricle: Normal right ventricular cavity size. Wall thickness is normal. Right ventricle wall motion  is normal. Systolic function is normal.    Pulmonary Artery: The estimated pulmonary artery systolic pressure is 21 mmHg.    IVC/SVC: Normal venous pressure at 3 mmHg.    Assessment/Plan:     Active Diagnoses:    Diagnosis Date Noted POA    PRINCIPAL PROBLEM:  Acute heart failure [I50.9] 12/04/2023 Yes    Hypertension [I10] 12/05/2023 Yes    Postpartum care following vaginal delivery [Z39.2] 12/04/2023 Not Applicable    Cardiac disease in mother affecting pregnancy - H/o post partum SCAD s/p CABG  [O99.419, I51.9] 05/05/2023 Yes     History of CAD (coronary artery disease) [Z86.79] 2013 Not Applicable    Chronic anemia [D64.9] 2013 Yes     Chronic      Problems Resolved During this Admission:       Acute Heart Failure   As above, patient presented to the [facility] for concerns of worsening CP, SOB, cough. BNP 1215 and CXR with bilateral edema concerning for acute heart failure. Patient transferred to [facility] [place] for acute cardiology care.   - Since admission and diuresis vital signs have improved. Patient primarily normotensive.   - UOP adequate   - Labs 23:  - CBC 7/9.9/30.9/276  - Cr 0.7, AST/ALT 28/49 (downtrended from 46/68)  - K 4.0, mag 1.9  - Medications: home metoprolol continued   - S/p IV lasix 40 x2 with excellent diuresis as above   - Strict I&O, daily weights   - Echo today  - Management per primary team.  - Suspect elevated BP is related to heart failure/volume overload and not preeclampsia. Additionally, mild transaminitis is likely a result of passive hepatic congestion as LFTs have downtrended again today  - If there are questions regarding safety of medications/interventions in recent postpartum and breastfeeding state please reach out to MFM     History of CAD   - See heart failure      Anemia   - H/H  on admit   - Continue home iron      Postpartum State   - Patient is now PPD #7 s/p , POD #7 s/p BTL  - Doing well, meeting all postpartum/postop milestones  - Please provide patient with pads. If she saturates 1-2 pads/hour for 2 hours please notify MFM team  - Recommend ibuprofen/tylenol, oxy PRN for pain   - Please provide patient with pump for use if requested or if patient does not have her own for use      Thank you for your consult. I will follow-up with patient. Please contact us if you have any additional questions.     [facility] spectra: [id number]  Upper level OB resident spectra: [id number]    Roma Cleaning MD  Obstetrics & Gynecology  [facility] - Cardiology Stepdown

## 2023-12-06 NOTE — NURSING
Nurses Note -- 4 Eyes      12/5/2023   6:35 PM      Skin assessed during: Admit      [] No Altered Skin Integrity Present    []Prevention Measures Documented      [x] Yes- Altered Skin Integrity Present or Discovered   [] LDA Added if Not in Epic (Describe Wound)   [] New Altered Skin Integrity was Present on Admit and Documented in LDA   [x] Wound already present in LDA    Wound Care Consulted? No    Attending Nurse:  Leonardo Tripp RN/Staff Member:   Lita

## 2023-12-06 NOTE — DISCHARGE SUMMARY
[facility] - Cardiology Stepdown  Cardiology  Discharge Summary      Patient Name: Clayton Duarte  MRN: [id number]  Admission Date: 2023  Hospital Length of Stay: 2 days  Discharge Date and Time:  2023 12:08 PM  Attending Physician: Bernard Garcia MD    Discharging Provider: Jesse Bass MD  Primary Care Physician: Lesley Vanessa MD    HPI:   Ms. Clayton Duarte is a 39 y/o F PPD5 s/p  & POD5 s/p BTL with hx of CAD c/b SCAD resulting in 2v CABG (LIMA to LAD, VG to OM1)  (follows with Dr. Levine and Dr. Narvaez), anemia, VDD who presents as transfer from [facility] for management of post-partum HF. Patient initially presented to [facility] ED for evaluation of 2 days of worsening SOB, orthopnea, and chest pain. Patient was recently discharged from [facility] on  after an uncomplicated labor and  on . Patient was discharged home in her usual state of health. However, the day after discharge patient began experiencing coughing and difficulty breathing. That night she had significant orthopnea requiring several pillows to prop her up.     In the [facility] ED, patient afebrile, hypertensive with SBP 150s, satting well on RA. CBC showing hgb 9.0 (BL 10). CMP grossly unremarkable, renal function and electrolytes stable. BNP elevated to 1200. Trop wnl. CXR notable for bilateral edema. JVD noted on PEx. Case discussed with CCU staff at [facility] [place] and patient transferred for continued management in CCU.     * No surgery found *     Indwelling Lines/Drains at time of discharge:  Lines/Drains/Airways       None                   Hospital Course:  Patient admitted to CCU for management of acute decompensated HF. Patient given IV lasix with good UOP and significant improvement in sxs. Bedside echo showing low-normal EF without any acute changes from prior. Formal echo pending. Patient stable for stepdown to floor for continued management. MFM following, low suspicion for preeclampsia/HELLP at this  time, hypertension likely 2/2 HF and volume overload. LFTs mildly elevated, however no evidence of hemolysis, thrombocytopenia. BP improved with volume removal and nifedipine. LFTs subsequently improved, Patient stable for discharge on 12/6 with close cardiology follow-up.     Goals of Care Treatment Preferences:  Code Status: Full Code      Vitals:    12/06/23 1149   BP: 120/65   Pulse: 73   Resp: 18   Temp: 98.4 °F (36.9 °C)     Physical Exam  Vitals and nursing note reviewed.   Constitutional:       General: She is not in acute distress.     Appearance: Normal appearance. She is not ill-appearing, toxic-appearing or diaphoretic.   Cardiovascular:      Rate and Rhythm: Normal rate and regular rhythm.      Pulses: Normal pulses.      Heart sounds: Normal heart sounds. No murmur heard.     No friction rub. No gallop.   Pulmonary:      Effort: Pulmonary effort is normal. No respiratory distress.      Breath sounds: Normal breath sounds.   Abdominal:      General: Abdomen is flat.      Palpations: Abdomen is soft.   Musculoskeletal:      Right lower leg: Edema (trace pitting) present.      Left lower leg: Edema (trace pitting) present.   Skin:     General: Skin is warm and dry.   Neurological:      General: No focal deficit present.      Mental Status: She is alert and oriented to person, place, and time.   Psychiatric:         Mood and Affect: Mood normal.         Behavior: Behavior normal.     Consults:   Consults (From admission, onward)          Status Ordering Provider     Inpatient consult to Lactation  Once        Provider:  (Not yet assigned)    Acknowledged ENRICO QUINTANA     Inpatient consult to Obstetrics  Once        Provider:  (Not yet assigned)    Completed DESTINEE ASHLEY            Significant Diagnostic Studies:     X-Ray Chest AP Portable   Final Result      Findings are worrisome for CHF.  Pneumonia aspiration sepsis cannot be excluded.         Electronically signed by: Elver Branch MD    Date:    12/04/2023   Time:    11:53        Echo 12/4/23      Left Ventricle: The left ventricle is normal in size. Normal wall thickness. Regional wall motion abnormalities present. See diagram for wall motion findings. There is low normal systolic function with a visually estimated ejection fraction of 50 - 55%. Ejection fraction by visual approximation is 50%. There is normal diastolic function.    Right Ventricle: Normal right ventricular cavity size. Wall thickness is normal. Right ventricle wall motion  is normal. Systolic function is normal.    Pulmonary Artery: The estimated pulmonary artery systolic pressure is 21 mmHg.    IVC/SVC: Normal venous pressure at 3 mmHg.      Pending Diagnostic Studies:       None            Final Active Diagnoses:    Diagnosis Date Noted POA    PRINCIPAL PROBLEM:  Acute heart failure [I50.9] 12/04/2023 Yes    Hypertension [I10] 12/05/2023 Yes    Postpartum care following vaginal delivery [Z39.2] 12/04/2023 Not Applicable    Cardiac disease in mother affecting pregnancy - H/o post partum SCAD s/p CABG  [O99.419, I51.9] 05/05/2023 Yes    History of CAD (coronary artery disease) [Z86.79] 09/19/2013 Not Applicable    Chronic anemia [D64.9] 09/03/2013 Yes     Chronic      Problems Resolved During this Admission:     No new Assessment & Plan notes have been filed under this hospital service since the last note was generated.  Service: Cardiology      Discharged Condition: stable    Disposition: Home or Self Care    Follow Up:    Patient Instructions:      Ambulatory referral/consult to Cardiology   Standing Status: Future   Referral Priority: Routine Referral Type: Consultation   Referral Reason: Specialty Services Required   Referred to Provider: ALEXANDRIA BROWNING Requested Specialty: Cardiology   Number of Visits Requested: 1     Medications:  Reconciled Home Medications:      Medication List        START taking these medications      furosemide 20 MG tablet  Commonly known as:  LASIX  Take 0.5 tablets (10 mg total) by mouth daily as needed. Please take one half tablet (10mg) if you gain more than 3 pounds in 1 day or more than 5 pounds in 1 week, or if you begin having difficulty breathing while lying flat.     NIFEdipine 30 MG (OSM) 24 hr tablet  Commonly known as: PROCARDIA-XL  Take 1 tablet (30 mg total) by mouth once daily.  Start taking on: December 7, 2023            CONTINUE taking these medications      acetaminophen 325 MG tablet  Commonly known as: TYLENOL  Take 2 tablets (650 mg total) by mouth every 6 (six) hours.     docusate sodium 100 MG capsule  Commonly known as: COLACE  Take 2 capsules (200 mg total) by mouth 2 (two) times daily.     ferrous sulfate 325 (65 FE) MG EC tablet  Take 1 tablet (325 mg total) by mouth once daily.     ibuprofen 600 MG tablet  Commonly known as: ADVIL,MOTRIN  Take 1 tablet (600 mg total) by mouth every 6 (six) hours.     metoprolol succinate 25 MG 24 hr tablet  Commonly known as: TOPROL-XL  Take 1 tablet (25 mg total) by mouth once daily.     oxyCODONE 5 MG immediate release tablet  Commonly known as: ROXICODONE  Take 1 tablet (5 mg total) by mouth every 4 (four) hours as needed for Pain.              Time spent on the discharge of patient: 45 minutes    Jesse Bass MD  Cardiology  [facility]

## 2023-12-07 ENCOUNTER — PATIENT MESSAGE (OUTPATIENT)
Dept: OBSTETRICS AND GYNECOLOGY | Facility: CLINIC | Age: 38
End: 2023-12-07
Payer: MEDICAID

## 2023-12-15 NOTE — ADDENDUM NOTE
Addendum  created 12/15/23 0638 by Jerome Johnson MD    Attestation recorded in Intraprocedure, Intraprocedure Attestations filed

## 2023-12-18 ENCOUNTER — PATIENT MESSAGE (OUTPATIENT)
Dept: OBSTETRICS AND GYNECOLOGY | Facility: CLINIC | Age: 38
End: 2023-12-18
Payer: MEDICAID

## 2023-12-19 ENCOUNTER — OFFICE VISIT (OUTPATIENT)
Dept: OBSTETRICS AND GYNECOLOGY | Facility: CLINIC | Age: 38
End: 2023-12-19
Payer: MEDICAID

## 2023-12-19 VITALS — HEIGHT: 65 IN | DIASTOLIC BLOOD PRESSURE: 72 MMHG | BODY MASS INDEX: 27.62 KG/M2 | SYSTOLIC BLOOD PRESSURE: 114 MMHG

## 2023-12-19 DIAGNOSIS — M79.10 MUSCLE PAIN: ICD-10-CM

## 2023-12-19 DIAGNOSIS — M54.9 BACK PAIN, UNSPECIFIED BACK LOCATION, UNSPECIFIED BACK PAIN LATERALITY, UNSPECIFIED CHRONICITY: ICD-10-CM

## 2023-12-19 PROCEDURE — 3074F SYST BP LT 130 MM HG: CPT | Mod: CPTII,,, | Performed by: OBSTETRICS & GYNECOLOGY

## 2023-12-19 PROCEDURE — 1159F MED LIST DOCD IN RCRD: CPT | Mod: CPTII,,, | Performed by: OBSTETRICS & GYNECOLOGY

## 2023-12-19 PROCEDURE — 99999 PR PBB SHADOW E&M-EST. PATIENT-LVL III: CPT | Mod: PBBFAC,,, | Performed by: OBSTETRICS & GYNECOLOGY

## 2023-12-19 PROCEDURE — 3078F DIAST BP <80 MM HG: CPT | Mod: CPTII,,, | Performed by: OBSTETRICS & GYNECOLOGY

## 2023-12-19 PROCEDURE — 99213 OFFICE O/P EST LOW 20 MIN: CPT | Mod: PBBFAC,PN | Performed by: OBSTETRICS & GYNECOLOGY

## 2023-12-19 RX ORDER — ASCORBIC ACID, CHOLECALCIFEROL, .ALPHA.-TOCOPHEROL ACETATE, DL-, PYRIDOXINE HYDROCHLORIDE, FOLIC ACID, CYANOCOBALAMIN, BIOTIN, CALCIUM CARBONATE, FERROUS ASPARTO GLYCINATE, IRON, POTASSIUM IODIDE, MAGNESIUM OXIDE, DOCONEXENT AND LOWBUSH BLUEBERRY 60; 1000; 10; 26; 400; 13; 280; 80; 9; 9; 150; 25; 350; 25; 600 MG/1; [IU]/1; [IU]/1; MG/1; UG/1; UG/1; UG/1; MG/1; MG/1; MG/1; UG/1; MG/1; MG/1; MG/1; UG/1
1 CAPSULE, GELATIN COATED ORAL DAILY
Qty: 30 CAPSULE | Refills: 11 | Status: SHIPPED | OUTPATIENT
Start: 2023-12-19

## 2023-12-19 NOTE — PROGRESS NOTES
"CC: Post-partum follow-up    HPI:  Clayton Duarte is a 38 y.o. female  presents for post-partum visit s/p a /ppBTL. She was admitted to CCU at [facility] [place] [place] on PPD5 after presenting with 2 days of orthopnea, dyspnea on exertion, and found to be in acute diastolic congestive heart failure. She did have elevated BP but there was low suspicion for preeclampsia as it was felt to be due volume overload. Reports overall doing well since second admission. Denies CP/SOB.     Reports still having lower back pain around the area of the epidural.       Delivery Date: 23  Delivery MD: Los  Gender: male  Birth Weight: see delivery summary  Breast Feeding: yes  Depression: no  Contraception: pp BTL     ROS:  GENERAL: No fever, chills, fatigability or weight loss.  VULVAR: No pain, no lesions and no itching.  VAGINAL: No relaxation, no itching, no discharge, no abnormal bleeding and no lesions.  ABDOMEN: No abdominal pain. Denies nausea. Denies vomiting. No diarrhea. No constipation  BREAST: Denies pain. No lumps. No discharge.  URINARY: No incontinence, no nocturia, no frequency and no dysuria.  CARDIOVASCULAR: No chest pain. No shortness of breath. No leg cramps.  NEUROLOGICAL: No headaches. No vision changes.    PHYSICAL EXAM:  /72   Ht 5' 5" (1.651 m)   LMP 2023   BMI 27.62 kg/m²   ABDOMEN: incisions healing well, no erythema, induration or drainage  PELVIC: Normal external female genitalia; vagina rugated, normal; cervix normal, no masses; uterus small mobile nontender    Diagnosis:  1. Postpartum care and examination    2. Muscle pain    3. Back pain, unspecified back location, unspecified back pain laterality, unspecified chronicity        Plan:   Doing well postpartum  Contraception - s/p ppBTL  Moods stable  Abnormal pap - needs colposcopy, appt scheduled  Needs Cardiology follow up - message sent  Back pain/muscle pain - referral to PT    Orders Placed This Encounter    Ambulatory " referral/consult to Physical/Occupational Therapy    prenatal vit 87-iron-folic-dha (PRENATE MINI, FERR ASP GLYCIN,) 18-1-350 mg Cap         Patient was counseled today on A.C.S. Pap guidelines and recommendations for yearly pelvic exams and monthly self breast exams; to see her PCP for other health maintenance.    FOLLOW UP: for colpo

## 2023-12-20 ENCOUNTER — PATIENT MESSAGE (OUTPATIENT)
Dept: MATERNAL FETAL MEDICINE | Facility: CLINIC | Age: 38
End: 2023-12-20
Payer: MEDICAID

## 2024-01-09 ENCOUNTER — PATIENT MESSAGE (OUTPATIENT)
Dept: OBSTETRICS AND GYNECOLOGY | Facility: CLINIC | Age: 39
End: 2024-01-09
Payer: MEDICAID

## 2024-01-09 ENCOUNTER — PATIENT MESSAGE (OUTPATIENT)
Dept: PRIMARY CARE CLINIC | Facility: CLINIC | Age: 39
End: 2024-01-09
Payer: MEDICAID

## 2024-01-09 ENCOUNTER — PATIENT MESSAGE (OUTPATIENT)
Dept: CARDIOLOGY | Facility: CLINIC | Age: 39
End: 2024-01-09
Payer: MEDICAID

## 2024-01-09 RX ORDER — NIFEDIPINE 30 MG/1
30 TABLET, EXTENDED RELEASE ORAL DAILY
Qty: 30 TABLET | Refills: 11 | Status: SHIPPED | OUTPATIENT
Start: 2024-01-09 | End: 2024-01-16 | Stop reason: SDUPTHER

## 2024-01-11 ENCOUNTER — TELEPHONE (OUTPATIENT)
Dept: PRIMARY CARE CLINIC | Facility: CLINIC | Age: 39
End: 2024-01-11
Payer: MEDICAID

## 2024-01-11 DIAGNOSIS — D50.9 IRON DEFICIENCY ANEMIA, UNSPECIFIED IRON DEFICIENCY ANEMIA TYPE: Primary | ICD-10-CM

## 2024-01-11 DIAGNOSIS — R79.89 LOW TSH LEVEL: ICD-10-CM

## 2024-01-11 DIAGNOSIS — E55.9 VITAMIN D DEFICIENCY: ICD-10-CM

## 2024-01-11 DIAGNOSIS — I10 HYPERTENSION, UNSPECIFIED TYPE: ICD-10-CM

## 2024-01-11 DIAGNOSIS — Z00.00 ANNUAL PHYSICAL EXAM: ICD-10-CM

## 2024-01-11 NOTE — TELEPHONE ENCOUNTER
----- Message from Kellie Sanchez sent at 1/11/2024  8:23 AM CST -----  Regarding: Requesting annual labs  Good morning, I contacted patient in regards to scheduling her annual with labs. I'm not seeing any labs orders. Once orders are placed, I'll contact patient and assist with scheduling her annual and labs prior, thanks.

## 2024-02-07 ENCOUNTER — PROCEDURE VISIT (OUTPATIENT)
Dept: OBSTETRICS AND GYNECOLOGY | Facility: CLINIC | Age: 39
End: 2024-02-07
Payer: MEDICAID

## 2024-02-07 VITALS
BODY MASS INDEX: 24.24 KG/M2 | WEIGHT: 145.5 LBS | SYSTOLIC BLOOD PRESSURE: 93 MMHG | HEIGHT: 65 IN | DIASTOLIC BLOOD PRESSURE: 71 MMHG | HEART RATE: 82 BPM

## 2024-02-07 DIAGNOSIS — N89.8 VAGINAL DISCHARGE: ICD-10-CM

## 2024-02-07 DIAGNOSIS — Z01.812 PRE-PROCEDURE LAB EXAM: Primary | ICD-10-CM

## 2024-02-07 DIAGNOSIS — D06.9 CIN III (CERVICAL INTRAEPITHELIAL NEOPLASIA GRADE III) WITH SEVERE DYSPLASIA: ICD-10-CM

## 2024-02-07 LAB
B-HCG UR QL: NEGATIVE
CTP QC/QA: YES

## 2024-02-07 PROCEDURE — 88305 TISSUE EXAM BY PATHOLOGIST: CPT | Mod: 26,,, | Performed by: PATHOLOGY

## 2024-02-07 PROCEDURE — 88342 IMHCHEM/IMCYTCHM 1ST ANTB: CPT | Mod: 26,,, | Performed by: PATHOLOGY

## 2024-02-07 PROCEDURE — 81025 URINE PREGNANCY TEST: CPT | Mod: PBBFAC | Performed by: OBSTETRICS & GYNECOLOGY

## 2024-02-07 PROCEDURE — 88305 TISSUE EXAM BY PATHOLOGIST: CPT | Mod: 59 | Performed by: PATHOLOGY

## 2024-02-07 PROCEDURE — 81514 NFCT DS BV&VAGINITIS DNA ALG: CPT | Performed by: OBSTETRICS & GYNECOLOGY

## 2024-02-07 PROCEDURE — 57454 BX/CURETT OF CERVIX W/SCOPE: CPT | Mod: PBBFAC | Performed by: OBSTETRICS & GYNECOLOGY

## 2024-02-07 PROCEDURE — 99499 UNLISTED E&M SERVICE: CPT | Mod: S$PBB,,, | Performed by: OBSTETRICS & GYNECOLOGY

## 2024-02-07 PROCEDURE — 88342 IMHCHEM/IMCYTCHM 1ST ANTB: CPT | Mod: 59 | Performed by: PATHOLOGY

## 2024-02-07 PROCEDURE — 99999PBSHW POCT URINE PREGNANCY: Mod: PBBFAC,,,

## 2024-02-07 RX ORDER — METRONIDAZOLE 500 MG/1
500 TABLET ORAL EVERY 12 HOURS
Qty: 14 TABLET | Refills: 0 | Status: SHIPPED | OUTPATIENT
Start: 2024-02-07 | End: 2024-02-14

## 2024-02-07 NOTE — PROCEDURES
Colposcopy    Date/Time: 2/7/2024 1:00 PM    Performed by: Mitzy Madison MD  Authorized by: Mitzy Madison MD    Consent obatined:  Prior to procedure the appropriate consent was completed and verified  Timeout:Immediately prior to procedure a time out was called to verify the correct patient, procedure, equipment, support staff and site/side marked as required  Prep:Patient was prepped and draped in the usual sterile fashion  Assistants?: Yes    List of assistants:  Dara SCHUSTER was present for the entire procedure.    Colposcopy Site:  Cervix  Position:  Supine   Patient was prepped and draped in the normal sterile fashion.  Acrowhite Lesion? Yes    Atypical Vessels: No    Transformation Zone Adequate?: Yes    Biopsy?: Yes         Location:  Cervix ((6 00 and 12 00))  ECC Performed?: Yes    LEEP Performed?: No    Estimated blood loss (cc):  1   Patient tolerated the procedure well with no immediate complications.   Post-operative instructions were provided for the patient.   Patient was discharged and will follow up if any complications occur     Patient reported vaginal discharge/irritation. Exam c/w BV. Affirm collected and Rx flagyl sent.

## 2024-02-08 LAB
BACTERIAL VAGINOSIS DNA: POSITIVE
CANDIDA GLABRATA DNA: NEGATIVE
CANDIDA KRUSEI DNA: NEGATIVE
CANDIDA RRNA VAG QL PROBE: NEGATIVE
T VAGINALIS RRNA GENITAL QL PROBE: NEGATIVE

## 2024-02-14 LAB
FINAL PATHOLOGIC DIAGNOSIS: NORMAL
GROSS: NORMAL
Lab: NORMAL
MICROSCOPIC EXAM: NORMAL

## 2024-02-15 ENCOUNTER — TELEPHONE (OUTPATIENT)
Dept: OBSTETRICS AND GYNECOLOGY | Facility: CLINIC | Age: 39
End: 2024-02-15
Payer: MEDICAID

## 2024-02-15 NOTE — TELEPHONE ENCOUNTER
----- Message from Mitzy Madison MD sent at 2/14/2024 10:57 AM CST -----  Patient needs leep - can you see if she can do the morning of 3/18? If that won't work let me know

## 2024-02-16 ENCOUNTER — TELEPHONE (OUTPATIENT)
Dept: OBSTETRICS AND GYNECOLOGY | Facility: CLINIC | Age: 39
End: 2024-02-16
Payer: MEDICAID

## 2024-02-16 NOTE — TELEPHONE ENCOUNTER
----- Message from Mitzy Madison MD sent at 2/16/2024 12:50 PM CST -----  Let's do 10 am that day, and please let her know since this is a surgery day for me we may have to move the time but we will let her know. Thanks    ----- Message -----  From: Dara Lam MA  Sent: 2/15/2024  10:40 AM CST  To: Mitzy Madison MD    Patient can come in on 3/18 for Leep. What time?  ----- Message -----  From: Mitzy Madison MD  Sent: 2/14/2024  10:57 AM CST  To: [place] Staff    Patient needs leep - can you see if she can do the morning of 3/18? If that won't work let me know

## 2024-02-16 NOTE — TELEPHONE ENCOUNTER
Left message to patient to call the office at [phone] to confirm time of 3/18 LEEP for 10 am.

## 2024-03-28 ENCOUNTER — PATIENT MESSAGE (OUTPATIENT)
Dept: CARDIOLOGY | Facility: CLINIC | Age: 39
End: 2024-03-28
Payer: MEDICAID

## 2024-04-15 ENCOUNTER — PATIENT MESSAGE (OUTPATIENT)
Dept: PRIMARY CARE CLINIC | Facility: CLINIC | Age: 39
End: 2024-04-15
Payer: MEDICAID

## 2024-04-16 ENCOUNTER — LAB VISIT (OUTPATIENT)
Dept: LAB | Facility: HOSPITAL | Age: 39
End: 2024-04-16
Payer: MEDICAID

## 2024-04-16 DIAGNOSIS — D50.9 IRON DEFICIENCY ANEMIA, UNSPECIFIED IRON DEFICIENCY ANEMIA TYPE: ICD-10-CM

## 2024-04-16 DIAGNOSIS — Z00.00 ANNUAL PHYSICAL EXAM: ICD-10-CM

## 2024-04-16 DIAGNOSIS — E55.9 VITAMIN D DEFICIENCY: ICD-10-CM

## 2024-04-16 DIAGNOSIS — I10 HYPERTENSION, UNSPECIFIED TYPE: ICD-10-CM

## 2024-04-16 DIAGNOSIS — R79.89 LOW TSH LEVEL: ICD-10-CM

## 2024-04-16 LAB
25(OH)D3+25(OH)D2 SERPL-MCNC: 23 NG/ML (ref 30–96)
ALBUMIN SERPL BCP-MCNC: 4 G/DL (ref 3.5–5.2)
ALP SERPL-CCNC: 61 U/L (ref 55–135)
ALT SERPL W/O P-5'-P-CCNC: 15 U/L (ref 10–44)
ANION GAP SERPL CALC-SCNC: 11 MMOL/L (ref 8–16)
AST SERPL-CCNC: 19 U/L (ref 10–40)
BASOPHILS # BLD AUTO: 0.02 K/UL (ref 0–0.2)
BASOPHILS NFR BLD: 0.3 % (ref 0–1.9)
BILIRUB SERPL-MCNC: 0.5 MG/DL (ref 0.1–1)
BUN SERPL-MCNC: 7 MG/DL (ref 6–20)
CALCIUM SERPL-MCNC: 10.5 MG/DL (ref 8.7–10.5)
CHLORIDE SERPL-SCNC: 107 MMOL/L (ref 95–110)
CO2 SERPL-SCNC: 23 MMOL/L (ref 23–29)
CREAT SERPL-MCNC: 0.8 MG/DL (ref 0.5–1.4)
DIFFERENTIAL METHOD BLD: ABNORMAL
EOSINOPHIL # BLD AUTO: 0.1 K/UL (ref 0–0.5)
EOSINOPHIL NFR BLD: 1.9 % (ref 0–8)
ERYTHROCYTE [DISTWIDTH] IN BLOOD BY AUTOMATED COUNT: 12.9 % (ref 11.5–14.5)
EST. GFR  (NO RACE VARIABLE): >60 ML/MIN/1.73 M^2
FERRITIN SERPL-MCNC: 13 NG/ML (ref 20–300)
GLUCOSE SERPL-MCNC: 71 MG/DL (ref 70–110)
HCT VFR BLD AUTO: 38.4 % (ref 37–48.5)
HGB BLD-MCNC: 11.9 G/DL (ref 12–16)
IMM GRANULOCYTES # BLD AUTO: 0.01 K/UL (ref 0–0.04)
IMM GRANULOCYTES NFR BLD AUTO: 0.2 % (ref 0–0.5)
IRON SERPL-MCNC: 83 UG/DL (ref 30–160)
LYMPHOCYTES # BLD AUTO: 1.8 K/UL (ref 1–4.8)
LYMPHOCYTES NFR BLD: 29.4 % (ref 18–48)
MCH RBC QN AUTO: 30.1 PG (ref 27–31)
MCHC RBC AUTO-ENTMCNC: 31 G/DL (ref 32–36)
MCV RBC AUTO: 97 FL (ref 82–98)
MONOCYTES # BLD AUTO: 0.4 K/UL (ref 0.3–1)
MONOCYTES NFR BLD: 6.1 % (ref 4–15)
NEUTROPHILS # BLD AUTO: 3.8 K/UL (ref 1.8–7.7)
NEUTROPHILS NFR BLD: 62.1 % (ref 38–73)
NRBC BLD-RTO: 0 /100 WBC
PLATELET # BLD AUTO: 299 K/UL (ref 150–450)
PMV BLD AUTO: 10.8 FL (ref 9.2–12.9)
POTASSIUM SERPL-SCNC: 3.6 MMOL/L (ref 3.5–5.1)
PROT SERPL-MCNC: 7 G/DL (ref 6–8.4)
RBC # BLD AUTO: 3.95 M/UL (ref 4–5.4)
SATURATED IRON: 18 % (ref 20–50)
SODIUM SERPL-SCNC: 141 MMOL/L (ref 136–145)
TOTAL IRON BINDING CAPACITY: 472 UG/DL (ref 250–450)
TRANSFERRIN SERPL-MCNC: 319 MG/DL (ref 200–375)
VIT B12 SERPL-MCNC: 1009 PG/ML (ref 210–950)
WBC # BLD AUTO: 6.19 K/UL (ref 3.9–12.7)

## 2024-04-16 PROCEDURE — 82306 VITAMIN D 25 HYDROXY: CPT | Performed by: FAMILY MEDICINE

## 2024-04-16 PROCEDURE — 82728 ASSAY OF FERRITIN: CPT | Performed by: FAMILY MEDICINE

## 2024-04-16 PROCEDURE — 80053 COMPREHEN METABOLIC PANEL: CPT | Performed by: FAMILY MEDICINE

## 2024-04-16 PROCEDURE — 85025 COMPLETE CBC W/AUTO DIFF WBC: CPT | Performed by: FAMILY MEDICINE

## 2024-04-16 PROCEDURE — 36415 COLL VENOUS BLD VENIPUNCTURE: CPT | Mod: PO | Performed by: FAMILY MEDICINE

## 2024-04-16 PROCEDURE — 82607 VITAMIN B-12: CPT | Performed by: FAMILY MEDICINE

## 2024-04-16 PROCEDURE — 83540 ASSAY OF IRON: CPT | Performed by: FAMILY MEDICINE

## 2024-04-17 ENCOUNTER — PATIENT MESSAGE (OUTPATIENT)
Dept: OBSTETRICS AND GYNECOLOGY | Facility: CLINIC | Age: 39
End: 2024-04-17
Payer: MEDICAID

## 2024-04-17 ENCOUNTER — OFFICE VISIT (OUTPATIENT)
Dept: PRIMARY CARE CLINIC | Facility: CLINIC | Age: 39
End: 2024-04-17
Payer: MEDICAID

## 2024-04-17 VITALS
HEIGHT: 65 IN | BODY MASS INDEX: 23.95 KG/M2 | DIASTOLIC BLOOD PRESSURE: 64 MMHG | WEIGHT: 143.75 LBS | TEMPERATURE: 99 F | RESPIRATION RATE: 18 BRPM | HEART RATE: 88 BPM | OXYGEN SATURATION: 97 % | SYSTOLIC BLOOD PRESSURE: 110 MMHG

## 2024-04-17 DIAGNOSIS — N60.02 BREAST CYST, LEFT: Primary | ICD-10-CM

## 2024-04-17 DIAGNOSIS — I25.42 CORONARY ARTERY DISSECTION: ICD-10-CM

## 2024-04-17 DIAGNOSIS — Z00.00 ANNUAL PHYSICAL EXAM: Primary | ICD-10-CM

## 2024-04-17 DIAGNOSIS — E55.9 VITAMIN D DEFICIENCY: ICD-10-CM

## 2024-04-17 DIAGNOSIS — D50.9 IRON DEFICIENCY ANEMIA, UNSPECIFIED IRON DEFICIENCY ANEMIA TYPE: ICD-10-CM

## 2024-04-17 DIAGNOSIS — I10 HYPERTENSION, UNSPECIFIED TYPE: ICD-10-CM

## 2024-04-17 PROCEDURE — 99999 PR PBB SHADOW E&M-EST. PATIENT-LVL IV: CPT | Mod: PBBFAC,,, | Performed by: FAMILY MEDICINE

## 2024-04-17 PROCEDURE — 1159F MED LIST DOCD IN RCRD: CPT | Mod: CPTII,,, | Performed by: FAMILY MEDICINE

## 2024-04-17 PROCEDURE — 99214 OFFICE O/P EST MOD 30 MIN: CPT | Mod: PBBFAC,PN | Performed by: FAMILY MEDICINE

## 2024-04-17 PROCEDURE — 1160F RVW MEDS BY RX/DR IN RCRD: CPT | Mod: CPTII,,, | Performed by: FAMILY MEDICINE

## 2024-04-17 PROCEDURE — 99395 PREV VISIT EST AGE 18-39: CPT | Mod: S$PBB,,, | Performed by: FAMILY MEDICINE

## 2024-04-17 PROCEDURE — 3008F BODY MASS INDEX DOCD: CPT | Mod: CPTII,,, | Performed by: FAMILY MEDICINE

## 2024-04-17 PROCEDURE — 3078F DIAST BP <80 MM HG: CPT | Mod: CPTII,,, | Performed by: FAMILY MEDICINE

## 2024-04-17 PROCEDURE — 3074F SYST BP LT 130 MM HG: CPT | Mod: CPTII,,, | Performed by: FAMILY MEDICINE

## 2024-04-17 NOTE — PROGRESS NOTES
"Subjective:       Patient ID: Clayton Duarte is a 38 y.o. female.    Chief Complaint: Annual Exam and Otalgia (Right hear sometimes feel full or painful )    HPI  37 y/o female with Iron def anemia, Vitamin D deficiency, post partum SCAD s/p CABG X2 2013 is here for annual exam.    She is s/p  with tubal ligation 2023 post partum CHF exacerbation/HTN, she had Toprol XL 25 mg and Nifedipine XL 30 mg added to her regimen, and she was given Lasix for prn use, has not needed to take it. Echo 2023 ok. She is breast feeding. She is feeling good, she denies f/n/v/d/constipation/cp/sob/urinary sx.     She has some lower back pain off and on since she gave birth, pain is achy does not radiate, 7-10, she denies leg weakness/numbness or tingling, she does breast feed is "weird positions", she has not been very active, plans to start exercising soon. Plans to start PT in May. She has tried heat that did not help. She is not taking any meds for pain. Cycles are back and regular. She is eating regularly, staying hydrated. Sleeping fair. Mood ok.     R ear itchy and full off and on for 3 months, she is using Qtips       SCAD: following with cardiology, Toprol XL 25 mg daily, Nifedipine XL 30 mg daily, lipitor 80 mg daily, asa 81 daily, Lasix 10 mg prn  Low TSH/free T4 normal  Vitamin D deficiency: off D3  Iron def anemia: off iron supplement  Eye exam utd  Dental exam utd  GYN: following with Dr. Madison, pelvic utd  OTC: prenatal    Review of Systems    Objective:      /64 (BP Location: Right arm, Patient Position: Sitting, BP Method: Small (Manual))   Pulse 88   Temp 98.5 °F (36.9 °C) (Other (see comments))   Resp 18   Ht 5' 5" (1.651 m)   Wt 65.2 kg (143 lb 11.8 oz)   LMP 2024   SpO2 97%   Breastfeeding Yes   BMI 23.92 kg/m²   Physical Exam  Vitals and nursing note reviewed.   Constitutional:       Appearance: She is well-developed.   HENT:      Head: Normocephalic and atraumatic.      Right " "Ear: Tympanic membrane normal.      Left Ear: Tympanic membrane normal.      Mouth/Throat:      Pharynx: No oropharyngeal exudate or posterior oropharyngeal erythema.   Neck:      Thyroid: No thyromegaly.   Cardiovascular:      Rate and Rhythm: Normal rate and regular rhythm.      Heart sounds: Normal heart sounds.   Pulmonary:      Effort: Pulmonary effort is normal. No respiratory distress.      Breath sounds: Normal breath sounds.   Abdominal:      General: Bowel sounds are normal. There is no distension.      Palpations: Abdomen is soft. There is no mass.      Tenderness: There is no abdominal tenderness.   Musculoskeletal:      Cervical back: Normal range of motion and neck supple.      Right lower leg: No edema.      Left lower leg: No edema.   Lymphadenopathy:      Cervical: No cervical adenopathy.   Skin:     General: Skin is warm and dry.   Neurological:      Mental Status: She is alert.         Assessment:       1. Annual physical exam    2. Mother currently breast-feeding    3. Vitamin D deficiency    4. Iron deficiency anemia, unspecified iron deficiency anemia type    5. Coronary artery dissection    6. Hypertension, unspecified type        Plan:   Clayton "Padmini" was seen today for annual exam and otalgia.    Diagnoses and all orders for this visit:    Annual physical exam    Mother currently breast-feeding    Vitamin D deficiency    Iron deficiency anemia, unspecified iron deficiency anemia type    Coronary artery dissection    Hypertension, unspecified type        "

## 2024-04-23 NOTE — PROGRESS NOTES
I have seen, taken a history and examined the patient. I agree with the evaluation and management plan   We need to really get a little more information before we can comment about next steps.  Where is the pain?  How long is it been present?  If she having any fever?  Nausea, vomiting, constipation or diarrhea?  Bad is the pain on a scale of 1-10?

## 2024-05-06 ENCOUNTER — HOSPITAL ENCOUNTER (OUTPATIENT)
Dept: RADIOLOGY | Facility: HOSPITAL | Age: 39
Discharge: HOME | End: 2024-05-06
Attending: OBSTETRICS & GYNECOLOGY
Payer: MEDICAID

## 2024-05-06 DIAGNOSIS — N60.02 BREAST CYST, LEFT: ICD-10-CM

## 2024-05-06 PROCEDURE — 77062 BREAST TOMOSYNTHESIS BI: CPT | Mod: TC

## 2024-05-06 PROCEDURE — 76642 ULTRASOUND BREAST LIMITED: CPT | Mod: 26,LT,, | Performed by: RADIOLOGY

## 2024-05-06 PROCEDURE — 76642 ULTRASOUND BREAST LIMITED: CPT | Mod: TC,LT

## 2024-05-06 PROCEDURE — 77062 BREAST TOMOSYNTHESIS BI: CPT | Mod: 26,,, | Performed by: RADIOLOGY

## 2024-05-06 PROCEDURE — 77066 DX MAMMO INCL CAD BI: CPT | Mod: 26,,, | Performed by: RADIOLOGY

## 2024-05-09 ENCOUNTER — PATIENT MESSAGE (OUTPATIENT)
Dept: CARDIOLOGY | Facility: CLINIC | Age: 39
End: 2024-05-09
Payer: MEDICAID

## 2024-05-09 ENCOUNTER — TELEPHONE (OUTPATIENT)
Dept: CARDIOLOGY | Facility: CLINIC | Age: 39
End: 2024-05-09
Payer: MEDICAID

## 2024-07-19 RX ORDER — NIFEDIPINE 30 MG/1
30 TABLET, EXTENDED RELEASE ORAL DAILY
Qty: 90 TABLET | Refills: 1 | Status: SHIPPED | OUTPATIENT
Start: 2024-07-19

## 2024-07-19 RX ORDER — ASCORBIC ACID, CHOLECALCIFEROL, .ALPHA.-TOCOPHEROL ACETATE, DL-, PYRIDOXINE HYDROCHLORIDE, FOLIC ACID, CYANOCOBALAMIN, BIOTIN, CALCIUM CARBONATE, FERROUS ASPARTO GLYCINATE, IRON, POTASSIUM IODIDE, MAGNESIUM OXIDE, DOCONEXENT AND LOWBUSH BLUEBERRY 60; 1000; 10; 26; 400; 13; 280; 80; 9; 9; 150; 25; 350; 25; 600 MG/1; [IU]/1; [IU]/1; MG/1; UG/1; UG/1; UG/1; MG/1; MG/1; MG/1; UG/1; MG/1; MG/1; MG/1; UG/1
1 CAPSULE, GELATIN COATED ORAL DAILY
Qty: 90 CAPSULE | Refills: 3 | Status: SHIPPED | OUTPATIENT
Start: 2024-07-19

## 2024-07-19 NOTE — TELEPHONE ENCOUNTER
Refill Routing Note   Medication(s) are not appropriate for processing by [facility] for the following reason(s):        Outside of protocol    [facility] action(s):  Route  Approve               Appointments  past 12m or future 3m with PCP    Date Provider   Last Visit   2/7/2024 Mitzy Madison MD   Next Visit   Visit date not found Mitzy Madison MD   ED visits in past 90 days: 0        Note composed:11:24 AM 07/19/2024

## 2024-07-23 RX ORDER — METOPROLOL SUCCINATE 25 MG/1
25 TABLET, EXTENDED RELEASE ORAL DAILY
Qty: 90 TABLET | Refills: 3 | Status: SHIPPED | OUTPATIENT
Start: 2024-07-23 | End: 2025-07-23

## 2024-08-06 ENCOUNTER — PATIENT MESSAGE (OUTPATIENT)
Dept: CARDIOLOGY | Facility: CLINIC | Age: 39
End: 2024-08-06
Payer: MEDICAID

## 2024-08-30 ENCOUNTER — HOSPITAL ENCOUNTER (EMERGENCY)
Facility: HOSPITAL | Age: 39
Discharge: HOME | End: 2024-08-30
Attending: EMERGENCY MEDICINE
Payer: MEDICAID

## 2024-08-30 VITALS
OXYGEN SATURATION: 100 % | TEMPERATURE: 98 F | HEIGHT: 65 IN | BODY MASS INDEX: 23.82 KG/M2 | RESPIRATION RATE: 18 BRPM | WEIGHT: 143 LBS | DIASTOLIC BLOOD PRESSURE: 68 MMHG | SYSTOLIC BLOOD PRESSURE: 108 MMHG | HEART RATE: 90 BPM

## 2024-08-30 DIAGNOSIS — R00.0 TACHYCARDIA: ICD-10-CM

## 2024-08-30 DIAGNOSIS — R50.9 FEVER: ICD-10-CM

## 2024-08-30 DIAGNOSIS — U07.1 COVID-19 VIRUS DETECTED: ICD-10-CM

## 2024-08-30 DIAGNOSIS — A41.9 SEPSIS: ICD-10-CM

## 2024-08-30 DIAGNOSIS — U07.1 COVID: Primary | ICD-10-CM

## 2024-08-30 DIAGNOSIS — R05.9 COUGH: ICD-10-CM

## 2024-08-30 DIAGNOSIS — R07.89 CHEST DISCOMFORT: ICD-10-CM

## 2024-08-30 LAB
ALBUMIN SERPL BCP-MCNC: 4 G/DL (ref 3.5–5.2)
ALLENS TEST: NORMAL
ALP SERPL-CCNC: 65 U/L (ref 55–135)
ALT SERPL W/O P-5'-P-CCNC: 14 U/L (ref 10–44)
ANION GAP SERPL CALC-SCNC: 8 MMOL/L (ref 8–16)
AST SERPL-CCNC: 21 U/L (ref 10–40)
B-HCG UR QL: NEGATIVE
BACTERIA #/AREA URNS AUTO: ABNORMAL /HPF
BASOPHILS # BLD AUTO: 0.02 K/UL (ref 0–0.2)
BASOPHILS NFR BLD: 0.3 % (ref 0–1.9)
BILIRUB SERPL-MCNC: 0.4 MG/DL (ref 0.1–1)
BILIRUB UR QL STRIP: NEGATIVE
BUN SERPL-MCNC: 8 MG/DL (ref 6–20)
CALCIUM SERPL-MCNC: 10 MG/DL (ref 8.7–10.5)
CHLORIDE SERPL-SCNC: 106 MMOL/L (ref 95–110)
CLARITY UR REFRACT.AUTO: ABNORMAL
CO2 SERPL-SCNC: 23 MMOL/L (ref 23–29)
COLOR UR AUTO: YELLOW
CREAT SERPL-MCNC: 0.9 MG/DL (ref 0.5–1.4)
CTP QC/QA: YES
DIFFERENTIAL METHOD BLD: ABNORMAL
EOSINOPHIL # BLD AUTO: 0.1 K/UL (ref 0–0.5)
EOSINOPHIL NFR BLD: 0.9 % (ref 0–8)
ERYTHROCYTE [DISTWIDTH] IN BLOOD BY AUTOMATED COUNT: 14 % (ref 11.5–14.5)
EST. GFR  (NO RACE VARIABLE): >60 ML/MIN/1.73 M^2
GLUCOSE SERPL-MCNC: 82 MG/DL (ref 70–110)
GLUCOSE UR QL STRIP: NEGATIVE
GROUP A STREP, MOLECULAR: NEGATIVE
HCT VFR BLD AUTO: 33.3 % (ref 37–48.5)
HCV AB SERPL QL IA: NORMAL
HGB BLD-MCNC: 10.3 G/DL (ref 12–16)
HGB UR QL STRIP: ABNORMAL
IMM GRANULOCYTES # BLD AUTO: 0.03 K/UL (ref 0–0.04)
IMM GRANULOCYTES NFR BLD AUTO: 0.5 % (ref 0–0.5)
INFLUENZA A, MOLECULAR: NEGATIVE
INFLUENZA B, MOLECULAR: NEGATIVE
KETONES UR QL STRIP: ABNORMAL
LDH SERPL L TO P-CCNC: 1 MMOL/L (ref 0.5–2.2)
LEUKOCYTE ESTERASE UR QL STRIP: ABNORMAL
LYMPHOCYTES # BLD AUTO: 0.6 K/UL (ref 1–4.8)
LYMPHOCYTES NFR BLD: 9 % (ref 18–48)
MCH RBC QN AUTO: 26.5 PG (ref 27–31)
MCHC RBC AUTO-ENTMCNC: 30.9 G/DL (ref 32–36)
MCV RBC AUTO: 86 FL (ref 82–98)
MICROSCOPIC COMMENT: ABNORMAL
MONOCYTES # BLD AUTO: 0.9 K/UL (ref 0.3–1)
MONOCYTES NFR BLD: 13.9 % (ref 4–15)
NEUTROPHILS # BLD AUTO: 4.8 K/UL (ref 1.8–7.7)
NEUTROPHILS NFR BLD: 75.4 % (ref 38–73)
NITRITE UR QL STRIP: NEGATIVE
NRBC BLD-RTO: 0 /100 WBC
OHS QRS DURATION: 74 MS
OHS QTC CALCULATION: 496 MS
PH UR STRIP: 6 [PH] (ref 5–8)
PLATELET # BLD AUTO: 307 K/UL (ref 150–450)
PMV BLD AUTO: 10.4 FL (ref 9.2–12.9)
POTASSIUM SERPL-SCNC: 3.7 MMOL/L (ref 3.5–5.1)
PROT SERPL-MCNC: 7.3 G/DL (ref 6–8.4)
PROT UR QL STRIP: NEGATIVE
RBC # BLD AUTO: 3.89 M/UL (ref 4–5.4)
RBC #/AREA URNS AUTO: 2 /HPF (ref 0–4)
SAMPLE: NORMAL
SARS-COV-2 RDRP RESP QL NAA+PROBE: POSITIVE
SITE: NORMAL
SODIUM SERPL-SCNC: 137 MMOL/L (ref 136–145)
SP GR UR STRIP: 1.01 (ref 1–1.03)
SPECIMEN SOURCE: NORMAL
SQUAMOUS #/AREA URNS AUTO: 17 /HPF
TROPONIN I SERPL DL<=0.01 NG/ML-MCNC: <0.006 NG/ML (ref 0–0.03)
URN SPEC COLLECT METH UR: ABNORMAL
WBC # BLD AUTO: 6.34 K/UL (ref 3.9–12.7)
WBC #/AREA URNS AUTO: 7 /HPF (ref 0–5)

## 2024-08-30 PROCEDURE — 99900035 HC TECH TIME PER 15 MIN (STAT)

## 2024-08-30 PROCEDURE — 87154 CUL TYP ID BLD PTHGN 6+ TRGT: CPT

## 2024-08-30 PROCEDURE — 87651 STREP A DNA AMP PROBE: CPT | Performed by: PHYSICIAN ASSISTANT

## 2024-08-30 PROCEDURE — 81025 URINE PREGNANCY TEST: CPT | Performed by: PHYSICIAN ASSISTANT

## 2024-08-30 PROCEDURE — 81001 URINALYSIS AUTO W/SCOPE: CPT

## 2024-08-30 PROCEDURE — 87040 BLOOD CULTURE FOR BACTERIA: CPT | Mod: 59

## 2024-08-30 PROCEDURE — 25000003 PHARM REV CODE 250: Performed by: PHYSICIAN ASSISTANT

## 2024-08-30 PROCEDURE — 96360 HYDRATION IV INFUSION INIT: CPT

## 2024-08-30 PROCEDURE — U0002 COVID-19 LAB TEST NON-CDC: HCPCS | Performed by: PHYSICIAN ASSISTANT

## 2024-08-30 PROCEDURE — 93005 ELECTROCARDIOGRAM TRACING: CPT

## 2024-08-30 PROCEDURE — 86803 HEPATITIS C AB TEST: CPT | Performed by: PHYSICIAN ASSISTANT

## 2024-08-30 PROCEDURE — 93010 ELECTROCARDIOGRAM REPORT: CPT | Mod: ,,, | Performed by: INTERNAL MEDICINE

## 2024-08-30 PROCEDURE — 63600175 PHARM REV CODE 636 W HCPCS

## 2024-08-30 PROCEDURE — 99285 EMERGENCY DEPT VISIT HI MDM: CPT | Mod: 25

## 2024-08-30 PROCEDURE — 84484 ASSAY OF TROPONIN QUANT: CPT | Performed by: PHYSICIAN ASSISTANT

## 2024-08-30 PROCEDURE — 80053 COMPREHEN METABOLIC PANEL: CPT | Performed by: PHYSICIAN ASSISTANT

## 2024-08-30 PROCEDURE — 87502 INFLUENZA DNA AMP PROBE: CPT | Performed by: PHYSICIAN ASSISTANT

## 2024-08-30 PROCEDURE — 85025 COMPLETE CBC W/AUTO DIFF WBC: CPT | Performed by: PHYSICIAN ASSISTANT

## 2024-08-30 PROCEDURE — 83605 ASSAY OF LACTIC ACID: CPT

## 2024-08-30 RX ORDER — ACETAMINOPHEN 500 MG
1000 TABLET ORAL
Status: COMPLETED | OUTPATIENT
Start: 2024-08-30 | End: 2024-08-30

## 2024-08-30 RX ADMIN — SODIUM CHLORIDE, POTASSIUM CHLORIDE, SODIUM LACTATE AND CALCIUM CHLORIDE 1000 ML: 600; 310; 30; 20 INJECTION, SOLUTION INTRAVENOUS at 11:08

## 2024-08-30 RX ADMIN — ACETAMINOPHEN 1000 MG: 500 TABLET ORAL at 11:08

## 2024-08-30 NOTE — FIRST PROVIDER EVALUATION
Emergency Department TeleTriage Encounter Note      CHIEF COMPLAINT    Chief Complaint   Patient presents with    Chest Pain     Mitral valve replacement 2013, fever, has       VITAL SIGNS   Initial Vitals [08/30/24 1009]   BP Pulse Resp Temp SpO2   112/68 (!) 119 18 (!) 102.3 °F (39.1 °C) 99 %      MAP       --            ALLERGIES    Review of patient's allergies indicates:   Allergen Reactions    Bananas [banana] Itching    Peanut butter flavor Hives       PROVIDER TRIAGE NOTE  This is a teletriage evaluation of a 39 y.o. female presenting to the ED complaining of fever. Patient reports fever, cough, congestion, sore throat, and nausea since last night. She has mild chest pain. She denies vomiting.     Patient is alert and oriented. She speaks in complete sentences. She is sitting upright in the chair in no distress.     Initial orders will be placed and care will be transferred to an alternate provider when patient is roomed for a full evaluation. Any additional orders and the final disposition will be determined by that provider.         ORDERS  Labs Reviewed   INFLUENZA A & B BY MOLECULAR   GROUP A STREP, MOLECULAR   HEPATITIS C ANTIBODY   SARS-COV-2 RNA AMPLIFICATION, QUAL   CBC W/ AUTO DIFFERENTIAL   COMPREHENSIVE METABOLIC PANEL   POCT URINE PREGNANCY       ED Orders (720h ago, onward)      Start Ordered     Status Ordering Provider    08/30/24 1100 08/30/24 1052  acetaminophen tablet 1,000 mg  ED 1 Time         Ordered JING, RAMOS G.    08/30/24 1053 08/30/24 1052  POCT urine pregnancy  Once         Ordered JING, RAMOS G.    08/30/24 1053 08/30/24 1052  CBC auto differential  STAT         Ordered JING, RAMOS G.    08/30/24 1053 08/30/24 1052  Comprehensive metabolic panel  STAT         Ordered JING, RAMOS G.    08/30/24 1052 08/30/24 1052  COVID-19 Rapid Screening  STAT         Ordered JING, RAMOS G.    08/30/24 1052 08/30/24 1052  Airborne and Contact and Droplet Isolation Status  Continuous          Ordered RAMOS CH.    08/30/24 1052 08/30/24 1052  Influenza A & B by Molecular  STAT         Ordered RAMOS CH.    08/30/24 1052 08/30/24 1052  Group A Strep, Molecular  STAT         Ordered RAMOS CH.    08/30/24 1052 08/30/24 1052  Insert Saline lock IV  Once         Ordered RAMOS CH.    08/30/24 1052 08/30/24 1052  X-Ray Chest PA And Lateral  1 time imaging         Ordered RAMOS CH.    08/30/24 1013 08/30/24 1012  Hepatitis C Antibody  STAT         Ordered RYANANDREWGAYATRICYNDI K.    08/30/24 1013 08/30/24 1012  EKG 12-lead  Once         Completed by MAX PEDRAZA on 8/30/2024 at 10:19 AM TRUONG ESTRADA              Virtual Visit Note: The provider triage portion of this emergency department evaluation and documentation was performed via seedtag, a HIPAA-compliant telemedicine application, in concert with a tele-presenter in the room. A face to face patient evaluation with one of my colleagues will occur once the patient is placed in an emergency department room.      DISCLAIMER: This note was prepared with Spokeable voice recognition transcription software. Garbled syntax, mangled pronouns, and other bizarre constructions may be attributed to that software system.

## 2024-08-30 NOTE — DISCHARGE INSTRUCTIONS
Diagnosis:  COVID    Tests today showed:   Labs Reviewed   SARS-COV-2 RNA AMPLIFICATION, QUAL - Abnormal       Result Value    SARS-CoV-2 RNA, Amplification, Qual Positive (*)    CBC W/ AUTO DIFFERENTIAL - Abnormal    WBC 6.34      RBC 3.89 (*)     Hemoglobin 10.3 (*)     Hematocrit 33.3 (*)     MCV 86      MCH 26.5 (*)     MCHC 30.9 (*)     RDW 14.0      Platelets 307      MPV 10.4      Immature Granulocytes 0.5      Gran # (ANC) 4.8      Immature Grans (Abs) 0.03      Lymph # 0.6 (*)     Mono # 0.9      Eos # 0.1      Baso # 0.02      nRBC 0      Gran % 75.4 (*)     Lymph % 9.0 (*)     Mono % 13.9      Eosinophil % 0.9      Basophil % 0.3      Differential Method Automated     URINALYSIS, REFLEX TO URINE CULTURE - Abnormal    Specimen UA Urine, Clean Catch      Color, UA Yellow      Appearance, UA Hazy (*)     pH, UA 6.0      Specific Gravity, UA 1.010      Protein, UA Negative      Glucose, UA Negative      Ketones, UA 2+ (*)     Bilirubin (UA) Negative      Occult Blood UA 2+ (*)     Nitrite, UA Negative      Leukocytes, UA 1+ (*)     Narrative:     Specimen Source->Urine   URINALYSIS MICROSCOPIC - Abnormal    RBC, UA 2      WBC, UA 7 (*)     Bacteria Occasional      Squam Epithel, UA 17      Microscopic Comment SEE COMMENT      Narrative:     Specimen Source->Urine   INFLUENZA A & B BY MOLECULAR    Influenza A, Molecular Negative      Influenza B, Molecular Negative      Flu A & B Source NP     GROUP A STREP, MOLECULAR    Group A Strep, Molecular Negative     CULTURE, BLOOD   CULTURE, BLOOD   HEPATITIS C ANTIBODY    Hepatitis C Ab Non-reactive      Narrative:     Release to patient->Immediate   COMPREHENSIVE METABOLIC PANEL    Sodium 137      Potassium 3.7      Chloride 106      CO2 23      Glucose 82      BUN 8      Creatinine 0.9      Calcium 10.0      Total Protein 7.3      Albumin 4.0      Total Bilirubin 0.4      Alkaline Phosphatase 65      AST 21      ALT 14      eGFR >60.0      Anion Gap 8     TROPONIN  I   TROPONIN I    Troponin I <0.006      Narrative:     add on Troponin-[id number] per Pantera Serrano MD  08/30/2024  11:52   Earl   POCT URINE PREGNANCY    POC Preg Test, Ur Negative       Acceptable Yes     ISTAT LACTATE    POC Lactate 1.00      Sample VENOUS      Site Other      Allens Test N/A       X-Ray Chest PA And Lateral    (Results Pending)   X-Ray Chest AP Portable    (Results Pending)       Treatments you had today:   Medications   acetaminophen tablet 1,000 mg (1,000 mg Oral Given 8/30/24 1136)   lactated ringers bolus 1,000 mL (0 mLs Intravenous Stopped 8/30/24 1318)       Follow-Up Plan:  - Follow-up with primary care doctor within 3 - 5 days  - Additional testing and/or evaluation as directed by your primary doctor    Return to the Emergency Department for symptoms including but not limited to: worsening symptoms, shortness of breath or chest pain, vomiting with inability to hold down fluids, fevers greater than 100.4°F, passing out/fainting/unconsciousness, or other concerning symptoms.

## 2024-08-30 NOTE — ED PROVIDER NOTES
Encounter Date: 8/30/2024       History     Chief Complaint   Patient presents with    Chest Pain     Mitral valve replacement 2013, fever, has     Varanise Padmini Duarte is a 39 y.o. female with w/PMHx of chronic anemia, CAD, and mitral valve replacement who presented today following fevers, nausea, body aches, general malaise and pain with cough since last night. Pt states that she brought in her 9 month old baby boy to the [facility] ED because he had the same symptoms as her. Son tested (+) postive covid-19 so she decided to check herself out as well. Pt denies /GI symptoms, photophobia, neck pain. Pt has had no recent travel history, no recent sick contacts. Currently lives at home and not working at this time. Nil for any additional ROS findings.      Review of patient's allergies indicates:   Allergen Reactions    Bananas [banana] Itching    Peanut butter flavor Hives     Past Medical History:   Diagnosis Date    Abnormal Pap smear of cervix 2008, 2009    colposcopy    Anemia     Coronary artery dissection 9/19/2013    Postpartum depression     Spinal headache complicating labor and delivery, postpartum condition 9/6/2013     Past Surgical History:   Procedure Laterality Date    CARDIAC SURGERY      CORONARY ARTERY BYPASS GRAFT  9/13    tanner to lad, svg OM1    POSTPARTUM LIGATION OF FALLOPIAN TUBE Bilateral 11/29/2023    Procedure: LIGATION, FALLOPIAN TUBE, POSTPARTUM;  Surgeon: Mitzy Madison MD;  Location: [facility] L&D;  Service: OB/GYN;  Laterality: Bilateral;    WISDOM TOOTH EXTRACTION       Family History   Problem Relation Name Age of Onset    Hypertension Mother      Diabetes Mother      Stroke Mother      Breast cancer Neg Hx      Ovarian cancer Neg Hx      Colon cancer Neg Hx      Cancer Neg Hx      Heart disease Neg Hx       Social History     Tobacco Use    Smoking status: Never    Smokeless tobacco: Never   Substance Use Topics    Alcohol use: Not Currently    Drug use: No     Review of Systems    Constitutional:  Positive for fever.   HENT:  Positive for congestion and rhinorrhea. Negative for sore throat.    Eyes:  Negative for visual disturbance.   Respiratory:  Positive for cough. Negative for shortness of breath.    Cardiovascular:  Positive for chest pain (when coughing). Negative for leg swelling.   Gastrointestinal:  Negative for abdominal pain, diarrhea, nausea and vomiting.   Genitourinary:  Negative for dysuria and hematuria.   Musculoskeletal:  Positive for myalgias.   Neurological:  Negative for weakness.       Physical Exam     Initial Vitals [08/30/24 1009]   BP Pulse Resp Temp SpO2   112/68 (!) 119 18 (!) 102.3 °F (39.1 °C) 99 %      MAP       --         Physical Exam    Nursing note and vitals reviewed.  Constitutional: She appears well-developed and well-nourished. She is cooperative.  Non-toxic appearance. She does not appear ill.   HENT:   Head: Normocephalic and atraumatic.   Mouth/Throat: Mucous membranes are normal. Mucous membranes are not dry.   Eyes: Conjunctivae are normal. Pupils are equal, round, and reactive to light.   Neck: Trachea normal and phonation normal.   Cardiovascular:  Normal rate, regular rhythm, normal heart sounds, intact distal pulses and normal pulses.     Exam reveals no gallop, no S3, no S4 and no friction rub.       No murmur heard.  Pulmonary/Chest: Breath sounds normal. No respiratory distress. She has no wheezes. She has no rhonchi. She has no rales.   Abdominal: Abdomen is soft. She exhibits no distension. There is no abdominal tenderness.   Musculoskeletal:      Right lower leg: No edema.      Left lower leg: No edema.     Neurological: She is alert.   Skin: Skin is warm, dry and intact. Capillary refill takes less than 2 seconds.   Psychiatric: She has a normal mood and affect. Her speech is normal.         ED Course   Procedures  Labs Reviewed   SARS-COV-2 RNA AMPLIFICATION, QUAL - Abnormal       Result Value    SARS-CoV-2 RNA, Amplification, Qual  Positive (*)    CBC W/ AUTO DIFFERENTIAL - Abnormal    WBC 6.34      RBC 3.89 (*)     Hemoglobin 10.3 (*)     Hematocrit 33.3 (*)     MCV 86      MCH 26.5 (*)     MCHC 30.9 (*)     RDW 14.0      Platelets 307      MPV 10.4      Immature Granulocytes 0.5      Gran # (ANC) 4.8      Immature Grans (Abs) 0.03      Lymph # 0.6 (*)     Mono # 0.9      Eos # 0.1      Baso # 0.02      nRBC 0      Gran % 75.4 (*)     Lymph % 9.0 (*)     Mono % 13.9      Eosinophil % 0.9      Basophil % 0.3      Differential Method Automated     INFLUENZA A & B BY MOLECULAR    Influenza A, Molecular Negative      Influenza B, Molecular Negative      Flu A & B Source NP     GROUP A STREP, MOLECULAR    Group A Strep, Molecular Negative     CULTURE, BLOOD   CULTURE, BLOOD   HEPATITIS C ANTIBODY    Hepatitis C Ab Non-reactive      Narrative:     Release to patient->Immediate   COMPREHENSIVE METABOLIC PANEL    Sodium 137      Potassium 3.7      Chloride 106      CO2 23      Glucose 82      BUN 8      Creatinine 0.9      Calcium 10.0      Total Protein 7.3      Albumin 4.0      Total Bilirubin 0.4      Alkaline Phosphatase 65      AST 21      ALT 14      eGFR >60.0      Anion Gap 8     TROPONIN I   TROPONIN I    Troponin I <0.006      Narrative:     add on Troponin-[id number] per Pantera Serrano MD  08/30/2024  11:52   Earl   URINALYSIS, REFLEX TO URINE CULTURE   POCT URINE PREGNANCY   ISTAT LACTATE    POC Lactate 1.00      Sample VENOUS      Site Other      Allens Test N/A       EKG Readings: (Independently Interpreted)   Initial Reading: No STEMI. Previous EKG: Compared with most recent EKG Rhythm: Sinus Tachycardia. Heart Rate: 107. Ectopy: No Ectopy. Conduction: Normal. ST Segments: Normal ST Segments. Clinical Impression: Sinus Tachycardia     ECG Results              EKG 12-lead (Final result)        Collection Time Result Time QRS Duration [facility] QTC Calculation    08/30/24 10:14:08 08/30/24 11:10:24 87 056                     Final result by  Interface, Lab In [facility] (08/30/24 11:10:32)                   Narrative:    Test Reason : R07.89,    Vent. Rate : 107 BPM     Atrial Rate : 107 BPM     P-R Int : 146 ms          QRS Dur : 074 ms      QT Int : 372 ms       P-R-T Axes : 038 079 002 degrees     QTc Int : 496 ms    Sinus tachycardia  ST and T wave abnormality, consider anterior ischemia  Abnormal ECG  When compared with ECG of 04-DEC-2023 15:46,  Vent. rate has increased BY  40 BPM  Criteria for Septal infarct are no longer Present  Nonspecific T wave abnormality now evident in Inferior leads  Nonspecific T wave abnormality, improved in Lateral leads  QT has lengthened  Confirmed by Vance WILLETT MD ([id number]) on 8/30/2024 11:10:21 AM    Referred By:             Confirmed By:Vance WILLETT MD                                  Imaging Results    None          Medications   acetaminophen tablet 1,000 mg (1,000 mg Oral Given 8/30/24 1136)   lactated ringers bolus 1,000 mL (0 mLs Intravenous Stopped 8/30/24 1318)     Medical Decision Making  39-year-old female, history of mitral valve replacement presenting for fever, chest pain with coughing, viral symptoms.  Initially, patient is febrile and tachycardic but overall hemodynamically stable.      Since patient's son recently tested COVID positive and patient is presenting with fever, viral symptoms, suspect that she is COVID positive too.  Concerning for possible dehydration due to her tachycardia.  Will administer Tylenol and 1 L LR for fluid resuscitation.  Low suspicion for cardiac etiology of patient's chest pain since chest pain with coughing.  Will obtain troponin x1.  Troponin x2 unnecessary since pain with coughing has started last night.  Sepsis order set started based on patient's presenting vitals.  Will not start with broad-spectrum antibiotics since patient is overall well-appearing and healthy.    Upon reassessment, vital signs of normalized, patient states that she was feeling better.  Workup  significant for baseline anemia in being COVID positive.  No evidence of pneumonia on chest x-ray.  Discussed strict return precautions, patient voices understanding.  She is stable and well-appearing, she is appropriate for discharge at this time.  Encouraged close PCP follow-up.    Amount and/or Complexity of Data Reviewed  External Data Reviewed: notes.  Labs: ordered. Decision-making details documented in ED Course.  Radiology:  Decision-making details documented in ED Course.               ED Course as of 08/30/24 1505   Fri Aug 30, 2024   1240 Troponin I: <0.006 [ES]   1240 Group A Strep, Molecular: Negative [ES]   1240 SARS-CoV-2 RNA, Amplification, Qual(!): Positive [ES]   1240 Influenza A, Molecular: Negative [ES]   1240 Influenza B, Molecular: Negative [ES]   1240 Comprehensive metabolic panel  No significant electrolyte abnormalities [ES]   1240 Hemoglobin(!): 10.3  Normocytic anemia consistent with baseline. [ES]   1344 POC Lactate: 1.00 [ES]   1401 hCG Qualitative, Urine: Negative [ES]   1459 X-Ray Chest AP Portable  Independent interpretation: No evidence of pneumonia [ES]      ED Course User Index  [ES] Padmini Ocasio MD                           Clinical Impression:  Final diagnoses:  [R07.89] Chest discomfort  [R00.0] Tachycardia  [R50.9] Fever  [A41.9] Sepsis                 Padmini Ocasio MD  Resident  08/30/24 1505

## 2024-08-30 NOTE — ED TRIAGE NOTES
Clayton Padmini Duarte, a 39 y.o. female presents to the ED w/ complaint of fever     Triage note:  Chief Complaint   Patient presents with    Chest Pain     Mitral valve replacement 2013, fever, has     Review of patient's allergies indicates:   Allergen Reactions    Bananas [banana] Itching    Peanut butter flavor Hives     Past Medical History:   Diagnosis Date    Abnormal Pap smear of cervix 2008, 2009    colposcopy    Anemia     Coronary artery dissection 9/19/2013    Postpartum depression     Spinal headache complicating labor and delivery, postpartum condition 9/6/2013

## 2024-09-03 ENCOUNTER — TELEPHONE (OUTPATIENT)
Dept: EMERGENCY MEDICINE | Facility: HOSPITAL | Age: 39
End: 2024-09-03
Payer: MEDICAID

## 2024-09-03 LAB
ACINETOBACTER CALCOACETICUS/BAUMANNII COMPLEX: NOT DETECTED
BACTEROIDES FRAGILIS: NOT DETECTED
CANDIDA ALBICANS: NOT DETECTED
CANDIDA AURIS: NOT DETECTED
CANDIDA GLABRATA: NOT DETECTED
CANDIDA KRUSEI: NOT DETECTED
CANDIDA PARAPSILOSIS: NOT DETECTED
CANDIDA TROPICALIS: NOT DETECTED
CRYPTOCOCCUS NEOFORMANS/GATTII: NOT DETECTED
CTX-M GENE (ESBL PRODUCER): NORMAL
ENTEROBACTER CLOACAE COMPLEX: NOT DETECTED
ENTEROBACTERALES: NOT DETECTED
ENTEROCOCCUS FAECALIS: NOT DETECTED
ENTEROCOCCUS FAECIUM: NOT DETECTED
ESCHERICHIA COLI: NOT DETECTED
HAEMOPHILUS INFLUENZAE: NOT DETECTED
IMP GENE (CARBAPENEM RESISTANT): NORMAL
KLEBSIELLA AEROGENES: NOT DETECTED
KLEBSIELLA OXYTOCA: NOT DETECTED
KLEBSIELLA PNEUMONIAE GROUP: NOT DETECTED
KPC RESISTANCE GENE (CARBAPENEM): NORMAL
LISTERIA MONOCYTOGENES: NOT DETECTED
MCR-1: NORMAL
MEC A/C AND MREJ (MRSA): NORMAL
MEC A/C: NORMAL
NDM GENE (CARBAPENEM RESISTANT): NORMAL
NEISSERIA MENINGITIDIS: NOT DETECTED
OXA-48-LIKE (CARBAPENEM RESISTANT): NORMAL
PROTEUS SPECIES: NOT DETECTED
PSEUDOMONAS AERUGINOSA: NOT DETECTED
SALMONELLA SP: NOT DETECTED
SERRATIA MARCESCENS: NOT DETECTED
STAPHYLOCOCCUS AUREUS: NOT DETECTED
STAPHYLOCOCCUS EPIDERMIDIS: NOT DETECTED
STAPHYLOCOCCUS LUGDUNESIS: NOT DETECTED
STAPHYLOCOCCUS SPECIES: NOT DETECTED
STENOTROPHOMONAS MALTOPHILIA: NOT DETECTED
STREPTOCOCCUS AGALACTIAE: NOT DETECTED
STREPTOCOCCUS PNEUMONIAE: NOT DETECTED
STREPTOCOCCUS PYOGENES: NOT DETECTED
STREPTOCOCCUS SPECIES: NOT DETECTED
VAN A/B (VRE GENE): NORMAL
VIM GENE (CARBAPENEM RESISTANT): NORMAL

## 2024-09-04 LAB — BACTERIA BLD CULT: NORMAL

## 2024-09-04 NOTE — TELEPHONE ENCOUNTER
Received a call from micro lab regarding positive blood cultures (drawn 8/30) showing Gram-positive rods in 1 anaerobic bottle.  Patient did present with fevers, nausea, body aches, general malaise and pain with cough.  She was found to be COVID positive.  Rapid ID by PCR pending. Called patient. No answer. Left .

## 2024-09-05 LAB
BACTERIA BLD CULT: ABNORMAL

## 2024-09-13 ENCOUNTER — PATIENT MESSAGE (OUTPATIENT)
Dept: OBSTETRICS AND GYNECOLOGY | Facility: CLINIC | Age: 39
End: 2024-09-13
Payer: MEDICAID

## 2024-09-13 ENCOUNTER — PATIENT MESSAGE (OUTPATIENT)
Dept: CARDIOLOGY | Facility: CLINIC | Age: 39
End: 2024-09-13
Payer: MEDICAID

## 2024-09-16 ENCOUNTER — OFFICE VISIT (OUTPATIENT)
Dept: OBSTETRICS AND GYNECOLOGY | Facility: CLINIC | Age: 39
End: 2024-09-16
Payer: MEDICAID

## 2024-09-16 VITALS
HEIGHT: 65 IN | SYSTOLIC BLOOD PRESSURE: 100 MMHG | BODY MASS INDEX: 23.81 KG/M2 | WEIGHT: 142.88 LBS | DIASTOLIC BLOOD PRESSURE: 54 MMHG

## 2024-09-16 DIAGNOSIS — N76.0 BV (BACTERIAL VAGINOSIS): ICD-10-CM

## 2024-09-16 DIAGNOSIS — N89.8 VAGINAL DISCHARGE: Primary | ICD-10-CM

## 2024-09-16 DIAGNOSIS — B96.89 BV (BACTERIAL VAGINOSIS): ICD-10-CM

## 2024-09-16 PROCEDURE — 99999 PR PBB SHADOW E&M-EST. PATIENT-LVL III: CPT | Mod: PBBFAC,,, | Performed by: OBSTETRICS & GYNECOLOGY

## 2024-09-16 PROCEDURE — 99213 OFFICE O/P EST LOW 20 MIN: CPT | Mod: S$PBB,,, | Performed by: OBSTETRICS & GYNECOLOGY

## 2024-09-16 PROCEDURE — 99213 OFFICE O/P EST LOW 20 MIN: CPT | Mod: PBBFAC | Performed by: OBSTETRICS & GYNECOLOGY

## 2024-09-16 PROCEDURE — 3008F BODY MASS INDEX DOCD: CPT | Mod: CPTII,,, | Performed by: OBSTETRICS & GYNECOLOGY

## 2024-09-16 PROCEDURE — 3074F SYST BP LT 130 MM HG: CPT | Mod: CPTII,,, | Performed by: OBSTETRICS & GYNECOLOGY

## 2024-09-16 PROCEDURE — 81514 NFCT DS BV&VAGINITIS DNA ALG: CPT | Performed by: OBSTETRICS & GYNECOLOGY

## 2024-09-16 PROCEDURE — 3078F DIAST BP <80 MM HG: CPT | Mod: CPTII,,, | Performed by: OBSTETRICS & GYNECOLOGY

## 2024-09-16 PROCEDURE — 87591 N.GONORRHOEAE DNA AMP PROB: CPT | Performed by: OBSTETRICS & GYNECOLOGY

## 2024-09-16 PROCEDURE — 87491 CHLMYD TRACH DNA AMP PROBE: CPT | Performed by: OBSTETRICS & GYNECOLOGY

## 2024-09-16 RX ORDER — METRONIDAZOLE VAGINAL GEL, 1.3 % 65 MG/5G
1 GEL VAGINAL ONCE
Qty: 5 G | Refills: 1 | Status: SHIPPED | OUTPATIENT
Start: 2024-09-16 | End: 2024-09-16

## 2024-09-16 NOTE — PROGRESS NOTES
Subjective     Patient ID: Clayton Duarte is a 39 y.o. female.    Chief Complaint:  Vaginitis      History of Present Illness  Vaginal Discharge  The patient's primary symptoms include vaginal discharge. This is a new problem. The current episode started in the past 7 days. The problem occurs 2 to 4 times per day. The problem has been unchanged. The patient is experiencing no pain. The problem affects both sides. She is not pregnant. Pertinent negatives include no abdominal pain, anorexia, back pain, chills, constipation, diarrhea, discolored urine, dysuria, fever, flank pain, frequency, headaches, hematuria, nausea, painful intercourse, rash, urgency or vomiting. There has been no bleeding. She has not been passing clots. She has not been passing tissue. Nothing aggravates the symptoms. She has tried nothing for the symptoms. The treatment provided no relief. She is sexually active. No, her partner does not have an STD. Her menstrual history has been regular. There is no history of an abdominal surgery, a  section, an ectopic pregnancy, endometriosis, a gynecological surgery, herpes simplex, menorrhagia, metrorrhagia, miscarriage, ovarian cysts, perineal abscess, PID, an STD, a terminated pregnancy or vaginosis.     40 y/o  presents for evaluation of vaginitis. Reports vaginal discharge and mild odor.    GYN & OB History  Patient's last menstrual period was 2024.   Date of Last Pap: 2023    OB History    Para Term  AB Living   4 4 4     4   SAB IAB Ectopic Multiple Live Births         0 4      # Outcome Date GA Lbr Jose Alberto/2nd Weight Sex Type Anes PTL Lv   4 Term 23 38w3d / 00:17 2.86 kg (6 lb 4.9 oz) M Vag-Spont EPI  RENEE   3 Term 13 39w2d 103:30 / 00:23 3.025 kg (6 lb 10.7 oz) F Vag-Spont EPI N RENEE      Birth Comments: 28    2 Term 09 39w0d 10:00 3.629 kg (8 lb) M Vag-Spont EPI N RENEE   1 Term 06 39w0d 09:00 3.033 kg (6 lb 11 oz) F Vag-Spont EPI  "N RENEE       Review of Systems  Review of Systems   Constitutional:  Negative for chills and fever.   Gastrointestinal:  Negative for abdominal pain, anorexia, constipation, diarrhea, nausea and vomiting.   Genitourinary:  Positive for vaginal discharge and vaginal odor. Negative for dysuria, flank pain, frequency, hematuria, menorrhagia and urgency.   Musculoskeletal:  Negative for back pain.   Integumentary:  Negative for rash.   Neurological:  Negative for headaches.          Objective   Physical Exam:   Constitutional: She is oriented to person, place, and time. She appears well-developed and well-nourished. No distress.    HENT:   Head: Normocephalic and atraumatic.    Eyes: EOM are normal.      Pulmonary/Chest: Effort normal.          Genitourinary:    Genitourinary Comments: Normal external female genitalia; vagina rugated, normal, thin white vaginal discharge; cervix normal, no masses;              Musculoskeletal: Normal range of motion and moves all extremeties.       Neurological: She is alert and oriented to person, place, and time.     Psychiatric: She has a normal mood and affect. Her behavior is normal. Judgment and thought content normal.            Assessment and Plan     1. Vaginal discharge    2. BV (bacterial vaginosis)          Plan:  Clayton Espana" was seen today for vaginitis.    Diagnoses and all orders for this visit:    Vaginal discharge  -     Vaginosis Screen by DNA Probe  -     C. trachomatis/N. gonorrhoeae by AMP DNA  -     metroNIDAZOLE (NUVESSA) 1.3 % (65 mg/5 gram) Gel; Place 1 applicator vaginally once. for 1 dose    BV (bacterial vaginosis)  -     metroNIDAZOLE (NUVESSA) 1.3 % (65 mg/5 gram) Gel; Place 1 applicator vaginally once. for 1 dose      Cultures done  Exam c/w BV so treatment initiated  LEEP scheduled    Patient was counseled today on vaginitis prevention including :  -- avoiding feminine products such as deodorant soaps, body wash, bubble bath, douches, scented toilet " paper, deodorant tampons or pads, feminine wipes, chronic pad use, etc.  -- avoiding other vulvovaginal irritants such as long hot baths, humidity, tight, synthetic clothing, chlorine and sitting around in wet bathing suits  -- wearing cotton underwear, avoiding thong underwear and no underwear to bed  -- taking showers instead of baths and use a hair dryer on cool setting afterwards to dry  -- wearing cotton to exercise and shower immediately after exercise and change clothes  -- using polyurethane condoms without spermicide if sexually active and symptoms are triggered by intercourse      Orders Placed This Encounter   Procedures    Vaginosis Screen by DNA Probe    C. trachomatis/N. gonorrhoeae by AMP DNA       Follow up for LEEP.

## 2024-09-17 LAB
BACTERIAL VAGINOSIS DNA: NEGATIVE
C TRACH DNA SPEC QL NAA+PROBE: NOT DETECTED
CANDIDA GLABRATA DNA: NEGATIVE
CANDIDA KRUSEI DNA: NEGATIVE
CANDIDA RRNA VAG QL PROBE: NEGATIVE
N GONORRHOEA DNA SPEC QL NAA+PROBE: NOT DETECTED
T VAGINALIS RRNA GENITAL QL PROBE: NEGATIVE

## 2024-09-18 ENCOUNTER — OFFICE VISIT (OUTPATIENT)
Dept: CARDIOLOGY | Facility: CLINIC | Age: 39
End: 2024-09-18
Payer: MEDICAID

## 2024-09-18 ENCOUNTER — PATIENT MESSAGE (OUTPATIENT)
Dept: OBSTETRICS AND GYNECOLOGY | Facility: CLINIC | Age: 39
End: 2024-09-18
Payer: MEDICAID

## 2024-09-18 VITALS — WEIGHT: 143.63 LBS | BODY MASS INDEX: 23.9 KG/M2

## 2024-09-18 DIAGNOSIS — I25.10 CORONARY ARTERY DISEASE INVOLVING NATIVE CORONARY ARTERY OF NATIVE HEART WITHOUT ANGINA PECTORIS: Primary | ICD-10-CM

## 2024-09-18 PROCEDURE — 99213 OFFICE O/P EST LOW 20 MIN: CPT | Mod: PBBFAC | Performed by: INTERNAL MEDICINE

## 2024-09-18 PROCEDURE — 3008F BODY MASS INDEX DOCD: CPT | Mod: CPTII,,, | Performed by: INTERNAL MEDICINE

## 2024-09-18 PROCEDURE — 99999 PR PBB SHADOW E&M-EST. PATIENT-LVL III: CPT | Mod: PBBFAC,,, | Performed by: INTERNAL MEDICINE

## 2024-09-18 PROCEDURE — 99214 OFFICE O/P EST MOD 30 MIN: CPT | Mod: S$PBB,,, | Performed by: INTERNAL MEDICINE

## 2024-09-18 PROCEDURE — 1160F RVW MEDS BY RX/DR IN RCRD: CPT | Mod: CPTII,,, | Performed by: INTERNAL MEDICINE

## 2024-09-18 PROCEDURE — 1159F MED LIST DOCD IN RCRD: CPT | Mod: CPTII,,, | Performed by: INTERNAL MEDICINE

## 2024-09-18 NOTE — PROGRESS NOTES
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  Developed cardiogenic shock.  Underwent two-vessel bypass surgery.  Has done well since.  She is now almost a year out from her 4th pregnancy.  No problems.  Underwent tubal ligation.  Active without exertional dyspnea or chest discomfort.    Medications  Current Outpatient Medications   Medication Sig Dispense Refill    aspirin 81 mg Cap Take 81 mg by mouth once daily.      metoprolol succinate (TOPROL-XL) 25 MG 24 hr tablet Take 1 tablet (25 mg total) by mouth once daily. 90 tablet 3    NIFEdipine (PROCARDIA-XL) 30 MG (OSM) 24 hr tablet Take 1 tablet (30 mg total) by mouth once daily. 90 tablet 1    prenatal vit 87-iron-folic-dha (PRENATE MINI, FERR ASP GLYCIN,) 18-1-350 mg Cap Take 1 capsule by mouth once daily. 90 capsule 3    acetaminophen (TYLENOL) 325 MG tablet Take 2 tablets (650 mg total) by mouth every 6 (six) hours. (Patient not taking: Reported on 4/17/2024) 60 tablet 3    furosemide (LASIX) 20 MG tablet Take 0.5 tablets (10 mg total) by mouth daily as needed. Please take one half tablet (10mg) if you gain more than 3 pounds in 1 day or more than 5 pounds in 1 week, or if you begin having difficulty breathing while lying flat. (Patient not taking: Reported on 9/16/2024) 30 tablet 11     No current facility-administered medications for this visit.        History  Past Medical History:   Diagnosis Date    Abnormal Pap smear of cervix 2008, 2009    colposcopy    Anemia     Coronary artery dissection 09/19/2013    Spinal headache complicating labor and delivery, postpartum condition 09/06/2013     Past Surgical History:   Procedure Laterality Date    CORONARY ARTERY BYPASS GRAFT  09/2013    tanner to lad, svg OM1    POSTPARTUM LIGATION OF FALLOPIAN TUBE Bilateral 11/29/2023    Procedure:  LIGATION, FALLOPIAN TUBE, POSTPARTUM;  Surgeon: Mitzy Madison MD;  Location: [facility];  Service: OB/GYN;  Laterality: Bilateral;    WISDOM TOOTH EXTRACTION       Social History     Socioeconomic History    Marital status: Single    Number of children: 3    Years of education: College   Occupational History    Occupation: unemployed     Comment: [employer]   Tobacco Use    Smoking status: Never    Smokeless tobacco: Never   Substance and Sexual Activity    Alcohol use: Not Currently    Drug use: No    Sexual activity: Yes     Partners: Male     Social Determinants of Health     Financial Resource Strain: Low Risk  (4/11/2024)    Overall Financial Resource Strain (CARDIA)     Difficulty of Paying Living Expenses: Not hard at all   Food Insecurity: No Food Insecurity (4/11/2024)    Hunger Vital Sign     Worried About Running Out of Food in the Last Year: Never true     Ran Out of Food in the Last Year: Never true   Transportation Needs: No Transportation Needs (4/11/2024)    PRAPARE - Transportation     Lack of Transportation (Medical): No     Lack of Transportation (Non-Medical): No   Physical Activity: Inactive (4/11/2024)    Exercise Vital Sign     Days of Exercise per Week: 0 days     Minutes of Exercise per Session: 0 min   Stress: No Stress Concern Present (4/11/2024)    [employer] - Occupational Stress Questionnaire     Feeling of Stress : Not at all   Housing Stability: Low Risk  (12/5/2023)    Housing Stability Vital Sign     Unable to Pay for Housing in the Last Year: No     Number of Places Lived in the Last Year: 1     Unstable Housing in the Last Year: No     Family History   Problem Relation Name Age of Onset    Hypertension Mother      Diabetes Mother      Stroke Mother      Breast cancer Neg Hx      Ovarian cancer Neg Hx      Colon cancer Neg Hx      Cancer Neg Hx      Heart disease Neg Hx          Allergies  Review of patient's allergies indicates:    Allergen Reactions    Bananas [banana] Itching    Peanut butter flavor Hives       Review of Systems   Review of Systems   Constitutional: Negative for malaise/fatigue, weight gain and weight loss.   Eyes:  Negative for visual disturbance.   Cardiovascular:  Negative for chest pain, claudication, cyanosis, dyspnea on exertion, irregular heartbeat, leg swelling, near-syncope, orthopnea, palpitations, paroxysmal nocturnal dyspnea and syncope.   Respiratory:  Negative for cough, hemoptysis, shortness of breath, sleep disturbances due to breathing and wheezing.    Hematologic/Lymphatic: Negative for bleeding problem. Does not bruise/bleed easily.   Skin:  Negative for poor wound healing.   Musculoskeletal:  Negative for muscle cramps and myalgias.   Gastrointestinal:  Negative for abdominal pain, anorexia, diarrhea, heartburn, hematemesis, hematochezia, melena, nausea and vomiting.   Genitourinary:  Negative for hematuria and nocturia.   Neurological:  Negative for excessive daytime sleepiness, dizziness, focal weakness, light-headedness and weakness.       Physical Exam  Vitals:    09/18/24 1348   BP: (P) 112/68   Pulse: (P) 93     Wt Readings from Last 1 Encounters:   09/18/24 65.1 kg (143 lb 9.6 oz)     Physical Exam  Vitals and nursing note reviewed.   Constitutional:       General: She is not in acute distress.     Appearance: She is not toxic-appearing or diaphoretic.   HENT:      Head: Normocephalic and atraumatic.      Mouth/Throat:      Lips: Pink.      Mouth: Mucous membranes are moist.   Eyes:      General: No scleral icterus.     Conjunctiva/sclera: Conjunctivae normal.   Neck:      Thyroid: No thyromegaly.      Vascular: No carotid bruit, hepatojugular reflux or JVD.      Trachea: Trachea normal.   Cardiovascular:      Rate and Rhythm: Normal rate and regular rhythm. No extrasystoles are present.     Chest Wall: PMI is not displaced.      Pulses:           Carotid pulses are 2+ on the right side and 2+ on  the left side.       Radial pulses are 2+ on the right side and 2+ on the left side.        Dorsalis pedis pulses are 2+ on the right side and 2+ on the left side.        Posterior tibial pulses are 2+ on the right side and 2+ on the left side.      Heart sounds: S1 normal and S2 normal. No murmur heard.     No friction rub. No S3 or S4 sounds.   Pulmonary:      Effort: Pulmonary effort is normal. No accessory muscle usage or respiratory distress.      Breath sounds: Normal breath sounds and air entry. No decreased breath sounds, wheezing, rhonchi or rales.   Chest:      Comments: Well-healed median sternotomy scar.  Abdominal:      General: Bowel sounds are normal. There is no distension or abdominal bruit.      Palpations: Abdomen is soft. There is no hepatomegaly, splenomegaly or pulsatile mass.      Tenderness: There is no abdominal tenderness.   Musculoskeletal:         General: No tenderness or deformity.      Right lower leg: No edema.      Left lower leg: No edema.   Skin:     General: Skin is warm and dry.      Capillary Refill: Capillary refill takes less than 2 seconds.      Coloration: Skin is not cyanotic or pale.      Nails: There is no clubbing.   Neurological:      General: No focal deficit present.      Mental Status: She is alert and oriented to person, place, and time.   Psychiatric:         Attention and Perception: Attention normal.         Mood and Affect: Mood normal.         Speech: Speech normal.         Behavior: Behavior normal. Behavior is cooperative.         Labs  Office Visit on 09/16/2024   Component Date Value Ref Range Status    Trichomonas vaginalis 09/16/2024 Negative  Negative Final    Candida sp 09/16/2024 Negative  Negative Final    Comment: Deyanira species group includes: Candida albicans, Candida tropicalis,  Candida parapsiolosis, Deyanira dubliniensis      Deyanira glabrata DNA 09/16/2024 Negative  Negative Final    Deyanira krusei DNA 09/16/2024 Negative  Negative Final     Bacterial vaginosis DNA 09/16/2024 Negative  Negative Final    Chlamydia, Amplified DNA 09/16/2024 Not Detected  Not Detected Final    N gonorrhoeae, amplified DNA 09/16/2024 Not Detected  Not Detected Final   Admission on 08/30/2024, Discharged on 08/30/2024   Component Date Value Ref Range Status    Hepatitis C Ab 08/30/2024 Non-reactive  Non-reactive Final    QRS Duration 08/30/2024 74  ms Final    [facility] QTC Calculation 08/30/2024 496  ms Final    SARS-CoV-2 RNA, Amplification, Qual 08/30/2024 Positive (A)  Negative Final    Comment: This test utilizes isothermal nucleic acid amplification technology   to   detect the SARS-CoV-2 RdRp nucleic acid segment. The analytical   sensitivity   (limit of detection) is 500 copies/swab.    A POSITIVE result is indicative of the presence of SARS-CoV-2 RNA;   clinical   correlation with patient history and other diagnostic information is   necessary to determine patient infection status.    A NEGATIVE result means that SARS-CoV-2 nucleic acids are not present   above   the limit of detection. A NEGATIVE result should be treated as   presumptive.   It does not rule out the possibility of COVID-19 and should not be   the sole   basis for treatment decisions.    If COVID-19 is strongly suspected based on clinical and exposure   history,   re-testing using an alternate molecular assay should be considered.    This test is Food and Drug Administration (FDA) approved. Performance   characteristics of this has been independently verified by [facility] Department of Pat                           hology and Laboratory Medicine.      Influenza A, Molecular 08/30/2024 Negative  Negative Final    Influenza B, Molecular 08/30/2024 Negative  Negative Final    Flu A & B Source 08/30/2024 NP   Final    Group A Strep, Molecular 08/30/2024 Negative  Negative Final    Comment: Arcanobacterium haemolyticum and Beta Streptococcus group C   and G will not be detected by this test  method.  Please order   Throat Culture ([id number]) if suspected.      POC Preg Test, Ur 08/30/2024 Negative  Negative Final     Acceptable 08/30/2024 Yes   Final    WBC 08/30/2024 6.34  3.90 - 12.70 K/uL Final    RBC 08/30/2024 3.89 (L)  4.00 - 5.40 M/uL Final    Hemoglobin 08/30/2024 10.3 (L)  12.0 - 16.0 g/dL Final    Hematocrit 08/30/2024 33.3 (L)  37.0 - 48.5 % Final    MCV 08/30/2024 86  82 - 98 fL Final    MCH 08/30/2024 26.5 (L)  27.0 - 31.0 pg Final    MCHC 08/30/2024 30.9 (L)  32.0 - 36.0 g/dL Final    RDW 08/30/2024 14.0  11.5 - 14.5 % Final    Platelets 08/30/2024 307  150 - 450 K/uL Final    MPV 08/30/2024 10.4  9.2 - 12.9 fL Final    Immature Granulocytes 08/30/2024 0.5  0.0 - 0.5 % Final    Gran # (ANC) 08/30/2024 4.8  1.8 - 7.7 K/uL Final    Immature Grans (Abs) 08/30/2024 0.03  0.00 - 0.04 K/uL Final    Comment: Mild elevation in immature granulocytes is non specific and   can be seen in a variety of conditions including stress response,   acute inflammation, trauma and pregnancy. Correlation with other   laboratory and clinical findings is essential.      Lymph # 08/30/2024 0.6 (L)  1.0 - 4.8 K/uL Final    Mono # 08/30/2024 0.9  0.3 - 1.0 K/uL Final    Eos # 08/30/2024 0.1  0.0 - 0.5 K/uL Final    Baso # 08/30/2024 0.02  0.00 - 0.20 K/uL Final    nRBC 08/30/2024 0  0 /100 WBC Final    Gran % 08/30/2024 75.4 (H)  38.0 - 73.0 % Final    Lymph % 08/30/2024 9.0 (L)  18.0 - 48.0 % Final    Mono % 08/30/2024 13.9  4.0 - 15.0 % Final    Eosinophil % 08/30/2024 0.9  0.0 - 8.0 % Final    Basophil % 08/30/2024 0.3  0.0 - 1.9 % Final    Differential Method 08/30/2024 Automated   Final    Sodium 08/30/2024 137  136 - 145 mmol/L Final    Potassium 08/30/2024 3.7  3.5 - 5.1 mmol/L Final    Chloride 08/30/2024 106  95 - 110 mmol/L Final    CO2 08/30/2024 23  23 - 29 mmol/L Final    Glucose 08/30/2024 82  70 - 110 mg/dL Final    BUN 08/30/2024 8  6 - 20 mg/dL Final    Creatinine 08/30/2024 0.9  0.5 -  1.4 mg/dL Final    Calcium 08/30/2024 10.0  8.7 - 10.5 mg/dL Final    Total Protein 08/30/2024 7.3  6.0 - 8.4 g/dL Final    Albumin 08/30/2024 4.0  3.5 - 5.2 g/dL Final    Total Bilirubin 08/30/2024 0.4  0.1 - 1.0 mg/dL Final    Comment: For infants and newborns, interpretation of results should be based  on gestational age, weight and in agreement with clinical  observations.    Premature Infant recommended reference ranges:  Up to 24 hours.............<8.0 mg/dL  Up to 48 hours............<12.0 mg/dL  3-5 days..................<15.0 mg/dL  6-29 days.................<15.0 mg/dL      Alkaline Phosphatase 08/30/2024 65  55 - 135 U/L Final    AST 08/30/2024 21  10 - 40 U/L Final    ALT 08/30/2024 14  10 - 44 U/L Final    eGFR 08/30/2024 >60.0  >60 mL/min/1.73 m^2 Final    Anion Gap 08/30/2024 8  8 - 16 mmol/L Final    Blood Culture, Routine 08/30/2024 Gram stain bharathi bottle: Gram positive rods   Corrected    Blood Culture, Routine 08/30/2024 Positive results previously called 09/08/2024   Corrected    Comment: Previous comment was modified by JODI at 22:18 on 09/08/2024 No growth   after 5 days.      Blood Culture, Routine 08/30/2024 CUTIBACTERIUM ACNES (A)   Corrected    Blood Culture, Routine 08/30/2024 Gram stain bharathi bottle: Gram positive rods   Final    Blood Culture, Routine 08/30/2024 Results called to and read back by:CRICKET Wolf 09/03/2024  21:00   Final    Blood Culture, Routine 08/30/2024 CUTIBACTERIUM ACNES (A)   Final    Specimen UA 08/30/2024 Urine, Clean Catch   Final    Color, UA 08/30/2024 Yellow  Yellow, Straw, Karo Final    Appearance, UA 08/30/2024 Hazy (A)  Clear Final    pH, UA 08/30/2024 6.0  5.0 - 8.0 Final    Specific Gravity, UA 08/30/2024 1.010  1.005 - 1.030 Final    Protein, UA 08/30/2024 Negative  Negative Final    Comment: Recommend a 24 hour urine protein or a urine   protein/creatinine ratio if globulin induced proteinuria is  clinically suspected.      Glucose, UA 08/30/2024  Negative  Negative Final    Ketones, UA 08/30/2024 2+ (A)  Negative Final    Bilirubin (UA) 08/30/2024 Negative  Negative Final    Occult Blood UA 08/30/2024 2+ (A)  Negative Final    Nitrite, UA 08/30/2024 Negative  Negative Final    Leukocytes, UA 08/30/2024 1+ (A)  Negative Final    Troponin I 08/30/2024 <0.006  0.000 - 0.026 ng/mL Final    Comment: The reference interval for Troponin I represents the 99th percentile   cutoff   for our facility and is consistent with 3rd generation assay   performance.      POC Lactate 08/30/2024 1.00  0.5 - 2.2 mmol/L Final    Sample 08/30/2024 VENOUS   Final    Site 08/30/2024 Other   Final    Allens Test 08/30/2024 N/A   Final    RBC, UA 08/30/2024 2  0 - 4 /hpf Final    WBC, UA 08/30/2024 7 (H)  0 - 5 /hpf Final    Bacteria 08/30/2024 Occasional  None-Occ /hpf Final    Squam Epithel, UA 08/30/2024 17  /hpf Final    Microscopic Comment 08/30/2024 SEE COMMENT   Final    Comment: Other formed elements not mentioned in the report are not   present in the microscopic examination.       Enterococcus faecalis 08/30/2024 Not Detected  Not Detected Final    Enterococcus faecium 08/30/2024 Not Detected  Not Detected Final    Listeria monocytogenes 08/30/2024 Not Detected  Not Detected Final    Staphylococcus spp. 08/30/2024 Not Detected  Not Detected Final    Staphylococcus aureus 08/30/2024 Not Detected  Not Detected Final    Staphylococcus epidermidis 08/30/2024 Not Detected  Not Detected Final    Staphylococcus lugdunensis 08/30/2024 Not Detected  Not Detected Final    Streptococcus species 08/30/2024 Not Detected  Not Detected Final    Streptococcus agalactiae 08/30/2024 Not Detected  Not Detected Final    Streptococcus pneumoniae 08/30/2024 Not Detected  Not Detected Final    Streptococcus pyogenes 08/30/2024 Not Detected  Not Detected Final    Acinetobacter calcoaceticus/marnie* 08/30/2024 Not Detected  Not Detected Final    Bacteroides fragilis 08/30/2024 Not Detected  Not  Detected Final    Enterobacterales 08/30/2024 Not Detected  Not Detected Final    Enterobacter cloacae complex 08/30/2024 Not Detected  Not Detected Final    Escherichia coli 08/30/2024 Not Detected  Not Detected Final    Klebsiella aerogenes 08/30/2024 Not Detected  Not Detected Final    Klebsiella oxytoca 08/30/2024 Not Detected  Not Detected Final    Klebsiella pneumoniae group 08/30/2024 Not Detected  Not Detected Final    Proteus 08/30/2024 Not Detected  Not Detected Final    Salmonella sp 08/30/2024 Not Detected  Not Detected Final    Serratia marcescens 08/30/2024 Not Detected  Not Detected Final    Haemophilus influenzae 08/30/2024 Not Detected  Not Detected Final    Neisseria meningtidis 08/30/2024 Not Detected  Not Detected Final    Pseudomonas aeruginosa 08/30/2024 Not Detected  Not Detected Final    Stenotrophomonas maltophilia 08/30/2024 Not Detected  Not Detected Final    Candida albicans 08/30/2024 Not Detected  Not Detected Final    Candida auris 08/30/2024 Not Detected  Not Detected Final    Candida glabrata 08/30/2024 Not Detected  Not Detected Final    Candida krusei 08/30/2024 Not Detected  Not Detected Final    Candida parapsilosis 08/30/2024 Not Detected  Not Detected Final    Candida tropicalis 08/30/2024 Not Detected  Not Detected Final    Cryptococcus neoformans/gattii 08/30/2024 Not Detected  Not Detected Final    CTX-M (ESBL ) 08/30/2024 Test Not Applicable  Not Detected Final    IMP (Carbapenem resistant) 08/30/2024 Test Not Applicable  Not Detected Final    KPC resistance gene (Carbapenem re* 08/30/2024 Test Not Applicable  Not Detected Final    mcr-1  08/30/2024 Test Not Applicable  Not Detected Final    mec A/C  08/30/2024 Test Not Applicable  Not Detected Final    mec A/C and MREJ (MRSA) gene 08/30/2024 Test Not Applicable  Not Detected Final    NDM (Carbapenem resistant) 08/30/2024 Test Not Applicable  Not Detected Final    OXA-48-like (Carbapenem resistant) 08/30/2024 Test  Not Applicable  Not Detected Final    van A/B (VRE gene) 08/30/2024 Test Not Applicable  Not Detected Final    VIM (Carbapenem resistant) 08/30/2024 Test Not Applicable  Not Detected Final   Lab Visit on 04/16/2024   Component Date Value Ref Range Status    Ferritin 04/16/2024 13 (L)  20.0 - 300.0 ng/mL Final    Iron 04/16/2024 83  30 - 160 ug/dL Final    Transferrin 04/16/2024 319  200 - 375 mg/dL Final    TIBC 04/16/2024 472 (H)  250 - 450 ug/dL Final    Saturated Iron 04/16/2024 18 (L)  20 - 50 % Final    WBC 04/16/2024 6.19  3.90 - 12.70 K/uL Final    RBC 04/16/2024 3.95 (L)  4.00 - 5.40 M/uL Final    Hemoglobin 04/16/2024 11.9 (L)  12.0 - 16.0 g/dL Final    Hematocrit 04/16/2024 38.4  37.0 - 48.5 % Final    MCV 04/16/2024 97  82 - 98 fL Final    MCH 04/16/2024 30.1  27.0 - 31.0 pg Final    MCHC 04/16/2024 31.0 (L)  32.0 - 36.0 g/dL Final    RDW 04/16/2024 12.9  11.5 - 14.5 % Final    Platelets 04/16/2024 299  150 - 450 K/uL Final    MPV 04/16/2024 10.8  9.2 - 12.9 fL Final    Immature Granulocytes 04/16/2024 0.2  0.0 - 0.5 % Final    Gran # (ANC) 04/16/2024 3.8  1.8 - 7.7 K/uL Final    Immature Grans (Abs) 04/16/2024 0.01  0.00 - 0.04 K/uL Final    Comment: Mild elevation in immature granulocytes is non specific and   can be seen in a variety of conditions including stress response,   acute inflammation, trauma and pregnancy. Correlation with other   laboratory and clinical findings is essential.      Lymph # 04/16/2024 1.8  1.0 - 4.8 K/uL Final    Mono # 04/16/2024 0.4  0.3 - 1.0 K/uL Final    Eos # 04/16/2024 0.1  0.0 - 0.5 K/uL Final    Baso # 04/16/2024 0.02  0.00 - 0.20 K/uL Final    nRBC 04/16/2024 0  0 /100 WBC Final    Gran % 04/16/2024 62.1  38.0 - 73.0 % Final    Lymph % 04/16/2024 29.4  18.0 - 48.0 % Final    Mono % 04/16/2024 6.1  4.0 - 15.0 % Final    Eosinophil % 04/16/2024 1.9  0.0 - 8.0 % Final    Basophil % 04/16/2024 0.3  0.0 - 1.9 % Final    Differential Method 04/16/2024 Automated   Final     Sodium 04/16/2024 141  136 - 145 mmol/L Final    Potassium 04/16/2024 3.6  3.5 - 5.1 mmol/L Final    Chloride 04/16/2024 107  95 - 110 mmol/L Final    CO2 04/16/2024 23  23 - 29 mmol/L Final    Glucose 04/16/2024 71  70 - 110 mg/dL Final    BUN 04/16/2024 7  6 - 20 mg/dL Final    Creatinine 04/16/2024 0.8  0.5 - 1.4 mg/dL Final    Calcium 04/16/2024 10.5  8.7 - 10.5 mg/dL Final    Total Protein 04/16/2024 7.0  6.0 - 8.4 g/dL Final    Albumin 04/16/2024 4.0  3.5 - 5.2 g/dL Final    Total Bilirubin 04/16/2024 0.5  0.1 - 1.0 mg/dL Final    Comment: For infants and newborns, interpretation of results should be based  on gestational age, weight and in agreement with clinical  observations.    Premature Infant recommended reference ranges:  Up to 24 hours.............<8.0 mg/dL  Up to 48 hours............<12.0 mg/dL  3-5 days..................<15.0 mg/dL  6-29 days.................<15.0 mg/dL      Alkaline Phosphatase 04/16/2024 61  55 - 135 U/L Final    AST 04/16/2024 19  10 - 40 U/L Final    ALT 04/16/2024 15  10 - 44 U/L Final    eGFR 04/16/2024 >60.0  >60 mL/min/1.73 m^2 Final    Anion Gap 04/16/2024 11  8 - 16 mmol/L Final    Vit D, 25-Hydroxy 04/16/2024 23 (L)  30 - 96 ng/mL Final    Comment: Vitamin D deficiency.........<10 ng/mL                              Vitamin D insufficiency......10-29 ng/mL       Vitamin D sufficiency........> or equal to 30 ng/mL  Vitamin D toxicity............>100 ng/mL      Vitamin B-12 04/16/2024 1009 (H)  210 - 950 pg/mL Final       Imaging  X-Ray Chest AP Portable    Result Date: 8/30/2024  EXAMINATION: XR CHEST AP PORTABLE CLINICAL HISTORY: fever; Fever, unspecified TECHNIQUE: Single frontal view of the chest was performed. COMPARISON: 12/04/2023 FINDINGS: The cardiomediastinal silhouette is not enlarged noting calcification of the aorta and surgical change..  There with slight obscuration of the left costophrenic angle, may reflect small effusion or atelectasis..  The trachea is  midline.  The lungs are symmetrically expanded bilaterally with mildly coarse interstitial attenuation accentuated by habitus.  There is mild left basilar subsegmental atelectasis..  No large focal consolidation seen.  There is no pneumothorax.  The osseous structures are unremarkable.     1. Left basilar subsegmental atelectasis, developing consolidation however not excluded.  Correlation is advised in this hypoventilatory exam. Electronically signed by: Lowell Issa MD Date:    08/30/2024 Time:    15:12      Assessment  1. Coronary artery disease involving native coronary artery of native heart without angina pectoris  Spontaneous dissection of left main coronary artery associated with pregnancy in 2013.  - Lipid Panel; Future  - Echo; Future      Plan and Discussion    Discussed the importance of risk factor modification to reduce the likelihood of atherosclerotic coronary artery disease.    The 10-year ASCVD risk score (Garrett DK, et al., 2019) is: 0.8%    Values used to calculate the score:      Age: 39 years      Sex: Female      Is Non- : Yes      Diabetic: No      Tobacco smoker: No      Systolic Blood Pressure: 112 mmHg      Is BP treated: Yes      HDL Cholesterol: 48 mg/dL      Total Cholesterol: 153 mg/dL     Follow Up  Follow up in about 1 year (around 9/18/2025).      Dawit Kat MD

## 2024-09-23 ENCOUNTER — HOSPITAL ENCOUNTER (OUTPATIENT)
Dept: CARDIOLOGY | Facility: OTHER | Age: 39
Discharge: HOME | End: 2024-09-23
Attending: INTERNAL MEDICINE
Payer: MEDICAID

## 2024-09-23 VITALS
WEIGHT: 143 LBS | DIASTOLIC BLOOD PRESSURE: 68 MMHG | BODY MASS INDEX: 23.82 KG/M2 | SYSTOLIC BLOOD PRESSURE: 112 MMHG | HEIGHT: 65 IN

## 2024-09-23 DIAGNOSIS — I25.10 CORONARY ARTERY DISEASE INVOLVING NATIVE CORONARY ARTERY OF NATIVE HEART WITHOUT ANGINA PECTORIS: ICD-10-CM

## 2024-09-23 LAB
AORTIC ROOT ANNULUS: 2.23 CM
ASCENDING AORTA: 2.59 CM
BSA FOR ECHO PROCEDURE: 1.72 M2
CV ECHO LV RWT: 0.33 CM
DOP CALC LVOT AREA: 2.5 CM2
DOP CALC LVOT DIAMETER: 1.79 CM
E WAVE DECELERATION TIME: 164.04 MSEC
E/A RATIO: 1.77
E/E' RATIO: 8.83 M/S
ECHO LV POSTERIOR WALL: 0.73 CM (ref 0.6–1.1)
FRACTIONAL SHORTENING: 26 % (ref 28–44)
GLOBAL LONGITUIDAL STRAIN: 16.5 %
INTERVENTRICULAR SEPTUM: 0.67 CM (ref 0.6–1.1)
IVC DIAMETER: 1.66 CM
LA MAJOR: 4.53 CM
LA MINOR: 4.19 CM
LA WIDTH: 3 CM
LAAS-AP2: 15 CM2
LAAS-AP4: 16 CM2
LEFT ATRIUM AREA SYSTOLIC (APICAL 2 CHAMBER): 15.38 CM2
LEFT ATRIUM AREA SYSTOLIC (APICAL 4 CHAMBER): 16.41 CM2
LEFT ATRIUM SIZE: 2.4 CM
LEFT ATRIUM VOLUME INDEX: 15.5 ML/M2
LEFT ATRIUM VOLUME MOD: 42.7 CM3
LEFT ATRIUM VOLUME: 26.64 CM3
LEFT INTERNAL DIMENSION IN SYSTOLE: 3.28 CM (ref 2.1–4)
LEFT VENTRICLE DIASTOLIC VOLUME INDEX: 52.19 ML/M2
LEFT VENTRICLE DIASTOLIC VOLUME: 89.77 ML
LEFT VENTRICLE END SYSTOLIC VOLUME APICAL 2 CHAMBER: 41.35 ML
LEFT VENTRICLE END SYSTOLIC VOLUME APICAL 4 CHAMBER: 43.51 ML
LEFT VENTRICLE MASS INDEX: 54 G/M2
LEFT VENTRICLE SYSTOLIC VOLUME INDEX: 25.3 ML/M2
LEFT VENTRICLE SYSTOLIC VOLUME: 43.57 ML
LEFT VENTRICULAR INTERNAL DIMENSION IN DIASTOLE: 4.44 CM (ref 3.5–6)
LEFT VENTRICULAR MASS: 93.49 G
LV LATERAL E/E' RATIO: 8.15 M/S
LV SEPTAL E/E' RATIO: 9.64 M/S
LVED V (TEICH): 89.77 ML
LVES V (TEICH): 43.57 ML
MV PEAK A VEL: 0.6 M/S
MV PEAK E VEL: 1.06 M/S
OHS CV RV/LV RATIO: 0.36 CM
PISA MRMAX VEL: 4.15 M/S
PISA TR MAX VEL: 1.7 M/S
PV MV: 0.67 M/S
PV PEAK GRADIENT: 4 MMHG
PV PEAK VELOCITY: 0.95 M/S
RA MAJOR: 2.98 CM
RA PRESSURE ESTIMATED: 3 MMHG
RA WIDTH: 2.4 CM
RIGHT VENTRICULAR END-DIASTOLIC DIMENSION: 1.62 CM
RV TB RVSP: 5 MMHG
RV TISSUE DOPPLER FREE WALL SYSTOLIC VELOCITY 1 (APICAL 4 CHAMBER VIEW): 9.44 CM/S
SINUS: 2.1 CM
STJ: 2.45 CM
TDI LATERAL: 0.13 M/S
TDI SEPTAL: 0.11 M/S
TDI: 0.12 M/S
TR MAX PG: 12 MMHG
TRICUSPID ANNULAR PLANE SYSTOLIC EXCURSION: 1.5 CM
TV REST PULMONARY ARTERY PRESSURE: 15 MMHG
Z-SCORE OF LEFT VENTRICULAR DIMENSION IN END DIASTOLE: -0.72
Z-SCORE OF LEFT VENTRICULAR DIMENSION IN END SYSTOLE: 0.83

## 2024-09-23 PROCEDURE — 93356 MYOCRD STRAIN IMG SPCKL TRCK: CPT | Mod: ,,, | Performed by: INTERNAL MEDICINE

## 2024-09-23 PROCEDURE — 93356 MYOCRD STRAIN IMG SPCKL TRCK: CPT

## 2024-09-23 PROCEDURE — 93306 TTE W/DOPPLER COMPLETE: CPT

## 2024-09-23 PROCEDURE — 93306 TTE W/DOPPLER COMPLETE: CPT | Mod: 26,,, | Performed by: INTERNAL MEDICINE

## 2024-11-12 ENCOUNTER — PATIENT MESSAGE (OUTPATIENT)
Dept: PRIMARY CARE CLINIC | Facility: CLINIC | Age: 39
End: 2024-11-12
Payer: MEDICAID

## 2024-12-03 ENCOUNTER — PATIENT MESSAGE (OUTPATIENT)
Dept: OBSTETRICS AND GYNECOLOGY | Facility: CLINIC | Age: 39
End: 2024-12-03
Payer: MEDICAID

## 2024-12-03 DIAGNOSIS — R87.619 ABNORMAL CERVICAL PAPANICOLAOU SMEAR, UNSPECIFIED ABNORMAL PAP FINDING: Primary | ICD-10-CM

## 2024-12-03 DIAGNOSIS — N64.89 GALACTOCELE: Primary | ICD-10-CM

## 2024-12-03 RX ORDER — DIAZEPAM 5 MG/1
5 TABLET ORAL ONCE
Qty: 1 TABLET | Refills: 0 | Status: SHIPPED | OUTPATIENT
Start: 2024-12-03 | End: 2024-12-03

## 2024-12-08 ENCOUNTER — PATIENT MESSAGE (OUTPATIENT)
Dept: OBSTETRICS AND GYNECOLOGY | Facility: CLINIC | Age: 39
End: 2024-12-08
Payer: MEDICAID

## 2024-12-18 ENCOUNTER — HOSPITAL ENCOUNTER (OUTPATIENT)
Dept: RADIOLOGY | Facility: HOSPITAL | Age: 39
Discharge: HOME | End: 2024-12-18
Attending: OBSTETRICS & GYNECOLOGY
Payer: MEDICAID

## 2024-12-18 DIAGNOSIS — N64.89 GALACTOCELE: ICD-10-CM

## 2024-12-18 PROCEDURE — 77061 BREAST TOMOSYNTHESIS UNI: CPT | Mod: TC,LT

## 2024-12-18 PROCEDURE — 77061 BREAST TOMOSYNTHESIS UNI: CPT | Mod: 26,LT,, | Performed by: RADIOLOGY

## 2024-12-18 PROCEDURE — 77065 DX MAMMO INCL CAD UNI: CPT | Mod: 26,LT,, | Performed by: RADIOLOGY

## 2024-12-18 PROCEDURE — 76642 ULTRASOUND BREAST LIMITED: CPT | Mod: TC,LT

## 2024-12-18 PROCEDURE — 76642 ULTRASOUND BREAST LIMITED: CPT | Mod: 26,LT,, | Performed by: RADIOLOGY

## 2024-12-20 ENCOUNTER — TELEPHONE (OUTPATIENT)
Dept: OBSTETRICS AND GYNECOLOGY | Facility: CLINIC | Age: 39
End: 2024-12-20
Payer: MEDICAID

## 2024-12-20 NOTE — TELEPHONE ENCOUNTER
----- Message from Mitzy Madison MD sent at 12/20/2024 11:27 AM CST -----  I thought we had landed on the LEEP for Jan but I don't see it scheduled. She is going to have to have a breast biopsy on 1/6 so see if she is fine with still doing the leep. I could do 930 that day for the LEEP.

## 2024-12-20 NOTE — TELEPHONE ENCOUNTER
Called pt to get scheduled for leep. No answer left vm to give the office a call back.    January 8 @ 9:30am?

## 2025-01-06 ENCOUNTER — PATIENT MESSAGE (OUTPATIENT)
Dept: OBSTETRICS AND GYNECOLOGY | Facility: CLINIC | Age: 40
End: 2025-01-06
Payer: MEDICAID

## 2025-01-06 ENCOUNTER — HOSPITAL ENCOUNTER (OUTPATIENT)
Dept: RADIOLOGY | Facility: HOSPITAL | Age: 40
Discharge: HOME | End: 2025-01-06
Attending: OBSTETRICS & GYNECOLOGY
Payer: MEDICAID

## 2025-01-06 DIAGNOSIS — N60.02 BREAST CYST, LEFT: ICD-10-CM

## 2025-01-06 DIAGNOSIS — R30.0 DYSURIA: ICD-10-CM

## 2025-01-06 DIAGNOSIS — R87.619 ABNORMAL CERVICAL PAPANICOLAOU SMEAR, UNSPECIFIED ABNORMAL PAP FINDING: ICD-10-CM

## 2025-01-06 DIAGNOSIS — Z01.818 PREOP EXAMINATION: Primary | ICD-10-CM

## 2025-01-06 PROCEDURE — 77065 DX MAMMO INCL CAD UNI: CPT | Mod: 26,LT,, | Performed by: RADIOLOGY

## 2025-01-06 PROCEDURE — 63600175 PHARM REV CODE 636 W HCPCS: Performed by: OBSTETRICS & GYNECOLOGY

## 2025-01-06 PROCEDURE — 27200934 US BREAST BIOPSY WITH IMAGING 1ST SITE LEFT

## 2025-01-06 PROCEDURE — 19083 BX BREAST 1ST LESION US IMAG: CPT | Mod: LT,,, | Performed by: RADIOLOGY

## 2025-01-06 PROCEDURE — 77065 DX MAMMO INCL CAD UNI: CPT | Mod: TC,LT

## 2025-01-06 RX ORDER — LIDOCAINE HYDROCHLORIDE AND EPINEPHRINE 10; 20 UG/ML; MG/ML
10 INJECTION, SOLUTION INFILTRATION; PERINEURAL ONCE
Status: COMPLETED | OUTPATIENT
Start: 2025-01-06 | End: 2025-01-06

## 2025-01-06 RX ORDER — DIAZEPAM 5 MG/1
5 TABLET ORAL ONCE
Qty: 1 TABLET | Refills: 0 | Status: SHIPPED | OUTPATIENT
Start: 2025-01-06 | End: 2025-01-08

## 2025-01-06 RX ORDER — LIDOCAINE HYDROCHLORIDE 20 MG/ML
10 INJECTION, SOLUTION INFILTRATION; PERINEURAL ONCE
Status: COMPLETED | OUTPATIENT
Start: 2025-01-06 | End: 2025-01-06

## 2025-01-06 RX ADMIN — LIDOCAINE HYDROCHLORIDE 10 ML: 20 INJECTION, SOLUTION INFILTRATION; PERINEURAL at 02:01

## 2025-01-06 RX ADMIN — LIDOCAINE HYDROCHLORIDE,EPINEPHRINE BITARTRATE 1 ML: 20; .01 INJECTION, SOLUTION INFILTRATION; PERINEURAL at 02:01

## 2025-01-08 RX ORDER — DIAZEPAM 5 MG/1
5 TABLET ORAL ONCE
Qty: 1 TABLET | Refills: 0 | Status: SHIPPED | OUTPATIENT
Start: 2025-01-08 | End: 2025-01-09

## 2025-01-09 ENCOUNTER — LAB VISIT (OUTPATIENT)
Dept: LAB | Facility: HOSPITAL | Age: 40
End: 2025-01-09
Attending: OBSTETRICS & GYNECOLOGY
Payer: MEDICAID

## 2025-01-09 DIAGNOSIS — R30.0 DYSURIA: ICD-10-CM

## 2025-01-09 LAB
BACTERIA #/AREA URNS AUTO: ABNORMAL /HPF
BILIRUB UR QL STRIP: NEGATIVE
CLARITY UR REFRACT.AUTO: ABNORMAL
COLOR UR AUTO: YELLOW
GLUCOSE UR QL STRIP: NEGATIVE
HGB UR QL STRIP: ABNORMAL
KETONES UR QL STRIP: NEGATIVE
LEUKOCYTE ESTERASE UR QL STRIP: ABNORMAL
MICROSCOPIC COMMENT: ABNORMAL
NITRITE UR QL STRIP: POSITIVE
NON-SQ EPI CELLS #/AREA URNS AUTO: 1 /HPF
PH UR STRIP: 6 [PH] (ref 5–8)
PROT UR QL STRIP: ABNORMAL
RBC #/AREA URNS AUTO: 7 /HPF (ref 0–4)
SP GR UR STRIP: 1.02 (ref 1–1.03)
SQUAMOUS #/AREA URNS AUTO: 19 /HPF
URN SPEC COLLECT METH UR: ABNORMAL
WBC #/AREA URNS AUTO: >100 /HPF (ref 0–5)
WBC CLUMPS UR QL AUTO: ABNORMAL

## 2025-01-09 PROCEDURE — 87088 URINE BACTERIA CULTURE: CPT | Performed by: OBSTETRICS & GYNECOLOGY

## 2025-01-09 PROCEDURE — 81001 URINALYSIS AUTO W/SCOPE: CPT | Performed by: OBSTETRICS & GYNECOLOGY

## 2025-01-09 PROCEDURE — 87086 URINE CULTURE/COLONY COUNT: CPT | Performed by: OBSTETRICS & GYNECOLOGY

## 2025-01-09 PROCEDURE — 87186 SC STD MICRODIL/AGAR DIL: CPT | Performed by: OBSTETRICS & GYNECOLOGY

## 2025-01-10 ENCOUNTER — PATIENT MESSAGE (OUTPATIENT)
Dept: OBSTETRICS AND GYNECOLOGY | Facility: CLINIC | Age: 40
End: 2025-01-10
Payer: MEDICAID

## 2025-01-10 RX ORDER — SULFAMETHOXAZOLE AND TRIMETHOPRIM 800; 160 MG/1; MG/1
1 TABLET ORAL 2 TIMES DAILY
Qty: 6 TABLET | Refills: 0 | Status: SHIPPED | OUTPATIENT
Start: 2025-01-10 | End: 2025-01-13

## 2025-01-11 LAB — BACTERIA UR CULT: ABNORMAL

## 2025-01-29 ENCOUNTER — TELEPHONE (OUTPATIENT)
Dept: RADIOLOGY | Facility: HOSPITAL | Age: 40
End: 2025-01-29
Payer: MEDICAID

## 2025-02-07 ENCOUNTER — LAB VISIT (OUTPATIENT)
Dept: LAB | Facility: HOSPITAL | Age: 40
End: 2025-02-07
Attending: OBSTETRICS & GYNECOLOGY
Payer: MEDICAID

## 2025-02-07 DIAGNOSIS — R30.0 DYSURIA: ICD-10-CM

## 2025-02-07 LAB
BACTERIA #/AREA URNS AUTO: ABNORMAL /HPF
BILIRUB UR QL STRIP: NEGATIVE
CLARITY UR REFRACT.AUTO: ABNORMAL
COLOR UR AUTO: YELLOW
GLUCOSE UR QL STRIP: NEGATIVE
HGB UR QL STRIP: NEGATIVE
KETONES UR QL STRIP: NEGATIVE
LEUKOCYTE ESTERASE UR QL STRIP: ABNORMAL
MICROSCOPIC COMMENT: ABNORMAL
NITRITE UR QL STRIP: NEGATIVE
PH UR STRIP: 6 [PH] (ref 5–8)
PROT UR QL STRIP: ABNORMAL
RBC #/AREA URNS AUTO: 2 /HPF (ref 0–4)
SP GR UR STRIP: 1.03 (ref 1–1.03)
SQUAMOUS #/AREA URNS AUTO: 10 /HPF
URN SPEC COLLECT METH UR: ABNORMAL
WBC #/AREA URNS AUTO: 20 /HPF (ref 0–5)

## 2025-02-07 PROCEDURE — 87086 URINE CULTURE/COLONY COUNT: CPT | Performed by: OBSTETRICS & GYNECOLOGY

## 2025-02-07 PROCEDURE — 81001 URINALYSIS AUTO W/SCOPE: CPT | Performed by: OBSTETRICS & GYNECOLOGY

## 2025-02-07 PROCEDURE — 87186 SC STD MICRODIL/AGAR DIL: CPT | Performed by: OBSTETRICS & GYNECOLOGY

## 2025-02-07 PROCEDURE — 87088 URINE BACTERIA CULTURE: CPT | Performed by: OBSTETRICS & GYNECOLOGY

## 2025-02-07 RX ORDER — NITROFURANTOIN 25; 75 MG/1; MG/1
100 CAPSULE ORAL 2 TIMES DAILY
Qty: 14 CAPSULE | Refills: 0 | Status: SHIPPED | OUTPATIENT
Start: 2025-02-07 | End: 2025-02-14

## 2025-02-10 ENCOUNTER — E-CONSULT (OUTPATIENT)
Dept: INFECTIOUS DISEASES | Facility: CLINIC | Age: 40
End: 2025-02-10
Payer: MEDICAID

## 2025-02-10 ENCOUNTER — PATIENT MESSAGE (OUTPATIENT)
Dept: OBSTETRICS AND GYNECOLOGY | Facility: CLINIC | Age: 40
End: 2025-02-10
Payer: MEDICAID

## 2025-02-10 DIAGNOSIS — B96.29 UTI DUE TO EXTENDED-SPECTRUM BETA LACTAMASE (ESBL) PRODUCING ESCHERICHIA COLI: Primary | ICD-10-CM

## 2025-02-10 DIAGNOSIS — Z16.12 UTI DUE TO EXTENDED-SPECTRUM BETA LACTAMASE (ESBL) PRODUCING ESCHERICHIA COLI: Primary | ICD-10-CM

## 2025-02-10 DIAGNOSIS — N39.0 UTI DUE TO EXTENDED-SPECTRUM BETA LACTAMASE (ESBL) PRODUCING ESCHERICHIA COLI: Primary | ICD-10-CM

## 2025-02-10 DIAGNOSIS — N30.00 ACUTE CYSTITIS WITHOUT HEMATURIA: Primary | ICD-10-CM

## 2025-02-10 LAB — BACTERIA UR CULT: ABNORMAL

## 2025-02-10 PROCEDURE — 99447 NTRPROF PH1/NTRNET/EHR 11-20: CPT | Mod: ,,, | Performed by: INTERNAL MEDICINE

## 2025-02-10 NOTE — CONSULTS
[facility] - Infectious Disease 1st Fl  Response for E-Consult     Patient Name: Clayton Duarte  MRN: [id number]  Primary Care Provider: Lesley Vanessa MD   Requesting Provider: Mitzy Madison MD  E-Consult to Infectious Disease  Consult performed by: Edwin Lion MD  Consult ordered by: Mitzy Madison MD        39 year old female with ESBL E coli UTI.  ID consulted as there are no standard oral options.    Recommendation:     There are no great oral options.  Patient could be given a trial of fosfomycin 3 grams po q 48 hours for a total of 3 doses.    If her insurance doesn't cover fosfomycin then I wouldn't make the patient patient the cost as it could be expensive.  Instead I would suggest an in-person referral to infectious diseases for IV antibiotics.      Additional future steps to consider: Notify ID if there are further questions.    Total time of Consultation: 15 minute    I did speak to the requesting provider verbally about this.     *This eConsult is based on the clinical data available to me and is furnished without benefit of a physical examination. The eConsult will need to be interpreted in light of any clinical issues or changes in patient status not available to me at the time of filing this eConsults. Significant changes in patient condition or level of acuity should result in immediate formal consultation and reevaluation. Please alert me if you have further questions.    Thank you for this eConsult referral.     MD [facility] Sagastume - Infectious Disease [facility]

## 2025-02-11 ENCOUNTER — PATIENT MESSAGE (OUTPATIENT)
Dept: OBSTETRICS AND GYNECOLOGY | Facility: CLINIC | Age: 40
End: 2025-02-11
Payer: MEDICAID

## 2025-02-11 DIAGNOSIS — Z16.12 UTI DUE TO EXTENDED-SPECTRUM BETA LACTAMASE (ESBL) PRODUCING ESCHERICHIA COLI: Primary | ICD-10-CM

## 2025-02-11 DIAGNOSIS — B96.29 UTI DUE TO EXTENDED-SPECTRUM BETA LACTAMASE (ESBL) PRODUCING ESCHERICHIA COLI: Primary | ICD-10-CM

## 2025-02-11 DIAGNOSIS — N39.0 UTI DUE TO EXTENDED-SPECTRUM BETA LACTAMASE (ESBL) PRODUCING ESCHERICHIA COLI: Primary | ICD-10-CM

## 2025-02-11 RX ORDER — GRANULES FOR ORAL 3 G/1
3 POWDER ORAL
Qty: 9 G | Refills: 0 | Status: SHIPPED | OUTPATIENT
Start: 2025-02-11 | End: 2025-02-16

## 2025-02-20 ENCOUNTER — PATIENT MESSAGE (OUTPATIENT)
Dept: PRIMARY CARE CLINIC | Facility: CLINIC | Age: 40
End: 2025-02-20
Payer: MEDICAID

## 2025-02-21 NOTE — TELEPHONE ENCOUNTER
Pt daughter want to establish care with you, and she has medicaid. Please advise if okay to schedule.

## 2025-03-07 ENCOUNTER — OCCUPATIONAL HEALTH (OUTPATIENT)
Dept: URGENT CARE | Facility: CLINIC | Age: 40
End: 2025-03-07

## 2025-03-07 DIAGNOSIS — Z23 ENCOUNTER FOR IMMUNIZATION: Primary | ICD-10-CM

## 2025-03-24 ENCOUNTER — TELEPHONE (OUTPATIENT)
Dept: RADIOLOGY | Facility: HOSPITAL | Age: 40
End: 2025-03-24
Payer: MEDICAID

## 2025-03-24 NOTE — TELEPHONE ENCOUNTER
Spoke to Jacqui in lab on 1-29-25 about the breast specimen for the breast biopsy done on 1-6-25 that had the wrong side on the pathology. Susie signed the paper to get it fixed on 1-29-25. Pathology says right and it is supposed to be LEFT breast.

## 2025-04-23 ENCOUNTER — PROCEDURE VISIT (OUTPATIENT)
Dept: OBSTETRICS AND GYNECOLOGY | Facility: CLINIC | Age: 40
End: 2025-04-23
Payer: MEDICAID

## 2025-04-23 VITALS
DIASTOLIC BLOOD PRESSURE: 72 MMHG | SYSTOLIC BLOOD PRESSURE: 119 MMHG | BODY MASS INDEX: 24.57 KG/M2 | HEIGHT: 65 IN | WEIGHT: 147.5 LBS

## 2025-04-23 DIAGNOSIS — D06.9 CIN III (CERVICAL INTRAEPITHELIAL NEOPLASIA GRADE III) WITH SEVERE DYSPLASIA: Primary | ICD-10-CM

## 2025-04-23 DIAGNOSIS — Z12.39 ENCOUNTER FOR SCREENING FOR MALIGNANT NEOPLASM OF BREAST, UNSPECIFIED SCREENING MODALITY: Primary | ICD-10-CM

## 2025-04-23 PROCEDURE — 99499 UNLISTED E&M SERVICE: CPT | Mod: S$PBB,,, | Performed by: OBSTETRICS & GYNECOLOGY

## 2025-04-23 NOTE — PROCEDURES
Patient elected to defer LEEP until next week when she has a ride home. Consents for procedure signed. Appt rescheduled to next week.

## 2025-04-28 ENCOUNTER — PROCEDURE VISIT (OUTPATIENT)
Dept: OBSTETRICS AND GYNECOLOGY | Facility: CLINIC | Age: 40
End: 2025-04-28
Payer: MEDICAID

## 2025-04-28 ENCOUNTER — TELEPHONE (OUTPATIENT)
Dept: PRIMARY CARE CLINIC | Facility: CLINIC | Age: 40
End: 2025-04-28
Payer: MEDICAID

## 2025-04-28 VITALS
WEIGHT: 144.63 LBS | BODY MASS INDEX: 24.1 KG/M2 | SYSTOLIC BLOOD PRESSURE: 118 MMHG | DIASTOLIC BLOOD PRESSURE: 69 MMHG | HEIGHT: 65 IN

## 2025-04-28 DIAGNOSIS — D06.9 CIN III (CERVICAL INTRAEPITHELIAL NEOPLASIA GRADE III) WITH SEVERE DYSPLASIA: Primary | ICD-10-CM

## 2025-04-28 DIAGNOSIS — I10 HYPERTENSION, UNSPECIFIED TYPE: ICD-10-CM

## 2025-04-28 DIAGNOSIS — D50.9 IRON DEFICIENCY ANEMIA, UNSPECIFIED IRON DEFICIENCY ANEMIA TYPE: ICD-10-CM

## 2025-04-28 DIAGNOSIS — Z00.00 ANNUAL PHYSICAL EXAM: ICD-10-CM

## 2025-04-28 DIAGNOSIS — Z32.02 NEGATIVE PREGNANCY TEST: ICD-10-CM

## 2025-04-28 DIAGNOSIS — E55.9 VITAMIN D DEFICIENCY: Primary | ICD-10-CM

## 2025-04-28 LAB
B-HCG UR QL: NEGATIVE
CTP QC/QA: YES

## 2025-04-28 PROCEDURE — 99999PBSHW POCT URINE PREGNANCY: Mod: PBBFAC,,,

## 2025-04-28 PROCEDURE — 57522 CONIZATION OF CERVIX: CPT | Mod: 51,S$PBB,, | Performed by: OBSTETRICS & GYNECOLOGY

## 2025-04-28 PROCEDURE — 81025 URINE PREGNANCY TEST: CPT | Mod: PBBFAC | Performed by: OBSTETRICS & GYNECOLOGY

## 2025-04-28 PROCEDURE — 88307 TISSUE EXAM BY PATHOLOGIST: CPT | Mod: TC | Performed by: OBSTETRICS & GYNECOLOGY

## 2025-04-28 PROCEDURE — 99499 UNLISTED E&M SERVICE: CPT | Mod: 51,S$PBB,, | Performed by: OBSTETRICS & GYNECOLOGY

## 2025-04-28 PROCEDURE — 64435 NJX AA&/STRD PARACRV NRV: CPT | Mod: PBBFAC | Performed by: OBSTETRICS & GYNECOLOGY

## 2025-04-28 PROCEDURE — 57522 CONIZATION OF CERVIX: CPT | Mod: PBBFAC | Performed by: OBSTETRICS & GYNECOLOGY

## 2025-04-28 RX ORDER — ASCORBIC ACID, CHOLECALCIFEROL, .ALPHA.-TOCOPHEROL ACETATE, DL-, PYRIDOXINE HYDROCHLORIDE, FOLIC ACID, CYANOCOBALAMIN, BIOTIN, CALCIUM CARBONATE, FERROUS ASPARTO GLYCINATE, IRON, POTASSIUM IODIDE, MAGNESIUM OXIDE, DOCONEXENT AND LOWBUSH BLUEBERRY 60; 1000; 10; 26; 400; 13; 280; 80; 9; 9; 150; 25; 350; 25; 600 MG/1; [IU]/1; [IU]/1; MG/1; UG/1; UG/1; UG/1; MG/1; MG/1; MG/1; UG/1; MG/1; MG/1; MG/1; UG/1
1 CAPSULE, GELATIN COATED ORAL DAILY
Qty: 30 CAPSULE | Refills: 11 | Status: SHIPPED | OUTPATIENT
Start: 2025-04-28

## 2025-04-28 NOTE — TELEPHONE ENCOUNTER
Spoke with pt. Pt stated that she had a last minute appointment that she had to go to and would not be able to come to her scheduled appt today. Pt rescheduled to 05/19/25 at 2:40 pm to see Dr. Vanessa. Pt also scheduled to have annual lab work done. Labs ordered based off of last set of labs ordered by Dr. Vanessa.

## 2025-04-28 NOTE — PROCEDURES
Procedures  LEEP PROCEDURE:    32 year old female patient  with Patient's last menstrual period was 2025. is here for LEEP.    DATA REVIEWED:  Last Pap Date: 2023      Pap Result: high-grade squamous intraepithelial neoplasia  (HGSIL-encompassing moderate and severe dysplasia)  Last Colposcopy biopsy result :  2024 - ANIYAH II-III. ECC ANIYAH I.     PRE- LEEP PROCEDURE COUNSELING:  Risks of infection, bleeding, pain, injury to adjacent organs as well as future cervicalincompetence.  That this procedure does not guarantee to completely remove all abnormal cells and that dysplasia may reoccur in the future.  Alternatives to the LEEP procedure were discussed and the patient agrees to proceed.    PROCEDURE:  TIME OUT PERFORMED.  The cervix and transformation zone were visualized with a speculum and a local block was obtained with 20 cc of 1% lidocaine plain.  A benz cone loop was selected.  The entire transformation zone was excised.  The base was cauterized with a ball cautery. Post LEEP ECC was obtained.  Monsels solution was applied to the base to obtain hemostasis and the speculum removed.  The specimen was placed in formalin and sent to Pathology for a Histopathologic diagnosis. The patient tolerated the procedure well.    ASSESSMENT:  1.   Encounter Diagnoses   Name Primary?    Negative pregnancy test     Postpartum care and examination     ANIYAH III (cervical intraepithelial neoplasia grade III) with severe dysplasia Yes       POST LEEP PROCEDURE COUNSELING:  Manage post LEEP cramping with NSAIDs or Tylenol or Rx per [employer].  Avoid anything in vagina (intercourse, douching, tampons) two weeks after the Procedure.  Expect a watery grey to yellow vaginal discharge for several weeks.  Limit activity for 2 weeks.  Report bleeding heavier than a period, worsening pain, fever > 101.0 F, or foul-smelling vaginal discharge.  Importance of follow-up stressed.    Counseling lasted approximately 15 minutes  and all her questions were answered.    FOLLOW-UP based on pathology results.

## 2025-04-30 ENCOUNTER — PATIENT MESSAGE (OUTPATIENT)
Dept: OBSTETRICS AND GYNECOLOGY | Facility: CLINIC | Age: 40
End: 2025-04-30
Payer: MEDICAID

## 2025-04-30 LAB
ESTROGEN SERPL-MCNC: NORMAL PG/ML
INSULIN SERPL-ACNC: NORMAL U[IU]/ML
LAB AP DIAGNOSIS CATEGORY: NORMAL
LAB AP GROSS DESCRIPTION: NORMAL
LAB AP PERFORMING LOCATION(S): NORMAL
LAB AP REPORT FOOTNOTES: NORMAL

## 2025-05-15 ENCOUNTER — TELEPHONE (OUTPATIENT)
Dept: OBSTETRICS AND GYNECOLOGY | Facility: CLINIC | Age: 40
End: 2025-05-15
Payer: MEDICAID

## 2025-05-15 RX ORDER — NITROFURANTOIN 25; 75 MG/1; MG/1
CAPSULE ORAL
Qty: 14 CAPSULE | Refills: 0 | OUTPATIENT
Start: 2025-05-15

## 2025-05-15 NOTE — TELEPHONE ENCOUNTER
Refill Routing Note   Medication(s) are not appropriate for processing by [facility] for the following reason(s):        Outside of protocol    [facility] action(s):  Route               Appointments  past 12m or future 3m with PCP    Date Provider   Last Visit   4/28/2025 Mitzy Madison MD   Next Visit   7/11/2025 Mitzy Madison MD   ED visits in past 90 days: 0        Note composed:7:05 AM 05/15/2025

## 2025-06-20 ENCOUNTER — PATIENT MESSAGE (OUTPATIENT)
Dept: OBSTETRICS AND GYNECOLOGY | Facility: CLINIC | Age: 40
End: 2025-06-20
Payer: MEDICAID

## 2025-06-20 NOTE — PROGRESS NOTES
HISTORY OF PRESENT ILLNESS:    Clayton Duarte is a 39 y.o. female, , Patient's last menstrual period was 2025 (exact date).,  presents with c/o vaginal irritation and discharge. Pt reports using a new body scrub on upper body, uses dove with manual internal washings.     Past Medical History:   Diagnosis Date    Abnormal Pap smear of cervix ,     colposcopy    Anemia     Coronary artery dissection 2013    Spinal headache complicating labor and delivery, postpartum condition 2013     Past Surgical History:   Procedure Laterality Date    BREAST BIOPSY Left 2024    CORONARY ARTERY BYPASS GRAFT  2013    tanner to lad, svg OM1    POSTPARTUM LIGATION OF FALLOPIAN TUBE Bilateral 2023    Procedure: LIGATION, FALLOPIAN TUBE, POSTPARTUM;  Surgeon: Mitzy Madison MD;  Location: [facility];  Service: OB/GYN;  Laterality: Bilateral;    WISDOM TOOTH EXTRACTION       MEDICATIONS AND ALLERGIES:    Current Medications[1]    Review of patient's allergies indicates:   Allergen Reactions    Bananas [banana] Itching    Peanut butter flavor Hives     Family History   Problem Relation Name Age of Onset    Hypertension Mother      Diabetes Mother      Stroke Mother      Breast cancer Neg Hx      Ovarian cancer Neg Hx      Colon cancer Neg Hx      Cancer Neg Hx      Heart disease Neg Hx       Social History[2]    ROS:  GENERAL: No weight changes. No swelling. No fatigue. No fever.  BREASTS: No pain. No lumps. No discharge.  ABDOMEN: No pain. No nausea. No vomiting. No diarrhea. No constipation.  REPRODUCTIVE: No abnormal bleeding.   VULVA: No pain. No lesions. No itching.  VAGINA: No relaxation. No itching. No odor. Reports discharge. No lesions. Reports irritation.  URINARY: No incontinence. No nocturia. No frequency. No dysuria.    /74 (BP Location: Left arm, Patient Position: Sitting)   Wt 66.4 kg (146 lb 6.2 oz)   LMP 2025 (Exact Date)   Breastfeeding No   BMI 24.36  kg/m²     PE:  APPEARANCE: Well nourished, well developed, in no acute distress.  AFFECT: WNL, alert and oriented x 3.  SKIN: Warm and dry  PELVIC: External female genitalia without lesions.  Female hair distribution. Adequate perineal body, Normal urethral meatus. Whitish clumpy discharge noted well rugated without lesions or discharge.  No significant cystocele or rectocele present. Cervix pink without lesions, discharge or tenderness. Uterus is normal in size, mobile and nontender. Adnexa without masses or tenderness.  EXTREMITIES: No edema    DIAGNOSIS:  1. Vaginal irritation    2. Encntr screen for infections w sexl mode of transmiss    3. Vaginal discharge      PLAN:  -Affirm & GC/CT culture collected today; will send diflucan to pharmacy     COUNSELING:  Patient counseled on vaginitis prevention including: May use probiotics daily & boric acid vaginal suppositories to maintain proper vaginal pH  a. avoiding feminine products such as deoderant soaps, body wash, bubble bath, douches, scented toilet paper, deoderant tampons or pads, feminine wipes, chronic pad use, etc.  b. avoiding other vulvovaginal irritants such as long hot baths, humidity, tight, synthetic clothing, chlorine and sitting around in wet bathing suits  c. wearing cotton underwear, avoiding thong underwear and no underwear to bed  d. taking showers instead of baths and use a hair dryer on cool setting afterwards to dry  e. wearing cotton to exercise and shower immediately after exercise and change clothes  f. using polyurethane condoms without spermicide if sexually active and symptoms are triggered by intercourse     FOLLOW-UP with me as needed or if symptoms persist or worsen    Edwina Doran, DNP, RN, APRN, WHNP-BC  [facility]          [1]   Current Outpatient Medications:     aspirin 81 mg Cap, Take 81 mg by mouth once daily., Disp: , Rfl:     metoprolol succinate (TOPROL-XL) 25 MG 24 hr tablet, Take 1  tablet (25 mg total) by mouth once daily., Disp: 90 tablet, Rfl: 3    NIFEdipine (PROCARDIA-XL) 30 MG (OSM) 24 hr tablet, Take 1 tablet (30 mg total) by mouth once daily., Disp: 90 tablet, Rfl: 1    prenatal vit 87-iron-folic-dha (PRENATE MINI, FERR ASP GLYCIN,) 18-1-350 mg Cap, Take 1 capsule by mouth once daily., Disp: 30 capsule, Rfl: 11    acetaminophen (TYLENOL) 325 MG tablet, Take 2 tablets (650 mg total) by mouth every 6 (six) hours. (Patient not taking: Reported on 4/17/2024), Disp: 60 tablet, Rfl: 3    diazePAM (VALIUM) 5 MG tablet, Take 1 tablet (5 mg total) by mouth once. for 1 dose, Disp: 1 tablet, Rfl: 0    furosemide (LASIX) 20 MG tablet, Take 0.5 tablets (10 mg total) by mouth daily as needed. Please take one half tablet (10mg) if you gain more than 3 pounds in 1 day or more than 5 pounds in 1 week, or if you begin having difficulty breathing while lying flat. (Patient not taking: Reported on 9/16/2024), Disp: 30 tablet, Rfl: 11  [2]   Social History  Socioeconomic History    Marital status: Single    Number of children: 3    Years of education: College   Occupational History    Occupation: unemployed     Comment: [employer]   Tobacco Use    Smoking status: Never    Smokeless tobacco: Never   Substance and Sexual Activity    Alcohol use: Yes     Comment: occs    Drug use: No    Sexual activity: Yes     Partners: Male     Comment:      Social Drivers of Health     Financial Resource Strain: Low Risk  (4/11/2024)    Overall Financial Resource Strain (CARDIA)     Difficulty of Paying Living Expenses: Not hard at all   Food Insecurity: No Food Insecurity (4/11/2024)    Hunger Vital Sign     Worried About Running Out of Food in the Last Year: Never true     Ran Out of Food in the Last Year: Never true   Transportation Needs: No Transportation Needs (4/11/2024)    PRAPARE - Transportation     Lack of Transportation (Medical): No     Lack of Transportation (Non-Medical): No   Physical  Activity: Inactive (4/11/2024)    Exercise Vital Sign     Days of Exercise per Week: 0 days     Minutes of Exercise per Session: 0 min   Stress: No Stress Concern Present (4/11/2024)    [employer] [employer] - Occupational Stress Questionnaire     Feeling of Stress : Not at all   Housing Stability: Low Risk  (12/5/2023)    Housing Stability Vital Sign     Unable to Pay for Housing in the Last Year: No     Number of Places Lived in the Last Year: 1     Unstable Housing in the Last Year: No

## 2025-06-23 ENCOUNTER — OFFICE VISIT (OUTPATIENT)
Dept: OBSTETRICS AND GYNECOLOGY | Facility: CLINIC | Age: 40
End: 2025-06-23
Payer: MEDICAID

## 2025-06-23 VITALS
WEIGHT: 146.38 LBS | DIASTOLIC BLOOD PRESSURE: 74 MMHG | BODY MASS INDEX: 24.36 KG/M2 | SYSTOLIC BLOOD PRESSURE: 125 MMHG

## 2025-06-23 DIAGNOSIS — N89.8 VAGINAL DISCHARGE: ICD-10-CM

## 2025-06-23 DIAGNOSIS — N89.8 VAGINAL IRRITATION: Primary | ICD-10-CM

## 2025-06-23 DIAGNOSIS — Z11.3 ENCNTR SCREEN FOR INFECTIONS W SEXL MODE OF TRANSMISS: ICD-10-CM

## 2025-06-23 PROCEDURE — 1160F RVW MEDS BY RX/DR IN RCRD: CPT | Mod: CPTII,,, | Performed by: NURSE PRACTITIONER

## 2025-06-23 PROCEDURE — 87491 CHLMYD TRACH DNA AMP PROBE: CPT | Performed by: NURSE PRACTITIONER

## 2025-06-23 PROCEDURE — 99214 OFFICE O/P EST MOD 30 MIN: CPT | Mod: 24,S$PBB,, | Performed by: NURSE PRACTITIONER

## 2025-06-23 PROCEDURE — 1159F MED LIST DOCD IN RCRD: CPT | Mod: CPTII,,, | Performed by: NURSE PRACTITIONER

## 2025-06-23 PROCEDURE — 99999 PR PBB SHADOW E&M-EST. PATIENT-LVL III: CPT | Mod: PBBFAC,,, | Performed by: NURSE PRACTITIONER

## 2025-06-23 PROCEDURE — 3074F SYST BP LT 130 MM HG: CPT | Mod: CPTII,,, | Performed by: NURSE PRACTITIONER

## 2025-06-23 PROCEDURE — 3008F BODY MASS INDEX DOCD: CPT | Mod: CPTII,,, | Performed by: NURSE PRACTITIONER

## 2025-06-23 PROCEDURE — 99213 OFFICE O/P EST LOW 20 MIN: CPT | Mod: PBBFAC,PN | Performed by: NURSE PRACTITIONER

## 2025-06-23 PROCEDURE — 3078F DIAST BP <80 MM HG: CPT | Mod: CPTII,,, | Performed by: NURSE PRACTITIONER

## 2025-06-23 PROCEDURE — 81515 NFCT DS BV&VAGINITIS DNA ALG: CPT | Performed by: NURSE PRACTITIONER

## 2025-06-23 RX ORDER — FLUCONAZOLE 150 MG/1
150 TABLET ORAL
Qty: 3 TABLET | Refills: 0 | Status: SHIPPED | OUTPATIENT
Start: 2025-06-23

## 2025-06-24 ENCOUNTER — PATIENT MESSAGE (OUTPATIENT)
Dept: PRIMARY CARE CLINIC | Facility: CLINIC | Age: 40
End: 2025-06-24
Payer: MEDICAID

## 2025-06-25 ENCOUNTER — RESULTS FOLLOW-UP (OUTPATIENT)
Dept: OBSTETRICS AND GYNECOLOGY | Facility: CLINIC | Age: 40
End: 2025-06-25

## 2025-06-25 ENCOUNTER — HOSPITAL ENCOUNTER (OUTPATIENT)
Dept: RADIOLOGY | Facility: HOSPITAL | Age: 40
Discharge: HOME | End: 2025-06-25
Attending: OBSTETRICS & GYNECOLOGY
Payer: MEDICAID

## 2025-06-25 DIAGNOSIS — Z12.39 ENCOUNTER FOR SCREENING FOR MALIGNANT NEOPLASM OF BREAST, UNSPECIFIED SCREENING MODALITY: ICD-10-CM

## 2025-06-25 LAB
BACTERIAL VAGINOSIS DNA (OHS): NOT DETECTED
C TRACH DNA SPEC QL NAA+PROBE: NOT DETECTED
CANDIDA GLABRATA/KRUSEI DNA (OHS): NOT DETECTED
CANDIDA SPECIES DNA (OHS): DETECTED
CTGC SOURCE (OHS) ORD-325: NORMAL
N GONORRHOEA DNA UR QL NAA+PROBE: NOT DETECTED
TRICHOMONAS VAGINALIS DNA (OHS): NOT DETECTED

## 2025-06-25 PROCEDURE — 77067 SCR MAMMO BI INCL CAD: CPT | Mod: TC,PO

## 2025-07-01 ENCOUNTER — RESULTS FOLLOW-UP (OUTPATIENT)
Dept: OBSTETRICS AND GYNECOLOGY | Facility: CLINIC | Age: 40
End: 2025-07-01

## 2025-07-09 ENCOUNTER — OFFICE VISIT (OUTPATIENT)
Dept: PRIMARY CARE CLINIC | Facility: CLINIC | Age: 40
End: 2025-07-09
Payer: MEDICAID

## 2025-07-09 VITALS
DIASTOLIC BLOOD PRESSURE: 68 MMHG | WEIGHT: 144.81 LBS | HEART RATE: 94 BPM | OXYGEN SATURATION: 99 % | HEIGHT: 65 IN | BODY MASS INDEX: 24.12 KG/M2 | SYSTOLIC BLOOD PRESSURE: 120 MMHG

## 2025-07-09 DIAGNOSIS — Z72.820 POOR SLEEP: ICD-10-CM

## 2025-07-09 DIAGNOSIS — F43.9 SITUATIONAL STRESS: ICD-10-CM

## 2025-07-09 DIAGNOSIS — Z13.6 ENCOUNTER FOR LIPID SCREENING FOR CARDIOVASCULAR DISEASE: ICD-10-CM

## 2025-07-09 DIAGNOSIS — D50.9 IRON DEFICIENCY ANEMIA, UNSPECIFIED IRON DEFICIENCY ANEMIA TYPE: ICD-10-CM

## 2025-07-09 DIAGNOSIS — N63.22 MASS OF UPPER INNER QUADRANT OF LEFT BREAST: ICD-10-CM

## 2025-07-09 DIAGNOSIS — Z00.00 ANNUAL PHYSICAL EXAM: Primary | ICD-10-CM

## 2025-07-09 DIAGNOSIS — E55.9 VITAMIN D DEFICIENCY: ICD-10-CM

## 2025-07-09 DIAGNOSIS — Z86.79 HISTORY OF CAD (CORONARY ARTERY DISEASE): ICD-10-CM

## 2025-07-09 DIAGNOSIS — Z13.220 ENCOUNTER FOR LIPID SCREENING FOR CARDIOVASCULAR DISEASE: ICD-10-CM

## 2025-07-09 DIAGNOSIS — R79.89 LOW TSH LEVEL: ICD-10-CM

## 2025-07-09 PROBLEM — Z32.01 POSITIVE PREGNANCY TEST: Status: RESOLVED | Noted: 2023-04-27 | Resolved: 2025-07-09

## 2025-07-09 PROBLEM — O09.529 AMA (ADVANCED MATERNAL AGE) MULTIGRAVIDA 35+: Status: RESOLVED | Noted: 2023-05-05 | Resolved: 2025-07-09

## 2025-07-09 PROBLEM — I50.9 ACUTE HEART FAILURE: Status: RESOLVED | Noted: 2023-12-04 | Resolved: 2025-07-09

## 2025-07-09 PROBLEM — R07.89 CHEST PAIN, NON-CARDIAC: Status: RESOLVED | Noted: 2017-02-15 | Resolved: 2025-07-09

## 2025-07-09 PROBLEM — Z34.90 ENCOUNTER FOR ELECTIVE INDUCTION OF LABOR: Status: RESOLVED | Noted: 2023-11-29 | Resolved: 2025-07-09

## 2025-07-09 PROCEDURE — 99396 PREV VISIT EST AGE 40-64: CPT | Mod: S$PBB,,, | Performed by: FAMILY MEDICINE

## 2025-07-09 PROCEDURE — 3078F DIAST BP <80 MM HG: CPT | Mod: CPTII,,, | Performed by: FAMILY MEDICINE

## 2025-07-09 PROCEDURE — 99214 OFFICE O/P EST MOD 30 MIN: CPT | Mod: PBBFAC,PN | Performed by: FAMILY MEDICINE

## 2025-07-09 PROCEDURE — 3008F BODY MASS INDEX DOCD: CPT | Mod: CPTII,,, | Performed by: FAMILY MEDICINE

## 2025-07-09 PROCEDURE — 99999 PR PBB SHADOW E&M-EST. PATIENT-LVL IV: CPT | Mod: PBBFAC,,, | Performed by: FAMILY MEDICINE

## 2025-07-09 PROCEDURE — 1160F RVW MEDS BY RX/DR IN RCRD: CPT | Mod: CPTII,,, | Performed by: FAMILY MEDICINE

## 2025-07-09 PROCEDURE — 3074F SYST BP LT 130 MM HG: CPT | Mod: CPTII,,, | Performed by: FAMILY MEDICINE

## 2025-07-09 PROCEDURE — 1159F MED LIST DOCD IN RCRD: CPT | Mod: CPTII,,, | Performed by: FAMILY MEDICINE

## 2025-07-09 NOTE — PROGRESS NOTES
"Subjective:       Patient ID: Clayton Duarte is a 39 y.o. female.    Chief Complaint: Annual Exam (Pt here for annual exam)    HPI   38 y/o female with Iron def anemia, Vitamin D deficiency, post partum SCAD s/p CABG X2 2013 is here for annual exam.    She has L breast mass that was biopsied in 2025 was benign, she feels its getting bigger, she denies nipple discharge or pain, recent mmg 2025 reports stable L breast mass. Stopped breast feeding 4 months ago.    She denies f/n/v/d/constipation/cp/sob/urinary sx. Cycles regular. She is not sleeping well, she can fall asleep but has trouble staying asleep, her son sleeps through the night. She is active in general no dedicated exercise. She does not do caffeine. Has alcohol a few times a month. She has good sleep hygiene with her baby. She has increased stress about not sleeping well, she denies lack of motivation/lack of concentration/dysphoria, some easy agitation at times. She does naps from 12-3 about 2-3 days a week. She has not tried otc meds. She is eating healthy.         SCAD: following with cardiology, Toprol XL 25 mg daily, Nifedipine XL 30 mg daily, off lipitor 80 mg daily, off asa 81 daily; Echo EF 50-55%2024  Low TSH/free T4 normal  Vitamin D deficiency: off D3  Iron def anemia: off iron supplement  GYN: following with Dr. Madison, s/p  with tubal ligation 2023 post partum CHF exacerbation/HTN at the time, pelvic utd  Eye exam utd  Dental exam utd  OTC: prenatal with iron     Review of Systems    Objective:      /68 (BP Location: Left arm, Patient Position: Sitting)   Pulse 94   Ht 5' 5" (1.651 m)   Wt 65.7 kg (144 lb 13.5 oz)   LMP 2025 (Exact Date)   SpO2 99%   Breastfeeding No   BMI 24.10 kg/m²   Physical Exam  Vitals and nursing note reviewed.   Constitutional:       Appearance: She is well-developed.   HENT:      Head: Normocephalic and atraumatic.      Right Ear: Tympanic membrane normal.      Left Ear: Tympanic " "membrane normal.      Mouth/Throat:      Pharynx: No oropharyngeal exudate or posterior oropharyngeal erythema.   Neck:      Thyroid: No thyromegaly.   Cardiovascular:      Rate and Rhythm: Normal rate and regular rhythm.      Heart sounds: Normal heart sounds.   Pulmonary:      Effort: Pulmonary effort is normal. No respiratory distress.      Breath sounds: Normal breath sounds.   Abdominal:      General: Bowel sounds are normal. There is no distension.      Palpations: Abdomen is soft. There is no mass.      Tenderness: There is no abdominal tenderness.   Musculoskeletal:      Cervical back: Normal range of motion and neck supple.      Right lower leg: No edema.      Left lower leg: No edema.   Lymphadenopathy:      Cervical: No cervical adenopathy.   Skin:     General: Skin is warm and dry.   Neurological:      Mental Status: She is alert.         Assessment:       1. Annual physical exam    2. Mass of upper inner quadrant of left breast    3. Poor sleep    4. Situational stress    5. History of CAD (coronary artery disease)    6. Vitamin D deficiency    7. Iron deficiency anemia, unspecified iron deficiency anemia type    8. Low TSH level    9. Encounter for lipid screening for cardiovascular disease        Plan:   Clayton Espana" was seen today for annual exam.    Diagnoses and all orders for this visit:    Annual physical exam  -     CBC Auto Differential; Future  -     Comprehensive Metabolic Panel; Future  -     Hemoglobin A1C; Future  -     Lipid Panel; Future  -     Vitamin D; Future  -     TSH; Future  -     Vitamin B12; Future  -     Iron and TIBC; Future  -     Folate; Future  -     Ferritin; Future    Mass of upper inner quadrant of left breast  -     Ambulatory referral/consult to Breast Surgery; Future    Poor sleep  -     Ambulatory referral/consult to Psychology; Future    Situational stress  -     Ambulatory referral/consult to Psychology; Future    History of CAD (coronary artery " disease)    Vitamin D deficiency  -     Vitamin D; Future    Iron deficiency anemia, unspecified iron deficiency anemia type  -     CBC Auto Differential; Future  -     Vitamin B12; Future  -     Iron and TIBC; Future  -     Folate; Future  -     Ferritin; Future    Low TSH level  -     TSH; Future    Encounter for lipid screening for cardiovascular disease  -     Lipid Panel; Future